# Patient Record
Sex: FEMALE | Race: WHITE | NOT HISPANIC OR LATINO | Employment: FULL TIME | URBAN - METROPOLITAN AREA
[De-identification: names, ages, dates, MRNs, and addresses within clinical notes are randomized per-mention and may not be internally consistent; named-entity substitution may affect disease eponyms.]

---

## 2017-01-25 ENCOUNTER — OFFICE VISIT (OUTPATIENT)
Dept: URGENT CARE | Facility: RETAIL CLINIC | Age: 52
End: 2017-01-25
Payer: COMMERCIAL

## 2017-01-25 VITALS — SYSTOLIC BLOOD PRESSURE: 146 MMHG | DIASTOLIC BLOOD PRESSURE: 94 MMHG | HEART RATE: 91 BPM | TEMPERATURE: 97.6 F

## 2017-01-25 DIAGNOSIS — J01.90 ACUTE SINUSITIS WITH COEXISTING CONDITION, NEED PROPHYLACTIC TREATMENT: Primary | ICD-10-CM

## 2017-01-25 DIAGNOSIS — Z23 NEED FOR PROPHYLACTIC VACCINATION AND INOCULATION AGAINST INFLUENZA: ICD-10-CM

## 2017-01-25 PROCEDURE — 90471 IMMUNIZATION ADMIN: CPT | Performed by: PHYSICIAN ASSISTANT

## 2017-01-25 PROCEDURE — 90686 IIV4 VACC NO PRSV 0.5 ML IM: CPT | Performed by: PHYSICIAN ASSISTANT

## 2017-01-25 PROCEDURE — 99213 OFFICE O/P EST LOW 20 MIN: CPT | Mod: 25 | Performed by: PHYSICIAN ASSISTANT

## 2017-01-25 RX ORDER — AZITHROMYCIN 500 MG/1
500 TABLET, FILM COATED ORAL DAILY
Qty: 3 TABLET | Refills: 0 | Status: SHIPPED | OUTPATIENT
Start: 2017-01-25 | End: 2017-01-28

## 2017-01-25 NOTE — PROGRESS NOTES
Chief Complaint   Patient presents with     Sinus Problem     x 7+ days, no fevers, sinus pressure and pain     Flu Shot        SUBJECTIVE:  Ophelia Wolf is a 51 year old female here with concerns about sinus infection.  She states onset of symptoms were 1 week(s) ago.  She has had maxillary, frontal pressure. Course of illness is worsening. Severity moderate  Current and Associated symptoms: nasal congestion, rhinorrhea, ear pressure bilateral, facial pain/pressure, tooth pain, headache and post-nasal drainage  Predisposing factors include HX of recurrent sinusitis and recent illness. Recent treatment has included: nasal spray, OTC cold med  Is leaving for [place] in 2 days    Past Medical History   Diagnosis Date     Lesion of plantar nerve      Buckley's neuroma/metatarsalgia     NONSPECIFIC MEDICAL HISTORY      pseudocholinesterase deficiency     Tobacco use disorder      Tension headache      UTERINE LEIOMYOMA NOS 4/26/2007     Dysmenorrhea 3/13/2007     CLASS MIGRAIN W/O MENTN INTRACTABLE 12/15/2004     ALLERGIC RHINITIS NOS 8/8/2002     History of rabies vaccination      Headache      Premenstrual tension syndrome      Current Outpatient Prescriptions   Medication Sig Dispense Refill     Chlorpheniramine-Phenylephrine (SINUS & ALLERGY PO)        azithromycin (ZITHROMAX) 500 MG tablet Take 1 tablet (500 mg) by mouth daily for 3 days 3 tablet 0     HYDROcodone-acetaminophen (NORCO) 5-325 MG per tablet Take 1 tablet by mouth every 6 hours as needed for pain 4 tablet 0     SUMAtriptan (IMITREX) 100 MG tablet Take 1 tablet (100 mg) by mouth at onset of headache for migraine 18 tablet prn     UNABLE TO FIND daily MEDICATION NAME: osteosheath       BIOTIN 5000 PO Take by mouth daily       Progesterone Micronized (PROGESTERONE PO) Take 20 mg by mouth daily       UNABLE TO FIND 5 mg daily MEDICATION NAME: biosWEEZEVENT       Vitamin D-Vitamin K (D3 + K2 DOTS) 1000-90 UNIT-MCG TABS Take 1 tablet by mouth daily       Calcium  Carbonate-Vitamin D (CALCIUM + D PO) Take  by mouth 2 times daily.          Allergies   Allergen Reactions     Amoxicillin Difficulty breathing     Anesthetic Ether      pseudocholinesterase deficiency        History   Smoking status     Former Smoker -- 0.50 packs/day for 10 years     Types: Cigarettes     Quit date: 11/30/2007   Smokeless tobacco     Never Used     Comment: 3-4 cig/day       ROS:  Review of systems negative except as stated above.    OBJECTIVE:  /94 mmHg  Pulse 91  Temp(Src) 97.6  F (36.4  C) (Oral)  LMP 06/16/2007  Exam:GENERAL APPEARANCE: moderately ill appearing  EYES: conjunctiva clear  HENT: ear canals clear. TMs gray with serous effusion. Nose erythematous, swollen, purulent discharge. mouth without ulcers, erythema or lesions  NECK: supple, nontender, no lymphadenopathy  RESP: lungs clear to auscultation - no rales, rhonchi or wheezes  CV: regular rates and rhythm, normal S1 S2, no murmur noted  SKIN: no suspicious lesions or rashes    ASSESSMENT:  Acute sinusitis with coexisting condition, need prophylactic treatment [J01.90]  Need for prophylactic vaccination and inoculation against influenza [Z23]      PLAN:  Flu shot received today  azithromycin (ZITHROMAX) 500 MG tablet - 1 tablet daily for 3 days  Take antibiotic as directed  Apply warm facial compresses/packs and next to ear for 15 minutes  daily.  Drink plenty of fluids- 6 to 10 glasses of liquids each day. Rest.  Saline drops or nasal sprays as needed.   May use netti pot as needed. Do not use tap water. May use filtered or distilled water.  Steam treatments or humidifier.  Take Sudafed (nasal decongestant) for congestion/pressure in middle ear.  Take antihistamine (Claritin/zyrtec) to help dry up fluid in middle ear  Mucinex (guaifenesin) to thin out secretions.  Tylenol or ibuprofen as needed for pain or fever  Follow up at your primary care clinic for increasing pain, high fever, vision changes, worsening symptoms, or  no relief from symptoms after 7-10 days.  Please follow up with primary care provider if not improving, worsening or new symptoms or for any adverse reactions to medications.     Winter Chirinos PA-C  [facility] [place]

## 2017-01-25 NOTE — MR AVS SNAPSHOT
After Visit Summary   1/25/2017    Ophelia Wolf    MRN: [id number]           Patient Information     Date Of Birth          1965        Visit Information        Provider Department      1/25/2017 11:40 AM Winter Chirinos PA-C [facility]        Today's Diagnoses     Acute sinusitis with coexisting condition, need prophylactic treatment    -  1       Care Instructions    Take antibiotic as directed  Apply warm facial compresses/packs and next to ear for 15 minutes  daily.  Drink plenty of fluids- 6 to 10 glasses of liquids each day. Rest.  Saline drops or nasal sprays as needed.   May use netti pot as needed. Do not use tap water. May use filtered or distilled water.  Steam treatments or humidifier.  Take Sudafed (nasal decongestant) for congestion/pressure in middle ear.  Take antihistamine (Claritin/zyrtec) to help dry up fluid in middle ear  Mucinex (guaifenesin) to thin out secretions.  Tylenol or ibuprofen as needed for pain or fever  Follow up at your primary care clinic for increasing pain, high fever, vision changes, worsening symptoms, or no relief from symptoms after 7-10 days.  Please follow up with primary care provider if not improving, worsening or new symptoms or for any adverse reactions to medications.     Flu shot given today          Follow-ups after your visit        Who to contact     You can reach your care team any time of the day by calling [phone].  Notification of test results:  If you have an abnormal lab result, we will notify you by phone as soon as possible.         Additional Information About Your Visit        [employer] Information     [employer] gives you secure access to your electronic health record. If you see a primary care provider, you can also send messages to your care team and make appointments. If you have questions, please call your primary care clinic.  If you do not have a primary care provider, please call [phone] and  they will assist you.        Care EveryWhere ID     This is your Care EveryWhere ID. This could be used by other organizations to access your [facility] medical records  [id number]        Your Vitals Were     Pulse Temperature Last Period             91 97.6  F (36.4  C) (Oral) 06/16/2007          Blood Pressure from Last 3 Encounters:   01/25/17 146/94   11/01/16 139/94   08/17/16 144/84    Weight from Last 3 Encounters:   08/17/16 157 lb (71.215 kg)   08/11/16 157 lb (71.215 kg)   05/10/16 160 lb (72.576 kg)              Today, you had the following     No orders found for display         Today's Medication Changes          These changes are accurate as of: 1/25/17 11:56 AM.  If you have any questions, ask your nurse or doctor.               Start taking these medicines.        Dose/Directions    azithromycin 500 MG tablet   Commonly known as:  ZITHROMAX   Used for:  Acute sinusitis with coexisting condition, need prophylactic treatment        Dose:  500 mg   Take 1 tablet (500 mg) by mouth daily for 3 days   Quantity:  3 tablet   Refills:  0            Where to get your medicines      These medications were sent to [id number] - [place], [place] - [address]  [address] [address] [address]     Phone:  [phone]    - azithromycin 500 MG tablet             Primary Care Provider Office Phone # Fax #    Susannah Ray PA-C [phone]       [facility] [address] [place] [address]        Thank you!     Thank you for choosing [facility]  for your care. Our goal is always to provide you with excellent care. Hearing back from our patients is one way we can continue to improve our services. Please take a few minutes to complete the written survey that you may receive in the mail after your visit with us. Thank you!             Your Updated Medication List - Protect others around you: Learn how to safely use, store and throw away your  medicines at www.disposemymeds.org.          This list is accurate as of: 1/25/17 11:56 AM.  Always use your most recent med list.                   Brand Name Dispense Instructions for use    azithromycin 500 MG tablet    ZITHROMAX    3 tablet    Take 1 tablet (500 mg) by mouth daily for 3 days       BIOTIN 5000 PO      Take by mouth daily       CALCIUM + D PO      Take  by mouth 2 times daily.       D3 + K2 DOTS 1000-90 UNIT-MCG Tabs      Take 1 tablet by mouth daily       HYDROcodone-acetaminophen 5-325 MG per tablet    NORCO    4 tablet    Take 1 tablet by mouth every 6 hours as needed for pain       NYQUIL PO          PROGESTERONE PO      Take 20 mg by mouth daily       SINUS & ALLERGY PO          SUMAtriptan 100 MG tablet    IMITREX    18 tablet    Take 1 tablet (100 mg) by mouth at onset of headache for migraine       triamcinolone 0.1 % cream    KENALOG    80 g    Apply sparingly to affected area three times daily as needed       * UNABLE TO FIND      daily MEDICATION NAME: osteosheath       * UNABLE TO FIND      5 mg daily MEDICATION NAME: biosom       * Notice:  This list has 2 medication(s) that are the same as other medications prescribed for you. Read the directions carefully, and ask your doctor or other care provider to review them with you.

## 2017-01-25 NOTE — PATIENT INSTRUCTIONS
Take antibiotic as directed  Apply warm facial compresses/packs and next to ear for 15 minutes  daily.  Drink plenty of fluids- 6 to 10 glasses of liquids each day. Rest.  Saline drops or nasal sprays as needed.   May use netti pot as needed. Do not use tap water. May use filtered or distilled water.  Steam treatments or humidifier.  Take Sudafed (nasal decongestant) for congestion/pressure in middle ear.  Take antihistamine (Claritin/zyrtec) to help dry up fluid in middle ear  Mucinex (guaifenesin) to thin out secretions.  Tylenol or ibuprofen as needed for pain or fever  Follow up at your primary care clinic for increasing pain, high fever, vision changes, worsening symptoms, or no relief from symptoms after 7-10 days.  Please follow up with primary care provider if not improving, worsening or new symptoms or for any adverse reactions to medications.     Flu shot given today

## 2017-01-25 NOTE — PROGRESS NOTES
Injectable Influenza Immunization Documentation    1.  Is the person to be vaccinated sick today?  No    2. Does the person to be vaccinated have an allergy to eggs or to a component of the vaccine?  No    3. Has the person to be vaccinated today ever had a serious reaction to influenza vaccine in the past?  No    4. Has the person to be vaccinated ever had Guillain-Macomb syndrome?  No     Form completed by Judith Anne CMA ([facility])  Prior to injection verified patient identity using patient's name and date of birth.  Per orders of Winter Chirinos PA-C, injection of flu shot given by Judith Anne. Patient instructed to remain in clinic for 20 minutes afterwards, and to report any adverse reaction to me immediately.Judith Anne CMA ([facility])

## 2017-02-06 NOTE — PROGRESS NOTES
"  SUBJECTIVE:                                                    Ophelia Wolf is a 51 year old female who presents to clinic today for the following health issues:      Mass  This is a new problem. Episode onset: 2 months. The problem occurs constantly. The problem has been gradually worsening. Associated symptoms comments: Mass seems to increase in size with activity.. Exacerbated by: When pressure is applied  She has tried nothing for the symptoms. The treatment provided no relief.       -------------------------------------    Problem list and histories reviewed & adjusted, as indicated.  Additional history: Patient has been noting a soreness to her R shoulder area and then afterwards felt there was a lump that was growing there.  Would like this evaluated.  Noted the discomfort after doing a lot of heavy lifting at work.  No trauma noted.        Problem list, Medication list, Allergies, and Medical/Social/Surgical histories reviewed in EPIC and updated as appropriate.    ROS:  C: NEGATIVE for fever, chills, change in weight  E/M: NEGATIVE for ear, mouth and throat problems  R: NEGATIVE for significant cough or SOB  CV: NEGATIVE for chest pain, palpitations or peripheral edema  MUSCULOSKELETAL: As above    OBJECTIVE:                                                    /80 mmHg  Pulse 76  Temp(Src) 98  F (36.7  C) (Temporal)  Resp 18  Ht 5' 3.5\" (1.613 m)  Wt 169 lb (76.658 kg)  BMI 29.46 kg/m2  LMP 06/16/2007  Body mass index is 29.46 kg/(m^2).   GENERAL: healthy, alert, well nourished, well hydrated, no distress  RESP: lungs clear to auscultation - no rales, no rhonchi, no wheezes  CV: regular rates and rhythm, normal S1 S2, no S3 or S4 and no murmur, no click or rub -  SHOULDER Exam-Right   Inspection: no swelling, no bruising, no discoloration, no obvious deformity, no asymmetry, no glenohumeral joint anterior bulge, no distal clavicle elevation, no muscle atrophy, no scapular winging  THERE IS " NO LUMP -- POINTS TO NORMAL CLAVICULAR PROMINENCE WHICH IS THE SAME AS ALTERNATE SIDE   Tenderness of: SC joint- no, clavicle(prox-mid)- no, clavicle-(mid-distal)- no, AC joint- YES, acromion- no, anterior capsule- no, prox bicep tendon- no, greater tuberosity- no, prox humerus- no, supraspinatous- no, infraspinatous- no, superior trapezious- no, rhomboids- no   Range of Motion: Active- forward flexion- normal, abduction- normal, external rotation- normal, internal rotation- normal  Range of Motion: Passive- forward flexion- normal, abduction- normal, external rotation- normal, internal rotation- normal   Strength: forward flexion- 5/5, abduction- 5/5, internal rotation- 5/5, external rotation- 5/5 and bicep- full   Special tests:    SHOULDER Exam-Left   Inspection: no swelling, no bruising, no discoloration, no obvious deformity, no asymmetry, no glenohumeral joint anterior bulge, no distal clavicle elevation, no muscle atrophy, no scapular winging   Tenderness of: SC joint- no, clavicle(prox-mid)- no, clavicle-(mid-distal)- no, AC joint- no, acromion- no, anterior capsule- no, prox bicep tendon- no, greater tuberosity- no, prox humerus- no, supraspinatous- no, infraspinatous- no, superior trapezious- no, rhomboids- no   Range of Motion: Active- forward flexion- normal, abduction- normal, external rotation- normal, internal rotation- normal  Range of Motion: Passive- forward flexion- normal, abduction- normal, external rotation- normal, internal rotation- normal   Strength: forward flexion- 5/5, abduction- 5/5, internal rotation- 5/5, external rotation- 5/5 and bicep- full   Special tests:                ASSESSMENT/PLAN:                                                        ICD-10-CM    1. Arthralgia of right acromioclavicular joint M25.511    2. Need for prophylactic vaccination and inoculation against viral hepatitis Z23 HEPATITIS A VACCINE (ADULT)   3. Need for hepatitis C screening test Z11.59 Hepatitis C  antibody       Reassured her that this is normal shoulder anatomy without any lump, though as she is having some AC area discomfort, she could have had injury or partial tear if over heavy lifting.  All is stable and only minimally uncomfortable at this time, so we reassured her on this and can offer PT or assistance in getting back to being able to lift heavier objects if needed, but likely needs a little relative rest and will be better soon.  Also planning travel to [place] soon, so discussed CDC website options for vaccinations and she felt only Hep A applied -- aware 2 shot series, but will only be able to complete 1 vaccine prior to travel.    Melonie Moore MD, MD  [facility]

## 2017-02-07 ASSESSMENT — PATIENT HEALTH QUESTIONNAIRE - PHQ9
SUM OF ALL RESPONSES TO PHQ QUESTIONS 1-9: 0
10. IF YOU CHECKED OFF ANY PROBLEMS, HOW DIFFICULT HAVE THESE PROBLEMS MADE IT FOR YOU TO DO YOUR WORK, TAKE CARE OF THINGS AT HOME, OR GET ALONG WITH OTHER PEOPLE: NOT DIFFICULT AT ALL

## 2017-02-07 ASSESSMENT — ANXIETY QUESTIONNAIRES
7. FEELING AFRAID AS IF SOMETHING AWFUL MIGHT HAPPEN: 0 = NOT AT ALL
GAD7 TOTAL SCORE: 0
GAD7 TOTAL SCORE: 0

## 2017-02-08 ASSESSMENT — PATIENT HEALTH QUESTIONNAIRE - PHQ9: SUM OF ALL RESPONSES TO PHQ QUESTIONS 1-9: 0

## 2017-02-08 ASSESSMENT — ANXIETY QUESTIONNAIRES: GAD7 TOTAL SCORE: 0

## 2017-02-09 ENCOUNTER — OFFICE VISIT (OUTPATIENT)
Dept: FAMILY MEDICINE | Facility: OTHER | Age: 52
End: 2017-02-09
Payer: COMMERCIAL

## 2017-02-09 VITALS
TEMPERATURE: 98 F | RESPIRATION RATE: 18 BRPM | SYSTOLIC BLOOD PRESSURE: 112 MMHG | HEART RATE: 76 BPM | WEIGHT: 169 LBS | DIASTOLIC BLOOD PRESSURE: 80 MMHG | BODY MASS INDEX: 28.85 KG/M2 | HEIGHT: 64 IN

## 2017-02-09 DIAGNOSIS — Z23 NEED FOR PROPHYLACTIC VACCINATION AND INOCULATION AGAINST VIRAL HEPATITIS: ICD-10-CM

## 2017-02-09 DIAGNOSIS — M25.511 ARTHRALGIA OF RIGHT ACROMIOCLAVICULAR JOINT: Primary | ICD-10-CM

## 2017-02-09 DIAGNOSIS — Z11.59 NEED FOR HEPATITIS C SCREENING TEST: ICD-10-CM

## 2017-02-09 PROCEDURE — 36415 COLL VENOUS BLD VENIPUNCTURE: CPT | Performed by: FAMILY MEDICINE

## 2017-02-09 PROCEDURE — 90632 HEPA VACCINE ADULT IM: CPT | Performed by: FAMILY MEDICINE

## 2017-02-09 PROCEDURE — 90471 IMMUNIZATION ADMIN: CPT | Performed by: FAMILY MEDICINE

## 2017-02-09 PROCEDURE — 86803 HEPATITIS C AB TEST: CPT | Performed by: FAMILY MEDICINE

## 2017-02-09 PROCEDURE — 99214 OFFICE O/P EST MOD 30 MIN: CPT | Mod: 25 | Performed by: FAMILY MEDICINE

## 2017-02-09 ASSESSMENT — PAIN SCALES - GENERAL: PAINLEVEL: MODERATE PAIN (4)

## 2017-02-09 NOTE — NURSING NOTE
"Chief Complaint   Patient presents with     Mass     shoulder x 2 months     Panel Management     height, hep c. phq/faith, drug screen       Initial /80 mmHg  Pulse 76  Temp(Src) 98  F (36.7  C) (Temporal)  Resp 18  Ht 5' 3.5\" (1.613 m)  Wt 169 lb (76.658 kg)  BMI 29.46 kg/m2  LMP 06/16/2007 Estimated body mass index is 29.46 kg/(m^2) as calculated from the following:    Height as of this encounter: 5' 3.5\" (1.613 m).    Weight as of this encounter: 169 lb (76.658 kg).  Medication Reconciliation: complete  "

## 2017-02-09 NOTE — MR AVS SNAPSHOT
"              After Visit Summary   2/9/2017    Ophelia Wolf    MRN: [id number]           Patient Information     Date Of Birth          1965        Visit Information        Provider Department      2/9/2017 1:00 PM Melonie Moore MD [facility]        Today's Diagnoses     Need for prophylactic vaccination and inoculation against viral hepatitis    -  1     Need for hepatitis C screening test            Follow-ups after your visit        Who to contact     If you have questions or need follow up information about today's clinic visit or your schedule please contact [facility] directly at [phone].  Normal or non-critical lab and imaging results will be communicated to you by [email or web address], letter or phone within 4 business days after the clinic has received the results. If you do not hear from us within 7 days, please contact the clinic through [employer] or phone. If you have a critical or abnormal lab result, we will notify you by phone as soon as possible.  Submit refill requests through [employer] or call your pharmacy and they will forward the refill request to us. Please allow 3 business days for your refill to be completed.          Additional Information About Your Visit        MyChart Information     [employer] gives you secure access to your electronic health record. If you see a primary care provider, you can also send messages to your care team and make appointments. If you have questions, please call your primary care clinic.  If you do not have a primary care provider, please call [phone] and they will assist you.        Care EveryWhere ID     This is your Care EveryWhere ID. This could be used by other organizations to access your [facility] medical records  [id number]        Your Vitals Were     Pulse Temperature Respirations Height BMI (Body Mass Index) Last Period    76 98  F (36.7  C) (Temporal) 18 5' 3.5\" (1.613 m) 29.46 kg/m2 06/16/2007       Blood Pressure " from Last 3 Encounters:   02/09/17 112/80   01/25/17 146/94   11/01/16 139/94    Weight from Last 3 Encounters:   02/09/17 169 lb (76.658 kg)   08/17/16 157 lb (71.215 kg)   08/11/16 157 lb (71.215 kg)              We Performed the Following     HEPATITIS A VACCINE (ADULT)     Hepatitis C antibody        Primary Care Provider Office Phone # Fax #    Susannah Ray PA-C [phone]       [facility] [address] [place] [address]        Thank you!     Thank you for choosing [facility]  for your care. Our goal is always to provide you with excellent care. Hearing back from our patients is one way we can continue to improve our services. Please take a few minutes to complete the written survey that you may receive in the mail after your visit with us. Thank you!             Your Updated Medication List - Protect others around you: Learn how to safely use, store and throw away your medicines at www.disposemymeds.org.          This list is accurate as of: 2/9/17  1:37 PM.  Always use your most recent med list.                   Brand Name Dispense Instructions for use    BIOTIN 5000 PO      Take by mouth daily       CALCIUM + D PO      Take  by mouth 2 times daily.       D3 + K2 DOTS 1000-90 UNIT-MCG Tabs      Take 1 tablet by mouth daily       HYDROcodone-acetaminophen 5-325 MG per tablet    NORCO    4 tablet    Take 1 tablet by mouth every 6 hours as needed for pain       PROGESTERONE PO      Take 20 mg by mouth daily       SINUS & ALLERGY PO          SUMAtriptan 100 MG tablet    IMITREX    18 tablet    Take 1 tablet (100 mg) by mouth at onset of headache for migraine       * UNABLE TO FIND      daily MEDICATION NAME: osteosheath       * UNABLE TO FIND      5 mg daily MEDICATION NAME: biosom       * Notice:  This list has 2 medication(s) that are the same as other medications prescribed for you. Read the directions carefully, and ask your doctor or other care provider  to review them with you.

## 2017-02-09 NOTE — NURSING NOTE
Screening Questionnaire for Adult Immunization    Are you sick today?   No   Do you have allergies to medications, food, a vaccine component or latex?   No   Have you ever had a serious reaction after receiving a vaccination?   No   Do you have a long-term health problem with heart disease, lung disease, asthma, kidney disease, metabolic disease (e.g. diabetes), anemia, or other blood disorder?   No   Do you have cancer, leukemia, HIV/AIDS, or any other immune system problem?   No   In the past 3 months, have you taken medications that affect  your immune system, such as prednisone, other steroids, or anticancer drugs; drugs for the treatment of rheumatoid arthritis, Crohn s disease, or psoriasis; or have you had radiation treatments?   No   Have you had a seizure, or a brain or other nervous system problem?   No   During the past year, have you received a transfusion of blood or blood     products, or been given immune (gamma) globulin or antiviral drug?   No   For women: Are you pregnant or is there a chance you could become        pregnant during the next month?   No   Have you received any vaccinations in the past 4 weeks?   No     Immunization questionnaire answers were all negative.      MNVFC doesn't apply on this patient    Per orders of Dr. Moore, injection of Hpe A given by Marzena Wilcox. Patient instructed to remain in clinic for 20 minutes afterwards, and to report any adverse reaction to me immediately.       Screening performed by Marzena Wilcox on 2/9/2017 at 1:39 PM.

## 2017-02-10 LAB — HCV AB SERPL QL IA: NORMAL

## 2017-02-12 NOTE — PROGRESS NOTES
Ophelia, your Hep C was normal.  Please let me know if you have any questions.    Melonie Moore MD

## 2017-02-15 ENCOUNTER — MYC MEDICAL ADVICE (OUTPATIENT)
Dept: FAMILY MEDICINE | Facility: OTHER | Age: 52
End: 2017-02-15

## 2017-02-15 DIAGNOSIS — J01.01 ACUTE RECURRENT MAXILLARY SINUSITIS: Primary | ICD-10-CM

## 2017-02-15 RX ORDER — AZITHROMYCIN 250 MG/1
TABLET, FILM COATED ORAL
Qty: 6 TABLET | Refills: 0 | Status: SHIPPED | OUTPATIENT
Start: 2017-02-15 | End: 2017-02-27

## 2017-02-21 NOTE — PROGRESS NOTES
SUBJECTIVE:                                                    Ophelia Wolf is a 51 year old female who presents to clinic today for the following health issues:      Migraine Follow-Up    Headaches symptoms:  Stable , doing well    Frequency: varies-stress induced but maybe once a month    Duration of headaches: about half the day to whole day    Able to do normal daily activities/work with migraines: No     Rescue/Relief medication:sumatriptan (Imitrex)              Effectiveness: total relief, if this fails she will use a norco, uses 4 in 90 days, narcotic agreement up to date     Preventative medication: None    Neurologic complications: No new stroke-like symptoms, loss of vision or speech, numbness or weakness    In the past 4 weeks, how often have you gone to Urgent Care or the emergency room because of your headaches?  0     Check spot on right big toe  No pain, just has a white spot at corner of nail.      Discuss weight loss   Has not been exercising, feels that she eats healthy.  She is starting to monitor her eating even more       Amount of exercise or physical activity: most the day with work    Problems taking medications regularly: No    Medication side effects: none  Diet: regular (no restrictions)        Problem list and histories reviewed & adjusted, as indicated.  Additional history: as documented    BP Readings from Last 3 Encounters:   02/27/17 128/66   02/09/17 112/80   01/25/17 (!) 146/94    Wt Readings from Last 3 Encounters:   02/27/17 166 lb (75.3 kg)   02/09/17 169 lb (76.7 kg)   08/17/16 157 lb (71.2 kg)                  Labs reviewed in EPIC    ROS:  C: NEGATIVE for fever, chills, change in weight  E/M: NEGATIVE for ear, mouth and throat problems  R: NEGATIVE for significant cough or SOB  CV: NEGATIVE for chest pain, palpitations or peripheral edema  NEURO: NEGATIVE for weakness, dizziness or paresthesias and HA as above  PSYCHIATRIC: working to monitor her stress levels which  "trigger her HA, her daughter with drug issues is now out of [facility] and doing well, she does not have her granddaughter as much now due to this , more free time    OBJECTIVE:                                                    /66  Pulse 78  Temp 98.4  F (36.9  C) (Oral)  Resp 12  Ht 5' 5\" (1.651 m)  Wt 166 lb (75.3 kg)  LMP 06/16/2007  BMI 27.62 kg/m2  Body mass index is 27.62 kg/(m^2).  GENERAL: healthy, alert and no distress  NECK: no adenopathy, no asymmetry, masses, or scars and thyroid normal to palpation  RESP: lungs clear to auscultation - no rales, rhonchi or wheezes  CV: regular rate and rhythm, normal S1 S2, no S3 or S4, no murmur, click or rub, no peripheral edema and peripheral pulses strong  ABDOMEN: soft, nontender, no hepatosplenomegaly, no masses and bowel sounds normal  MS: no gross musculoskeletal defects noted, no edema  SKIN: right great toe, mildly hypopigmented area lateral distal aspect, no yellowed nail no debris   NEURO: Normal strength and tone, mentation intact and speech normal    Diagnostic Test Results:  none      ASSESSMENT/PLAN:                                                        Will do drug screen at next visit, may not have hydrocodone in urine as she gets 4 pills for 90 days obviously rarely uses med , [place]  is checked last filled 11-3-16 all rx by me    1. Migraine with aura and without status migrainosus, not intractable  Stable   - SUMAtriptan (IMITREX) 100 MG tablet; Take 1 tablet (100 mg) by mouth at onset of headache for migraine  Dispense: 18 tablet; Refill: prn  - HYDROcodone-acetaminophen (NORCO) 5-325 MG per tablet; Take 1 tablet by mouth every 6 hours as needed for pain  Dispense: 4 tablet; Refill: 0    2. Onychomycosis  She will observe and use otc antifungal options,  To podiatry if worsening     3. Weight loss - track foods, increase vegetables and protein, eat less start exercise routine    Susannah Ray PA-C       recheck in 6 months     Susannah Ray, " FAN  [facility]  Electronically signed by Susannah Ray PA-C

## 2017-02-27 ENCOUNTER — OFFICE VISIT (OUTPATIENT)
Dept: FAMILY MEDICINE | Facility: OTHER | Age: 52
End: 2017-02-27
Payer: COMMERCIAL

## 2017-02-27 VITALS
TEMPERATURE: 98.4 F | HEIGHT: 65 IN | WEIGHT: 166 LBS | HEART RATE: 78 BPM | DIASTOLIC BLOOD PRESSURE: 66 MMHG | RESPIRATION RATE: 12 BRPM | BODY MASS INDEX: 27.66 KG/M2 | SYSTOLIC BLOOD PRESSURE: 128 MMHG

## 2017-02-27 DIAGNOSIS — B35.1 ONYCHOMYCOSIS: Primary | ICD-10-CM

## 2017-02-27 DIAGNOSIS — G43.109 MIGRAINE WITH AURA AND WITHOUT STATUS MIGRAINOSUS, NOT INTRACTABLE: ICD-10-CM

## 2017-02-27 PROCEDURE — 99213 OFFICE O/P EST LOW 20 MIN: CPT | Performed by: PHYSICIAN ASSISTANT

## 2017-02-27 RX ORDER — SUMATRIPTAN 100 MG/1
100 TABLET, FILM COATED ORAL
Qty: 18 TABLET | Status: SHIPPED | OUTPATIENT
Start: 2017-02-27 | End: 2018-01-08

## 2017-02-27 RX ORDER — HYDROCODONE BITARTRATE AND ACETAMINOPHEN 5; 325 MG/1; MG/1
1 TABLET ORAL EVERY 6 HOURS PRN
Qty: 4 TABLET | Refills: 0 | Status: SHIPPED | OUTPATIENT
Start: 2017-02-27 | End: 2017-02-27

## 2017-02-27 RX ORDER — HYDROCODONE BITARTRATE AND ACETAMINOPHEN 5; 325 MG/1; MG/1
1 TABLET ORAL EVERY 6 HOURS PRN
Qty: 4 TABLET | Refills: 0 | Status: SHIPPED | OUTPATIENT
Start: 2017-05-27 | End: 2017-10-12

## 2017-02-27 ASSESSMENT — PAIN SCALES - GENERAL: PAINLEVEL: NO PAIN (0)

## 2017-02-27 NOTE — MR AVS SNAPSHOT
"              After Visit Summary   2/27/2017    Ophelia Wolf    MRN: [id number]           Patient Information     Date Of Birth          1965        Visit Information        Provider Department      2/27/2017 9:30 AM Susannah Ray PA-C [facility]        Today's Diagnoses     Migraine with aura and without status migrainosus, not intractable           Follow-ups after your visit        Who to contact     If you have questions or need follow up information about today's clinic visit or your schedule please contact [facility] directly at [phone].  Normal or non-critical lab and imaging results will be communicated to you by [employer], letter or phone within 4 business days after the clinic has received the results. If you do not hear from us within 7 days, please contact the clinic through [employer] or phone. If you have a critical or abnormal lab result, we will notify you by phone as soon as possible.  Submit refill requests through [employer] or call your pharmacy and they will forward the refill request to us. Please allow 3 business days for your refill to be completed.          Additional Information About Your Visit        MyChart Information     [employer] gives you secure access to your electronic health record. If you see a primary care provider, you can also send messages to your care team and make appointments. If you have questions, please call your primary care clinic.  If you do not have a primary care provider, please call [phone] and they will assist you.        Care EveryWhere ID     This is your Care EveryWhere ID. This could be used by other organizations to access your [place] medical records  [id number]        Your Vitals Were     Pulse Temperature Respirations Height Last Period BMI (Body Mass Index)    78 98.4  F (36.9  C) (Oral) 12 5' 5\" (1.651 m) 06/16/2007 27.62 kg/m2       Blood Pressure from Last 3 Encounters:   02/27/17 128/66   02/09/17 112/80 "   01/25/17 (!) 146/94    Weight from Last 3 Encounters:   02/27/17 166 lb (75.3 kg)   02/09/17 169 lb (76.7 kg)   08/17/16 157 lb (71.2 kg)              Today, you had the following     No orders found for display         Today's Medication Changes          These changes are accurate as of: 2/27/17 10:16 AM.  If you have any questions, ask your nurse or doctor.               Start taking these medicines.        Dose/Directions    HYDROcodone-acetaminophen 5-325 MG per tablet   Commonly known as:  NORCO   Used for:  Migraine with aura and without status migrainosus, not intractable   Started by:  Susannah Ray PA-C        Dose:  1 tablet   Start taking on:  5/27/2017   Take 1 tablet by mouth every 6 hours as needed for pain   Quantity:  4 tablet   Refills:  0            Where to get your medicines      These medications were sent to [id number] - [place], [place] - [address]  [address], [facility] [address]     Phone:  [phone]     SUMAtriptan 100 MG tablet         Some of these will need a paper prescription and others can be bought over the counter.  Ask your nurse if you have questions.     Bring a paper prescription for each of these medications     HYDROcodone-acetaminophen 5-325 MG per tablet                Primary Care Provider Office Phone # Fax #    Susannah Ray PA-C [phone]       [facility] [address] [place] [address]        Thank you!     Thank you for choosing [facility]  for your care. Our goal is always to provide you with excellent care. Hearing back from our patients is one way we can continue to improve our services. Please take a few minutes to complete the written survey that you may receive in the mail after your visit with us. Thank you!             Your Updated Medication List - Protect others around you: Learn how to safely use, store and throw away your medicines at www.disposemymeds.org.           This list is accurate as of: 2/27/17 10:16 AM.  Always use your most recent med list.                   Brand Name Dispense Instructions for use    BIOTIN 5000 PO      Take by mouth daily       CALCIUM + D PO      Take  by mouth 2 times daily.       D3 + K2 DOTS 1000-90 UNIT-MCG Tabs      Take 1 tablet by mouth daily       HYDROcodone-acetaminophen 5-325 MG per tablet   Start taking on:  5/27/2017    NORCO    4 tablet    Take 1 tablet by mouth every 6 hours as needed for pain       PROGESTERONE PO      Take 20 mg by mouth daily       SINUS & ALLERGY PO          SUMAtriptan 100 MG tablet    IMITREX    18 tablet    Take 1 tablet (100 mg) by mouth at onset of headache for migraine       * UNABLE TO FIND      daily MEDICATION NAME: osteosheath       * UNABLE TO FIND      5 mg daily MEDICATION NAME: biosom       * Notice:  This list has 2 medication(s) that are the same as other medications prescribed for you. Read the directions carefully, and ask your doctor or other care provider to review them with you.

## 2017-03-15 DIAGNOSIS — R12 HEARTBURN SYMPTOM: ICD-10-CM

## 2017-03-16 NOTE — TELEPHONE ENCOUNTER
Routing refill request to provider for review/approval because:  Drug not active on patient's medication list    Kenzie Copeland RN, BSN

## 2017-03-16 NOTE — TELEPHONE ENCOUNTER
Ranitidine 150 mg    Did not see on med list   Last Written Prescription Date:   Last Fill Quantity: ,  # refills:    Last Office Visit with [facility], [facility] or [facility] prescribing provider: 02/27/2017

## 2017-05-24 ENCOUNTER — MYC MEDICAL ADVICE (OUTPATIENT)
Dept: FAMILY MEDICINE | Facility: OTHER | Age: 52
End: 2017-05-24

## 2017-05-24 NOTE — PROGRESS NOTES
SUBJECTIVE:                                                    Ophelia Wolf is a 51 year old female who presents to clinic today for the following health issues:      Acute Illness   Acute illness concerns: Cough  Onset: 3 weeks    Fever: no    Chills/Sweats: no    Headache (location?): no    Sinus Pressure:YES- a little bit    Conjunctivitis:  YES-     Ear Pain: YES- pressure    Rhinorrhea: YES    Congestion: YES    Sore Throat: YES-Cough, post nasal drainage      Cough: YES-productive of yellow sputum    Wheeze: no    Decreased Appetite: no    Nausea: no    Vomiting: no    Diarrhea:  no    Dysuria/Freq.: no    Fatigue/Achiness: YES    Sick/Strep Exposure: YES- Patient has been ill since being in [place]     Therapies Tried and outcome: loratadine,       PROBLEMS TO ADD ON...    Problem list and histories reviewed & adjusted, as indicated.  Additional history: as documented    Patient Active Problem List   Diagnosis     Headache     Premenstrual tension syndrome     Allergic rhinitis     Migraine with aura     Constipation     Dysmenorrhea     Leiomyoma of uterus     pseudocholinesterase deficiency     Disturbance of skin sensation     CARDIOVASCULAR SCREENING; LDL GOAL LESS THAN 160     Abdominal bloating     Chronic abdominal pain     Heartburn symptom     Past Surgical History:   Procedure Laterality Date     C LIGATE FALLOPIAN TUBE,POSTPARTUM  1990    Tubal Ligation     C NONSPECIFIC PROCEDURE  1978    jaw surgery     C SUPRACERV ABD HYSTERECTOMY  06/28/2007     COLONOSCOPY  04/14/10       Social History   Substance Use Topics     Smoking status: Former Smoker     Packs/day: 0.50     Years: 10.00     Types: Cigarettes     Quit date: 11/30/2007     Smokeless tobacco: Never Used      Comment: 3-4 cig/day     Alcohol use 2.0 oz/week      Comment: weekends     Family History   Problem Relation Age of Onset     Neurologic Disorder Mother      Meniere's disease     Anesthesia Reaction Mother      And myself      OSTEOPOROSIS Mother      Thyroid Disease Mother      hypo     HEART DISEASE Maternal Grandmother      stroke     Arthritis Maternal Grandmother      osteoporosis     DIABETES Maternal Grandmother      Type II diabetes     Coronary Artery Disease Maternal Grandmother 40     CEREBROVASCULAR DISEASE Maternal Grandmother 40     OSTEOPOROSIS Maternal Grandmother      HEART DISEASE Maternal Grandfather      heart problems, s/p CAB     CANCER Maternal Grandfather      prostate      DIABETES Maternal Grandfather      Type II diabetes     Prostate Cancer Maternal Grandfather      Hypertension Father      Hyperlipidemia Father      HEART DISEASE Paternal Grandmother      Anesthesia Reaction Brother          Current Outpatient Prescriptions   Medication Sig Dispense Refill     fluticasone (FLONASE) 50 MCG/ACT spray Spray 1-2 sprays into both nostrils daily 16 g 0     ranitidine (ZANTAC) 150 MG tablet TAKE ONE TABLET BY MOUTH TWICE A DAY 60 tablet 11     SUMAtriptan (IMITREX) 100 MG tablet Take 1 tablet (100 mg) by mouth at onset of headache for migraine 18 tablet prn     HYDROcodone-acetaminophen (NORCO) 5-325 MG per tablet Take 1 tablet by mouth every 6 hours as needed for pain 4 tablet 0     Chlorpheniramine-Phenylephrine (SINUS & ALLERGY PO)        BIOTIN 5000 PO Take by mouth daily       Progesterone Micronized (PROGESTERONE PO) Take 20 mg by mouth daily       Calcium Carbonate-Vitamin D (CALCIUM + D PO) Take  by mouth 2 times daily.       Allergies   Allergen Reactions     Amoxicillin Difficulty breathing     Anesthetic Ether      pseudocholinesterase deficiency       Reviewed and updated as needed this visit by clinical staff       Reviewed and updated as needed this visit by Provider         ROS:  Constitutional, HEENT, cardiovascular, pulmonary, gi and gu systems are negative, except as otherwise noted.    OBJECTIVE:                                                    /80 (Cuff Size: Adult Regular)  Pulse 88   "Temp 97.7  F (36.5  C) (Temporal)  Resp 16  Ht 5' 5\" (1.651 m)  Wt 164 lb (74.4 kg)  LMP 06/16/2007  SpO2 98%  BMI 27.29 kg/m2  Body mass index is 27.29 kg/(m^2).  GENERAL: healthy, alert and no distress  EYES: Eyes grossly normal to inspection, PERRL and conjunctivae and sclerae normal  HENT: ear canals and TM's normal, nose and mouth without ulcers or lesions  NECK: no adenopathy, no asymmetry, masses, or scars and thyroid normal to palpation  RESP: lungs clear to auscultation - no rales, rhonchi or wheezes  CV: regular rate and rhythm, normal S1 S2, no S3 or S4, no murmur, click or rub, no peripheral edema and peripheral pulses strong  ABDOMEN: soft, nontender, no hepatosplenomegaly, no masses and bowel sounds normal  MS: no gross musculoskeletal defects noted, no edema    Diagnostic Test Results:  none      ASSESSMENT/PLAN:                                                        ICD-10-CM    1. Acute recurrent maxillary sinusitis J01.01 fluticasone (FLONASE) 50 MCG/ACT spray       The patient understood the rational for the diagnosis and treatment plan. All questions were answered to best of my ability and the patient's satisfaction.  I have reviewed all pertinent investigations including labs and imaging including outside records if relevent.  Hydrate well, tylenol as needed.  Risks, benefits and alternatives of treatments discussed. Plan agreed on.  Followup: if not better.  Will call, return to clinic, or go to ED if worsening or symptoms not improving as discussed.  See patient instructions.     Patient Instructions       Sinusitis (No Antibiotics)    The sinuses are air-filled spaces within the bones of the face. They connect to the inside of the nose. Sinusitis is an inflammation of the tissue lining the sinus cavity. Sinus inflammation can occur during a cold. It can also be due to allergies to pollens and other particles in the air. It can cause symptoms such as sinus congestion, headache, sore " throat, facial swelling and fullness. It may also cause a low-grade fever. No infection is present, and no antibiotic treatment is needed.  Home care    Drink plenty of water, hot tea, and other liquids. This may help thin mucus. It also may promote sinus drainage.    Heat may help soothe painful areas of the face. Use a towel soaked in hot water. Or,  the shower and direct the hot spray onto your face. Using a vaporizer along with a menthol rub at night may also help.     An expectorant containing guaifenesin may help thin the mucus and promote drainage from the sinuses.    Over-the-counter decongestants may be used unless a similar medicine was prescribed. Nasal sprays work the fastest. Use one that contains phenylephrine or oxymetazoline. First blow the nose gently. Then use the spray. Do not use these medicines more often than directed on the label or symptoms may get worse. You may also use tablets containing pseudoephedrine. Avoid products that combine ingredients, because side effects may be increased. Read labels. You can also ask the pharmacist for help. (NOTE: Persons with high blood pressure should not use decongestants. They can raise blood pressure.)    Over-the-counter antihistamines may help if allergies contributed to your sinusitis.      Use acetaminophen or ibuprofen to control pain, unless another pain medicine was prescribed. (If you have chronic liver or kidney disease or ever had a stomach ulcer, talk with your doctor before using these medicines. Aspirin should never be used in anyone under 18 years of age who is ill with a fever. It may cause severe liver damage.)    Use nasal rinses or irrigation as instructed by your health care provider.    Don't smoke. This can worsen symptoms.  Follow-up care  Follow up with your healthcare provider or our staff if you are not improving within the next week.  When to seek medical advice  Call your healthcare provider if any of these  occur:    Green or yellow discharge from the nose or into the throat    Facial pain or headache becoming more severe    Stiff neck    Unusual drowsiness or confusion    Swelling of the forehead or eyelids    Vision problems, including blurred or double vision    Fever of 100.4 F (38 C) or higher, or as directed by your healthcare provider    Seizure    Breathing problems    Symptoms not resolving within 10 days    8766-4780 [employer]. [address] [place] [address]. All rights reserved. This information is not intended as a substitute for professional medical care. Always follow your healthcare professional's instructions.        Self-Care for Sinusitis  Sinusitis can often be managed with self-care. Self-care can keep sinuses moist and make you feel more comfortable. Remember to follow your doctor's instructions closely, which can make a big difference in getting your sinus problem under control.    Drink fluids  Drinking extra fluids -- a glass every hour or two -- helps thin your mucus, allowing it to drain from your sinuses more easily. A humidifier helps in much the same way. Fluids can also offset the drying effects of certain drugs.  Use saltwater rinses  Rinses help keep your sinuses and nose moist. Mix a teaspoon of salt in 8 ounces of fresh, warm water. Use a bulb syringe to gently squirt the water into your nose a few times a day. You can also buy ready-made saline nasal sprays.  Apply hot or cold packs  Applying heat to the area surrounding your sinuses may make you feel more comfortable. Use a hot water bottle or a hand towel dipped in hot water. Some people also find ice packs effective for relieving pain.  Medications  Your doctor may prescribe medications to help treat your sinusitis. If you have an infection, antibiotics can help clear it up. If you are prescribed antibiotics, take all pills on schedule until they are gone, even if you feel better. Decongestants help  relieve swelling. Use decongestant sprays for short periods only under the direction of your doctor. If you have allergies, your doctor may prescribe medications to help relieve them.     9104-2641 [employer] [address] [place] [address], [place] [address]. All rights reserved. This information is not intended as a substitute for professional medical care. Always follow your healthcare professional's instructions.        Preventing Sinusitis  Colds, flu, and allergies can lead more easily to sinusitis. Do your best to prevent sinusitis by preventing these underlying problems. Do what you can to avoid getting colds and other infections. Avoid any allergens (substances that cause allergies), and keep your sinuses as moist as possible.  Tips for air travel  When traveling on an airplane, use saline nasal spray to keep your sinuses moist. Drink plenty of fluids. You may also want to take a decongestant before boarding.   Prevent colds    Do what you can to avoid exposure to colds and flu. Whenever possible, take more time to rest when you feel something  coming on.     Wash your hands often, especially during cold and flu season.    As much as possible, stay away from infected people.    Follow these standbys for beating the  bugs : eat balanced meals, exercise regularly, and get plenty of sleep.  Avoid allergens  First find out what substances you re allergic to. Then take steps to minimize exposure to allergens or irritants in the air such as dust, pollution, and pollen.    Wear a mask when you clean, or consider hiring a  to help minimize your exposure to dust.    Sit in the nonsmoking sections of restaurants.    Avoid the outdoors during peak pollution hours such as rush hour.    Keep an air conditioner on during allergy season and clean its filter regularly.  Maximize moisture  Keeping your sinuses moist makes your mucus thinner, allowing your sinuses to drain better. This, in turn, helps  prevent infection. Ask your doctor about these suggestions:    Use a humidifier, regularly cleaning out any mold or mildew in the reservoir.    Drink several glasses of water a day.    Avoid drying substances such as alcohol and coffee.    Avoid smoke, which dries out sinus linings.    Use saltwater rinses.    5974-6620 [employer]. [address] [place] [address]. All rights reserved. This information is not intended as a substitute for professional medical care. Always follow your healthcare professional's instructions.        Causes of Sinusitis       Mucus helps keep your sinuses clean. But mucus may build up in the sinuses due to colds, allergies, or obstructions. These things interfere with the natural drainage of mucus. This may lead to sinusitis (sinus inflammation and infection).    Acute sinusitis comes on suddenly. It often happens right after an upper respiratory infection, such as a cold.    Chronic sinusitis is ongoing swelling of the sinus lining. This is often the result of allergies or chronic infections.  Colds and other infections  A cold or flu may cause your sinus and nasal linings to swell. Sinus openings can become blocked. This causes mucus to back up. This backed-up mucus becomes an ideal place for bacteria to grow. Thick, yellow, or discolored mucus is one sign of infection.  Allergic reactions  You may be sensitive to certain substances. This causes the release of histamine in the body. Histamine makes your sinus and nasal linings swell. Long-term swelling clogs your sinuses. It prevents the cilia (tiny hairs in the nasal lining) from sweeping away mucus. Allergy symptoms can be persistent. But they re less severe than with colds.    Obstructions    A polyp is a sac of swollen tissue. It can be the result of an allergy or infection. It may block the middle meatus (the opening where most of your sinuses drain). It may even grow large enough to block your nose.    A  deviated septum is when the thin wall inside your nose is pushed to one side. It is often the result of injury. This can block your middle meatus.    2798-5736 [employer]. [address] [place] [address], [place] [address]. All rights reserved. This information is not intended as a substitute for professional medical care. Always follow your healthcare professional's instructions.            Eda De La Cruz MD  [facility]

## 2017-05-24 NOTE — TELEPHONE ENCOUNTER
Patient returned call and received message below. Patient is now scheduled in [place] with another provider     Yue Khan  Reception/ Scheduling

## 2017-05-24 NOTE — TELEPHONE ENCOUNTER
Responded to mychart. Due to length of symptoms and type of symptoms, patient needs OV.    Kenzie Copeland RN, BSN

## 2017-05-25 ENCOUNTER — OFFICE VISIT (OUTPATIENT)
Dept: FAMILY MEDICINE | Facility: OTHER | Age: 52
End: 2017-05-25
Payer: COMMERCIAL

## 2017-05-25 VITALS
SYSTOLIC BLOOD PRESSURE: 122 MMHG | HEIGHT: 65 IN | RESPIRATION RATE: 16 BRPM | DIASTOLIC BLOOD PRESSURE: 80 MMHG | TEMPERATURE: 97.7 F | BODY MASS INDEX: 27.32 KG/M2 | OXYGEN SATURATION: 98 % | HEART RATE: 88 BPM | WEIGHT: 164 LBS

## 2017-05-25 DIAGNOSIS — J01.01 ACUTE RECURRENT MAXILLARY SINUSITIS: Primary | ICD-10-CM

## 2017-05-25 PROCEDURE — 99213 OFFICE O/P EST LOW 20 MIN: CPT | Performed by: FAMILY MEDICINE

## 2017-05-25 RX ORDER — FLUTICASONE PROPIONATE 50 MCG
1-2 SPRAY, SUSPENSION (ML) NASAL DAILY
Qty: 16 G | Refills: 0 | Status: SHIPPED | OUTPATIENT
Start: 2017-05-25 | End: 2017-10-17

## 2017-05-25 ASSESSMENT — PAIN SCALES - GENERAL: PAINLEVEL: NO PAIN (0)

## 2017-05-25 NOTE — PATIENT INSTRUCTIONS
Sinusitis (No Antibiotics)    The sinuses are air-filled spaces within the bones of the face. They connect to the inside of the nose. Sinusitis is an inflammation of the tissue lining the sinus cavity. Sinus inflammation can occur during a cold. It can also be due to allergies to pollens and other particles in the air. It can cause symptoms such as sinus congestion, headache, sore throat, facial swelling and fullness. It may also cause a low-grade fever. No infection is present, and no antibiotic treatment is needed.  Home care    Drink plenty of water, hot tea, and other liquids. This may help thin mucus. It also may promote sinus drainage.    Heat may help soothe painful areas of the face. Use a towel soaked in hot water. Or,  the shower and direct the hot spray onto your face. Using a vaporizer along with a menthol rub at night may also help.     An expectorant containing guaifenesin may help thin the mucus and promote drainage from the sinuses.    Over-the-counter decongestants may be used unless a similar medicine was prescribed. Nasal sprays work the fastest. Use one that contains phenylephrine or oxymetazoline. First blow the nose gently. Then use the spray. Do not use these medicines more often than directed on the label or symptoms may get worse. You may also use tablets containing pseudoephedrine. Avoid products that combine ingredients, because side effects may be increased. Read labels. You can also ask the pharmacist for help. (NOTE: Persons with high blood pressure should not use decongestants. They can raise blood pressure.)    Over-the-counter antihistamines may help if allergies contributed to your sinusitis.      Use acetaminophen or ibuprofen to control pain, unless another pain medicine was prescribed. (If you have chronic liver or kidney disease or ever had a stomach ulcer, talk with your doctor before using these medicines. Aspirin should never be used in anyone under 18 years of age  who is ill with a fever. It may cause severe liver damage.)    Use nasal rinses or irrigation as instructed by your health care provider.    Don't smoke. This can worsen symptoms.  Follow-up care  Follow up with your healthcare provider or our staff if you are not improving within the next week.  When to seek medical advice  Call your healthcare provider if any of these occur:    Green or yellow discharge from the nose or into the throat    Facial pain or headache becoming more severe    Stiff neck    Unusual drowsiness or confusion    Swelling of the forehead or eyelids    Vision problems, including blurred or double vision    Fever of 100.4 F (38 C) or higher, or as directed by your healthcare provider    Seizure    Breathing problems    Symptoms not resolving within 10 days    1447-6317 [employer]. [address] [place] [address], [place] [address]. All rights reserved. This information is not intended as a substitute for professional medical care. Always follow your healthcare professional's instructions.        Self-Care for Sinusitis  Sinusitis can often be managed with self-care. Self-care can keep sinuses moist and make you feel more comfortable. Remember to follow your doctor's instructions closely, which can make a big difference in getting your sinus problem under control.    Drink fluids  Drinking extra fluids -- a glass every hour or two -- helps thin your mucus, allowing it to drain from your sinuses more easily. A humidifier helps in much the same way. Fluids can also offset the drying effects of certain drugs.  Use saltwater rinses  Rinses help keep your sinuses and nose moist. Mix a teaspoon of salt in 8 ounces of fresh, warm water. Use a bulb syringe to gently squirt the water into your nose a few times a day. You can also buy ready-made saline nasal sprays.  Apply hot or cold packs  Applying heat to the area surrounding your sinuses may make you feel more comfortable. Use a hot water bottle  or a hand towel dipped in hot water. Some people also find ice packs effective for relieving pain.  Medications  Your doctor may prescribe medications to help treat your sinusitis. If you have an infection, antibiotics can help clear it up. If you are prescribed antibiotics, take all pills on schedule until they are gone, even if you feel better. Decongestants help relieve swelling. Use decongestant sprays for short periods only under the direction of your doctor. If you have allergies, your doctor may prescribe medications to help relieve them.     8691-7262 [employer]. [address] [place] [address]. All rights reserved. This information is not intended as a substitute for professional medical care. Always follow your healthcare professional's instructions.        Preventing Sinusitis  Colds, flu, and allergies can lead more easily to sinusitis. Do your best to prevent sinusitis by preventing these underlying problems. Do what you can to avoid getting colds and other infections. Avoid any allergens (substances that cause allergies), and keep your sinuses as moist as possible.  Tips for air travel  When traveling on an airplane, use saline nasal spray to keep your sinuses moist. Drink plenty of fluids. You may also want to take a decongestant before boarding.   Prevent colds    Do what you can to avoid exposure to colds and flu. Whenever possible, take more time to rest when you feel something  coming on.     Wash your hands often, especially during cold and flu season.    As much as possible, stay away from infected people.    Follow these standbys for beating the  bugs : eat balanced meals, exercise regularly, and get plenty of sleep.  Avoid allergens  First find out what substances you re allergic to. Then take steps to minimize exposure to allergens or irritants in the air such as dust, pollution, and pollen.    Wear a mask when you clean, or consider hiring a  to help  minimize your exposure to dust.    Sit in the nonsmoking sections of restaurants.    Avoid the outdoors during peak pollution hours such as rush hour.    Keep an air conditioner on during allergy season and clean its filter regularly.  Maximize moisture  Keeping your sinuses moist makes your mucus thinner, allowing your sinuses to drain better. This, in turn, helps prevent infection. Ask your doctor about these suggestions:    Use a humidifier, regularly cleaning out any mold or mildew in the reservoir.    Drink several glasses of water a day.    Avoid drying substances such as alcohol and coffee.    Avoid smoke, which dries out sinus linings.    Use saltwater rinses.    6234-9791 [employer]. [address] [place], [place] [id number]. All rights reserved. This information is not intended as a substitute for professional medical care. Always follow your healthcare professional's instructions.        Causes of Sinusitis       Mucus helps keep your sinuses clean. But mucus may build up in the sinuses due to colds, allergies, or obstructions. These things interfere with the natural drainage of mucus. This may lead to sinusitis (sinus inflammation and infection).    Acute sinusitis comes on suddenly. It often happens right after an upper respiratory infection, such as a cold.    Chronic sinusitis is ongoing swelling of the sinus lining. This is often the result of allergies or chronic infections.  Colds and other infections  A cold or flu may cause your sinus and nasal linings to swell. Sinus openings can become blocked. This causes mucus to back up. This backed-up mucus becomes an ideal place for bacteria to grow. Thick, yellow, or discolored mucus is one sign of infection.  Allergic reactions  You may be sensitive to certain substances. This causes the release of histamine in the body. Histamine makes your sinus and nasal linings swell. Long-term swelling clogs your sinuses. It prevents the cilia (tiny  hairs in the nasal lining) from sweeping away mucus. Allergy symptoms can be persistent. But they re less severe than with colds.    Obstructions    A polyp is a sac of swollen tissue. It can be the result of an allergy or infection. It may block the middle meatus (the opening where most of your sinuses drain). It may even grow large enough to block your nose.    A deviated septum is when the thin wall inside your nose is pushed to one side. It is often the result of injury. This can block your middle meatus.    0452-2269 [employer]. [address] [place] [address]. All rights reserved. This information is not intended as a substitute for professional medical care. Always follow your healthcare professional's instructions.

## 2017-05-25 NOTE — NURSING NOTE
"Chief Complaint   Patient presents with     Cough     Panel Management     phq, faith, Urine drug screen       Initial /80 (Cuff Size: Adult Regular)  Pulse 88  Temp 97.7  F (36.5  C) (Temporal)  Resp 16  Ht 5' 5\" (1.651 m)  Wt 164 lb (74.4 kg)  LMP 06/16/2007  SpO2 98%  BMI 27.29 kg/m2 Estimated body mass index is 27.29 kg/(m^2) as calculated from the following:    Height as of this encounter: 5' 5\" (1.651 m).    Weight as of this encounter: 164 lb (74.4 kg).  Medication Reconciliation: complete   Ciera Lazar CMA (AAMA)    "

## 2017-09-19 ENCOUNTER — TELEPHONE (OUTPATIENT)
Dept: FAMILY MEDICINE | Facility: OTHER | Age: 52
End: 2017-09-19

## 2017-09-19 DIAGNOSIS — G43.109 MIGRAINE WITH AURA AND WITHOUT STATUS MIGRAINOSUS, NOT INTRACTABLE: ICD-10-CM

## 2017-09-19 RX ORDER — HYDROCODONE BITARTRATE AND ACETAMINOPHEN 5; 325 MG/1; MG/1
1 TABLET ORAL EVERY 6 HOURS PRN
Qty: 4 TABLET | Refills: 0 | Status: CANCELLED | OUTPATIENT
Start: 2017-09-19

## 2017-09-19 NOTE — TELEPHONE ENCOUNTER
Pending Prescriptions:                       Disp   Refills    HYDROcodone-acetaminophen (NORCO) 5-325 M*4 tabl*0            Sig: Take 1 tablet by mouth every 6 hours as needed           for pain          Last Written Prescription Date:  5/27/2017  Patient will  script please call when ready [phone], patient will be out of med before her appt on 10/12/2017 this was 1st available for Susannah CANNON  Last Fill Quantity: 4,   # refills: 0  Last Office Visit with [facility] prescribing provider: 5/25/2017  Future Office visit:    Next 5 appointments (look out 90 days)     Oct 12, 2017 10:00 AM CDT   Office Visit with Susannah Ray PA-C   [facility]    [address] [facility] [address] [phone]                   Routing refill request to provider for review/approval because:  Drug not on the [facility] refill protocol or controlled substance

## 2017-09-19 NOTE — TELEPHONE ENCOUNTER
Left message for patient to call clinic back. When patient calls back please give messasge below.  Romy Ortega MA

## 2017-09-19 NOTE — TELEPHONE ENCOUNTER
Reason for call:  Medication  Reason for Call:  Medication or medication refill:    Do you use a [facility]?  Name of the pharmacy and phone number for the current request:  pt will come     Name of the medication requested: hydrocodone    Other request: pt has an appointment for 10/12 (that was the first available with Susannah CANNON).  But she will be out of her medication before that date.  She asked that we give her a refill enough to get her to her appointment.    Can we leave a detailed message on this number? YES    Phone number patient can be reached at: Home number on file [phone] (home)    Best Time: any    Call taken on 9/19/2017 at 8:50 AM by Alia Stereter

## 2017-10-10 NOTE — PROGRESS NOTES
SUBJECTIVE:   Ophelia Wolf is a 52 year old female who presents to clinic today for the following health issues:        The 10-year ASCVD risk score (Felipa DIANA Jr, et al., 2013) is: 1.3%    Values used to calculate the score:      Age: 52 years      Sex: Female      Is Non- : No      Diabetic: No      Tobacco smoker: No      Systolic Blood Pressure: 122 mmHg      Is BP treated: No      HDL Cholesterol: 45 mg/dL      Total Cholesterol: 163 mg/dL  Patient is eligible for use of low-dose aspirin for primary prevention of heart attack and stroke.  Provider has discussed aspirin with patient and our decision was:     Prescribe:  Daily low-dose aspirin recommended for primary prevention, patient agrees with plan.        Chronic Pain Follow-Up       Type / Location of Pain: start in front lobe , she has migraine HA that are generally very well controlled with her imitrex as long as she takes it at the onset.  She rarely needs norco for her HA she gets 4 for 90 days per our contract  Very appropriate use   Analgesia/pain control:       Recent changes:  same      Overall control: Fully effective control  Activity level/function:      Daily activities:  Able to do all daily activities if she catches headache early on    Work:  Pain does not limit any  work  Adverse effects:  No  Adherance    Taking medication as directed?  Yes    Participating in other treatments: NA  Risk Factors:    Sleep:  Fair    Mood/anxiety:  controlled    Recent family or social stressors:  none noted    Other aggravating factors: Family stress  She now has her 7 year old granddaughter living with them. This is a good thing though she worries about her daughter and her drug use.  Her daughter  is in and our of [facility] and the legal system. Things are settling down a bit though she has very little free time   PHQ-9 SCORE 2/7/2017   Total Score MyChart 0     EDISON-7 SCORE 2/7/2017   Total Score 0 (minimal anxiety)     Encounter-Level  "CSA - 08/11/2016:          Controlled Substance Agreement - Scan on 8/26/2016 10:51 AM : CONTROLLED MEDICATION AGREEMENT (below)                  Amount of exercise or physical activity: 4-5 days/week for an average of greater than 60 minutes    Problems taking medications regularly: No    Medication side effects: none  Diet: regular (no restrictions)          Problem list and histories reviewed & adjusted, as indicated.  Additional history: as documented    BP Readings from Last 3 Encounters:   10/12/17 140/90   05/25/17 122/80   02/27/17 128/66    Wt Readings from Last 3 Encounters:   10/12/17 163 lb 12.8 oz (74.3 kg)   05/25/17 164 lb (74.4 kg)   02/27/17 166 lb (75.3 kg)               Labs reviewed in EPIC      Reviewed and updated as needed this visit by clinical staff     Reviewed and updated as needed this visit by Provider         ROS:  C: NEGATIVE for fever, chills, change in weight  E/M: NEGATIVE for ear, mouth and throat problems  ENT/MOUTH: she uses an otc allergy med and flonase and has not had sinus issues since, struggled with sinusitis significantly last year  R: NEGATIVE for significant cough or SOB  CV: NEGATIVE for chest pain, palpitations or peripheral edema  GI: NEGATIVE for nausea, abdominal pain, heartburn, or change in bowel habits  NEURO: NEGATIVE for weakness, dizziness or paresthesias and HA as above     OBJECTIVE:     /90  Pulse 80  Temp 97.4  F (36.3  C) (Temporal)  Resp 16  Ht 5' 3.58\" (1.615 m)  Wt 163 lb 12.8 oz (74.3 kg)  LMP 06/16/2007  BMI 28.49 kg/m2  Body mass index is 28.49 kg/(m^2).  GENERAL: healthy, alert and no distress  EYES: Eyes grossly normal to inspection, PERRL and conjunctivae and sclerae normal  NECK: no adenopathy, no asymmetry, masses, or scars and thyroid normal to palpation  RESP: lungs clear to auscultation - no rales, rhonchi or wheezes  CV: regular rate and rhythm, normal S1 S2, no S3 or S4, no murmur, click or rub, no peripheral edema and " peripheral pulses strong  ABDOMEN: soft, nontender, no hepatosplenomegaly, no masses and bowel sounds normal  MS: no gross musculoskeletal defects noted, no edema  NEURO: Normal strength and tone, mentation intact and speech normal  PSYCH: mentation appears normal, affect normal/bright    Diagnostic Test Results:  No results found for this or any previous visit (from the past 24 hour(s)).    ASSESSMENT/PLAN:         I reviewed the [place] prescription monitoring program website, She is compliant with our contract.  No concerning findings noted. Last hydrocodone was filled June 16 for 4 pills      1. Migraine with aura and without status migrainosus, not intractable  Well-controlled, she was uses ibuprofen or Imitrex which works great majority of the time. Rare use of hydrocodone, she gets 4 pills for 90 days  She only uses them if her headache is resistant to Imitrex  - Pain Drug Scr UR W Rptd Meds  - HYDROcodone-acetaminophen (NORCO) 5-325 MG per tablet; Take 1 tablet by mouth every 6 hours as needed for pain  Dispense: 4 tablet; Refill: 0    2. Need for vaccination  Series complete  - HEPATITIS A VACCINE, ADULT  [27537]  - 1st  Administration  [65403]    3. Need for prophylactic vaccination and inoculation against influenza  Updated  - FLU VAC, SPLIT VIRUS IM > 3 YO (QUADRIVALENT) [56802]  - Vaccine Administration, Each Additional [16699]        Susannah Ray PA-C  [facility]  Electronically signed by Susannah Ray PA-C

## 2017-10-12 ENCOUNTER — OFFICE VISIT (OUTPATIENT)
Dept: FAMILY MEDICINE | Facility: OTHER | Age: 52
End: 2017-10-12
Payer: COMMERCIAL

## 2017-10-12 VITALS
WEIGHT: 163.8 LBS | RESPIRATION RATE: 16 BRPM | HEART RATE: 80 BPM | HEIGHT: 64 IN | BODY MASS INDEX: 27.96 KG/M2 | TEMPERATURE: 97.4 F | DIASTOLIC BLOOD PRESSURE: 84 MMHG | SYSTOLIC BLOOD PRESSURE: 140 MMHG

## 2017-10-12 DIAGNOSIS — Z23 NEED FOR PROPHYLACTIC VACCINATION AND INOCULATION AGAINST INFLUENZA: ICD-10-CM

## 2017-10-12 DIAGNOSIS — Z23 NEED FOR VACCINATION: Primary | ICD-10-CM

## 2017-10-12 DIAGNOSIS — G43.109 MIGRAINE WITH AURA AND WITHOUT STATUS MIGRAINOSUS, NOT INTRACTABLE: ICD-10-CM

## 2017-10-12 PROCEDURE — 99213 OFFICE O/P EST LOW 20 MIN: CPT | Mod: 25 | Performed by: PHYSICIAN ASSISTANT

## 2017-10-12 PROCEDURE — 90472 IMMUNIZATION ADMIN EACH ADD: CPT | Performed by: PHYSICIAN ASSISTANT

## 2017-10-12 PROCEDURE — 90686 IIV4 VACC NO PRSV 0.5 ML IM: CPT | Performed by: PHYSICIAN ASSISTANT

## 2017-10-12 PROCEDURE — 90632 HEPA VACCINE ADULT IM: CPT | Performed by: PHYSICIAN ASSISTANT

## 2017-10-12 PROCEDURE — 80307 DRUG TEST PRSMV CHEM ANLYZR: CPT | Mod: 90 | Performed by: PHYSICIAN ASSISTANT

## 2017-10-12 PROCEDURE — 99000 SPECIMEN HANDLING OFFICE-LAB: CPT | Performed by: PHYSICIAN ASSISTANT

## 2017-10-12 PROCEDURE — 90471 IMMUNIZATION ADMIN: CPT | Performed by: PHYSICIAN ASSISTANT

## 2017-10-12 RX ORDER — HYDROCODONE BITARTRATE AND ACETAMINOPHEN 5; 325 MG/1; MG/1
1 TABLET ORAL EVERY 6 HOURS PRN
Qty: 4 TABLET | Refills: 0 | Status: SHIPPED | OUTPATIENT
Start: 2017-10-12 | End: 2017-10-12

## 2017-10-12 RX ORDER — HYDROCODONE BITARTRATE AND ACETAMINOPHEN 5; 325 MG/1; MG/1
1 TABLET ORAL EVERY 6 HOURS PRN
Qty: 4 TABLET | Refills: 0 | Status: SHIPPED | OUTPATIENT
Start: 2018-01-12 | End: 2018-01-08

## 2017-10-12 ASSESSMENT — ANXIETY QUESTIONNAIRES
5. BEING SO RESTLESS THAT IT IS HARD TO SIT STILL: NOT AT ALL
4. TROUBLE RELAXING: NOT AT ALL
GAD7 TOTAL SCORE: 0
7. FEELING AFRAID AS IF SOMETHING AWFUL MIGHT HAPPEN: NOT AT ALL
GAD7 TOTAL SCORE: 0
7. FEELING AFRAID AS IF SOMETHING AWFUL MIGHT HAPPEN: NOT AT ALL
3. WORRYING TOO MUCH ABOUT DIFFERENT THINGS: NOT AT ALL
2. NOT BEING ABLE TO STOP OR CONTROL WORRYING: NOT AT ALL
GAD7 TOTAL SCORE: 0
1. FEELING NERVOUS, ANXIOUS, OR ON EDGE: NOT AT ALL
6. BECOMING EASILY ANNOYED OR IRRITABLE: NOT AT ALL

## 2017-10-12 ASSESSMENT — PATIENT HEALTH QUESTIONNAIRE - PHQ9
SUM OF ALL RESPONSES TO PHQ QUESTIONS 1-9: 1
SUM OF ALL RESPONSES TO PHQ QUESTIONS 1-9: 1

## 2017-10-12 ASSESSMENT — PAIN SCALES - GENERAL: PAINLEVEL: NO PAIN (0)

## 2017-10-12 NOTE — NURSING NOTE
"Chief Complaint   Patient presents with     Pain Management     Panel Management     flu shot, asa, urine drug screen       Initial /90  Pulse 80  Temp 97.4  F (36.3  C) (Temporal)  Resp 16  Ht 5' 3.58\" (1.615 m)  Wt 163 lb 12.8 oz (74.3 kg)  LMP 06/16/2007  BMI 28.49 kg/m2 Estimated body mass index is 28.49 kg/(m^2) as calculated from the following:    Height as of this encounter: 5' 3.58\" (1.615 m).    Weight as of this encounter: 163 lb 12.8 oz (74.3 kg).  Medication Reconciliation: complete   Julisa aBi      "

## 2017-10-12 NOTE — MR AVS SNAPSHOT
After Visit Summary   10/12/2017    Ophelia Wolf    MRN: [id number]           Patient Information     Date Of Birth          1965        Visit Information        Provider Department      10/12/2017 10:00 AM Susannah Ray PA-C [facility]        Today's Diagnoses     Need for vaccination    -  1    Migraine with aura and without status migrainosus, not intractable        Need for prophylactic vaccination and inoculation against influenza           Follow-ups after your visit        Who to contact     If you have questions or need follow up information about today's clinic visit or your schedule please contact [facility] directly at [phone].  Normal or non-critical lab and imaging results will be communicated to you by MyChart, letter or phone within 4 business days after the clinic has received the results. If you do not hear from us within 7 days, please contact the clinic through [employer] or phone. If you have a critical or abnormal lab result, we will notify you by phone as soon as possible.  Submit refill requests through [employer] or call your pharmacy and they will forward the refill request to us. Please allow 3 business days for your refill to be completed.          Additional Information About Your Visit        MyChart Information     [employer] gives you secure access to your electronic health record. If you see a primary care provider, you can also send messages to your care team and make appointments. If you have questions, please call your primary care clinic.  If you do not have a primary care provider, please call [phone] and they will assist you.        Care EveryWhere ID     This is your Care EveryWhere ID. This could be used by other organizations to access your [facility] medical records  [id number]        Your Vitals Were     Pulse Temperature Respirations Height Last Period BMI (Body Mass Index)    80 97.4  F (36.3  C) (Temporal) 16 5'  "3.58\" (1.615 m) 06/16/2007 28.49 kg/m2       Blood Pressure from Last 3 Encounters:   10/12/17 140/84   05/25/17 122/80   02/27/17 128/66    Weight from Last 3 Encounters:   10/12/17 163 lb 12.8 oz (74.3 kg)   05/25/17 164 lb (74.4 kg)   02/27/17 166 lb (75.3 kg)              We Performed the Following     1st  Administration  [02021]     FLU VAC, SPLIT VIRUS IM > 3 YO (QUADRIVALENT) [96502]     HEPATITIS A VACCINE, ADULT  [13519]     Pain Drug Scr UR W Rptd Meds     Vaccine Administration, Each Additional [30982]          Today's Medication Changes          These changes are accurate as of: 10/12/17 12:50 PM.  If you have any questions, ask your nurse or doctor.               Start taking these medicines.        Dose/Directions    HYDROcodone-acetaminophen 5-325 MG per tablet   Commonly known as:  NORCO   Used for:  Migraine with aura and without status migrainosus, not intractable   Started by:  Susannah Ray PA-C        Dose:  1 tablet   Start taking on:  1/12/2018   Take 1 tablet by mouth every 6 hours as needed for pain   Quantity:  4 tablet   Refills:  0            Where to get your medicines      Some of these will need a paper prescription and others can be bought over the counter.  Ask your nurse if you have questions.     Bring a paper prescription for each of these medications     HYDROcodone-acetaminophen 5-325 MG per tablet                Primary Care Provider Office Phone # Fax #    Susannah Ray PA-C [phone] [address] [place] [address]        Equal Access to Services     [facility] AH: Hadii aad ku hadasho Soomaali, waaxda luqadaha, qaybta kaalmada adeegyada, jovani cai . So [facility] [phone].    ATENCIÓN: Si habla español, tiene a gee disposición servicios gratuitos de asistencia lingüística. Llame al [phone].    We comply with applicable federal civil rights laws and [place] laws. We do not discriminate on the basis of race, " color, national origin, age, disability, sex, sexual orientation, or gender identity.            Thank you!     Thank you for choosing [facility]  for your care. Our goal is always to provide you with excellent care. Hearing back from our patients is one way we can continue to improve our services. Please take a few minutes to complete the written survey that you may receive in the mail after your visit with us. Thank you!             Your Updated Medication List - Protect others around you: Learn how to safely use, store and throw away your medicines at www.disposemymeds.org.          This list is accurate as of: 10/12/17 12:50 PM.  Always use your most recent med list.                   Brand Name Dispense Instructions for use Diagnosis    BIOTIN 5000 PO      Take by mouth daily        CALCIUM + D PO      Take  by mouth 2 times daily.        fluticasone 50 MCG/ACT spray    FLONASE    16 g    Spray 1-2 sprays into both nostrils daily    Acute recurrent maxillary sinusitis       HYDROcodone-acetaminophen 5-325 MG per tablet   Start taking on:  1/12/2018    NORCO    4 tablet    Take 1 tablet by mouth every 6 hours as needed for pain    Migraine with aura and without status migrainosus, not intractable       PROGESTERONE PO      Take 20 mg by mouth daily        ranitidine 150 MG tablet    ZANTAC    60 tablet    TAKE ONE TABLET BY MOUTH TWICE A DAY    Heartburn symptom       SINUS & ALLERGY PO           SUMAtriptan 100 MG tablet    IMITREX    18 tablet    Take 1 tablet (100 mg) by mouth at onset of headache for migraine    Migraine with aura and without status migrainosus, not intractable

## 2017-10-12 NOTE — PROGRESS NOTES
"  SUBJECTIVE:                                                    Ophelia Wolf is a 52 year old female who presents to clinic today for the following health issues:  {Provider please address medication reconciliation discrepancies--rooming staff please delete if no med/rec issues}    HPI    Medication Followup of ***    Taking Medication as prescribed: {.:036488::\"yes\"}    Side Effects:  {NONEORCHOOSE:956825::\"None\"}    Medication Helping Symptoms:  {.:049638::\"yes\"}         Problem list and histories reviewed & adjusted, as indicated.  Additional history: {NONE - AS DOCUMENTED:660263::\"as documented\"}    {ACUTE Problem SUPERLIST - brief histories:968672}    {HIST REVIEW/ LINKS 2:302202}    {PROVIDER CHARTING PREFERENCE:064014}  "

## 2017-10-12 NOTE — PROGRESS NOTES
Prior to injection verified patient identity using patient's name and date of birth.  Injectable Influenza Immunization Documentation    1.  Is the person to be vaccinated sick today?   No    2. Does the person to be vaccinated have an allergy to a component   of the vaccine?   No    3. Has the person to be vaccinated ever had a serious reaction   to influenza vaccine in the past?   No    4. Has the person to be vaccinated ever had Guillain-Barré syndrome?   No    Form completed by Albertina Barnes CMA ([facility])  Screening Questionnaire for Adult Immunization    Are you sick today?   No   Do you have allergies to medications, food, a vaccine component or latex?   Yes   Have you ever had a serious reaction after receiving a vaccination?   No   Do you have a long-term health problem with heart disease, lung disease, asthma, kidney disease, metabolic disease (e.g. diabetes), anemia, or other blood disorder?   No   Do you have cancer, leukemia, HIV/AIDS, or any other immune system problem?   No   In the past 3 months, have you taken medications that affect  your immune system, such as prednisone, other steroids, or anticancer drugs; drugs for the treatment of rheumatoid arthritis, Crohn s disease, or psoriasis; or have you had radiation treatments?   No   Have you had a seizure, or a brain or other nervous system problem?   No   During the past year, have you received a transfusion of blood or blood     products, or been given immune (gamma) globulin or antiviral drug?   No   For women: Are you pregnant or is there a chance you could become        pregnant during the next month?   No   Have you received any vaccinations in the past 4 weeks?   No     Immunization questionnaire was positive for at least one answer.  Notified provider ok to give.        Per orders of Susannah Ray, injection of flu shot and hep a given by Albertina Barnes. Patient instructed to remain in clinic for 15 minutes afterwards, and to report any  adverse reaction to me immediately.       Screening performed by Albertina Barnes on 10/12/2017 at 12:14 PM.

## 2017-10-13 ASSESSMENT — ANXIETY QUESTIONNAIRES: GAD7 TOTAL SCORE: 0

## 2017-10-13 ASSESSMENT — PATIENT HEALTH QUESTIONNAIRE - PHQ9: SUM OF ALL RESPONSES TO PHQ QUESTIONS 1-9: 1

## 2017-10-17 ENCOUNTER — MYC MEDICAL ADVICE (OUTPATIENT)
Dept: FAMILY MEDICINE | Facility: OTHER | Age: 52
End: 2017-10-17

## 2017-10-17 DIAGNOSIS — J01.01 ACUTE RECURRENT MAXILLARY SINUSITIS: ICD-10-CM

## 2017-10-17 RX ORDER — FLUTICASONE PROPIONATE 50 MCG
1-2 SPRAY, SUSPENSION (ML) NASAL DAILY
Qty: 16 G | Refills: 11 | Status: SHIPPED | OUTPATIENT
Start: 2017-10-17 | End: 2021-02-11

## 2017-10-19 LAB — PAIN DRUG SCR UR W RPTD MEDS: NORMAL

## 2017-12-06 ENCOUNTER — OFFICE VISIT (OUTPATIENT)
Dept: URGENT CARE | Facility: RETAIL CLINIC | Age: 52
End: 2017-12-06
Payer: COMMERCIAL

## 2017-12-06 VITALS — TEMPERATURE: 97.6 F | DIASTOLIC BLOOD PRESSURE: 96 MMHG | HEART RATE: 85 BPM | SYSTOLIC BLOOD PRESSURE: 156 MMHG

## 2017-12-06 DIAGNOSIS — R03.0 ELEVATED BLOOD PRESSURE READING WITHOUT DIAGNOSIS OF HYPERTENSION: ICD-10-CM

## 2017-12-06 DIAGNOSIS — R51.9 ACUTE NONINTRACTABLE HEADACHE, UNSPECIFIED HEADACHE TYPE: ICD-10-CM

## 2017-12-06 DIAGNOSIS — J06.9 VIRAL URI: Primary | ICD-10-CM

## 2017-12-06 PROCEDURE — 99213 OFFICE O/P EST LOW 20 MIN: CPT | Performed by: PHYSICIAN ASSISTANT

## 2017-12-06 NOTE — NURSING NOTE
"Chief Complaint   Patient presents with     Sinus Problem     x 1 week, no fevers, sinus pain and pressure       Initial BP (!) 156/96 (BP Location: Left arm)  Pulse 85  Temp 97.6  F (36.4  C) (Temporal)  LMP 06/16/2007 Estimated body mass index is 28.49 kg/(m^2) as calculated from the following:    Height as of 10/12/17: 5' 3.58\" (1.615 m).    Weight as of 10/12/17: 163 lb 12.8 oz (74.3 kg).  Medication Reconciliation: complete    "

## 2017-12-06 NOTE — MR AVS SNAPSHOT
After Visit Summary   12/6/2017    Ophelia Wolf    MRN: [id number]           Patient Information     Date Of Birth          1965        Visit Information        Provider Department      12/6/2017 5:40 PM Winter Chirinos PA-C [facility]        Today's Diagnoses     Viral URI    -  1    Acute nonintractable headache, unspecified headache type        Elevated blood pressure reading without diagnosis of hypertension          Care Instructions    Symptomatic measures for viral illness  Apply warm facial compresses/packs for 5-10 minutes three times daily.  Drink plenty of fluids- 6 to 10 glasses of liquids each day. Rest.  Steam treatments/humidity  Saline drops or nasal sprays as needed.   Steam treatments or humidifier.  Mucinex (guaifenesin) to thin out secretions.  Tylenol or ibuprofen as needed for pain or fever  Follow up at your primary care clinic for increasing pain, high fever, vision changes, worsening symptoms, or no relief from symptoms after 7-10 days.  Please follow up with primary care provider if not improving, worsening or new symptoms or for any adverse reactions to medications.     For headache  Take medications for treating migraine  If persistent headache despite treatment, please be seen in primary care clinic, urgent care or ER for evaluation.              Follow-ups after your visit        Who to contact     You can reach your care team any time of the day by calling [phone].  Notification of test results:  If you have an abnormal lab result, we will notify you by phone as soon as possible.         Additional Information About Your Visit        MyChart Information     [employer] gives you secure access to your electronic health record. If you see a primary care provider, you can also send messages to your care team and make appointments. If you have questions, please call your primary care clinic.  If you do not have a primary care provider,  please call [phone] and they will assist you.        Care EveryWhere ID     This is your Care EveryWhere ID. This could be used by other organizations to access your [place] medical records  [id number]        Your Vitals Were     Pulse Temperature Last Period             85 97.6  F (36.4  C) (Temporal) 06/16/2007          Blood Pressure from Last 3 Encounters:   12/06/17 (!) 156/96   10/12/17 140/84   05/25/17 122/80    Weight from Last 3 Encounters:   10/12/17 163 lb 12.8 oz (74.3 kg)   05/25/17 164 lb (74.4 kg)   02/27/17 166 lb (75.3 kg)              Today, you had the following     No orders found for display       Primary Care Provider Office Phone # Fax #    Susannah Ray PA-C [phone] [address] [place] [address]        Equal Access to Services     [facility]: Hadii aad ku hadasho Sokenishaali, waaxda luqadaha, qaybta kaalmada adeegyada, jovani cai . So [facility] [phone].    ATENCIÓN: Si habla español, tiene a gee disposición servicios gratuitos de asistencia lingüística. Llame al [phone].    We comply with applicable federal civil rights laws and [place] laws. We do not discriminate on the basis of race, color, national origin, age, disability, sex, sexual orientation, or gender identity.            Thank you!     Thank you for choosing [facility]  for your care. Our goal is always to provide you with excellent care. Hearing back from our patients is one way we can continue to improve our services. Please take a few minutes to complete the written survey that you may receive in the mail after your visit with us. Thank you!             Your Updated Medication List - Protect others around you: Learn how to safely use, store and throw away your medicines at www.disposemymeds.org.          This list is accurate as of: 12/6/17  6:13 PM.  Always use your most recent med list.                   Brand Name Dispense  Instructions for use Diagnosis    BIOTIN 5000 PO      Take by mouth daily        CALCIUM + D PO      Take  by mouth 2 times daily.        fluticasone 50 MCG/ACT spray    FLONASE    16 g    Spray 1-2 sprays into both nostrils daily    Acute recurrent maxillary sinusitis       HYDROcodone-acetaminophen 5-325 MG per tablet   Start taking on:  1/12/2018    NORCO    4 tablet    Take 1 tablet by mouth every 6 hours as needed for pain    Migraine with aura and without status migrainosus, not intractable       PROGESTERONE PO      Take 20 mg by mouth daily        ranitidine 150 MG tablet    ZANTAC    60 tablet    TAKE ONE TABLET BY MOUTH TWICE A DAY    Heartburn symptom       SINUS & ALLERGY PO           SUDAFED PO           SUMAtriptan 100 MG tablet    IMITREX    18 tablet    Take 1 tablet (100 mg) by mouth at onset of headache for migraine    Migraine with aura and without status migrainosus, not intractable

## 2017-12-06 NOTE — PROGRESS NOTES
Chief Complaint   Patient presents with     Sinus Problem     x 1 week, no fevers, sinus pain and pressure        SUBJECTIVE:  Ophelia Wolf is a 52 year old female here with concerns about sinus infection.  She states onset of symptoms were 1 week(s) ago. Has had cold sxs earlier in week, now having pounding headache across forehead, experienced sound sensitivity a few days ago. Headache is worsening. Considering taking her imitrex for migraines but has not yet. Headache into temples. No congestion. Not blowing anything now.   Current and Associated symptoms: significant headache  Predisposing factors include HX of sinusitis and recent illness. Recent treatment has included: flonase    Past Medical History:   Diagnosis Date     ALLERGIC RHINITIS NOS 8/8/2002     CLASS MIGRAIN W/O MENTN INTRACTABLE 12/15/2004     Dysmenorrhea 3/13/2007     Headache      Headache      History of rabies vaccination      Lesion of plantar nerve     Buckley's neuroma/metatarsalgia     NONSPECIFIC MEDICAL HISTORY     pseudocholinesterase deficiency     Premenstrual tension syndrome      Tension headache      Tobacco use disorder      UTERINE LEIOMYOMA NOS 4/26/2007     Current Outpatient Prescriptions   Medication Sig Dispense Refill     Pseudoephedrine HCl (SUDAFED PO)        fluticasone (FLONASE) 50 MCG/ACT spray Spray 1-2 sprays into both nostrils daily 16 g 11     [START ON 1/12/2018] HYDROcodone-acetaminophen (NORCO) 5-325 MG per tablet Take 1 tablet by mouth every 6 hours as needed for pain 4 tablet 0     ranitidine (ZANTAC) 150 MG tablet TAKE ONE TABLET BY MOUTH TWICE A DAY 60 tablet 11     Chlorpheniramine-Phenylephrine (SINUS & ALLERGY PO)        BIOTIN 5000 PO Take by mouth daily       Calcium Carbonate-Vitamin D (CALCIUM + D PO) Take  by mouth 2 times daily.       SUMAtriptan (IMITREX) 100 MG tablet Take 1 tablet (100 mg) by mouth at onset of headache for migraine (Patient not taking: Reported on 12/6/2017) 18 tablet prn      Progesterone Micronized (PROGESTERONE PO) Take 20 mg by mouth daily          Allergies   Allergen Reactions     Amoxicillin Difficulty breathing     Anesthetic Ether      pseudocholinesterase deficiency        History   Smoking Status     Former Smoker     Packs/day: 0.50     Years: 10.00     Types: Cigarettes     Quit date: 11/30/2007   Smokeless Tobacco     Never Used     Comment: 3-4 cig/day       ROS:  CONSTITUTIONAL:POSITIVE significant headaches NEGATIVE for fever, chills  ENT/MOUTH: POSITIVE for congestion earlier on and NEGATIVE for postnasal drainage and rhinorrhea-clear  RESP:NEGATIVE for significant cough or wheezing    OBJECTIVE:  BP (!) 156/96 (BP Location: Left arm)  Pulse 85  Temp 97.6  F (36.4  C) (Temporal)  LMP 06/16/2007  Exam:GENERAL APPEARANCE: alert, mild discomfort  EYES:  conjunctiva clear, no discharge  HENT: ear canals and TM's normal.  Nose boggy, pink. Mouth without ulcers, erythema or lesions Pressure with palpation across forehead and temples  NECK: supple, nontender, no lymphadenopathy  RESP: lungs clear to auscultation - no rales, rhonchi or wheezes  CV: regular rates and rhythm, normal S1 S2, no murmur noted  SKIN: no suspicious lesions or rashes    ASSESSMENT:  (J06.9,  B97.89) Viral URI  (primary encounter diagnosis)  (R51) Acute nonintractable headache, unspecified headache type  (R03.0) Elevated blood pressure reading without diagnosis of hypertension    PLAN:  Discussed exam and history presents as viral cold and likely migraine headache    Symptomatic measures for viral illness  Apply warm facial compresses/packs for 5-10 minutes three times daily.  Drink plenty of fluids- 6 to 10 glasses of liquids each day. Rest.  Steam treatments/humidity  Saline drops or nasal sprays as needed.   Steam treatments or humidifier.  Mucinex (guaifenesin) to thin out secretions.  Tylenol or ibuprofen as needed for pain or fever  Follow up at your primary care clinic for increasing pain, high  fever, vision changes, worsening symptoms, or no relief from symptoms after 7-10 days.  Please follow up with primary care provider if not improving, worsening or new symptoms or for any adverse reactions to medications.     For headache  Take medications for treating migraine  If persistent headache despite treatment, please be seen in primary care clinic, urgent care or ER for evaluation.  Advised symptoms measures.   If persistent or worsening symptoms, follow up with primary care provider for evaluation.    Winter Chirinos PA-C  [facility]

## 2017-12-07 NOTE — PATIENT INSTRUCTIONS
Symptomatic measures for viral illness  Apply warm facial compresses/packs for 5-10 minutes three times daily.  Drink plenty of fluids- 6 to 10 glasses of liquids each day. Rest.  Steam treatments/humidity  Saline drops or nasal sprays as needed.   Steam treatments or humidifier.  Mucinex (guaifenesin) to thin out secretions.  Tylenol or ibuprofen as needed for pain or fever  Follow up at your primary care clinic for increasing pain, high fever, vision changes, worsening symptoms, or no relief from symptoms after 7-10 days.  Please follow up with primary care provider if not improving, worsening or new symptoms or for any adverse reactions to medications.     For headache  Take medications for treating migraine  If persistent headache despite treatment, please be seen in primary care clinic, urgent care or ER for evaluation.

## 2018-01-03 NOTE — PROGRESS NOTES
SUBJECTIVE:                                                    Ophelia Wolf is a 52 year old female who presents to clinic today for the following health issues:      HPI    Lump on shoulder --    Onset: a few months    Description:   Location: right shoulder  Character: Dull ache    Intensity: mild    Progression of Symptoms: worse, same    Accompanying Signs & Symptoms:  Other symptoms: pain in both shoulders that comes and goes, feels like lump on right shoulder is bigger than before, was told it was her bone a few months ago.  No injury.  Aches at night when she lays on her shoulder.      History:   Previous similar pain: YES- but no imaging done      Precipitating factors:   Trauma or overuse: no     Alleviating factors:  Improved by: rest/inactivity    Therapies Tried and outcome: once and awhile will take aleve for the achy pain at night in shoulders    Gets an area of tenderness just to the right of the center of chest  Noticed this a few months ago, tenderness comes and goes, usually lasts about 1-2 days.  Nothing triggers it, makes it worse, or better.  Resolves on own.    Did feel swollen at one time but no lump or bump  No discoloration/redness.  No increased pain with deep breathing.  Not exertional.  has a history of reflux but does not feel like that.        Problem list and histories reviewed & adjusted, as indicated.  Additional history: as documented        BP Readings from Last 3 Encounters:   01/08/18 136/74   12/06/17 (!) 156/96   10/12/17 140/84    Wt Readings from Last 3 Encounters:   01/08/18 169 lb (76.7 kg)   10/12/17 163 lb 12.8 oz (74.3 kg)   05/25/17 164 lb (74.4 kg)            Labs reviewed in EPIC      ROS:  C: NEGATIVE for fever, chills, change in weight  E/M: NEGATIVE for ear, mouth and throat problems  R: NEGATIVE for significant cough or SOB  CV: NEGATIVE for chest pain, palpitations or peripheral edema  GI: NEGATIVE for nausea, abdominal pain, heartburn is stable on zantac, or  "change in bowel habits  NEURO: migraines have been stable as well, gets 4 norco for 90 days, only had to use it once since I saw her, uses if Imitrex fails   PSYCHIATRIC: a lot of stress with dealing with her daughter    OBJECTIVE:     /74  Pulse 84  Temp 98.2  F (36.8  C) (Oral)  Resp 12  Ht 5' 4.75\" (1.645 m)  Wt 169 lb (76.7 kg)  LMP 06/16/2007  BMI 28.34 kg/m2  Body mass index is 28.34 kg/(m^2).  GENERAL: healthy, alert and no distress  NECK: no adenopathy, no asymmetry, masses, or scars and thyroid normal to palpation  RESP: lungs clear to auscultation - no rales, rhonchi or wheezes  CV: regular rate and rhythm, normal S1 S2, no S3 or S4, no murmur, click or rub, no peripheral edema and peripheral pulses strong  Minimal tenderness over right inferior costocartilage   ABDOMEN: soft, nontender, no hepatosplenomegaly, no masses and bowel sounds normal  MS: no gross musculoskeletal defects noted, no edema  MS: right AC joint is slightly enlarged   SKIN: no suspicious lesions or rashes  PSYCH: mentation appears normal, affect normal/bright    Diagnostic Test Results:  Xray - ac joints pending     ASSESSMENT/PLAN:         1. Migraine with aura and without status migrainosus, not intractable  Stable, given 1 more rx until next recheck  - HYDROcodone-acetaminophen (NORCO) 5-325 MG per tablet; Take 1 tablet by mouth every 6 hours as needed for pain  Dispense: 4 tablet; Refill: 0  - SUMAtriptan (IMITREX) 100 MG tablet; Take 1 tablet (100 mg) by mouth at onset of headache for migraine  Dispense: 18 tablet; Refill: prn    2. Heartburn symptom  Stable continue current meds   - ranitidine (ZANTAC) 150 MG tablet; Take 1 tablet (150 mg) by mouth 2 times daily  Dispense: 60 tablet; Refill: 11    3. Arthralgia of both acromioclavicular joints  Observations, Aleve prn  - XR Acromioclavicular Joint Bilateral; Future      4. costochondritis - use of NSAIDS as needed, if changing or worsening needs follow up  The area " of involvement is so localized no further evaluation needed today        Susannah Ray PA-C  [facility]  Electronically signed by Susannah Ray PA-C

## 2018-01-07 ASSESSMENT — ANXIETY QUESTIONNAIRES
1. FEELING NERVOUS, ANXIOUS, OR ON EDGE: SEVERAL DAYS
6. BECOMING EASILY ANNOYED OR IRRITABLE: NOT AT ALL
GAD7 TOTAL SCORE: 2
5. BEING SO RESTLESS THAT IT IS HARD TO SIT STILL: NOT AT ALL
3. WORRYING TOO MUCH ABOUT DIFFERENT THINGS: SEVERAL DAYS
7. FEELING AFRAID AS IF SOMETHING AWFUL MIGHT HAPPEN: NOT AT ALL
7. FEELING AFRAID AS IF SOMETHING AWFUL MIGHT HAPPEN: NOT AT ALL
2. NOT BEING ABLE TO STOP OR CONTROL WORRYING: NOT AT ALL
GAD7 TOTAL SCORE: 2
4. TROUBLE RELAXING: NOT AT ALL
GAD7 TOTAL SCORE: 2

## 2018-01-07 ASSESSMENT — PATIENT HEALTH QUESTIONNAIRE - PHQ9
SUM OF ALL RESPONSES TO PHQ QUESTIONS 1-9: 0
SUM OF ALL RESPONSES TO PHQ QUESTIONS 1-9: 0

## 2018-01-08 ENCOUNTER — OFFICE VISIT (OUTPATIENT)
Dept: FAMILY MEDICINE | Facility: OTHER | Age: 53
End: 2018-01-08
Payer: COMMERCIAL

## 2018-01-08 ENCOUNTER — RADIANT APPOINTMENT (OUTPATIENT)
Dept: GENERAL RADIOLOGY | Facility: OTHER | Age: 53
End: 2018-01-08
Attending: PHYSICIAN ASSISTANT
Payer: COMMERCIAL

## 2018-01-08 VITALS
DIASTOLIC BLOOD PRESSURE: 74 MMHG | SYSTOLIC BLOOD PRESSURE: 136 MMHG | TEMPERATURE: 98.2 F | WEIGHT: 169 LBS | RESPIRATION RATE: 12 BRPM | HEIGHT: 65 IN | HEART RATE: 84 BPM | BODY MASS INDEX: 28.16 KG/M2

## 2018-01-08 DIAGNOSIS — M25.512 ARTHRALGIA OF BOTH ACROMIOCLAVICULAR JOINTS: Primary | ICD-10-CM

## 2018-01-08 DIAGNOSIS — M94.0 COSTOCHONDRITIS: ICD-10-CM

## 2018-01-08 DIAGNOSIS — R12 HEARTBURN SYMPTOM: ICD-10-CM

## 2018-01-08 DIAGNOSIS — M25.511 ARTHRALGIA OF BOTH ACROMIOCLAVICULAR JOINTS: Primary | ICD-10-CM

## 2018-01-08 DIAGNOSIS — G43.109 MIGRAINE WITH AURA AND WITHOUT STATUS MIGRAINOSUS, NOT INTRACTABLE: ICD-10-CM

## 2018-01-08 DIAGNOSIS — M25.511 ARTHRALGIA OF BOTH ACROMIOCLAVICULAR JOINTS: ICD-10-CM

## 2018-01-08 DIAGNOSIS — M25.512 ARTHRALGIA OF BOTH ACROMIOCLAVICULAR JOINTS: ICD-10-CM

## 2018-01-08 PROCEDURE — 99214 OFFICE O/P EST MOD 30 MIN: CPT | Performed by: PHYSICIAN ASSISTANT

## 2018-01-08 PROCEDURE — 73050 X-RAY EXAM OF SHOULDERS: CPT | Mod: FY

## 2018-01-08 RX ORDER — SUMATRIPTAN 100 MG/1
100 TABLET, FILM COATED ORAL
Qty: 18 TABLET | Status: SHIPPED | OUTPATIENT
Start: 2018-01-08 | End: 2018-09-12

## 2018-01-08 RX ORDER — HYDROCODONE BITARTRATE AND ACETAMINOPHEN 5; 325 MG/1; MG/1
1 TABLET ORAL EVERY 6 HOURS PRN
Qty: 4 TABLET | Refills: 0 | Status: SHIPPED | OUTPATIENT
Start: 2018-01-12 | End: 2018-05-09

## 2018-01-08 ASSESSMENT — ANXIETY QUESTIONNAIRES: GAD7 TOTAL SCORE: 2

## 2018-01-08 ASSESSMENT — PATIENT HEALTH QUESTIONNAIRE - PHQ9: SUM OF ALL RESPONSES TO PHQ QUESTIONS 1-9: 0

## 2018-01-08 ASSESSMENT — PAIN SCALES - GENERAL: PAINLEVEL: NO PAIN (0)

## 2018-01-08 NOTE — MR AVS SNAPSHOT
"              After Visit Summary   1/8/2018    Ophelia Wolf    MRN: [id number]           Patient Information     Date Of Birth          1965        Visit Information        Provider Department      1/8/2018 8:45 AM Susannah Ray PA-C [facility]        Today's Diagnoses     Arthralgia of both acromioclavicular joints    -  1    Migraine with aura and without status migrainosus, not intractable        Heartburn symptom           Follow-ups after your visit        Who to contact     If you have questions or need follow up information about today's clinic visit or your schedule please contact [facility] directly at [phone].  Normal or non-critical lab and imaging results will be communicated to you by MyChart, letter or phone within 4 business days after the clinic has received the results. If you do not hear from us within 7 days, please contact the clinic through [email or web address] or phone. If you have a critical or abnormal lab result, we will notify you by phone as soon as possible.  Submit refill requests through [employer] or call your pharmacy and they will forward the refill request to us. Please allow 3 business days for your refill to be completed.          Additional Information About Your Visit        MyChart Information     [employer] gives you secure access to your electronic health record. If you see a primary care provider, you can also send messages to your care team and make appointments. If you have questions, please call your primary care clinic.  If you do not have a primary care provider, please call [phone] and they will assist you.        Care EveryWhere ID     This is your Care EveryWhere ID. This could be used by other organizations to access your [place] medical records  [id number]        Your Vitals Were     Pulse Temperature Respirations Height Last Period BMI (Body Mass Index)    84 98.2  F (36.8  C) (Oral) 12 5' 4.75\" (1.645 m) 06/16/2007 28.34 kg/m2 "       Blood Pressure from Last 3 Encounters:   01/08/18 136/74   12/06/17 (!) 156/96   10/12/17 140/84    Weight from Last 3 Encounters:   01/08/18 169 lb (76.7 kg)   10/12/17 163 lb 12.8 oz (74.3 kg)   05/25/17 164 lb (74.4 kg)                 Today's Medication Changes          These changes are accurate as of: 1/8/18  9:30 AM.  If you have any questions, ask your nurse or doctor.               These medicines have changed or have updated prescriptions.        Dose/Directions    ranitidine 150 MG tablet   Commonly known as:  ZANTAC   This may have changed:  See the new instructions.   Used for:  Heartburn symptom   Changed by:  Susannah Ray PA-C        Dose:  150 mg   Take 1 tablet (150 mg) by mouth 2 times daily   Quantity:  60 tablet   Refills:  11            Where to get your medicines      These medications were sent to [id number] - [facility] [address] [place] [address] [address] [place] [id number]     Phone:  [phone]     ranitidine 150 MG tablet    SUMAtriptan 100 MG tablet         Some of these will need a paper prescription and others can be bought over the counter.  Ask your nurse if you have questions.     Bring a paper prescription for each of these medications     HYDROcodone-acetaminophen 5-325 MG per tablet                Primary Care Provider Office Phone # Fax #    Susannah Ray PA-C [phone] [address] [place] [address]        Equal Access to Services     [facility]: Hadii aad ku hadasho Soomaali, waaxda luqadaha, qaybta kaalmada adeegyada, waxay idiin haygunner de la cruz. So [facility] [phone].    ATENCIÓN: Si habla español, tiene a gee disposición servicios gratuitos de asistencia lingüística. Llame al [phone].    We comply with applicable federal civil rights laws and [place] laws. We do not discriminate on the basis of race, color, national origin, age, disability, sex, sexual orientation, or gender  identity.            Thank you!     Thank you for choosing [facility]  for your care. Our goal is always to provide you with excellent care. Hearing back from our patients is one way we can continue to improve our services. Please take a few minutes to complete the written survey that you may receive in the mail after your visit with us. Thank you!             Your Updated Medication List - Protect others around you: Learn how to safely use, store and throw away your medicines at www.disposemymeds.org.          This list is accurate as of: 1/8/18  9:30 AM.  Always use your most recent med list.                   Brand Name Dispense Instructions for use Diagnosis    BIOTIN 5000 PO      Take by mouth daily        CALCIUM + D PO      Take  by mouth 2 times daily.        fluticasone 50 MCG/ACT spray    FLONASE    16 g    Spray 1-2 sprays into both nostrils daily    Acute recurrent maxillary sinusitis       HYDROcodone-acetaminophen 5-325 MG per tablet   Start taking on:  1/12/2018    NORCO    4 tablet    Take 1 tablet by mouth every 6 hours as needed for pain    Migraine with aura and without status migrainosus, not intractable       MELATONIN PO           PROGESTERONE PO      Take 20 mg by mouth daily        ranitidine 150 MG tablet    ZANTAC    60 tablet    Take 1 tablet (150 mg) by mouth 2 times daily    Heartburn symptom       SINUS & ALLERGY PO           SUDAFED PO           SUMAtriptan 100 MG tablet    IMITREX    18 tablet    Take 1 tablet (100 mg) by mouth at onset of headache for migraine    Migraine with aura and without status migrainosus, not intractable

## 2018-04-19 ENCOUNTER — VIRTUAL VISIT (OUTPATIENT)
Dept: FAMILY MEDICINE | Facility: OTHER | Age: 53
End: 2018-04-19
Payer: COMMERCIAL

## 2018-04-19 DIAGNOSIS — R82.90 NONSPECIFIC FINDING ON EXAMINATION OF URINE: ICD-10-CM

## 2018-04-19 DIAGNOSIS — N30.00 ACUTE CYSTITIS WITHOUT HEMATURIA: ICD-10-CM

## 2018-04-19 DIAGNOSIS — R30.0 DYSURIA: Primary | ICD-10-CM

## 2018-04-19 LAB
ALBUMIN UR-MCNC: NEGATIVE MG/DL
APPEARANCE UR: CLEAR
BACTERIA #/AREA URNS HPF: ABNORMAL /HPF
BILIRUB UR QL STRIP: NEGATIVE
COLOR UR AUTO: YELLOW
GLUCOSE UR STRIP-MCNC: NEGATIVE MG/DL
HGB UR QL STRIP: NEGATIVE
KETONES UR STRIP-MCNC: NEGATIVE MG/DL
LEUKOCYTE ESTERASE UR QL STRIP: ABNORMAL
NITRATE UR QL: NEGATIVE
NON-SQ EPI CELLS #/AREA URNS LPF: ABNORMAL /LPF
PH UR STRIP: 6 PH (ref 5–7)
RBC #/AREA URNS AUTO: ABNORMAL /HPF
SOURCE: ABNORMAL
SP GR UR STRIP: <=1.005 (ref 1–1.03)
SPECIMEN SOURCE: NORMAL
UROBILINOGEN UR STRIP-ACNC: 0.2 EU/DL (ref 0.2–1)
WBC #/AREA URNS AUTO: ABNORMAL /HPF
WET PREP SPEC: NORMAL

## 2018-04-19 PROCEDURE — 87210 SMEAR WET MOUNT SALINE/INK: CPT | Performed by: PHYSICIAN ASSISTANT

## 2018-04-19 PROCEDURE — 81001 URINALYSIS AUTO W/SCOPE: CPT | Performed by: PHYSICIAN ASSISTANT

## 2018-04-19 PROCEDURE — 87086 URINE CULTURE/COLONY COUNT: CPT | Performed by: PHYSICIAN ASSISTANT

## 2018-04-19 PROCEDURE — 99207 ZZC NO CHARGE LOS: CPT | Performed by: PHYSICIAN ASSISTANT

## 2018-04-19 RX ORDER — SULFAMETHOXAZOLE/TRIMETHOPRIM 800-160 MG
1 TABLET ORAL 2 TIMES DAILY
Qty: 14 TABLET | Refills: 0 | Status: SHIPPED | OUTPATIENT
Start: 2018-04-19 | End: 2018-09-12

## 2018-04-19 ASSESSMENT — PAIN SCALES - GENERAL: PAINLEVEL: SEVERE PAIN (7)

## 2018-04-19 NOTE — PROGRESS NOTES
"Ophelia Wolf is a 52 year old female who is being evaluated via a billable telephone visit.      The patient has been notified of following:     \"This telephone visit will be conducted via a call between you and your physician/provider. We have found that certain health care needs can be provided without the need for a physical exam.  This service lets us provide the care you need with a short phone conversation.  If a prescription is necessary we can send it directly to your pharmacy.  If lab work is needed we can place an order for that and you can then stop by our lab to have the test done at a later time.    We will bill your insurance company for this service.  Please check with your medical insurance if this type of visit is covered. You may be responsible for the cost of this type of visit if insurance coverage is denied.  The typical cost is $30 (10min), $59 (11-20min) and $85 (21-30min).  Most often these visits are shorter than 10 minutes.    If during the course of the call the physician/provider feels a telephone visit is not appropriate, you will not be charged for this service.\"       Consent has been obtained for this service by care team member: yes. See the scanned image in the medical record.  SUBJECTIVE:     Ophelia Wolf complains of    Chief Complaint   Patient presents with     UTI       I have reviewed and updated the patient's Past Medical History, Social History, Family History and Medication List.    ALLERGIES  Amoxicillin and Anesthetic ether    Robin Celis CMA   (MA signature)    Additional provider notes: UTI - Female  Duration of complaint: Just started last night   She has had stomach upset, back discomfort and urinary pressure, no dysuria fevers or vomiting.  She did have some diarrhea after eating cheesy potatoes last night but that is better today    OBJECTIVE:     1. Acute cystitis without hematuria  Increase fluids, urinate frequently follow-up if sulfa is not " improving symptoms  - sulfamethoxazole-trimethoprim (BACTRIM DS/SEPTRA DS) 800-160 MG per tablet; Take 1 tablet by mouth 2 times daily  Dispense: 14 tablet; Refill: 0    2. Dysuria    - UA reflex to Microscopic and Culture  - Wet prep  - Urine Microscopic        I have reviewed the note as documented above.  This accurately captures the substance of my conversation with the patient,  Electronically signed by Susannah Ray PA-C     Total time of call between patient and provider was 3.5 minutes     Robin Celis, CMA

## 2018-04-20 LAB
BACTERIA SPEC CULT: NORMAL
BACTERIA SPEC CULT: NORMAL
Lab: NORMAL
SPECIMEN SOURCE: NORMAL

## 2018-05-08 ENCOUNTER — MYC MEDICAL ADVICE (OUTPATIENT)
Dept: FAMILY MEDICINE | Facility: OTHER | Age: 53
End: 2018-05-08

## 2018-05-08 DIAGNOSIS — G43.109 MIGRAINE WITH AURA AND WITHOUT STATUS MIGRAINOSUS, NOT INTRACTABLE: ICD-10-CM

## 2018-05-10 RX ORDER — HYDROCODONE BITARTRATE AND ACETAMINOPHEN 5; 325 MG/1; MG/1
1 TABLET ORAL EVERY 6 HOURS PRN
Qty: 4 TABLET | Refills: 0 | Status: SHIPPED | OUTPATIENT
Start: 2018-05-10 | End: 2018-07-12

## 2018-05-10 NOTE — TELEPHONE ENCOUNTER
In this circumstance, this is fine.  Please mail to patient, I have already notified her  Susannah Ray PA-C

## 2018-07-12 ENCOUNTER — MYC MEDICAL ADVICE (OUTPATIENT)
Dept: FAMILY MEDICINE | Facility: OTHER | Age: 53
End: 2018-07-12

## 2018-07-12 DIAGNOSIS — G43.109 MIGRAINE WITH AURA AND WITHOUT STATUS MIGRAINOSUS, NOT INTRACTABLE: ICD-10-CM

## 2018-07-12 RX ORDER — HYDROCODONE BITARTRATE AND ACETAMINOPHEN 5; 325 MG/1; MG/1
1 TABLET ORAL EVERY 6 HOURS PRN
Qty: 4 TABLET | Refills: 0 | Status: SHIPPED | OUTPATIENT
Start: 2018-07-12 | End: 2018-09-12

## 2018-07-18 ENCOUNTER — TELEPHONE (OUTPATIENT)
Dept: FAMILY MEDICINE | Facility: OTHER | Age: 53
End: 2018-07-18

## 2018-07-18 NOTE — LETTER
[facility]  [address] [place] [address]  Phone: [phone]  October 9, 2018      Ophelia Wolf  [address] [facility] [address]      Dear Ophelia,    We care about your health and have reviewed your health plan including your medical conditions, medications, and lab results.  Based on this review, it is recommended that you follow up regarding the following health topic(s):  -Breast Cancer Screening    We recommend you take the following action(s):  -schedule a MAMMOGRAM which is due. Please disregard this reminder if you have had this exam elsewhere within the last 1-2 years please let us know so we can update your records.     Please call us at the [facility] - [phone] (or use Hango) to address the above recommendations.     Thank you for trusting [facility] and we appreciate the opportunity to serve you.  We look forward to supporting your healthcare needs in the future.    Healthy Regards,    Your Health Care Team  [facility]

## 2018-07-18 NOTE — TELEPHONE ENCOUNTER
Summary:    Patient is due/failing the following:   MAMMOGRAM    Action needed:   Schedule a mammogram     Type of outreach:    Phone, left message for patient to call back.     Questions for provider review:    None                                                                                                                                    Monica Pierre       Chart routed to Care Team .        Panel Management Review      Patient has the following on her problem list: None      Composite cancer screening  Chart review shows that this patient is due/due soon for the following Mammogram

## 2018-09-06 NOTE — PROGRESS NOTES
SUBJECTIVE:   Ophelia Wolf is a 53 year old female who presents to clinic today for the following health issues:    The ASCVD Risk score (Felipa DC Jr, et al., 2013) failed to calculate for the following reasons:    Cannot find a previous HDL lab    Cannot find a previous total cholesterol lab  Patient is eligible for use of low-dose aspirin for primary prevention of heart attack and stroke.  Provider has discussed aspirin with patient and our decision was:     Prescribe:  Daily low-dose aspirin recommended for primary prevention, patient agrees with plan.        History of Present Illness     Migraines:     Headache Symptoms are: depends on the stress levels     Migraine frequency:: has been a few months since last one.    Migraine Duration::  1 day    Ability to perform ADL's::  No    Migraine Rescue/Relief Medications::  Sumatriptan (Imitrex) and Hydrocodone    Effectiveness of rescue/relief medications::  Total relief    Migraine Preventative Medications::  None    Neurological symptoms::  None    ER or UC Visits::  None    Diet:  Gluten-free/reduced  Frequency of exercise:  None  Taking medications regularly:  Yes  Medication side effects:  None  Additional concerns today:  No      Problem list and histories reviewed & adjusted, as indicated.  Additional history: court is now done, she has temporary full custody of granddaughter as her daughter is going thorough drug treatment         BP Readings from Last 3 Encounters:   09/12/18 126/70   01/08/18 136/74   12/06/17 (!) 156/96    Wt Readings from Last 3 Encounters:   09/12/18 164 lb (74.4 kg)   01/08/18 169 lb (76.7 kg)   10/12/17 163 lb 12.8 oz (74.3 kg)                  Labs reviewed in EPIC    ROS:  CONSTITUTIONAL: NEGATIVE for fever, chills, change in weight  ENT/MOUTH: NEGATIVE for ear, mouth and throat problems  RESP: NEGATIVE for significant cough or SOB  CV: NEGATIVE for chest pain, palpitations or peripheral edema  NEURO: Migraines are stable and  improved not there is less stress, her symptoms are the same as typical  PSYCHIATRIC: Anxiety and stress is much less now that she has temporary full custody of her granddaughter    OBJECTIVE:     /70  Pulse 78  Temp 97.3  F (36.3  C) (Temporal)  Resp 16  Wt 164 lb (74.4 kg)  LMP 06/16/2007  BMI 27.5 kg/m2  Body mass index is 27.5 kg/(m^2).  GENERAL: healthy, alert and no distress  NECK: no adenopathy, no asymmetry, masses, or scars and thyroid normal to palpation  RESP: lungs clear to auscultation - no rales, rhonchi or wheezes  CV: regular rate and rhythm, normal S1 S2, no S3 or S4, no murmur, click or rub, no peripheral edema and peripheral pulses strong  MS: no gross musculoskeletal defects noted, no edema  NEURO: Normal strength and tone, mentation intact and speech normal  PSYCH: mentation appears normal, affect normal/bright    Diagnostic Test Results:  none     ASSESSMENT/PLAN:         I reviewed the [place] prescription monitoring program website, she is compliant with our contract.  No concerning findings noted.     1. Migraine with aura and without status migrainosus, not intractable  Stable, uses hydrocodone only if Imitrex feels on its own, I increased the amount she is given as she will be without insurance she uses a very reasonable amount for every 3 months recheck in clinic when she is requiring a refill  - HYDROcodone-acetaminophen (NORCO) 5-325 MG per tablet; Take 1 tablet by mouth every 6 hours as needed for pain  Dispense: 12 tablet; Refill: 0  - aspirin 81 MG tablet; Take 1 tablet (81 mg) by mouth daily  Dispense: 30 tablet  - SUMAtriptan (IMITREX) 100 MG tablet; Take 1 tablet (100 mg) by mouth at onset of headache for migraine  Dispense: 18 tablet; Refill: prn    This chart documentation was completed in part with Dragon voice recognition software.  Documentation is reviewed after completion, however, some words and grammatical errors may remain.  FAN Palumbo  FAN Ray  [facility]  Answers for HPI/ROS submitted by the patient on 9/12/2018   PHQ-2 Score: 0  Electronically signed by Susannah Ray PA-C

## 2018-09-12 ENCOUNTER — OFFICE VISIT (OUTPATIENT)
Dept: FAMILY MEDICINE | Facility: OTHER | Age: 53
End: 2018-09-12
Payer: COMMERCIAL

## 2018-09-12 VITALS
WEIGHT: 164 LBS | RESPIRATION RATE: 16 BRPM | SYSTOLIC BLOOD PRESSURE: 126 MMHG | TEMPERATURE: 97.3 F | HEART RATE: 78 BPM | BODY MASS INDEX: 27.5 KG/M2 | DIASTOLIC BLOOD PRESSURE: 70 MMHG

## 2018-09-12 DIAGNOSIS — G43.109 MIGRAINE WITH AURA AND WITHOUT STATUS MIGRAINOSUS, NOT INTRACTABLE: ICD-10-CM

## 2018-09-12 PROCEDURE — 99213 OFFICE O/P EST LOW 20 MIN: CPT | Performed by: PHYSICIAN ASSISTANT

## 2018-09-12 RX ORDER — SUMATRIPTAN 100 MG/1
100 TABLET, FILM COATED ORAL
Qty: 18 TABLET | Status: SHIPPED | OUTPATIENT
Start: 2018-09-12 | End: 2019-05-22

## 2018-09-12 RX ORDER — HYDROCODONE BITARTRATE AND ACETAMINOPHEN 5; 325 MG/1; MG/1
1 TABLET ORAL EVERY 6 HOURS PRN
Qty: 12 TABLET | Refills: 0 | Status: SHIPPED | OUTPATIENT
Start: 2018-10-12 | End: 2019-04-10

## 2018-09-12 ASSESSMENT — PAIN SCALES - GENERAL: PAINLEVEL: NO PAIN (0)

## 2018-09-12 NOTE — MR AVS SNAPSHOT
After Visit Summary   9/12/2018    Ophelia Wolf    MRN: [id number]           Patient Information     Date Of Birth          1965        Visit Information        Provider Department      9/12/2018 8:15 AM Susannah Ray PA-C [facility]        Today's Diagnoses     Migraine with aura and without status migrainosus, not intractable           Follow-ups after your visit        Who to contact     If you have questions or need follow up information about today's clinic visit or your schedule please contact [facility] directly at [phone].  Normal or non-critical lab and imaging results will be communicated to you by Vinspihart, letter or phone within 4 business days after the clinic has received the results. If you do not hear from us within 7 days, please contact the clinic through MerchantCirclet or phone. If you have a critical or abnormal lab result, we will notify you by phone as soon as possible.  Submit refill requests through Pockee or call your pharmacy and they will forward the refill request to us. Please allow 3 business days for your refill to be completed.          Additional Information About Your Visit        MyChart Information     Pockee gives you secure access to your electronic health record. If you see a primary care provider, you can also send messages to your care team and make appointments. If you have questions, please call your primary care clinic.  If you do not have a primary care provider, please call [phone] and they will assist you.        Care EveryWhere ID     This is your Care EveryWhere ID. This could be used by other organizations to access your [place] medical records  [id number]        Your Vitals Were     Pulse Temperature Respirations Last Period BMI (Body Mass Index)       78 97.3  F (36.3  C) (Temporal) 16 06/16/2007 27.5 kg/m2        Blood Pressure from Last 3 Encounters:   09/12/18 126/70   01/08/18 136/74   12/06/17 (!)  156/96    Weight from Last 3 Encounters:   09/12/18 164 lb (74.4 kg)   01/08/18 169 lb (76.7 kg)   10/12/17 163 lb 12.8 oz (74.3 kg)              Today, you had the following     No orders found for display         Today's Medication Changes          These changes are accurate as of 9/12/18  8:45 AM.  If you have any questions, ask your nurse or doctor.               Start taking these medicines.        Dose/Directions    aspirin 81 MG tablet   Used for:  Migraine with aura and without status migrainosus, not intractable   Started by:  Susannah Ray PA-C        Dose:  81 mg   Take 1 tablet (81 mg) by mouth daily   Quantity:  30 tablet   Refills:  0         These medicines have changed or have updated prescriptions.        Dose/Directions    HYDROcodone-acetaminophen 5-325 MG per tablet   Commonly known as:  NORCO   This may have changed:  These instructions start on 10/12/2018. If you are unsure what to do until then, ask your doctor or other care provider.   Used for:  Migraine with aura and without status migrainosus, not intractable   Changed by:  Susannah Ray PA-C        Dose:  1 tablet   Start taking on:  10/12/2018   Take 1 tablet by mouth every 6 hours as needed for pain   Quantity:  12 tablet   Refills:  0            Where to get your medicines      These medications were sent to [id number] - [address] [address]     Phone:  [phone]     SUMAtriptan 100 MG tablet         Some of these will need a paper prescription and others can be bought over the counter.  Ask your nurse if you have questions.     Bring a paper prescription for each of these medications     HYDROcodone-acetaminophen 5-325 MG per tablet       You don't need a prescription for these medications     aspirin 81 MG tablet               Information about OPIOIDS     PRESCRIPTION OPIOIDS: WHAT YOU NEED TO KNOW   We gave you an opioid (narcotic) pain  medicine. It is important to manage your pain, but opioids are not always the best choice. You should first try all the other options your care team gave you. Take this medicine for as short a time (and as few doses) as possible.    Some activities can increase your pain, such as bandage changes or therapy sessions. It may help to take your pain medicine 30 to 60 minutes before these activities. Reduce your stress by getting enough sleep, working on hobbies you enjoy and practicing relaxation or meditation. Talk to your care team about ways to manage your pain beyond prescription opioids.    These medicines have risks:    DO NOT drive when on new or higher doses of pain medicine. These medicines can affect your alertness and reaction times, and you could be arrested for driving under the influence (DUI). If you need to use opioids long-term, talk to your care team about driving.    DO NOT operate heavy machinery    DO NOT do any other dangerous activities while taking these medicines.    DO NOT drink any alcohol while taking these medicines.     If the opioid prescribed includes acetaminophen, DO NOT take with any other medicines that contain acetaminophen. Read all labels carefully. Look for the word  acetaminophen  or  Tylenol.  Ask your pharmacist if you have questions or are unsure.    You can get addicted to pain medicines, especially if you have a history of addiction (chemical, alcohol or substance dependence). Talk to your care team about ways to reduce this risk.    All opioids tend to cause constipation. Drink plenty of water and eat foods that have a lot of fiber, such as fruits, vegetables, prune juice, apple juice and high-fiber cereal. Take a laxative (Miralax, milk of magnesia, Colace, Senna) if you don t move your bowels at least every other day. Other side effects include upset stomach, sleepiness, dizziness, throwing up, tolerance (needing more of the medicine to have the same effect), physical  dependence and slowed breathing.    Store your pills in a secure place, locked if possible. We will not replace any lost or stolen medicine. If you don t finish your medicine, please throw away (dispose) as directed by your pharmacist. The [place] Pollution Control Agency has more information about safe disposal: https://[email or web address].[facility].[place]/living-green/managing-unwanted-medications         Primary Care Provider Office Phone # Fax #    Susannah Ray PA-C [phone] [address] [place] [address]        Equal Access to Services     [facility]: Hadii aad ku hadasho Soomaali, waaxda luqadaha, qaybta kaalmada adeegyada, jovani cai . So [facility] [phone].    ATENCIÓN: Si habla español, tiene a gee disposición servicios gratuitos de asistencia lingüística. Ll[facility] [phone].    We comply with applicable federal civil rights laws and [place] laws. We do not discriminate on the basis of race, color, national origin, age, disability, sex, sexual orientation, or gender identity.            Thank you!     Thank you for choosing [facility]  for your care. Our goal is always to provide you with excellent care. Hearing back from our patients is one way we can continue to improve our services. Please take a few minutes to complete the written survey that you may receive in the mail after your visit with us. Thank you!             Your Updated Medication List - Protect others around you: Learn how to safely use, store and throw away your medicines at www.disposemymeds.org.          This list is accurate as of 9/12/18  8:45 AM.  Always use your most recent med list.                   Brand Name Dispense Instructions for use Diagnosis    aspirin 81 MG tablet     30 tablet    Take 1 tablet (81 mg) by mouth daily    Migraine with aura and without status migrainosus, not intractable       BIOTIN 5000 PO      Take by mouth daily        CALCIUM + D PO       Take  by mouth 2 times daily.        fluticasone 50 MCG/ACT spray    FLONASE    16 g    Spray 1-2 sprays into both nostrils daily    Acute recurrent maxillary sinusitis       HYDROcodone-acetaminophen 5-325 MG per tablet   Start taking on:  10/12/2018    NORCO    12 tablet    Take 1 tablet by mouth every 6 hours as needed for pain    Migraine with aura and without status migrainosus, not intractable       MELATONIN PO           PROGESTERONE PO      Take 20 mg by mouth daily        ranitidine 150 MG tablet    ZANTAC    60 tablet    Take 1 tablet (150 mg) by mouth 2 times daily    Heartburn symptom       SUMAtriptan 100 MG tablet    IMITREX    18 tablet    Take 1 tablet (100 mg) by mouth at onset of headache for migraine    Migraine with aura and without status migrainosus, not intractable

## 2018-11-05 ENCOUNTER — TELEPHONE (OUTPATIENT)
Dept: FAMILY MEDICINE | Facility: OTHER | Age: 53
End: 2018-11-05

## 2018-11-05 NOTE — LETTER
[facility]  [address] [place] [address]  Phone: [phone]  December 3, 2018      Ophelia Wolf  [address] [facility] [address]      Dear Ophelia,    We care about your health and have reviewed your health plan including your medical conditions, medications, and lab results.  Based on this review, it is recommended that you follow up regarding the following health topic(s):  -Breast Cancer Screening    We recommend you take the following action(s):  -schedule a MAMMOGRAM which is due. Please disregard this reminder if you have had this exam elsewhere within the last 1-2 years please let us know so we can update your records.     Please call us at the [facility] - [phone] (or use Conergy) to address the above recommendations.     Thank you for trusting [facility] and we appreciate the opportunity to serve you.  We look forward to supporting your healthcare needs in the future.    Healthy Regards,    Your Health Care Team  [facility]

## 2018-12-05 NOTE — MR AVS SNAPSHOT
After Visit Summary   4/19/2018    Ophelia Wolf    MRN: [id number]           Patient Information     Date Of Birth          1965        Visit Information        Provider Department      4/19/2018 1:45 PM Susannah Ray PA-C [facility]        Today's Diagnoses     Dysuria    -  1    Acute cystitis without hematuria           Follow-ups after your visit        Who to contact     If you have questions or need follow up information about today's clinic visit or your schedule please contact [facility] directly at [phone].  Normal or non-critical lab and imaging results will be communicated to you by Etcetera Edutainmenthart, letter or phone within 4 business days after the clinic has received the results. If you do not hear from us within 7 days, please contact the clinic through Poupt or phone. If you have a critical or abnormal lab result, we will notify you by phone as soon as possible.  Submit refill requests through Doodle Mobile or call your pharmacy and they will forward the refill request to us. Please allow 3 business days for your refill to be completed.          Additional Information About Your Visit        MyChart Information     Doodle Mobile gives you secure access to your electronic health record. If you see a primary care provider, you can also send messages to your care team and make appointments. If you have questions, please call your primary care clinic.  If you do not have a primary care provider, please call [phone] and they will assist you.        Care EveryWhere ID     This is your Care EveryWhere ID. This could be used by other organizations to access your [place] medical records  [id number]        Your Vitals Were     Last Period                   06/16/2007            Blood Pressure from Last 3 Encounters:   01/08/18 136/74   12/06/17 (!) 156/96   10/12/17 140/84    Weight from Last 3 Encounters:   01/08/18 169 lb (76.7 kg)   10/12/17 163 lb 12.8 oz  (74.3 kg)   05/25/17 164 lb (74.4 kg)              We Performed the Following     UA reflex to Microscopic and Culture     Urine Microscopic     Wet prep          Today's Medication Changes          These changes are accurate as of 4/19/18  2:54 PM.  If you have any questions, ask your nurse or doctor.               Start taking these medicines.        Dose/Directions    sulfamethoxazole-trimethoprim 800-160 MG per tablet   Commonly known as:  BACTRIM DS/SEPTRA DS   Used for:  Acute cystitis without hematuria   Started by:  Susannah Ray PA-C        Dose:  1 tablet   Take 1 tablet by mouth 2 times daily   Quantity:  14 tablet   Refills:  0            Where to get your medicines      These medications were sent to [id number] - [place], [place] - [address]  [address]  [address] [place] [address]     Phone:  [phone]     sulfamethoxazole-trimethoprim 800-160 MG per tablet                Primary Care Provider Office Phone # Fax #    Susannah Ray PA-C [phone] [address] [place] [address]        Equal Access to Services     [facility]: Hadii aad ku hadasho Soomaali, waaxda luqadaha, qaybta kaalmada adeegyada, jovani cai . So [facility] [phone].    ATENCIÓN: Si habla español, tiene a gee disposición servicios gratuitos de asistencia lingüística. Llame al [phone].    We comply with applicable federal civil rights laws and [place] laws. We do not discriminate on the basis of race, color, national origin, age, disability, sex, sexual orientation, or gender identity.            Thank you!     Thank you for choosing [facility]  for your care. Our goal is always to provide you with excellent care. Hearing back from our patients is one way we can continue to improve our services. Please take a few minutes to complete the written survey that you may receive in the mail after your visit with us. Thank  [Non-Contributory] : Non-contributory you!             Your Updated Medication List - Protect others around you: Learn how to safely use, store and throw away your medicines at www.disposemymeds.org.          This list is accurate as of 4/19/18  2:54 PM.  Always use your most recent med list.                   Brand Name Dispense Instructions for use Diagnosis    BIOTIN 5000 PO      Take by mouth daily        CALCIUM + D PO      Take  by mouth 2 times daily.        fluticasone 50 MCG/ACT spray    FLONASE    16 g    Spray 1-2 sprays into both nostrils daily    Acute recurrent maxillary sinusitis       HYDROcodone-acetaminophen 5-325 MG per tablet    NORCO    4 tablet    Take 1 tablet by mouth every 6 hours as needed for pain    Migraine with aura and without status migrainosus, not intractable       MELATONIN PO           PROGESTERONE PO      Take 20 mg by mouth daily        ranitidine 150 MG tablet    ZANTAC    60 tablet    Take 1 tablet (150 mg) by mouth 2 times daily    Heartburn symptom       sulfamethoxazole-trimethoprim 800-160 MG per tablet    BACTRIM DS/SEPTRA DS    14 tablet    Take 1 tablet by mouth 2 times daily    Acute cystitis without hematuria       SUMAtriptan 100 MG tablet    IMITREX    18 tablet    Take 1 tablet (100 mg) by mouth at onset of headache for migraine    Migraine with aura and without status migrainosus, not intractable          [Normal?] : normal pregnancy [Vaginal] : Vaginal [Was child in NICU?] : Child was not in NICU

## 2019-01-15 DIAGNOSIS — Z12.31 ENCOUNTER FOR SCREENING MAMMOGRAM FOR BREAST CANCER: Primary | ICD-10-CM

## 2019-01-15 DIAGNOSIS — G43.109 MIGRAINE WITH AURA AND WITHOUT STATUS MIGRAINOSUS, NOT INTRACTABLE: ICD-10-CM

## 2019-01-15 RX ORDER — SUMATRIPTAN 100 MG/1
TABLET, FILM COATED ORAL
Qty: 18 TABLET | Refills: 3 | Status: SHIPPED | OUTPATIENT
Start: 2019-01-15 | End: 2019-11-19

## 2019-01-15 NOTE — TELEPHONE ENCOUNTER
"Requested Prescriptions   Pending Prescriptions Disp Refills     SUMAtriptan (IMITREX) 100 MG tablet [Pharmacy Med Name: SUMATRIPTAN SUCCINATE 100MG TABS] 18 tablet prn     Sig: TAKE 1 TABLET BY MOUTH AT ONSET OF HEADACHE FOR MIGRAINE    Serotonin Agonists Failed - 1/15/2019 12:44 PM       Failed - Serotonin Agonist request needs review.    Please review patient's record. If patient has had 8 or more treatments in the past month, please forward to provider.         Passed - Blood pressure under 140/90 in past 12 months    BP Readings from Last 3 Encounters:   09/12/18 126/70   01/08/18 136/74   12/06/17 (!) 156/96          Passed - Recent (12 mo) or future (30 days) visit within the authorizing provider's specialty    Patient had office visit in the last 12 months or has a visit in the next 30 days with authorizing provider or within the authorizing provider's specialty.  See \"Patient Info\" tab in inbasket, or \"Choose Columns\" in Meds & Orders section of the refill encounter.           Passed - Medication is active on med list       Passed - Patient is age 18 or older       Passed - No active pregnancy on record       Passed - No positive pregnancy test in past 12 months      SUMAtriptan (IMITREX) 100 MG tablet  Prescription approved per [facility] Refill Protocol.    Please call patient, per plan 09/2018, will use Norco as needed. If needing this refilled as well will need an OV.   Cyn Conley RN, BSN       "

## 2019-01-16 NOTE — TELEPHONE ENCOUNTER
Called patient and advised her of the message. She was also asking if she could get a Mammo, huddled with provider and she stated I can order it. Patient will call and schedule.     Lashay Blancas MA

## 2019-01-23 DIAGNOSIS — R12 HEARTBURN SYMPTOM: ICD-10-CM

## 2019-01-23 NOTE — TELEPHONE ENCOUNTER
"Requested Prescriptions   Pending Prescriptions Disp Refills     ranitidine (ZANTAC) 150 MG tablet [Pharmacy Med Name: RANITIDINE HCL 150MG TABS] 60 tablet 11     Sig: TAKE ONE TABLET BY MOUTH TWICE A DAY    H2 Blockers Protocol Passed - 1/23/2019  4:44 PM       Passed - Patient is age 12 or older       Passed - Recent (12 mo) or future (30 days) visit within the authorizing provider's specialty    Patient had office visit in the last 12 months or has a visit in the next 30 days with authorizing provider or within the authorizing provider's specialty.  See \"Patient Info\" tab in inbasket, or \"Choose Columns\" in Meds & Orders section of the refill encounter.             Passed - Medication is active on med list        Last ov 09/12/2018    Prescription approved per [facility] Refill Protocol.  Lowell Ferguson, RN, BSN        "

## 2019-02-18 ENCOUNTER — HOSPITAL ENCOUNTER (OUTPATIENT)
Dept: ULTRASOUND IMAGING | Facility: CLINIC | Age: 54
End: 2019-02-18
Attending: PHYSICIAN ASSISTANT
Payer: COMMERCIAL

## 2019-02-18 ENCOUNTER — HOSPITAL ENCOUNTER (OUTPATIENT)
Dept: MAMMOGRAPHY | Facility: CLINIC | Age: 54
End: 2019-02-18
Attending: PHYSICIAN ASSISTANT
Payer: COMMERCIAL

## 2019-02-18 ENCOUNTER — TELEPHONE (OUTPATIENT)
Dept: FAMILY MEDICINE | Facility: OTHER | Age: 54
End: 2019-02-18

## 2019-02-18 DIAGNOSIS — N64.4 BREAST PAIN, RIGHT: ICD-10-CM

## 2019-02-18 DIAGNOSIS — N64.4 BREAST PAIN, RIGHT: Primary | ICD-10-CM

## 2019-02-18 PROCEDURE — 76642 ULTRASOUND BREAST LIMITED: CPT | Mod: RT

## 2019-02-18 PROCEDURE — G0279 TOMOSYNTHESIS, MAMMO: HCPCS

## 2019-02-18 PROCEDURE — 77066 DX MAMMO INCL CAD BI: CPT

## 2019-02-18 NOTE — TELEPHONE ENCOUNTER
Radiology called, patient is there for screening mammogram though admits to 3-4 months of right medial breast pain.  Orders were changed from screening to diagnostic mammogram plus ultrasound.  Susannah Ray PA-C

## 2019-04-10 ENCOUNTER — TELEPHONE (OUTPATIENT)
Dept: FAMILY MEDICINE | Facility: OTHER | Age: 54
End: 2019-04-10

## 2019-04-10 DIAGNOSIS — G43.109 MIGRAINE WITH AURA AND WITHOUT STATUS MIGRAINOSUS, NOT INTRACTABLE: ICD-10-CM

## 2019-04-10 RX ORDER — HYDROCODONE BITARTRATE AND ACETAMINOPHEN 5; 325 MG/1; MG/1
1 TABLET ORAL EVERY 6 HOURS PRN
Qty: 2 TABLET | Refills: 0 | Status: SHIPPED | OUTPATIENT
Start: 2019-04-10 | End: 2019-05-22

## 2019-04-10 NOTE — TELEPHONE ENCOUNTER
Reason for call:  Pt does not see her until 5/6th and she is wondering if she can get a scrip for a few hydrocodone pills which she takes for her migraines just to hold her off until she is seen.

## 2019-04-10 NOTE — TELEPHONE ENCOUNTER
Rx placed in the mail to [facility].    Albertina Barnes CMA ([facility])

## 2019-04-10 NOTE — TELEPHONE ENCOUNTER
Left message for pt to return call, when call is returned give information below. Rx is in provider's MA to do bin.      Albertina Barnes CMA ([facility])

## 2019-05-01 NOTE — PROGRESS NOTES
"c  SUBJECTIVE:   Ophelia Wolf is a 53 year old female who presents to clinic today for the following health issues:      History of Present Illness     Migraines:   Since the patient's last clinic visit, headaches are: no change (He has identified triggers to be when she does not get enough sleep and has a poor appetite and if she becomes easily overwhelmed.)  The patient is getting headaches:  Zero migraines right now  She is able to do normal daily activities when she has a migraine.  The patient is taking the following rescue/relief medications:  Ibuprofen (Advil, Motrin)   Patient states \"The relief is inconsistent (Imitrex works as she uses it right away, if it fails and ibuprofen does not work that is when she uses the hydrocodone)\" from the rescue/relief medications.   The patient is taking the following medications to prevent migraines:  Other  In the past 4 weeks, the patient has gone to an Urgent Care or Emergency Room 0 times times due to headaches.    She eats 2-3 servings of fruits and vegetables daily.She consumes 0 sweetened beverage(s) daily.  She is taking medications regularly.      Additional history: She has spots on her feet that are getting tender as they get larger she is unsure what they are.    Also, she has blemishes that she gets on her chin when she gets them they last for several months even up to 6 months she is not sure what causes them.        Reviewed and updated as needed this visit by clinical staff  Tobacco  Allergies  Meds  Problems  Med Hx  Surg Hx  Fam Hx         Reviewed and updated as needed this visit by Provider  Tobacco  Allergies  Meds  Problems  Med Hx  Surg Hx  Fam Hx             BP Readings from Last 3 Encounters:   05/22/19 (!) 156/92   09/12/18 126/70   01/08/18 136/74    Wt Readings from Last 3 Encounters:   05/22/19 68.5 kg (151 lb)   09/12/18 74.4 kg (164 lb)   01/08/18 76.7 kg (169 lb)            Objective-vitals were reviewed to blood pressures " "continue to be elevated  She has erythema bilateral cheeks with telangiectasias central forehead, and chin that she does have an erythematous papule on it today  Neck supple no cervical lymphadenopathy  Lungs clear to auscultation bilaterally  Heart regular rate rhythm without murmur  Neuro alert and oriented x3 gait is normal speech is normal she is able to walk without any ataxia  Skin-left foot she has 3 plantar warts noted       Assessment & Plan     1. Migraine with aura and without status migrainosus, not intractable  Well-controlled, rare use of hydrocodone she gets for for 3 months we will recheck in 6 months  - HYDROcodone-acetaminophen (NORCO) 5-325 MG tablet; Take 1 tablet by mouth every 6 hours as needed for pain  Dispense: 4 tablet; Refill: 0  - HYDROcodone-acetaminophen (NORCO) 5-325 MG tablet; Take 1 tablet by mouth every 6 hours as needed for pain  Dispense: 4 tablet; Refill: 0  - Drug  Screen Comprehensive, Urine w/o Reported Meds (Pain Care Package)    2. Plantar warts  Warts were all treated in 3 freeze thaw cycles   - DESTRUCT BENIGN LESION, UP TO 14    3. Rosacea    - metroNIDAZOLE (METROGEL) 0.75 % external gel; Apply topically 2 times daily  Dispense: 45 g; Refill: 3       Blood pressure is elevated, she is under a lot of stress, she will have this rechecked with [facility] nurse or at [facility] pharmacy if it continues to be elevated she needs office visit    BMI:   Estimated body mass index is 25.32 kg/m  as calculated from the following:    Height as of this encounter: 1.645 m (5' 4.75\").    Weight as of this encounter: 68.5 kg (151 lb).   Weight management plan: Discussed healthy diet and exercise guidelines        This chart documentation was completed in part with Dragon voice recognition software.  Documentation is reviewed after completion, however, some words and grammatical errors may remain.  Susannah Ray PA-C       Return in about 6 months (around 11/22/2019) for migraine, chronic " pain.    Susannah Ray PA-C  [facility]  Answers for HPI/ROS submitted by the patient on 5/22/2019   Chronic problems general questions HPI Form  If you checked off any problems, how difficult have these problems made it for you to do your work, take care of things at home, or get along with other people?: Not difficult at all  PHQ9 TOTAL SCORE: 0  EDISON 7 TOTAL SCORE: 0

## 2019-05-22 ENCOUNTER — OFFICE VISIT (OUTPATIENT)
Dept: FAMILY MEDICINE | Facility: OTHER | Age: 54
End: 2019-05-22
Payer: COMMERCIAL

## 2019-05-22 VITALS
HEART RATE: 74 BPM | SYSTOLIC BLOOD PRESSURE: 156 MMHG | DIASTOLIC BLOOD PRESSURE: 92 MMHG | WEIGHT: 151 LBS | RESPIRATION RATE: 16 BRPM | BODY MASS INDEX: 25.16 KG/M2 | HEIGHT: 65 IN | TEMPERATURE: 97.6 F

## 2019-05-22 DIAGNOSIS — G43.109 MIGRAINE WITH AURA AND WITHOUT STATUS MIGRAINOSUS, NOT INTRACTABLE: ICD-10-CM

## 2019-05-22 DIAGNOSIS — L71.9 ROSACEA: ICD-10-CM

## 2019-05-22 DIAGNOSIS — B07.0 PLANTAR WARTS: Primary | ICD-10-CM

## 2019-05-22 PROCEDURE — 80307 DRUG TEST PRSMV CHEM ANLYZR: CPT | Mod: 90 | Performed by: PHYSICIAN ASSISTANT

## 2019-05-22 PROCEDURE — 17110 DESTRUCTION B9 LES UP TO 14: CPT | Performed by: PHYSICIAN ASSISTANT

## 2019-05-22 PROCEDURE — 99214 OFFICE O/P EST MOD 30 MIN: CPT | Mod: 25 | Performed by: PHYSICIAN ASSISTANT

## 2019-05-22 PROCEDURE — 99000 SPECIMEN HANDLING OFFICE-LAB: CPT | Performed by: PHYSICIAN ASSISTANT

## 2019-05-22 RX ORDER — HYDROCODONE BITARTRATE AND ACETAMINOPHEN 5; 325 MG/1; MG/1
1 TABLET ORAL EVERY 6 HOURS PRN
Qty: 4 TABLET | Refills: 0 | Status: SHIPPED | OUTPATIENT
Start: 2019-08-22 | End: 2019-11-19

## 2019-05-22 RX ORDER — HYDROCODONE BITARTRATE AND ACETAMINOPHEN 5; 325 MG/1; MG/1
1 TABLET ORAL EVERY 6 HOURS PRN
Qty: 4 TABLET | Refills: 0 | Status: SHIPPED | OUTPATIENT
Start: 2019-05-22 | End: 2019-08-05

## 2019-05-22 RX ORDER — METRONIDAZOLE 7.5 MG/G
GEL TOPICAL 2 TIMES DAILY
Qty: 45 G | Refills: 3 | Status: SHIPPED | OUTPATIENT
Start: 2019-05-22 | End: 2021-05-18

## 2019-05-22 ASSESSMENT — ANXIETY QUESTIONNAIRES
3. WORRYING TOO MUCH ABOUT DIFFERENT THINGS: NOT AT ALL
GAD7 TOTAL SCORE: 0
1. FEELING NERVOUS, ANXIOUS, OR ON EDGE: NOT AT ALL
7. FEELING AFRAID AS IF SOMETHING AWFUL MIGHT HAPPEN: NOT AT ALL
GAD7 TOTAL SCORE: 0
7. FEELING AFRAID AS IF SOMETHING AWFUL MIGHT HAPPEN: NOT AT ALL
4. TROUBLE RELAXING: NOT AT ALL
2. NOT BEING ABLE TO STOP OR CONTROL WORRYING: NOT AT ALL
5. BEING SO RESTLESS THAT IT IS HARD TO SIT STILL: NOT AT ALL
GAD7 TOTAL SCORE: 0
6. BECOMING EASILY ANNOYED OR IRRITABLE: NOT AT ALL

## 2019-05-22 ASSESSMENT — PATIENT HEALTH QUESTIONNAIRE - PHQ9
SUM OF ALL RESPONSES TO PHQ QUESTIONS 1-9: 0
SUM OF ALL RESPONSES TO PHQ QUESTIONS 1-9: 0
10. IF YOU CHECKED OFF ANY PROBLEMS, HOW DIFFICULT HAVE THESE PROBLEMS MADE IT FOR YOU TO DO YOUR WORK, TAKE CARE OF THINGS AT HOME, OR GET ALONG WITH OTHER PEOPLE: NOT DIFFICULT AT ALL

## 2019-05-22 ASSESSMENT — MIFFLIN-ST. JEOR: SCORE: 1286.84

## 2019-05-22 ASSESSMENT — PAIN SCALES - GENERAL: PAINLEVEL: NO PAIN (0)

## 2019-05-23 ASSESSMENT — ANXIETY QUESTIONNAIRES: GAD7 TOTAL SCORE: 0

## 2019-06-01 LAB — COMPREHEN DRUG ANALYSIS UR: NORMAL

## 2019-07-26 ENCOUNTER — TELEPHONE (OUTPATIENT)
Dept: FAMILY MEDICINE | Facility: OTHER | Age: 54
End: 2019-07-26

## 2019-07-26 DIAGNOSIS — G43.109 MIGRAINE WITH AURA AND WITHOUT STATUS MIGRAINOSUS, NOT INTRACTABLE: ICD-10-CM

## 2019-07-26 NOTE — TELEPHONE ENCOUNTER
Reason for Call:   medication refill    Do you use a [facility]?  Name of the pharmacy and phone number for the current request:    [id number] - [address] [phone] (Phone)  [phone] (Fax)     Name of the medication requested: HYDROcodone-acetaminophen (NORCO) 5-325 MG tablet     Other request: current script is over 30 days old so pharmacy will not fill    Can we leave a detailed message on this number? YES    Phone number patient can be reached at: Home number on file [phone] (home)    Best Time: anytime    Call taken on 7/26/2019 at 10:01 AM by Lilian Pérez

## 2019-08-02 NOTE — TELEPHONE ENCOUNTER
Pt is okay waiting until NP is back in office. Wondering if this can be mailed? As she lives about 40 minutes from [place]. Please advise.

## 2019-08-05 RX ORDER — HYDROCODONE BITARTRATE AND ACETAMINOPHEN 5; 325 MG/1; MG/1
1 TABLET ORAL EVERY 6 HOURS PRN
Qty: 4 TABLET | Refills: 0 | Status: SHIPPED | OUTPATIENT
Start: 2019-08-05 | End: 2020-10-20

## 2019-08-05 NOTE — TELEPHONE ENCOUNTER
Please call pt- I have approved her prescription, we can mail it to her or pharmacy  Susannah Ray PA-C

## 2019-08-05 NOTE — TELEPHONE ENCOUNTER
Patient returning message below. Advised patient of the message and she wants it sent to her home verified address which is [id number] [place] [address].    Not applicable

## 2019-08-05 NOTE — TELEPHONE ENCOUNTER
Left message for patient to return call to clinic. When call is returned please see message below. Please let patient know that our mail does take longer then usual due to we do not have a mail service that picks up our mail we have to send it out. It can take up to 10-12 days to get to its destination.     Bill Jensen,

## 2019-09-27 ENCOUNTER — HEALTH MAINTENANCE LETTER (OUTPATIENT)
Age: 54
End: 2019-09-27

## 2019-10-28 DIAGNOSIS — R12 HEARTBURN SYMPTOM: ICD-10-CM

## 2019-10-28 NOTE — TELEPHONE ENCOUNTER
Pending Prescriptions:                       Disp   Refills    ranitidine (ZANTAC) 150 MG tablet [Pharma*60 tab*8            Sig: TAKE ONE TABLET BY MOUTH TWICE A DAY    Prescription approved per [facility] Refill Protocol.    Angelina Nelson, RN, BSN

## 2019-11-04 NOTE — PROGRESS NOTES
"Subjective     Ophelia Wolf is a 54 year old female who presents to clinic today for the following health issues:  The ASCVD Risk score (Welaka DIANA Jr., et al., 2013) failed to calculate for the following reasons:    Cannot find a previous HDL lab    Cannot find a previous total cholesterol lab  Patient is eligible for use of low-dose aspirin for primary prevention of heart attack and stroke.  Provider has discussed aspirin with patient and our decision was:     Not Indicated:  Daily low-dose aspirin recommended for primary prevention, patient not eligible.        History of Present Illness        Migraines:   Since the patient's last clinic visit, headaches are: no change  The patient is getting headaches:  Maybe once a month  She is not able to do normal daily activities when she has a migraine.  The patient is taking the following rescue/relief medications:  Sumatriptan (Imitrex)   Patient states \"I get total relief\" from the rescue/relief medications.   The patient is taking the following medications to prevent migraines:  Other  In the past 4 weeks, the patient has gone to an Urgent Care or Emergency Room 0 times times due to headaches.  Migraines comment:  When she gets migraines, which she states she self triggers with anxiety and stress, they quickly resolve with use of her medication.  On rare occasion she must use Norco which she only gets for every 3 months.    She eats 2-3 servings of fruits and vegetables daily.She consumes 0 sweetened beverage(s) daily.  She is taking medications regularly.           BP Readings from Last 3 Encounters:   11/19/19 128/64   05/22/19 (!) 156/92   09/12/18 126/70    Wt Readings from Last 3 Encounters:   11/19/19 71.7 kg (158 lb)   05/22/19 68.5 kg (151 lb)   09/12/18 74.4 kg (164 lb)                    Reviewed and updated as needed this visit by Provider         Review of Systems   ROS COMP: CONSTITUTIONAL: NEGATIVE for fever, chills, change in weight  ENT/MOUTH: NEGATIVE " for ear, mouth and throat problems  RESP: NEGATIVE for significant cough or SOB  CV: NEGATIVE for chest pain, palpitations or peripheral edema  GI: She has been using ranitidine, she is not having any issues with it we did discuss potential carcinogens she is agreeable to switching over to famotidine  NEURO: Migraines as above  PSYCHIATRIC: NEGATIVE for changes in mood or affect      Objective    /64   Pulse 72   Temp 98.4  F (36.9  C) (Temporal)   Resp 16   Wt 71.7 kg (158 lb)   LMP 06/16/2007 (Approximate)   BMI 26.50 kg/m     Body mass index is 26.5 kg/m .  Physical Exam   GENERAL: healthy, alert and no distress  NECK: no adenopathy, no asymmetry, masses, or scars and thyroid normal to palpation  RESP: lungs clear to auscultation - no rales, rhonchi or wheezes  CV: regular rate and rhythm, normal S1 S2, no S3 or S4, no murmur, click or rub, no peripheral edema and peripheral pulses strong  MS: no gross musculoskeletal defects noted, no edema  NEURO: Normal strength and tone, mentation intact and speech normal  PSYCH: mentation appears normal, affect normal/bright    Diagnostic Test Results:  Labs reviewed in Epic  none         Assessment & Plan   I reviewed the [place] prescription monitoring program website, he is compliant with our contract.  No concerning findings noted.    1. Migraine with aura and without status migrainosus, not intractable  Doing well, her pharmacist recommended switching over to Aimovig however patient does not wish to do so at this time as she is doing well with her current treatment.  We have switched her  - SUMAtriptan (IMITREX) 100 MG tablet; TAKE 1 TABLET BY MOUTH AT ONSET OF HEADACHE FOR MIGRAINE  Dispense: 18 tablet; Refill: 3  - HYDROcodone-acetaminophen (NORCO) 5-325 MG tablet; Take 1 tablet by mouth every 6 hours as needed for pain  Dispense: 4 tablet; Refill: 0    2. Heartburn symptom  discharge ranitidine changed  to famotidine,  - famotidine (PEPCID) 20 MG tablet; Take  "1 tablet (20 mg) by mouth 2 times daily  Dispense: 180 tablet; Refill: 1     BMI:   Estimated body mass index is 26.5 kg/m  as calculated from the following:    Height as of 5/22/19: 1.645 m (5' 4.75\").    Weight as of this encounter: 71.7 kg (158 lb).   Weight management plan: Discussed healthy diet and exercise guidelines       recheck in 6 months with a physical exam and med check      Return in about 6 months (around 5/19/2020) for Physical Exam.    Susannah Ray PA-C  [facility]    Answers for HPI/ROS submitted by the patient on 11/18/2019   Chronic problems general questions HPI Form  If you checked off any problems, how difficult have these problems made it for you to do your work, take care of things at home, or get along with other people?: Not difficult at all  PHQ9 TOTAL SCORE: 0  EDISON 7 TOTAL SCORE: 0    "

## 2019-11-18 ASSESSMENT — PATIENT HEALTH QUESTIONNAIRE - PHQ9
SUM OF ALL RESPONSES TO PHQ QUESTIONS 1-9: 0
10. IF YOU CHECKED OFF ANY PROBLEMS, HOW DIFFICULT HAVE THESE PROBLEMS MADE IT FOR YOU TO DO YOUR WORK, TAKE CARE OF THINGS AT HOME, OR GET ALONG WITH OTHER PEOPLE: NOT DIFFICULT AT ALL
SUM OF ALL RESPONSES TO PHQ QUESTIONS 1-9: 0

## 2019-11-18 ASSESSMENT — ANXIETY QUESTIONNAIRES
2. NOT BEING ABLE TO STOP OR CONTROL WORRYING: NOT AT ALL
GAD7 TOTAL SCORE: 0
6. BECOMING EASILY ANNOYED OR IRRITABLE: NOT AT ALL
GAD7 TOTAL SCORE: 0
7. FEELING AFRAID AS IF SOMETHING AWFUL MIGHT HAPPEN: NOT AT ALL
5. BEING SO RESTLESS THAT IT IS HARD TO SIT STILL: NOT AT ALL
4. TROUBLE RELAXING: NOT AT ALL
GAD7 TOTAL SCORE: 0
1. FEELING NERVOUS, ANXIOUS, OR ON EDGE: NOT AT ALL
3. WORRYING TOO MUCH ABOUT DIFFERENT THINGS: NOT AT ALL
7. FEELING AFRAID AS IF SOMETHING AWFUL MIGHT HAPPEN: NOT AT ALL

## 2019-11-19 ENCOUNTER — OFFICE VISIT (OUTPATIENT)
Dept: FAMILY MEDICINE | Facility: OTHER | Age: 54
End: 2019-11-19
Payer: COMMERCIAL

## 2019-11-19 VITALS
RESPIRATION RATE: 16 BRPM | BODY MASS INDEX: 26.5 KG/M2 | WEIGHT: 158 LBS | SYSTOLIC BLOOD PRESSURE: 128 MMHG | TEMPERATURE: 98.4 F | DIASTOLIC BLOOD PRESSURE: 64 MMHG | HEART RATE: 72 BPM

## 2019-11-19 DIAGNOSIS — Z23 NEED FOR PROPHYLACTIC VACCINATION AND INOCULATION AGAINST INFLUENZA: ICD-10-CM

## 2019-11-19 DIAGNOSIS — G43.109 MIGRAINE WITH AURA AND WITHOUT STATUS MIGRAINOSUS, NOT INTRACTABLE: ICD-10-CM

## 2019-11-19 DIAGNOSIS — R12 HEARTBURN SYMPTOM: Primary | ICD-10-CM

## 2019-11-19 PROCEDURE — 90471 IMMUNIZATION ADMIN: CPT | Performed by: PHYSICIAN ASSISTANT

## 2019-11-19 PROCEDURE — 90682 RIV4 VACC RECOMBINANT DNA IM: CPT | Performed by: PHYSICIAN ASSISTANT

## 2019-11-19 PROCEDURE — 99214 OFFICE O/P EST MOD 30 MIN: CPT | Mod: 25 | Performed by: PHYSICIAN ASSISTANT

## 2019-11-19 RX ORDER — FAMOTIDINE 20 MG/1
20 TABLET, FILM COATED ORAL 2 TIMES DAILY
Qty: 180 TABLET | Refills: 1 | Status: SHIPPED | OUTPATIENT
Start: 2019-11-19 | End: 2020-07-29

## 2019-11-19 RX ORDER — HYDROCODONE BITARTRATE AND ACETAMINOPHEN 5; 325 MG/1; MG/1
1 TABLET ORAL EVERY 6 HOURS PRN
Qty: 4 TABLET | Refills: 0 | Status: SHIPPED | OUTPATIENT
Start: 2019-11-19 | End: 2020-03-31

## 2019-11-19 RX ORDER — SUMATRIPTAN 100 MG/1
TABLET, FILM COATED ORAL
Qty: 18 TABLET | Refills: 3 | Status: SHIPPED | OUTPATIENT
Start: 2019-11-19 | End: 2020-03-04

## 2019-11-19 ASSESSMENT — PAIN SCALES - GENERAL: PAINLEVEL: NO PAIN (0)

## 2019-11-19 ASSESSMENT — ANXIETY QUESTIONNAIRES: GAD7 TOTAL SCORE: 0

## 2019-11-19 ASSESSMENT — PATIENT HEALTH QUESTIONNAIRE - PHQ9: SUM OF ALL RESPONSES TO PHQ QUESTIONS 1-9: 0

## 2020-03-01 DIAGNOSIS — G43.109 MIGRAINE WITH AURA AND WITHOUT STATUS MIGRAINOSUS, NOT INTRACTABLE: ICD-10-CM

## 2020-03-03 NOTE — TELEPHONE ENCOUNTER
Pending Prescriptions:                       Disp   Refills    SUMAtriptan (IMITREX) 100 MG tablet [Phar*18 tab*3            Sig: TAKE 1 TABLET BY MOUTH AT ONSET OF MIGRAINE    Routing refill request to provider for review/approval because:  Appears that patient has used more than 8 treatments in the last month  Sent 11/19/19 with 18 tablet, 3 refills.    Angelina Nelson, BSN, RN, PHN

## 2020-03-04 RX ORDER — SUMATRIPTAN 100 MG/1
TABLET, FILM COATED ORAL
Qty: 18 TABLET | Refills: 3 | Status: SHIPPED | OUTPATIENT
Start: 2020-03-04 | End: 2020-06-08

## 2020-03-04 NOTE — TELEPHONE ENCOUNTER
Per pharmacist is patient fills this prescription  alot- twice a month- last fill was 2/12/2020.   Stephanie Estevez MA on 3/4/2020 at 10:48 AM

## 2020-03-04 NOTE — TELEPHONE ENCOUNTER
Patient called clinic.k She is looking for an update for the status of this rx. She needs this sent [facility].

## 2020-03-31 ENCOUNTER — MYC REFILL (OUTPATIENT)
Dept: FAMILY MEDICINE | Facility: OTHER | Age: 55
End: 2020-03-31

## 2020-03-31 DIAGNOSIS — G43.109 MIGRAINE WITH AURA AND WITHOUT STATUS MIGRAINOSUS, NOT INTRACTABLE: ICD-10-CM

## 2020-03-31 RX ORDER — HYDROCODONE BITARTRATE AND ACETAMINOPHEN 5; 325 MG/1; MG/1
1 TABLET ORAL EVERY 6 HOURS PRN
Qty: 4 TABLET | Refills: 0 | Status: SHIPPED | OUTPATIENT
Start: 2020-03-31 | End: 2020-06-29

## 2020-03-31 NOTE — TELEPHONE ENCOUNTER
Pending Prescriptions:                       Disp   Refills    HYDROcodone-acetaminophen (NORCO) 5-325 MG*4 tabl*0        Sig: Take 1 tablet by mouth every 6 hours as needed for           pain    Routing refill request to provider for review/approval because:  Drug not on the [facility] refill protocol

## 2020-03-31 NOTE — TELEPHONE ENCOUNTER
This refill was completed virtually due to our current COVID 19 pandemic.  The patient will be seen as soon as possible in the office.  If there is any significant change in or worsening of symptoms patient will call in to be triaged, present to the emergency department, or call 911 if acute worsening is noted.   Susannah Ray PA-C

## 2020-05-05 ENCOUNTER — MYC MEDICAL ADVICE (OUTPATIENT)
Dept: FAMILY MEDICINE | Facility: OTHER | Age: 55
End: 2020-05-05

## 2020-05-08 NOTE — TELEPHONE ENCOUNTER
LMTC< please see message below and assist in scheduling if needed  Thanks  Cheri Zamorano RT (R)         
MyChart has not been read  Thanks  Cheri Zamorano RT (R)         
Patient aware to schedule.  Looks like she scheduled for 5/19/20 however cancelled.  Will await patient to reschedule.    
Patient was at work so she will check her mychart and respond back   
Responded via MYChart   Thanks  Cheri Zamorano RT (R)         
normal...

## 2020-06-05 DIAGNOSIS — G43.109 MIGRAINE WITH AURA AND WITHOUT STATUS MIGRAINOSUS, NOT INTRACTABLE: ICD-10-CM

## 2020-06-05 NOTE — TELEPHONE ENCOUNTER
Reason for Call:  Medication or medication refill:    Do you use a [facility]?  Name of the pharmacy and phone number for the current request: [id number] - [place], [place] - [address] [place] [address] [address]      Name of the medication requested: SUMAtriptan (IMITREX) 100 MG tablet     Other request: Pt called and we did get a request from pharmacy for a refill on this medication. She has no more left today she was wondering if another provider can refill it. Please advise thank you    Can we leave a detailed message on this number? YES    Phone number patient can be reached at: Home number on file [phone] (home)    Best Time: anytime    Call taken on 6/5/2020 at 10:32 AM by Shirley Vaughan

## 2020-06-08 RX ORDER — SUMATRIPTAN 100 MG/1
TABLET, FILM COATED ORAL
Qty: 18 TABLET | Refills: 3 | OUTPATIENT
Start: 2020-06-08

## 2020-06-08 RX ORDER — SUMATRIPTAN 100 MG/1
TABLET, FILM COATED ORAL
Qty: 18 TABLET | Refills: 1 | Status: SHIPPED | OUTPATIENT
Start: 2020-06-08 | End: 2020-07-30

## 2020-06-08 NOTE — TELEPHONE ENCOUNTER
Prescription approved per [facility] [place].  Lowell Ferguson, RN, BSN

## 2020-06-29 ENCOUNTER — MYC REFILL (OUTPATIENT)
Dept: FAMILY MEDICINE | Facility: OTHER | Age: 55
End: 2020-06-29

## 2020-06-29 DIAGNOSIS — G43.109 MIGRAINE WITH AURA AND WITHOUT STATUS MIGRAINOSUS, NOT INTRACTABLE: ICD-10-CM

## 2020-06-29 RX ORDER — HYDROCODONE BITARTRATE AND ACETAMINOPHEN 5; 325 MG/1; MG/1
1 TABLET ORAL EVERY 6 HOURS PRN
Qty: 4 TABLET | Refills: 0 | Status: SHIPPED | OUTPATIENT
Start: 2020-06-29 | End: 2020-12-21

## 2020-06-29 NOTE — TELEPHONE ENCOUNTER
Pending Prescriptions:                       Disp   Refills    HYDROcodone-acetaminophen (NORCO) 5-325 MG*4 tabl*0        Sig: Take 1 tablet by mouth every 6 hours as needed for           pain    Last Written Prescription Date:  3/31/20  Last Fill Quantity: 4,  # refills: 0   Last office visit: 11/19/2019 with prescribing provider:     Future Office Visit:            Routing refill request to provider for review/approval because:  Drug not on the [facility] refill protocol     Jenn Gan, MSN, RN

## 2020-07-27 DIAGNOSIS — R12 HEARTBURN SYMPTOM: ICD-10-CM

## 2020-07-27 NOTE — TELEPHONE ENCOUNTER
Pending Prescriptions:                       Disp   Refills    famotidine (PEPCID) 20 MG tablet [Pharmacy*180 ta*1        Sig: TAKE ONE TABLET BY MOUTH TWICE A DAY    Patient is due for physical. Please call and schedule.    Jenn Gan, MSN, RN

## 2020-07-29 RX ORDER — FAMOTIDINE 20 MG/1
TABLET, FILM COATED ORAL
Qty: 180 TABLET | Refills: 0 | Status: SHIPPED | OUTPATIENT
Start: 2020-07-29 | End: 2020-10-20

## 2020-07-29 NOTE — TELEPHONE ENCOUNTER
Prescription approved per [facility] Refill Protocol.    Angelina Nelson, BSN, RN, PHN

## 2020-09-11 DIAGNOSIS — G43.109 MIGRAINE WITH AURA AND WITHOUT STATUS MIGRAINOSUS, NOT INTRACTABLE: ICD-10-CM

## 2020-09-13 NOTE — TELEPHONE ENCOUNTER
Pending Prescriptions:                       Disp   Refills    SUMAtriptan (IMITREX) 100 MG tablet [Pharm*18 tab*1        Sig: TAKE 1 TABLET BY MOUTH AT ONSET OF MIGRAINE      Support staff:  Please call patient to schedule a visit. (F2F, video, phone, or E Visit) physical/med check  Then ask if the patient will need a refill of the above medication before the visit.  Please reflag for RN and route to the Refill pool to give a 30 or 90 day mayuri to get them to their next visit or to remove medication and to close the encounter.    After 3 attempts to reach patient, please route to provider to review and advise.    Thank you,  Radha Mejia RN

## 2020-09-14 ENCOUNTER — MYC REFILL (OUTPATIENT)
Dept: FAMILY MEDICINE | Facility: OTHER | Age: 55
End: 2020-09-14

## 2020-09-14 DIAGNOSIS — G43.109 MIGRAINE WITH AURA AND WITHOUT STATUS MIGRAINOSUS, NOT INTRACTABLE: ICD-10-CM

## 2020-09-15 RX ORDER — SUMATRIPTAN 100 MG/1
TABLET, FILM COATED ORAL
Qty: 18 TABLET | Refills: 1 | Status: SHIPPED | OUTPATIENT
Start: 2020-09-15 | End: 2020-10-20

## 2020-09-15 NOTE — TELEPHONE ENCOUNTER
Patient called in to check on status of this refill. She was given the information below and stated that she starts a new job tomorrow and will not be able to come in for a few weeks. She scheduled for 10/20/20 for a physical/med check. Please send mayuri refill.

## 2020-09-16 RX ORDER — SUMATRIPTAN 100 MG/1
TABLET, FILM COATED ORAL
Qty: 18 TABLET | Refills: 1 | OUTPATIENT
Start: 2020-09-16

## 2020-10-09 ENCOUNTER — MYC REFILL (OUTPATIENT)
Dept: FAMILY MEDICINE | Facility: OTHER | Age: 55
End: 2020-10-09

## 2020-10-09 ENCOUNTER — MYC MEDICAL ADVICE (OUTPATIENT)
Dept: FAMILY MEDICINE | Facility: OTHER | Age: 55
End: 2020-10-09

## 2020-10-09 DIAGNOSIS — G43.109 MIGRAINE WITH AURA AND WITHOUT STATUS MIGRAINOSUS, NOT INTRACTABLE: ICD-10-CM

## 2020-10-09 RX ORDER — HYDROCODONE BITARTRATE AND ACETAMINOPHEN 5; 325 MG/1; MG/1
1 TABLET ORAL EVERY 6 HOURS PRN
Qty: 4 TABLET | Refills: 0 | Status: CANCELLED | OUTPATIENT
Start: 2020-10-09

## 2020-10-10 NOTE — TELEPHONE ENCOUNTER
Pt has read message with no reply. Will close.    Albertina Barnes CMA ([facility])

## 2020-10-12 NOTE — TELEPHONE ENCOUNTER
Needs PE and med check in person, please set up appt, I can refill if she will run out before her appt  Susannah Ray PA-C

## 2020-10-12 NOTE — TELEPHONE ENCOUNTER
Appt scheduled  Next 5 appointments (look out 90 days)    Oct 20, 2020  7:00 AM  PHYSICAL with Susannah Ray PA-C  [facility] [address]  [facility] [address]  [phone]        Closing encounter  Cheri Zamorano RT (R)

## 2020-10-15 NOTE — PROGRESS NOTES
SUBJECTIVE:   CC: Ophelia Wolf is an 55 year old woman who presents for preventive health visit.     Patient has been advised of split billing requirements and indicates understanding: Yes  Healthy Habits:     Getting at least 3 servings of Calcium per day:  Yes    Bi-annual eye exam:  NO    Dental care twice a year:  Yes    Sleep apnea or symptoms of sleep apnea:  None    Diet:  Low salt, Low fat/cholesterol and Gluten-free/reduced    Frequency of exercise:  None    Taking medications regularly:  Yes    Medication side effects:  None    PHQ-2 Total Score: 0    Additional concerns today:  Yes    She has 2 tiny skin tags on her left neck she would like removed.  They irritate her, they get caught on her necklace.    Her headaches have been occurring more frequently.  She been under more stress.  Her mother  unexpectedly and she is helping to care for her father.  Her father-in-law also  unexpectedly and lives out of state.  She typically will use Excedrin if this fails she will use Imitrex.  If these 2 things fail then she will use a half of a Norco.  She gets 4 Norco for 3 months.  She is out of this prescription currently.  She is due for her drug screen.    Patient has realized she is sensitive to gluten.  She will get a lower stomachache if she eats anything containing gluten.  She uses famotidine as needed for acid reflux.  She tries to avoid its use however.  Sometimes she will get epigastric pain.  She feels very bloated and increased gas.  She does not feel nauseous.  She denies any right upper quadrant pain or radiation to her back.  If she presses on her abdomen or if she lays on her abdomen the pressure seems to improve.  She wonders what this could be from.  She has never had upper GI endoscopy to her recollection.          Today's PHQ-2 Score:   PHQ-2 (  [employer]) 10/20/2020   Q1: Little interest or pleasure in doing things 0   Q2: Feeling down, depressed or hopeless 0   PHQ-2 Score 0    Q1: Little interest or pleasure in doing things Not at all   Q2: Feeling down, depressed or hopeless Not at all   PHQ-2 Score 0       Abuse: Current or Past (Physical, Sexual or Emotional) - No  Do you feel safe in your environment? Yes    Have you ever done Advance Care Planning? (For example, a Health Directive, POLST, or a discussion with a medical provider or your loved ones about your wishes): Yes, patient states has an Advance Care Planning document and will bring a copy to the clinic.    Social History     Tobacco Use     Smoking status: Former Smoker     Packs/day: 0.50     Years: 10.00     Pack years: 5.00     Types: Cigarettes     Quit date: 2007     Years since quittin.8     Smokeless tobacco: Never Used     Tobacco comment: 3-4 cig/day   Substance Use Topics     Alcohol use: Yes     Alcohol/week: 3.3 standard drinks     Comment: weekends         Alcohol Use 10/20/2020   Prescreen: >3 drinks/day or >7 drinks/week? Yes   Prescreen: >3 drinks/day or >7 drinks/week? -   AUDIT SCORE  8     AUDIT - Alcohol Use Disorders Identification Test - Reproduced from the World Health Organization Audit 2001 (Second Edition) 10/20/2020   1.  How often do you have a drink containing alcohol? 4 or more times a week   2.  How many drinks containing alcohol do you have on a typical day when you are drinking? 3 or 4   3.  How often do you have five or more drinks on one occasion? Weekly   4.  How often during the last year have you found that you were not able to stop drinking once you had started? Never   5.  How often during the last year have you failed to do what was normally expected of you because of drinking? Never   6.  How often during the last year have you needed a first drink in the morning to get yourself going after a heavy drinking session? Never   7.  How often during the last year have you had a feeling of guilt or remorse after drinking? Never   8.  How often during the last year have you been  unable to remember what happened the night before because of your drinking? Never   9.  Have you or someone else been injured because of your drinking? No   10. Has a relative, friend, doctor or other health care worker been concerned about your drinking or suggested you cut down? No   TOTAL SCORE 8       Reviewed orders with patient.  Reviewed health maintenance and updated orders accordingly - Yes  Labs reviewed in EPIC  BP Readings from Last 3 Encounters:   10/20/20 120/78   19 128/64   19 (!) 156/92    Wt Readings from Last 3 Encounters:   10/20/20 68.3 kg (150 lb 9.6 oz)   19 71.7 kg (158 lb)   19 68.5 kg (151 lb)                  Patient Active Problem List   Diagnosis     Headache     Premenstrual tension syndrome     Allergic rhinitis     Migraine with aura     Constipation     Dysmenorrhea     Leiomyoma of uterus     pseudocholinesterase deficiency     Disturbance of skin sensation     CARDIOVASCULAR SCREENING; LDL GOAL LESS THAN 160     Abdominal bloating     Chronic abdominal pain     Heartburn symptom     Past Surgical History:   Procedure Laterality Date     C LIGATE FALLOPIAN TUBE,POSTPARTUM      Tubal Ligation     C SUPRACERV ABD HYSTERECTOMY  2007     COLONOSCOPY  04/14/10     ZZC NONSPECIFIC PROCEDURE      jaw surgery       Social History     Tobacco Use     Smoking status: Former Smoker     Packs/day: 0.50     Years: 10.00     Pack years: 5.00     Types: Cigarettes     Quit date: 2007     Years since quittin.8     Smokeless tobacco: Never Used     Tobacco comment: 3-4 cig/day   Substance Use Topics     Alcohol use: Yes     Alcohol/week: 3.3 standard drinks     Comment: weekends     Family History   Problem Relation Age of Onset     Neurologic Disorder Mother         Meniere's disease     Anesthesia Reaction Mother         And myself     Osteoporosis Mother      Thyroid Disease Mother         hypo     Heart Disease Maternal Grandmother         stroke      Arthritis Maternal Grandmother         osteoporosis     Diabetes Maternal Grandmother         Type II diabetes     Coronary Artery Disease Maternal Grandmother 40     Cerebrovascular Disease Maternal Grandmother 40     Osteoporosis Maternal Grandmother      Heart Disease Maternal Grandfather         heart problems, s/p CAB     Cancer Maternal Grandfather         prostate      Diabetes Maternal Grandfather         Type II diabetes     Prostate Cancer Maternal Grandfather      Hypertension Father      Hyperlipidemia Father      Heart Disease Paternal Grandmother      Anesthesia Reaction Brother      Anesthesia Reaction Brother          Current Outpatient Medications   Medication Sig Dispense Refill     aspirin 81 MG tablet Take 1 tablet (81 mg) by mouth daily 30 tablet      Calcium Carbonate-Vitamin D (CALCIUM + D PO) Take  by mouth 2 times daily.       famotidine (PEPCID) 20 MG tablet Take 1 tablet (20 mg) by mouth 2 times daily 180 tablet 1     HYDROcodone-acetaminophen (NORCO) 5-325 MG tablet Take 1 tablet by mouth every 6 hours as needed for pain 4 tablet 0     MELATONIN PO        metroNIDAZOLE (METROGEL) 0.75 % external gel Apply topically 2 times daily 45 g 3     SUMAtriptan (IMITREX) 100 MG tablet TAKE 1 TABLET BY MOUTH AT ONSET OF MIGRAINE 18 tablet 3     BIOTIN 5000 PO Take by mouth daily       fluticasone (FLONASE) 50 MCG/ACT spray Spray 1-2 sprays into both nostrils daily (Patient not taking: Reported on 10/20/2020) 16 g 11     HYDROcodone-acetaminophen (NORCO) 5-325 MG tablet Take 1 tablet by mouth every 6 hours as needed for pain (Patient not taking: Reported on 10/20/2020) 4 tablet 0     Progesterone Micronized (PROGESTERONE PO) Take 20 mg by mouth daily       Allergies   Allergen Reactions     Amoxicillin Difficulty breathing     Anesthetic Ether      pseudocholinesterase deficiency       Mammogram Screening: Patient over age 50, mutual decision to screen reflected in health  "maintenance.    Pertinent mammograms are reviewed under the imaging tab.  History of abnormal Pap smear:   Last 3 Pap and HPV Results:   PAP / HPV Latest Ref Rng & Units 9/22/2015 3/13/2007   PAP - NIL NIL   HPV 16 DNA NEG Negative -   HPV 18 DNA NEG Negative -   OTHER HR HPV NEG Negative -     Status post hysterectomy. Pap still indicated. Supracervical hysterectomy, she still has her cervix  PAP / HPV Latest Ref Rng & Units 9/22/2015 3/13/2007   PAP - NIL NIL   HPV 16 DNA NEG Negative -   HPV 18 DNA NEG Negative -   OTHER HR HPV NEG Negative -     Reviewed and updated as needed this visit by clinical staff  Tobacco  Allergies  Meds  Problems  Med Hx  Surg Hx  Fam Hx  Soc Hx          Reviewed and updated as needed this visit by Provider  Tobacco  Allergies  Meds  Problems  Med Hx  Surg Hx  Fam Hx             Review of Systems   Constitutional: Negative for chills and fever.   HENT: Negative for congestion, ear pain, hearing loss and sore throat.    Eyes: Negative for pain and visual disturbance.   Respiratory: Negative for cough and shortness of breath.    Cardiovascular: Negative for chest pain, palpitations and peripheral edema.   Gastrointestinal: Positive for abdominal pain and constipation. Negative for diarrhea, heartburn, hematochezia and nausea.   Genitourinary: Negative for dysuria, frequency, genital sores, hematuria and urgency.   Musculoskeletal: Negative for arthralgias, joint swelling and myalgias.   Skin: Negative for rash.   Neurological: Positive for headaches. Negative for dizziness, weakness and paresthesias.   Psychiatric/Behavioral: Negative for mood changes. The patient is not nervous/anxious.      As above     OBJECTIVE:   /78   Pulse 69   Temp 97.5  F (36.4  C) (Temporal)   Resp 16   Ht 1.6 m (5' 2.99\")   Wt 68.3 kg (150 lb 9.6 oz)   LMP 06/16/2007 (Approximate)   SpO2 100%   BMI 26.68 kg/m    Physical Exam  GENERAL APPEARANCE: healthy, alert and no " distress  EYES: Eyes grossly normal to inspection, PERRL and conjunctivae and sclerae normal  HENT: ear canals and TM's normal, nose and mouth without ulcers or lesions, oropharynx clear and oral mucous membranes moist  NECK: no adenopathy, no asymmetry, masses, or scars and thyroid normal to palpation  RESP: lungs clear to auscultation - no rales, rhonchi or wheezes  BREAST: normal without masses, tenderness or nipple discharge and no palpable axillary masses or adenopathy  CV: regular rate and rhythm, normal S1 S2, no S3 or S4, no murmur, click or rub, no peripheral edema and peripheral pulses strong  ABDOMEN: soft, nontender, no hepatosplenomegaly, no masses and bowel sounds normal   (female): normal female external genitalia, normal urethral meatus, vaginal mucosal atrophy noted and normal-appearing cervix, status post hysterectomy  MS: no musculoskeletal defects are noted and gait is age appropriate without ataxia  SKIN: 2 small pedunculated hyperpigmented lesions on her left neck  NEURO: Normal strength and tone, sensory exam grossly normal, mentation intact and speech normal  PSYCH: mentation appears normal and affect normal/bright    Diagnostic Test Results:  Labs reviewed in Epic  No results found for any visits on 10/20/20.    ASSESSMENT/PLAN:   1. Routine general medical examination at a health care facility  Patient will come in annually for exams  - **CBC with platelets FUTURE anytime; Future    2. Migraine with aura and without status migrainosus, not intractable  Stable, continue current meds she reserves hydrocodone for only if Excedrin and Imitrex are not successful.  - SUMAtriptan (IMITREX) 100 MG tablet; TAKE 1 TABLET BY MOUTH AT ONSET OF MIGRAINE  Dispense: 18 tablet; Refill: 3  - HYDROcodone-acetaminophen (NORCO) 5-325 MG tablet; Take 1 tablet by mouth every 6 hours as needed for pain  Dispense: 4 tablet; Refill: 0  - **Comprehensive metabolic panel FUTURE anytime; Future  - Drug  Screen  Comprehensive, Urine w/o Reported Meds (Pain Care Package)    3. Heartburn symptom  Advised that she use famotidine daily, over acid production could account for the bloating and epigastric pain she has noted.  I also advised small frequent meals as opposed to skipping meals and eating 1 large meal at the end of the day  - famotidine (PEPCID) 20 MG tablet; Take 1 tablet (20 mg) by mouth 2 times daily  Dispense: 180 tablet; Refill: 1  - **CBC with platelets FUTURE anytime; Future  - **Comprehensive metabolic panel FUTURE anytime; Future    4. Special screening for malignant neoplasms, colon  Patient will complete in December  - GASTROENTEROLOGY ADULT REF PROCEDURE ONLY; Future    5. Epigastric pain  She would like to wait until December, in the meantime she will use famotidine more routinely and eat small frequent meals  - US Abdomen Limited; Future    6. CARDIOVASCULAR SCREENING; LDL GOAL LESS THAN 160  She will return to clinic for lipids  - Lipid panel reflex to direct LDL Fasting; Future    7. Screening for malignant neoplasm of cervix  Pending  - Pap imaged thin layer screen with HPV - recommended age 30 - 65 years (select HPV order below)  - HPV High Risk Types DNA Cervical    8. Encounter for screening mammogram for breast cancer  Patient will do in December  - *MA Screening Digital Bilateral; Future    9. Skin tag  Skin was cleansed with alcohol using sterile technique 2 tiny skin tags were snipped and covered with a Band-Aid patient tolerated procedure well    10. Personal history of tobacco use  Patient is no longer smoker, she has a 13.5-pack-year history of smoking, she does not qualify for lung cancer screening    11. Need for prophylactic vaccination and inoculation against influenza  Given  - INFLUENZA QUAD, RECOMBINANT, P-FREE (RIV4) (FLUBLOCK) [78959]  - Vaccine Administration, Initial [87017]    Patient has been advised of split billing requirements and indicates understanding:  "Yes  COUNSELING:  Reviewed preventive health counseling, as reflected in patient instructions    Estimated body mass index is 26.68 kg/m  as calculated from the following:    Height as of this encounter: 1.6 m (5' 2.99\").    Weight as of this encounter: 68.3 kg (150 lb 9.6 oz).    Weight management plan: Discussed healthy diet and exercise guidelines  She is planning to start working out at the gym in near future and already eats a healthy diet    She reports that she quit smoking about 12 years ago. Her smoking use included cigarettes. She has a 5.00 pack-year smoking history. She has never used smokeless tobacco.      Counseling Resources:  ATP IV Guidelines  Pooled Cohorts Equation Calculator  Breast Cancer Risk Calculator  BRCA-Related Cancer Risk Assessment: FHS-7 Tool  FRAX Risk Assessment  ICSI Preventive Guidelines  Dietary Guidelines for Americans, 2010  USDA's MyPlate  ASA Prophylaxis  Lung CA Screening    Susannah Ray PA-C  [facility]  Lung Cancer Screening Shared Decision Making Visit     Ophelia Wolf is not eligible for lung cancer screening on the basis of the information provided in my signed lung cancer screening order. Ophelia's smoking history is below the threshold and so it is not recommended.    Patient is not currently a smoker and so we did not discuss that the only way to prevent lung cancer is to not smoke. Smoking cessation counseling was not given.    I did not offer risk estimation using a calculator such as this one:    ShouldIScreen    "

## 2020-10-16 ENCOUNTER — MYC MEDICAL ADVICE (OUTPATIENT)
Dept: FAMILY MEDICINE | Facility: OTHER | Age: 55
End: 2020-10-16

## 2020-10-20 ENCOUNTER — OFFICE VISIT (OUTPATIENT)
Dept: FAMILY MEDICINE | Facility: OTHER | Age: 55
End: 2020-10-20
Payer: COMMERCIAL

## 2020-10-20 VITALS
BODY MASS INDEX: 26.68 KG/M2 | TEMPERATURE: 97.5 F | RESPIRATION RATE: 16 BRPM | HEIGHT: 63 IN | HEART RATE: 69 BPM | DIASTOLIC BLOOD PRESSURE: 78 MMHG | OXYGEN SATURATION: 100 % | SYSTOLIC BLOOD PRESSURE: 120 MMHG | WEIGHT: 150.6 LBS

## 2020-10-20 DIAGNOSIS — Z00.00 ROUTINE GENERAL MEDICAL EXAMINATION AT A HEALTH CARE FACILITY: Primary | ICD-10-CM

## 2020-10-20 DIAGNOSIS — L91.8 SKIN TAG: ICD-10-CM

## 2020-10-20 DIAGNOSIS — R12 HEARTBURN SYMPTOM: ICD-10-CM

## 2020-10-20 DIAGNOSIS — R10.13 EPIGASTRIC PAIN: ICD-10-CM

## 2020-10-20 DIAGNOSIS — Z12.31 ENCOUNTER FOR SCREENING MAMMOGRAM FOR BREAST CANCER: ICD-10-CM

## 2020-10-20 DIAGNOSIS — Z87.891 PERSONAL HISTORY OF TOBACCO USE: ICD-10-CM

## 2020-10-20 DIAGNOSIS — Z23 NEED FOR PROPHYLACTIC VACCINATION AND INOCULATION AGAINST INFLUENZA: ICD-10-CM

## 2020-10-20 DIAGNOSIS — Z13.6 CARDIOVASCULAR SCREENING; LDL GOAL LESS THAN 160: ICD-10-CM

## 2020-10-20 DIAGNOSIS — G43.109 MIGRAINE WITH AURA AND WITHOUT STATUS MIGRAINOSUS, NOT INTRACTABLE: ICD-10-CM

## 2020-10-20 DIAGNOSIS — Z12.11 SPECIAL SCREENING FOR MALIGNANT NEOPLASMS, COLON: ICD-10-CM

## 2020-10-20 DIAGNOSIS — Z12.4 SCREENING FOR MALIGNANT NEOPLASM OF CERVIX: ICD-10-CM

## 2020-10-20 PROCEDURE — 80307 DRUG TEST PRSMV CHEM ANLYZR: CPT | Mod: 90 | Performed by: PHYSICIAN ASSISTANT

## 2020-10-20 PROCEDURE — 87624 HPV HI-RISK TYP POOLED RSLT: CPT | Performed by: PHYSICIAN ASSISTANT

## 2020-10-20 PROCEDURE — 90682 RIV4 VACC RECOMBINANT DNA IM: CPT | Performed by: PHYSICIAN ASSISTANT

## 2020-10-20 PROCEDURE — G0145 SCR C/V CYTO,THINLAYER,RESCR: HCPCS | Performed by: PHYSICIAN ASSISTANT

## 2020-10-20 PROCEDURE — 99396 PREV VISIT EST AGE 40-64: CPT | Mod: 25 | Performed by: PHYSICIAN ASSISTANT

## 2020-10-20 PROCEDURE — 99000 SPECIMEN HANDLING OFFICE-LAB: CPT | Performed by: PHYSICIAN ASSISTANT

## 2020-10-20 PROCEDURE — 90471 IMMUNIZATION ADMIN: CPT | Performed by: PHYSICIAN ASSISTANT

## 2020-10-20 PROCEDURE — 99214 OFFICE O/P EST MOD 30 MIN: CPT | Mod: 25 | Performed by: PHYSICIAN ASSISTANT

## 2020-10-20 RX ORDER — FAMOTIDINE 20 MG/1
20 TABLET, FILM COATED ORAL 2 TIMES DAILY
Qty: 180 TABLET | Refills: 1 | Status: SHIPPED | OUTPATIENT
Start: 2020-10-20 | End: 2021-12-15

## 2020-10-20 RX ORDER — SUMATRIPTAN 100 MG/1
TABLET, FILM COATED ORAL
Qty: 18 TABLET | Refills: 3 | Status: SHIPPED | OUTPATIENT
Start: 2020-10-20 | End: 2021-02-01

## 2020-10-20 RX ORDER — HYDROCODONE BITARTRATE AND ACETAMINOPHEN 5; 325 MG/1; MG/1
1 TABLET ORAL EVERY 6 HOURS PRN
Qty: 4 TABLET | Refills: 0 | Status: SHIPPED | OUTPATIENT
Start: 2020-10-20 | End: 2020-12-21

## 2020-10-20 ASSESSMENT — ENCOUNTER SYMPTOMS
SHORTNESS OF BREATH: 0
EYE PAIN: 0
HEMATOCHEZIA: 0
MYALGIAS: 0
CHILLS: 0
HEADACHES: 1
NAUSEA: 0
JOINT SWELLING: 0
DIARRHEA: 0
DYSURIA: 0
ABDOMINAL PAIN: 1
NERVOUS/ANXIOUS: 0
PALPITATIONS: 0
SORE THROAT: 0
FREQUENCY: 0
ARTHRALGIAS: 0
COUGH: 0
DIZZINESS: 0
CONSTIPATION: 1
WEAKNESS: 0
HEARTBURN: 0
FEVER: 0
HEMATURIA: 0
PARESTHESIAS: 0

## 2020-10-20 ASSESSMENT — MIFFLIN-ST. JEOR: SCORE: 1247.12

## 2020-10-21 ENCOUNTER — TELEPHONE (OUTPATIENT)
Dept: FAMILY MEDICINE | Facility: OTHER | Age: 55
End: 2020-10-21

## 2020-10-21 NOTE — LETTER
[facility] [address] [address]  [phone]      October 23, 2020      Ophelia Wolf  [address] [facility] [address]      Dear Ophelia:     To better serve you, we are sending this letter to notify you that we have attempted to contact you by telephone to schedule the following procedure(s) ordered by your physician.     ___x____   Colonoscopy   _______   Upper GI Endoscopy (EGD)   _______   Colonoscopy and Upper GI Endoscopy    To provide the highest quality of care, we strongly encourage you to call and schedule the prescribed test/procedure at your earliest convenience.   The number to the Specialty Scheduling department is [phone] and the hours are 8:00am - 4:30pm Monday through Friday.   We look forward to hearing from you.    Sincerely,    [facility]

## 2020-10-21 NOTE — TELEPHONE ENCOUNTER
Left message for patient to return call to schedule EGD/colonoscopy. If Sowmya or Blanca are not available, please transfer to same day surgery

## 2020-10-22 LAB
COMPREHEN DRUG ANALYSIS UR: NORMAL
COPATH REPORT: NORMAL
PAP: NORMAL

## 2020-10-23 LAB
FINAL DIAGNOSIS: ABNORMAL
HPV HR 12 DNA CVX QL NAA+PROBE: NEGATIVE
HPV16 DNA SPEC QL NAA+PROBE: NEGATIVE
HPV18 DNA SPEC QL NAA+PROBE: POSITIVE
SPECIMEN DESCRIPTION: ABNORMAL
SPECIMEN SOURCE CVX/VAG CYTO: ABNORMAL

## 2020-10-23 NOTE — TELEPHONE ENCOUNTER
Left message for patient to return call to schedule EGD/colonoscopy. If Sowmya or Blanca are not available, please transfer to same day surgery        x2 letter sent

## 2020-10-26 ENCOUNTER — PATIENT OUTREACH (OUTPATIENT)
Dept: FAMILY MEDICINE | Facility: OTHER | Age: 55
End: 2020-10-26

## 2020-10-26 DIAGNOSIS — R87.810 CERVICAL HIGH RISK HPV (HUMAN PAPILLOMAVIRUS) TEST POSITIVE: ICD-10-CM

## 2020-10-26 NOTE — TELEPHONE ENCOUNTER
2004, 2007 NIL paps.  2007 Supracervical hysterectomy  2015 NIL pap, Neg HPV  10/20/20 NIL pap, + HR HPV 18. Plan colp due bef 1/20/21.

## 2020-11-16 ENCOUNTER — MYC MEDICAL ADVICE (OUTPATIENT)
Dept: FAMILY MEDICINE | Facility: OTHER | Age: 55
End: 2020-11-16

## 2020-12-18 DIAGNOSIS — Z13.6 CARDIOVASCULAR SCREENING; LDL GOAL LESS THAN 160: ICD-10-CM

## 2020-12-18 DIAGNOSIS — G43.109 MIGRAINE WITH AURA AND WITHOUT STATUS MIGRAINOSUS, NOT INTRACTABLE: ICD-10-CM

## 2020-12-18 DIAGNOSIS — R12 HEARTBURN SYMPTOM: ICD-10-CM

## 2020-12-18 DIAGNOSIS — Z00.00 ROUTINE GENERAL MEDICAL EXAMINATION AT A HEALTH CARE FACILITY: ICD-10-CM

## 2020-12-18 LAB
ALBUMIN SERPL-MCNC: 4 G/DL (ref 3.4–5)
ALP SERPL-CCNC: 92 U/L (ref 40–150)
ALT SERPL W P-5'-P-CCNC: 31 U/L (ref 0–50)
ANION GAP SERPL CALCULATED.3IONS-SCNC: 3 MMOL/L (ref 3–14)
AST SERPL W P-5'-P-CCNC: 21 U/L (ref 0–45)
BILIRUB SERPL-MCNC: 0.6 MG/DL (ref 0.2–1.3)
BUN SERPL-MCNC: 9 MG/DL (ref 7–30)
CALCIUM SERPL-MCNC: 9.6 MG/DL (ref 8.5–10.1)
CHLORIDE SERPL-SCNC: 108 MMOL/L (ref 94–109)
CHOLEST SERPL-MCNC: 247 MG/DL
CO2 SERPL-SCNC: 31 MMOL/L (ref 20–32)
CREAT SERPL-MCNC: 0.62 MG/DL (ref 0.52–1.04)
ERYTHROCYTE [DISTWIDTH] IN BLOOD BY AUTOMATED COUNT: 13.1 % (ref 10–15)
GFR SERPL CREATININE-BSD FRML MDRD: >90 ML/MIN/{1.73_M2}
GLUCOSE SERPL-MCNC: 90 MG/DL (ref 70–99)
HCT VFR BLD AUTO: 45.5 % (ref 35–47)
HDLC SERPL-MCNC: 64 MG/DL
HGB BLD-MCNC: 15.2 G/DL (ref 11.7–15.7)
LDLC SERPL CALC-MCNC: 164 MG/DL
MCH RBC QN AUTO: 32.1 PG (ref 26.5–33)
MCHC RBC AUTO-ENTMCNC: 33.4 G/DL (ref 31.5–36.5)
MCV RBC AUTO: 96 FL (ref 78–100)
NONHDLC SERPL-MCNC: 183 MG/DL
PLATELET # BLD AUTO: 206 10E9/L (ref 150–450)
POTASSIUM SERPL-SCNC: 4.5 MMOL/L (ref 3.4–5.3)
PROT SERPL-MCNC: 7.5 G/DL (ref 6.8–8.8)
RBC # BLD AUTO: 4.74 10E12/L (ref 3.8–5.2)
SODIUM SERPL-SCNC: 142 MMOL/L (ref 133–144)
TRIGL SERPL-MCNC: 97 MG/DL
WBC # BLD AUTO: 4.5 10E9/L (ref 4–11)

## 2020-12-18 PROCEDURE — 85027 COMPLETE CBC AUTOMATED: CPT | Performed by: PHYSICIAN ASSISTANT

## 2020-12-18 PROCEDURE — 80061 LIPID PANEL: CPT | Performed by: PHYSICIAN ASSISTANT

## 2020-12-18 PROCEDURE — 36415 COLL VENOUS BLD VENIPUNCTURE: CPT | Performed by: PHYSICIAN ASSISTANT

## 2020-12-18 PROCEDURE — 80053 COMPREHEN METABOLIC PANEL: CPT | Performed by: PHYSICIAN ASSISTANT

## 2020-12-21 ENCOUNTER — MYC REFILL (OUTPATIENT)
Dept: FAMILY MEDICINE | Facility: OTHER | Age: 55
End: 2020-12-21

## 2020-12-21 ENCOUNTER — MYC MEDICAL ADVICE (OUTPATIENT)
Dept: FAMILY MEDICINE | Facility: OTHER | Age: 55
End: 2020-12-21

## 2020-12-21 DIAGNOSIS — G43.109 MIGRAINE WITH AURA AND WITHOUT STATUS MIGRAINOSUS, NOT INTRACTABLE: ICD-10-CM

## 2020-12-22 RX ORDER — HYDROCODONE BITARTRATE AND ACETAMINOPHEN 5; 325 MG/1; MG/1
1 TABLET ORAL EVERY 6 HOURS PRN
Qty: 6 TABLET | Refills: 0 | Status: SHIPPED | OUTPATIENT
Start: 2020-12-22 | End: 2021-05-04

## 2021-01-13 ENCOUNTER — ANCILLARY PROCEDURE (OUTPATIENT)
Dept: MAMMOGRAPHY | Facility: OTHER | Age: 56
End: 2021-01-13
Attending: PHYSICIAN ASSISTANT
Payer: COMMERCIAL

## 2021-01-13 DIAGNOSIS — Z12.31 ENCOUNTER FOR SCREENING MAMMOGRAM FOR BREAST CANCER: ICD-10-CM

## 2021-01-13 PROCEDURE — 77067 SCR MAMMO BI INCL CAD: CPT | Mod: TC | Performed by: RADIOLOGY

## 2021-01-20 PROBLEM — R87.810 CERVICAL HIGH RISK HPV (HUMAN PAPILLOMAVIRUS) TEST POSITIVE: Status: ACTIVE | Noted: 2020-10-20

## 2021-01-28 DIAGNOSIS — G43.109 MIGRAINE WITH AURA AND WITHOUT STATUS MIGRAINOSUS, NOT INTRACTABLE: ICD-10-CM

## 2021-01-29 ENCOUNTER — MYC MEDICAL ADVICE (OUTPATIENT)
Dept: FAMILY MEDICINE | Facility: OTHER | Age: 56
End: 2021-01-29

## 2021-01-29 DIAGNOSIS — G43.109 MIGRAINE WITH AURA AND WITHOUT STATUS MIGRAINOSUS, NOT INTRACTABLE: ICD-10-CM

## 2021-01-29 NOTE — TELEPHONE ENCOUNTER
"Requested Prescriptions   Pending Prescriptions Disp Refills     SUMAtriptan (IMITREX) 100 MG tablet [Pharmacy Med Name: SUMATRIPTAN SUCCINATE 100MG TABS] 18 tablet 3     Sig: TAKE 1 TABLET BY MOUTH AT ONSET OF MIGRAINE       Serotonin Agonists Failed - 1/28/2021  1:56 PM        Failed - Serotonin Agonist request needs review.     Please review patient's record. If patient has had 8 or more treatments in the past month, please forward to provider.          Passed - Blood pressure under 140/90 in past 12 months     BP Readings from Last 3 Encounters:   10/20/20 120/78   11/19/19 128/64   05/22/19 (!) 156/92                 Passed - Recent (12 mo) or future (30 days) visit within the authorizing provider's specialty     Patient has had an office visit with the authorizing provider or a provider within the authorizing providers department within the previous 12 mos or has a future within next 30 days. See \"Patient Info\" tab in inbasket, or \"Choose Columns\" in Meds & Orders section of the refill encounter.              Passed - Medication is active on med list        Passed - Patient is age 18 or older        Passed - No active pregnancy on record        Passed - No positive pregnancy test in past 12 months           Routing refill request to provider for review/approval because:  Last script was for 18 tablets with 3 refills. Patient has had 8 or more treatments in the past month.    Jean Pierre Mishra, RN, BSN            "

## 2021-02-01 ENCOUNTER — MYC MEDICAL ADVICE (OUTPATIENT)
Dept: FAMILY MEDICINE | Facility: OTHER | Age: 56
End: 2021-02-01

## 2021-02-01 RX ORDER — SUMATRIPTAN 100 MG/1
TABLET, FILM COATED ORAL
Qty: 18 TABLET | Status: SHIPPED | OUTPATIENT
Start: 2021-02-01 | End: 2022-02-15

## 2021-02-01 RX ORDER — SUMATRIPTAN 100 MG/1
TABLET, FILM COATED ORAL
Qty: 18 TABLET | Refills: 3 | OUTPATIENT
Start: 2021-02-01
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
## 2021-02-02 ENCOUNTER — ANCILLARY PROCEDURE (OUTPATIENT)
Dept: GENERAL RADIOLOGY | Facility: CLINIC | Age: 56
End: 2021-02-02
Attending: PHYSICAL MEDICINE & REHABILITATION
Payer: COMMERCIAL

## 2021-02-02 ENCOUNTER — OFFICE VISIT (OUTPATIENT)
Dept: ORTHOPEDICS | Facility: CLINIC | Age: 56
End: 2021-02-02
Payer: COMMERCIAL

## 2021-02-02 ENCOUNTER — TELEPHONE (OUTPATIENT)
Dept: ORTHOPEDICS | Facility: OTHER | Age: 56
End: 2021-02-02

## 2021-02-02 VITALS — DIASTOLIC BLOOD PRESSURE: 88 MMHG | WEIGHT: 157 LBS | BODY MASS INDEX: 27.82 KG/M2 | SYSTOLIC BLOOD PRESSURE: 150 MMHG

## 2021-02-02 DIAGNOSIS — M25.511 ACUTE PAIN OF RIGHT SHOULDER: Primary | ICD-10-CM

## 2021-02-02 DIAGNOSIS — M25.511 RIGHT SHOULDER PAIN: ICD-10-CM

## 2021-02-02 PROCEDURE — 99244 OFF/OP CNSLTJ NEW/EST MOD 40: CPT | Performed by: PHYSICAL MEDICINE & REHABILITATION

## 2021-02-02 PROCEDURE — 73030 X-RAY EXAM OF SHOULDER: CPT | Mod: TC | Performed by: RADIOLOGY

## 2021-02-02 RX ORDER — TRAMADOL HYDROCHLORIDE 50 MG/1
50 TABLET ORAL EVERY 6 HOURS PRN
Qty: 10 TABLET | Refills: 0 | Status: SHIPPED | OUTPATIENT
Start: 2021-02-02 | End: 2021-02-11

## 2021-02-02 ASSESSMENT — PAIN SCALES - GENERAL: PAINLEVEL: EXTREME PAIN (8)

## 2021-02-02 NOTE — TELEPHONE ENCOUNTER
Reason for Call:  Other call back and prescription    Detailed comments: Patient was seen today and wanted to ask if she could have a stronger prescription then ibuprofen sent to her pharmacy, [facility] in [place].      Phone Number Patient can be reached at: Home number on file [phone] (home) or Cell number on file:    Telephone Information:   Mobile [phone]       Best Time: any    Can we leave a detailed message on this number? YES    Call taken on 2/2/2021 at 11:12 AM by Sera Stewart

## 2021-02-02 NOTE — PATIENT INSTRUCTIONS
-MRI right shoulder ordered.  [facility] [phone]. Cost estimates can be provided by [employer] at the same number.  -Ice or heat 15-20 minutes as needed (Avoid sleeping on a heating pad or ice).  -Patient's preferred over the counter medication as directed on packaging as needed for pain or soreness.  -Work on gentle range of motion of the shoulder.    -Ophelia to follow up with primary care provider regarding elevated blood pressure.    -Follow up after completion of MRI.  Can be in person or virtual appointment.  Please schedule at least 1-2 business days after MRI is completed to ensure we have the results of the MRI.

## 2021-02-02 NOTE — TELEPHONE ENCOUNTER
Spoke with pt. Let her know Rx has been sent. Discussed sedation and not taking prior to driving, and to reserve for severe pain. Pt expressed understanding and no further questions.  Vince Oden, ATC

## 2021-02-02 NOTE — TELEPHONE ENCOUNTER
Tramadol prescription sent to pharmacy.  Can cause sedation.  Do not take prior to driving.  Reserve for severe pain.
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
## 2021-02-04 ENCOUNTER — HOSPITAL ENCOUNTER (OUTPATIENT)
Dept: MRI IMAGING | Facility: CLINIC | Age: 56
Discharge: HOME | End: 2021-02-04
Attending: PHYSICAL MEDICINE & REHABILITATION | Admitting: PHYSICAL MEDICINE & REHABILITATION
Payer: COMMERCIAL

## 2021-02-04 ENCOUNTER — TELEPHONE (OUTPATIENT)
Dept: ORTHOPEDICS | Facility: CLINIC | Age: 56
End: 2021-02-04

## 2021-02-04 ENCOUNTER — MYC MEDICAL ADVICE (OUTPATIENT)
Dept: FAMILY MEDICINE | Facility: OTHER | Age: 56
End: 2021-02-04

## 2021-02-04 DIAGNOSIS — M25.511 ACUTE PAIN OF RIGHT SHOULDER: ICD-10-CM

## 2021-02-04 DIAGNOSIS — S46.011A TRAUMATIC COMPLETE TEAR OF RIGHT ROTATOR CUFF, INITIAL ENCOUNTER: Primary | ICD-10-CM

## 2021-02-04 PROCEDURE — 73221 MRI JOINT UPR EXTREM W/O DYE: CPT | Mod: 26 | Performed by: RADIOLOGY

## 2021-02-04 PROCEDURE — 73221 MRI JOINT UPR EXTREM W/O DYE: CPT | Mod: RT

## 2021-02-04 NOTE — TELEPHONE ENCOUNTER
Called Ophelia to review MRI results with her.  Shows a complete tear of the supraspinatus tendon. Recommend orthopedic surgery referral for repair.  Referral placed.  She expressed understanding and was appreciative of the call.

## 2021-02-09 NOTE — MR AVS SNAPSHOT
After Visit Summary   5/25/2017    Ophelia Wolf    MRN: [id number]           Patient Information     Date Of Birth          1965        Visit Information        Provider Department      5/25/2017 1:40 PM Eda De La Cruz MD [facility]        Today's Diagnoses     Acute recurrent maxillary sinusitis    -  1      Care Instructions      Sinusitis (No Antibiotics)    The sinuses are air-filled spaces within the bones of the face. They connect to the inside of the nose. Sinusitis is an inflammation of the tissue lining the sinus cavity. Sinus inflammation can occur during a cold. It can also be due to allergies to pollens and other particles in the air. It can cause symptoms such as sinus congestion, headache, sore throat, facial swelling and fullness. It may also cause a low-grade fever. No infection is present, and no antibiotic treatment is needed.  Home care    Drink plenty of water, hot tea, and other liquids. This may help thin mucus. It also may promote sinus drainage.    Heat may help soothe painful areas of the face. Use a towel soaked in hot water. Or,  the shower and direct the hot spray onto your face. Using a vaporizer along with a menthol rub at night may also help.     An expectorant containing guaifenesin may help thin the mucus and promote drainage from the sinuses.    Over-the-counter decongestants may be used unless a similar medicine was prescribed. Nasal sprays work the fastest. Use one that contains phenylephrine or oxymetazoline. First blow the nose gently. Then use the spray. Do not use these medicines more often than directed on the label or symptoms may get worse. You may also use tablets containing pseudoephedrine. Avoid products that combine ingredients, because side effects may be increased. Read labels. You can also ask the pharmacist for help. (NOTE: Persons with high blood pressure should not use decongestants. They can raise blood  Patient notified  She does still see the allergist    She completely understand and agrees with PCP, she does feel the paperwork is asking a little more than it should  She is OK with PCP filling out however much you are bale/ willing to fill out, and leave the rest blank and possibly just write a letter      OK to discuss at upcoming appt no need to call back pressure.)    Over-the-counter antihistamines may help if allergies contributed to your sinusitis.      Use acetaminophen or ibuprofen to control pain, unless another pain medicine was prescribed. (If you have chronic liver or kidney disease or ever had a stomach ulcer, talk with your doctor before using these medicines. Aspirin should never be used in anyone under 18 years of age who is ill with a fever. It may cause severe liver damage.)    Use nasal rinses or irrigation as instructed by your health care provider.    Don't smoke. This can worsen symptoms.  Follow-up care  Follow up with your healthcare provider or our staff if you are not improving within the next week.  When to seek medical advice  Call your healthcare provider if any of these occur:    Green or yellow discharge from the nose or into the throat    Facial pain or headache becoming more severe    Stiff neck    Unusual drowsiness or confusion    Swelling of the forehead or eyelids    Vision problems, including blurred or double vision    Fever of 100.4 F (38 C) or higher, or as directed by your healthcare provider    Seizure    Breathing problems    Symptoms not resolving within 10 days    8231-1335 [employer]. [address] [place], [place] [id number]. All rights reserved. This information is not intended as a substitute for professional medical care. Always follow your healthcare professional's instructions.        Self-Care for Sinusitis  Sinusitis can often be managed with self-care. Self-care can keep sinuses moist and make you feel more comfortable. Remember to follow your doctor's instructions closely, which can make a big difference in getting your sinus problem under control.    Drink fluids  Drinking extra fluids -- a glass every hour or two -- helps thin your mucus, allowing it to drain from your sinuses more easily. A humidifier helps in much the same way. Fluids can also offset the drying effects of certain drugs.  Use  saltwater rinses  Rinses help keep your sinuses and nose moist. Mix a teaspoon of salt in 8 ounces of fresh, warm water. Use a bulb syringe to gently squirt the water into your nose a few times a day. You can also buy ready-made saline nasal sprays.  Apply hot or cold packs  Applying heat to the area surrounding your sinuses may make you feel more comfortable. Use a hot water bottle or a hand towel dipped in hot water. Some people also find ice packs effective for relieving pain.  Medications  Your doctor may prescribe medications to help treat your sinusitis. If you have an infection, antibiotics can help clear it up. If you are prescribed antibiotics, take all pills on schedule until they are gone, even if you feel better. Decongestants help relieve swelling. Use decongestant sprays for short periods only under the direction of your doctor. If you have allergies, your doctor may prescribe medications to help relieve them.     5879-6095 [employer]. [address] [place] [address], [place] [id number]. All rights reserved. This information is not intended as a substitute for professional medical care. Always follow your healthcare professional's instructions.        Preventing Sinusitis  Colds, flu, and allergies can lead more easily to sinusitis. Do your best to prevent sinusitis by preventing these underlying problems. Do what you can to avoid getting colds and other infections. Avoid any allergens (substances that cause allergies), and keep your sinuses as moist as possible.  Tips for air travel  When traveling on an airplane, use saline nasal spray to keep your sinuses moist. Drink plenty of fluids. You may also want to take a decongestant before boarding.   Prevent colds    Do what you can to avoid exposure to colds and flu. Whenever possible, take more time to rest when you feel something  coming on.     Wash your hands often, especially during cold and flu season.    As much as possible, stay away from  infected people.    Follow these standbys for beating the  bugs : eat balanced meals, exercise regularly, and get plenty of sleep.  Avoid allergens  First find out what substances you re allergic to. Then take steps to minimize exposure to allergens or irritants in the air such as dust, pollution, and pollen.    Wear a mask when you clean, or consider hiring a  to help minimize your exposure to dust.    Sit in the nonsmoking sections of restaurants.    Avoid the outdoors during peak pollution hours such as rush hour.    Keep an air conditioner on during allergy season and clean its filter regularly.  Maximize moisture  Keeping your sinuses moist makes your mucus thinner, allowing your sinuses to drain better. This, in turn, helps prevent infection. Ask your doctor about these suggestions:    Use a humidifier, regularly cleaning out any mold or mildew in the reservoir.    Drink several glasses of water a day.    Avoid drying substances such as alcohol and coffee.    Avoid smoke, which dries out sinus linings.    Use saltwater rinses.    0813-5366 [employer]. [address] [place] [address]. All rights reserved. This information is not intended as a substitute for professional medical care. Always follow your healthcare professional's instructions.        Causes of Sinusitis       Mucus helps keep your sinuses clean. But mucus may build up in the sinuses due to colds, allergies, or obstructions. These things interfere with the natural drainage of mucus. This may lead to sinusitis (sinus inflammation and infection).    Acute sinusitis comes on suddenly. It often happens right after an upper respiratory infection, such as a cold.    Chronic sinusitis is ongoing swelling of the sinus lining. This is often the result of allergies or chronic infections.  Colds and other infections  A cold or flu may cause your sinus and nasal linings to swell. Sinus openings can become blocked. This  causes mucus to back up. This backed-up mucus becomes an ideal place for bacteria to grow. Thick, yellow, or discolored mucus is one sign of infection.  Allergic reactions  You may be sensitive to certain substances. This causes the release of histamine in the body. Histamine makes your sinus and nasal linings swell. Long-term swelling clogs your sinuses. It prevents the cilia (tiny hairs in the nasal lining) from sweeping away mucus. Allergy symptoms can be persistent. But they re less severe than with colds.    Obstructions    A polyp is a sac of swollen tissue. It can be the result of an allergy or infection. It may block the middle meatus (the opening where most of your sinuses drain). It may even grow large enough to block your nose.    A deviated septum is when the thin wall inside your nose is pushed to one side. It is often the result of injury. This can block your middle meatus.    4168-6530 [employer]. [address] [place] [address]. All rights reserved. This information is not intended as a substitute for professional medical care. Always follow your healthcare professional's instructions.                Follow-ups after your visit        Who to contact     If you have questions or need follow up information about today's clinic visit or your schedule please contact [facility] directly at [phone].  Normal or non-critical lab and imaging results will be communicated to you by MyChart, letter or phone within 4 business days after the clinic has received the results. If you do not hear from us within 7 days, please contact the clinic through MyChart or phone. If you have a critical or abnormal lab result, we will notify you by phone as soon as possible.  Submit refill requests through [employer] or call your pharmacy and they will forward the refill request to us. Please allow 3 business days for your refill to be completed.          Additional Information About  "Your Visit        MyChart Information     [employer] gives you secure access to your electronic health record. If you see a primary care provider, you can also send messages to your care team and make appointments. If you have questions, please call your primary care clinic.  If you do not have a primary care provider, please call [phone] and they will assist you.        Care EveryWhere ID     This is your Care EveryWhere ID. This could be used by other organizations to access your [facility] medical records  [id number]        Your Vitals Were     Pulse Temperature Respirations Height Last Period Pulse Oximetry    88 97.7  F (36.5  C) (Temporal) 16 5' 5\" (1.651 m) 06/16/2007 98%    BMI (Body Mass Index)                   27.29 kg/m2            Blood Pressure from Last 3 Encounters:   05/25/17 122/80   02/27/17 128/66   02/09/17 112/80    Weight from Last 3 Encounters:   05/25/17 164 lb (74.4 kg)   02/27/17 166 lb (75.3 kg)   02/09/17 169 lb (76.7 kg)              Today, you had the following     No orders found for display         Today's Medication Changes          These changes are accurate as of: 5/25/17  2:20 PM.  If you have any questions, ask your nurse or doctor.               Start taking these medicines.        Dose/Directions    fluticasone 50 MCG/ACT spray   Commonly known as:  FLONASE   Used for:  Acute recurrent maxillary sinusitis   Started by:  Eda De La Cruz MD        Dose:  1-2 spray   Spray 1-2 sprays into both nostrils daily   Quantity:  16 g   Refills:  0            Where to get your medicines      Some of these will need a paper prescription and others can be bought over the counter.  Ask your nurse if you have questions.     Bring a paper prescription for each of these medications     fluticasone 50 MCG/ACT spray                Primary Care Provider Office Phone # Fax #    Susannah Ray PA-C [phone]       [facility] [address] [place] " [id number]        Thank you!     Thank you for choosing [facility]  for your care. Our goal is always to provide you with excellent care. Hearing back from our patients is one way we can continue to improve our services. Please take a few minutes to complete the written survey that you may receive in the mail after your visit with us. Thank you!             Your Updated Medication List - Protect others around you: Learn how to safely use, store and throw away your medicines at www.disposemymeds.org.          This list is accurate as of: 5/25/17  2:20 PM.  Always use your most recent med list.                   Brand Name Dispense Instructions for use    BIOTIN 5000 PO      Take by mouth daily       CALCIUM + D PO      Take  by mouth 2 times daily.       fluticasone 50 MCG/ACT spray    FLONASE    16 g    Spray 1-2 sprays into both nostrils daily       HYDROcodone-acetaminophen 5-325 MG per tablet   Start taking on:  5/27/2017    NORCO    4 tablet    Take 1 tablet by mouth every 6 hours as needed for pain       PROGESTERONE PO      Take 20 mg by mouth daily       ranitidine 150 MG tablet    ZANTAC    60 tablet    TAKE ONE TABLET BY MOUTH TWICE A DAY       SINUS & ALLERGY PO          SUMAtriptan 100 MG tablet    IMITREX    18 tablet    Take 1 tablet (100 mg) by mouth at onset of headache for migraine

## 2021-02-10 ENCOUNTER — VIRTUAL VISIT (OUTPATIENT)
Dept: FAMILY MEDICINE | Facility: OTHER | Age: 56
End: 2021-02-10
Payer: COMMERCIAL

## 2021-02-10 DIAGNOSIS — J01.10 ACUTE NON-RECURRENT FRONTAL SINUSITIS: Primary | ICD-10-CM

## 2021-02-10 PROCEDURE — 99213 OFFICE O/P EST LOW 20 MIN: CPT | Mod: TEL | Performed by: PHYSICIAN ASSISTANT

## 2021-02-10 RX ORDER — AZITHROMYCIN 250 MG/1
TABLET, FILM COATED ORAL
Qty: 6 TABLET | Refills: 0 | Status: SHIPPED | OUTPATIENT
Start: 2021-02-10 | End: 2021-02-15

## 2021-02-10 NOTE — PROGRESS NOTES
Ophelia is a 55 year old who is being evaluated via a billable telephone visit.      What phone number would you like to be contacted at? [phone]  How would you like to obtain your AVS? MyChart    Assessment & Plan     Acute non-recurrent frontal sinusitis  Continue to use Lois pot, over-the-counter meds as needed and will use Zithromax.  It has always worked well for her in the past  - azithromycin (ZITHROMAX) 250 MG tablet; Take 2 tablets (500 mg) by mouth daily for 1 day, THEN 1 tablet (250 mg) daily for 4 days.                 No follow-ups on file.    Susannah Ray PA-C  [facility]    Gilberto Jacinto is a 55 year old who presents for the following health issues     HPI       Acute Illness  Acute illness concerns: sinus  Onset/Duration: 3-4 weeks, she been trying to zaragoza it off with the Lois pot but is just not working.  Today she woke up and had much worse frontal headaches and pain with blowing her nose.  She has now noticed that it feels much more like a sinus infection.  She historically has done well with Zithromax and prefers this antibiotic  Symptoms:  Fever: no  Chills/Sweats: no  Headache (location?): YES,   Sinus Pressure: YES  Conjunctivitis:  no  Ear Pain: no  Rhinorrhea: no  Congestion: no  Sore Throat: no  Cough: no  Wheeze: no  Decreased Appetite: no  Nausea: no  Vomiting: no  Diarrhea: no  Dysuria/Freq.: no  Dysuria or Hematuria: no  Fatigue/Achiness: no  Sick/Strep Exposure: no  Therapies tried and outcome: neti pot , has dramatically reduced infection          Review of Systems         Objective           Vitals:  No vitals were obtained today due to virtual visit.    Physical Exam   healthy, alert and no distress  PSYCH: Alert and oriented times 3; coherent speech, normal   rate and volume, able to articulate logical thoughts, able   to abstract reason, no tangential thoughts, no hallucinations   or delusions  Her affect is normal and pleasant  RESP: No cough,  no audible wheezing, able to talk in full sentences  Remainder of exam unable to be completed due to telephone visits                Phone call duration: 6  minutes

## 2021-02-11 ENCOUNTER — MYC MEDICAL ADVICE (OUTPATIENT)
Dept: FAMILY MEDICINE | Facility: OTHER | Age: 56
End: 2021-02-11

## 2021-02-11 ENCOUNTER — TELEPHONE (OUTPATIENT)
Dept: ORTHOPEDICS | Facility: CLINIC | Age: 56
End: 2021-02-11

## 2021-02-11 ENCOUNTER — OFFICE VISIT (OUTPATIENT)
Dept: ORTHOPEDICS | Facility: OTHER | Age: 56
End: 2021-02-11
Payer: COMMERCIAL

## 2021-02-11 VITALS
BODY MASS INDEX: 27.82 KG/M2 | HEART RATE: 97 BPM | HEIGHT: 63 IN | DIASTOLIC BLOOD PRESSURE: 87 MMHG | SYSTOLIC BLOOD PRESSURE: 153 MMHG | RESPIRATION RATE: 14 BRPM | WEIGHT: 157 LBS

## 2021-02-11 DIAGNOSIS — S46.011A TRAUMATIC COMPLETE TEAR OF RIGHT ROTATOR CUFF, INITIAL ENCOUNTER: ICD-10-CM

## 2021-02-11 DIAGNOSIS — M25.60 LIMITED JOINT RANGE OF MOTION (ROM): Primary | ICD-10-CM

## 2021-02-11 DIAGNOSIS — M19.011 ARTHRITIS OF RIGHT ACROMIOCLAVICULAR JOINT: ICD-10-CM

## 2021-02-11 DIAGNOSIS — M25.511 ACUTE PAIN OF RIGHT SHOULDER: ICD-10-CM

## 2021-02-11 PROCEDURE — 99204 OFFICE O/P NEW MOD 45 MIN: CPT | Performed by: ORTHOPAEDIC SURGERY

## 2021-02-11 RX ORDER — TRAMADOL HYDROCHLORIDE 50 MG/1
50 TABLET ORAL EVERY 6 HOURS PRN
Qty: 30 TABLET | Refills: 0 | Status: SHIPPED | OUTPATIENT
Start: 2021-02-11 | End: 2021-02-23

## 2021-02-11 ASSESSMENT — MIFFLIN-ST. JEOR: SCORE: 1276.28

## 2021-02-11 NOTE — LETTER
2/11/2021         RE: Ophelia Wolf  [address] [facility] [address]        Dear Colleague,    Thank you for referring your patient, Ophelia Wolf, to the [facility] [place]. Please see a copy of my visit note below.    ORTHOPEDIC CLINIC CONSULT    Referred by: Dr. Cheng    Chief Complaint: Ophelia Wolf is a 55 year old female who is being seen for   Chief Complaint   Patient presents with     Consult For     Right shoulder/MRI results.       History of Present Illness:   Mechanism of Injury: Pain resulted ground level fall. 1/4/21 fell on right shoulder, was recovering well then fell on ice again on 1/31/21.  After that the right arm did not want to work.  She cannot raise it overhead. Felt like she hit hard  Location: right shoulder diffuse  Duration of Pain:   2-6 week(s)  Rating of Pain:  Moderate-severe.    Pain Quality: dull, aching, sharp, cramping, stabbing and constant  Pain is better with: Tramadol  Pain is worse with:  overhead reaching, reaching to the side of the body, reaching forward, reaching across body  Treatment so far consists of:  Rest, OTC pain medications, range of motion attempts, pendulums   Associated Features: None, difficulty raising arm  Prior history of related problems:  No  Patient reports she recovered shortly after the first fall on ice that she was able to obtain her range of motion back.  After the second fall, patient reports her shoulder was difficult to move.  It is painful most of the time.  Patient has been released from her work at this time until seen in clinic.  She has primarily office duties.    Has seen Dr. Cheng.    MRI obtained 2/4/21 - reveals complete rotator cuff tear of supraspinatus.  Also high grade partial tear of subscapularis lower fibers is suspected.  No biceps dislocation.  Mild acromio-clavicular osteoarthritis and type 2 acromion produces significant encroachment on the rotator cuff.    Patient's past  medical, surgical, social and family histories reviewed.       Past Medical History:   Diagnosis Date     ALLERGIC RHINITIS NOS 08/08/2002     Arthritis of right acromioclavicular joint 2/11/2021     Cervical high risk HPV (human papillomavirus) test positive 10/20/2020     CLASS MIGRAIN W/O MENTN INTRACTABLE 12/15/2004     Dysmenorrhea 03/13/2007     Headache      Headache      Headache      History of rabies vaccination      Lesion of plantar nerve     Buckley's neuroma/metatarsalgia     NONSPECIFIC MEDICAL HISTORY     pseudocholinesterase deficiency     Premenstrual tension syndrome      Tension headache      Tobacco use disorder      UTERINE LEIOMYOMA NOS 04/26/2007       Past Surgical History:   Procedure Laterality Date     C LIGATE FALLOPIAN TUBE,POSTPARTUM  1990    Tubal Ligation     C SUPRACERV ABD HYSTERECTOMY  06/28/2007     COLONOSCOPY  04/14/10     [facility] NONSPECIFIC PROCEDURE  1978    jaw surgery       Home Medications:  Prior to Admission medications    Medication Sig Start Date End Date Taking? Authorizing Provider   aspirin 81 MG tablet Take 1 tablet (81 mg) by mouth daily 9/12/18  Yes Susannah Ray PA-C   azithromycin (ZITHROMAX) 250 MG tablet Take 2 tablets (500 mg) by mouth daily for 1 day, THEN 1 tablet (250 mg) daily for 4 days. 2/10/21 2/15/21 Yes Susannah Ray PA-C   BIOTIN 5000 PO Take by mouth daily   Yes Reported, Patient   Calcium Carbonate-Vitamin D (CALCIUM + D PO) Take  by mouth 2 times daily.   Yes Reported, Patient   famotidine (PEPCID) 20 MG tablet Take 1 tablet (20 mg) by mouth 2 times daily 10/20/20  Yes Susannah Ray PA-C   HYDROcodone-acetaminophen (NORCO) 5-325 MG tablet Take 1 tablet by mouth every 6 hours as needed for pain 12/22/20  Yes Susannah Ray PA-C   MELATONIN PO    Yes Reported, Patient   metroNIDAZOLE (METROGEL) 0.75 % external gel Apply topically 2 times daily 5/22/19  Yes Susannah Ray PA-C   Progesterone Micronized (PROGESTERONE PO) Take 20 mg by mouth  daily   Yes Reported, Patient   SUMAtriptan (IMITREX) 100 MG tablet TAKE 1 TABLET BY MOUTH AT ONSET OF MIGRAINE 21  Yes Susannah Ray PA-C   traMADol (ULTRAM) 50 MG tablet Take 1 tablet (50 mg) by mouth every 6 hours as needed for severe pain 21  Yes Laquita Rivera PA-C       Allergies   Allergen Reactions     Amoxicillin Difficulty breathing     Anesthetic Ether      pseudocholinesterase deficiency       Social History     Occupational History     Occupation: staff coordinator   Tobacco Use     Smoking status: Former Smoker     Packs/day: 0.50     Years: 27.00     Pack years: 13.50     Types: Cigarettes     Quit date: 2007     Years since quittin.2     Smokeless tobacco: Never Used     Tobacco comment: 3-4 cig/day   Substance and Sexual Activity     Alcohol use: Yes     Alcohol/week: 3.3 standard drinks     Comment: weekends     Drug use: No     Sexual activity: Yes     Partners: Male     Birth control/protection: Female Surgical     Comment: hysterectomy-partial    for insurance company    Family History   Problem Relation Age of Onset     Neurologic Disorder Mother         Meniere's disease     Anesthesia Reaction Mother         And myself     Osteoporosis Mother      Thyroid Disease Mother         hypo     Heart Disease Maternal Grandmother         stroke     Arthritis Maternal Grandmother         osteoporosis     Diabetes Maternal Grandmother         Type II diabetes     Coronary Artery Disease Maternal Grandmother 40     Cerebrovascular Disease Maternal Grandmother 40     Osteoporosis Maternal Grandmother      Heart Disease Maternal Grandfather         heart problems, s/p CAB     Cancer Maternal Grandfather         prostate      Diabetes Maternal Grandfather         Type II diabetes     Prostate Cancer Maternal Grandfather      Hypertension Father      Hyperlipidemia Father      Heart Disease Paternal Grandmother      Anesthesia Reaction Brother      Anesthesia Reaction  "Brother        REVIEW OF SYSTEMS    General: Negative for fever or fatigue    Psych:  negative anxiety or depression     Ophthalmic:  Corrective lenses?  YES    ENT:  Hearing difficulty? No    CV: negative for chest pain, venous insufficiency, no history of MI or stroke    Endocrine:  negative diabetes     Urology:  negative kidney disease    Resp:  Normal respiratory effort, no history of pulmonary disease or asthma    Skin: negative for cuts/sores or redness    Musculoskeletal: as above    Neurologic:negative for numbness/tingling    Hematologic: negative for bleeding disorder, does not use of prescription anticoagulants         Physical Exam:    Vitals: BP (!) 153/87   Pulse 97   Resp 14   Ht 1.6 m (5' 3\")   Wt 71.2 kg (157 lb)   LMP 06/16/2007 (Approximate)   BMI 27.81 kg/m    BMI= Body mass index is 27.81 kg/m .    GENERAL APPEARANCE: Healthy, alert, no distress    SKIN:  negative for suspicious lesions or rashes    NEURO: Normal strength and tone, mentation intact and speech normal    PSYCH:   Mentation appears Normal and affect normal/bright    RESPIRATORY: negative for respiratory distress.    EYES: negative for Conjunctivitis    Cardiovascular: No vascular discoloration;  Fingers warm and well perfused    JOINT/EXTREMITIES:  right   SHOULDER Exam-Right   Inspection: no swelling, no bruising, no discoloration, no obvious deformity, no asymmetry, no glenohumeral joint anterior bulge, no distal clavicle elevation, no muscle atrophy, no scapular winging   Tenderness of: No significant tenderness of the AC joint or distal clavicle   Range of Motion: Active-patient has difficulty in abduction to only approximately 20 degrees.  Patient has only 20 degrees in forward flexion.  Left shoulder obtains easily 180 degrees.  Patient with symmetrical external rotation.  Internal rotation is limited by pain  Range of Motion: Passive-passively patient's right shoulder can be elevated to a full 180 degrees with some " discomfort.  Internal and external rotation also causes some discomfort but does have passive motion.  Patient is guarding of motion with the right.   Strength: Patient has difficulty even holding up her right arm in an abducted or forward flexed position.  Positive blocking test and empty can test on right.     Radiographs: MRI images do reveal supraspinatus tendon is pulled away from the insertion site.  There is also some irritation recognized at subscapularis with possible tearing of lower fibers..  There is good spacing of the AC joint however there is some subacromial spurring.  Type 2 acromion.  Independent visualization of the films was made.     Impression:      ICD-10-CM    1. Traumatic complete tear of right rotator cuff, initial encounter  S46.011A Orthopedic & Spine  Referral     traMADol (ULTRAM) 50 MG tablet     Case Request: ARTHROTOMY, SHOULDER ([facility]), WITH ROTATOR CUFF REPAIR open     Case Request: ARTHROTOMY, SHOULDER ([facility]), WITH ROTATOR CUFF REPAIR open   2. Acute pain of right shoulder  M25.511 traMADol (ULTRAM) 50 MG tablet   3. Arthritis of right acromioclavicular joint  M19.011      Patient with traumatic rotator cuff tear resulting from a fall on ice.  Patient also has imaging evidence of subacromial spurring.  Patient with significant reduction of range of motion.    Plan:  All of the above pertinent physical exam and imaging modalities findings was reviewed with Ophelia.                                           ELECTIVE SURGERY:  The patient has attempted conservative treatments that include NSAIDs, Tramadol, focused self directed physical therapy, activity modifications, rest, no pushing, pulling, and/or lifting yet still continues to have significant issues. These issues include pain at rest, increased pain with activity, loss of motion of the joint. Secondary to these reasons I have offered surgery in the form of rotator cuff repair , subacromial decompression  and distal clavicle excision ([facility]).    Restrictions: Upper extremity:  no lifting, pushing or pulling    Patient should continue pendulum exercises and active range of motion as able.  She should not do any pulling pushing or lifting with the right arm.  Patient may return to work up until surgery date.  She should maintain restrictions of no lifting pushing or pulling with the right arm.  A note was written    Surgical orders have been placed.  Patient should expect a call for scheduling of surgery.    Follow-up 2 weeks postop    BP Readings from Last 1 Encounters:   02/11/21 (!) 153/87       BP noted to be elevated today in office.  Patient to follow up with Primary Care provider regarding elevated blood pressure.    Scribed by  Laquita Rivera PA-C   2/11/2021  9:37 AM    The information in this document, created by a scribe for me, accurately reflects the services I personally performed and the decisions made by me. I have reviewed and approved this document for accuracy.  César Talavera MD        Again, thank you for allowing me to participate in the care of your patient.        Sincerely,        César Talavera MD

## 2021-02-11 NOTE — LETTER
[facility] [address]  [facility] [address]  [phone]          February 11, 2021    RE:  Ophelia Wolf                                                                                                                                                       [address] [facility] [address]            To whom it may concern:    Ophelia Wolf is under my professional care for right shoulder injury.  Please allow patient opportunity to return to work at this time.  She should avoid lifting with right arm.  She will need 3-6 weeks off work post surgical when scheduled.      Sincerely,        César Talavera MD

## 2021-02-11 NOTE — PATIENT INSTRUCTIONS
Rotator cuff repair is done in same day surgery.    Preop physical with primary physician is needed within 30 days of the surgery.  Nothing to eat or drink for 8 hours before surgery.  For same day surgery you need a ride.  Someone should stay with you for 12 hours afterward.  Stop blood thinners 5 days before surgery (aspirin, warfarin, anti-inflammatories).      General anesthesia is used.  They often perform a pain block at the neck to help with pain.  Sutures are in the wound.  Keep wound dry until clinic appointment at 1 1/2 weeks.     Physical Therapy will start after first clinic visit.  0-3 weeks.  Arm in sling or immobilizer.  No reaching with operative arm.   Okay to have arm out to dangle or bend elbow.    3-6 weeks.  Discontinue immobilizer.  Start reaching.  No lifting over 1 lb.  6-12 weeks  Work on strengthening.  1 year to full recovery.  You will need a Covid test before surgery.  They will call you to schedule that.    Surgery schedulers will call you to schedule surgery.  If you don't get a call in the next few days, then call [phone] to schedule your surgery for [place] or [phone] to schedule for [place]..

## 2021-02-11 NOTE — TELEPHONE ENCOUNTER
Type of surgery: ARTHROTOMY, SHOULDER ([facility]), WITH ROTATOR CUFF REPAIR open  Location of surgery: [facility]   Date of surgery: 3/12/21  Surgeon: Dr. Talavera   Pre-Op Appt Date: 3/8/21  Post-Op Appt Date: 3/25/21   Packet sent out: Surgery packet mailed to patient's home address.   Pre-cert/Authorization completed: NA  Date: 2/11/2021    Blanca Iglesias  Surgery Scheduler

## 2021-02-11 NOTE — PROGRESS NOTES
ORTHOPEDIC CLINIC CONSULT    Referred by: Dr. Cheng    Chief Complaint: Ophelia Wolf is a 55 year old female who is being seen for   Chief Complaint   Patient presents with     Consult For     Right shoulder/MRI results.       History of Present Illness:   Mechanism of Injury: Pain resulted ground level fall. 1/4/21 fell on right shoulder, was recovering well then fell on ice again on 1/31/21.  After that the right arm did not want to work.  She cannot raise it overhead. Felt like she hit hard  Location: right shoulder diffuse  Duration of Pain:   2-6 week(s)  Rating of Pain:  Moderate-severe.    Pain Quality: dull, aching, sharp, cramping, stabbing and constant  Pain is better with: Tramadol  Pain is worse with:  overhead reaching, reaching to the side of the body, reaching forward, reaching across body  Treatment so far consists of:  Rest, OTC pain medications, range of motion attempts, pendulums   Associated Features: None, difficulty raising arm  Prior history of related problems:  No  Patient reports she recovered shortly after the first fall on ice that she was able to obtain her range of motion back.  After the second fall, patient reports her shoulder was difficult to move.  It is painful most of the time.  Patient has been released from her work at this time until seen in clinic.  She has primarily office duties.    Has seen Dr. Cheng.    MRI obtained 2/4/21 - reveals complete rotator cuff tear of supraspinatus.  Also high grade partial tear of subscapularis lower fibers is suspected.  No biceps dislocation.  Mild acromio-clavicular osteoarthritis and type 2 acromion produces significant encroachment on the rotator cuff.    Patient's past medical, surgical, social and family histories reviewed.       Past Medical History:   Diagnosis Date     ALLERGIC RHINITIS NOS 08/08/2002     Arthritis of right acromioclavicular joint 2/11/2021     Cervical high risk HPV (human papillomavirus) test positive  10/20/2020     CLASS MIGRAIN W/O MENTN INTRACTABLE 12/15/2004     Dysmenorrhea 03/13/2007     Headache      Headache      Headache      History of rabies vaccination      Lesion of plantar nerve     Buckley's neuroma/metatarsalgia     NONSPECIFIC MEDICAL HISTORY     pseudocholinesterase deficiency     Premenstrual tension syndrome      Tension headache      Tobacco use disorder      UTERINE LEIOMYOMA NOS 04/26/2007       Past Surgical History:   Procedure Laterality Date     C LIGATE FALLOPIAN TUBE,POSTPARTUM  1990    Tubal Ligation     C SUPRACERV ABD HYSTERECTOMY  06/28/2007     COLONOSCOPY  04/14/10     [facility] NONSPECIFIC PROCEDURE  1978    jaw surgery       Home Medications:  Prior to Admission medications    Medication Sig Start Date End Date Taking? Authorizing Provider   aspirin 81 MG tablet Take 1 tablet (81 mg) by mouth daily 9/12/18  Yes Susannah Ray PA-C   azithromycin (ZITHROMAX) 250 MG tablet Take 2 tablets (500 mg) by mouth daily for 1 day, THEN 1 tablet (250 mg) daily for 4 days. 2/10/21 2/15/21 Yes Susannah Ray PA-C   BIOTIN 5000 PO Take by mouth daily   Yes Reported, Patient   Calcium Carbonate-Vitamin D (CALCIUM + D PO) Take  by mouth 2 times daily.   Yes Reported, Patient   famotidine (PEPCID) 20 MG tablet Take 1 tablet (20 mg) by mouth 2 times daily 10/20/20  Yes Susannah Ray PA-C   HYDROcodone-acetaminophen (NORCO) 5-325 MG tablet Take 1 tablet by mouth every 6 hours as needed for pain 12/22/20  Yes Susannah Ray PA-C   MELATONIN PO    Yes Reported, Patient   metroNIDAZOLE (METROGEL) 0.75 % external gel Apply topically 2 times daily 5/22/19  Yes Susannah Ray PA-C   Progesterone Micronized (PROGESTERONE PO) Take 20 mg by mouth daily   Yes Reported, Patient   SUMAtriptan (IMITREX) 100 MG tablet TAKE 1 TABLET BY MOUTH AT ONSET OF MIGRAINE 2/1/21  Yes Susannah Ray PA-C   traMADol (ULTRAM) 50 MG tablet Take 1 tablet (50 mg) by mouth every 6 hours as needed for severe pain 2/11/21   Yes Laquita Rivera PA-C       Allergies   Allergen Reactions     Amoxicillin Difficulty breathing     Anesthetic Ether      pseudocholinesterase deficiency       Social History     Occupational History     Occupation: staff coordinator   Tobacco Use     Smoking status: Former Smoker     Packs/day: 0.50     Years: 27.00     Pack years: 13.50     Types: Cigarettes     Quit date: 2007     Years since quittin.2     Smokeless tobacco: Never Used     Tobacco comment: 3-4 cig/day   Substance and Sexual Activity     Alcohol use: Yes     Alcohol/week: 3.3 standard drinks     Comment: weekends     Drug use: No     Sexual activity: Yes     Partners: Male     Birth control/protection: Female Surgical     Comment: hysterectomy-partial    for insurance company    Family History   Problem Relation Age of Onset     Neurologic Disorder Mother         Meniere's disease     Anesthesia Reaction Mother         And myself     Osteoporosis Mother      Thyroid Disease Mother         hypo     Heart Disease Maternal Grandmother         stroke     Arthritis Maternal Grandmother         osteoporosis     Diabetes Maternal Grandmother         Type II diabetes     Coronary Artery Disease Maternal Grandmother 40     Cerebrovascular Disease Maternal Grandmother 40     Osteoporosis Maternal Grandmother      Heart Disease Maternal Grandfather         heart problems, s/p CAB     Cancer Maternal Grandfather         prostate      Diabetes Maternal Grandfather         Type II diabetes     Prostate Cancer Maternal Grandfather      Hypertension Father      Hyperlipidemia Father      Heart Disease Paternal Grandmother      Anesthesia Reaction Brother      Anesthesia Reaction Brother        REVIEW OF SYSTEMS    General: Negative for fever or fatigue    Psych:  negative anxiety or depression     Ophthalmic:  Corrective lenses?  YES    ENT:  Hearing difficulty? No    CV: negative for chest pain, venous insufficiency, no history of MI  "or stroke    Endocrine:  negative diabetes     Urology:  negative kidney disease    Resp:  Normal respiratory effort, no history of pulmonary disease or asthma    Skin: negative for cuts/sores or redness    Musculoskeletal: as above    Neurologic:negative for numbness/tingling    Hematologic: negative for bleeding disorder, does not use of prescription anticoagulants         Physical Exam:    Vitals: BP (!) 153/87   Pulse 97   Resp 14   Ht 1.6 m (5' 3\")   Wt 71.2 kg (157 lb)   LMP 06/16/2007 (Approximate)   BMI 27.81 kg/m    BMI= Body mass index is 27.81 kg/m .    GENERAL APPEARANCE: Healthy, alert, no distress    SKIN:  negative for suspicious lesions or rashes    NEURO: Normal strength and tone, mentation intact and speech normal    PSYCH:   Mentation appears Normal and affect normal/bright    RESPIRATORY: negative for respiratory distress.    EYES: negative for Conjunctivitis    Cardiovascular: No vascular discoloration;  Fingers warm and well perfused    JOINT/EXTREMITIES:  right   SHOULDER Exam-Right   Inspection: no swelling, no bruising, no discoloration, no obvious deformity, no asymmetry, no glenohumeral joint anterior bulge, no distal clavicle elevation, no muscle atrophy, no scapular winging   Tenderness of: No significant tenderness of the AC joint or distal clavicle   Range of Motion: Active-patient has difficulty in abduction to only approximately 20 degrees.  Patient has only 20 degrees in forward flexion.  Left shoulder obtains easily 180 degrees.  Patient with symmetrical external rotation.  Internal rotation is limited by pain  Range of Motion: Passive-passively patient's right shoulder can be elevated to a full 180 degrees with some discomfort.  Internal and external rotation also causes some discomfort but does have passive motion.  Patient is guarding of motion with the right.   Strength: Patient has difficulty even holding up her right arm in an abducted or forward flexed position.  " Positive blocking test and empty can test on right.     Radiographs: MRI images do reveal supraspinatus tendon is pulled away from the insertion site.  There is also some irritation recognized at subscapularis with possible tearing of lower fibers..  There is good spacing of the AC joint however there is some subacromial spurring.  Type 2 acromion.  Independent visualization of the films was made.     Impression:      ICD-10-CM    1. Traumatic complete tear of right rotator cuff, initial encounter  S46.011A Orthopedic & Spine  Referral     traMADol (ULTRAM) 50 MG tablet     Case Request: ARTHROTOMY, SHOULDER ([facility]), WITH ROTATOR CUFF REPAIR open     Case Request: ARTHROTOMY, SHOULDER ([facility]), WITH ROTATOR CUFF REPAIR open   2. Acute pain of right shoulder  M25.511 traMADol (ULTRAM) 50 MG tablet   3. Arthritis of right acromioclavicular joint  M19.011      Patient with traumatic rotator cuff tear resulting from a fall on ice.  Patient also has imaging evidence of subacromial spurring.  Patient with significant reduction of range of motion.    Plan:  All of the above pertinent physical exam and imaging modalities findings was reviewed with Ophelia.                                           ELECTIVE SURGERY:  The patient has attempted conservative treatments that include NSAIDs, Tramadol, focused self directed physical therapy, activity modifications, rest, no pushing, pulling, and/or lifting yet still continues to have significant issues. These issues include pain at rest, increased pain with activity, loss of motion of the joint. Secondary to these reasons I have offered surgery in the form of rotator cuff repair , subacromial decompression and distal clavicle excision ([facility]).    Restrictions: Upper extremity:  no lifting, pushing or pulling    Patient should continue pendulum exercises and active range of motion as able.  She should not do any pulling pushing or lifting with the right  arm.  Patient may return to work up until surgery date.  She should maintain restrictions of no lifting pushing or pulling with the right arm.  A note was written    Surgical orders have been placed.  Patient should expect a call for scheduling of surgery.    Follow-up 2 weeks postop    BP Readings from Last 1 Encounters:   02/11/21 (!) 153/87       BP noted to be elevated today in office.  Patient to follow up with Primary Care provider regarding elevated blood pressure.    Scribed by  Laquita Rivera PA-C   2/11/2021  9:37 AM    The information in this document, created by a scribe for me, accurately reflects the services I personally performed and the decisions made by me. I have reviewed and approved this document for accuracy.  César Talavera MD

## 2021-02-22 ENCOUNTER — MYC MEDICAL ADVICE (OUTPATIENT)
Dept: ORTHOPEDICS | Facility: OTHER | Age: 56
End: 2021-02-22

## 2021-02-22 DIAGNOSIS — M25.511 ACUTE PAIN OF RIGHT SHOULDER: ICD-10-CM

## 2021-02-22 DIAGNOSIS — S46.011A TRAUMATIC COMPLETE TEAR OF RIGHT ROTATOR CUFF, INITIAL ENCOUNTER: Primary | ICD-10-CM

## 2021-02-22 NOTE — TELEPHONE ENCOUNTER
Would adding hydroxyzine be appropriate?    Clemencia PALACIO RN Specialty Triage 2/22/2021 4:19 PM

## 2021-02-23 ENCOUNTER — MYC MEDICAL ADVICE (OUTPATIENT)
Dept: FAMILY MEDICINE | Facility: OTHER | Age: 56
End: 2021-02-23

## 2021-02-23 DIAGNOSIS — Z11.59 ENCOUNTER FOR SCREENING FOR OTHER VIRAL DISEASES: ICD-10-CM

## 2021-02-23 RX ORDER — TRAMADOL HYDROCHLORIDE 50 MG/1
50 TABLET ORAL EVERY 6 HOURS PRN
Qty: 30 TABLET | Refills: 0 | Status: SHIPPED | OUTPATIENT
Start: 2021-02-23 | End: 2021-05-18

## 2021-02-23 RX ORDER — HYDROXYZINE HYDROCHLORIDE 25 MG/1
25 TABLET, FILM COATED ORAL 3 TIMES DAILY PRN
Qty: 30 TABLET | Refills: 1 | Status: SHIPPED | OUTPATIENT
Start: 2021-02-23 | End: 2021-05-18

## 2021-02-23 NOTE — TELEPHONE ENCOUNTER
Requested for tramadol forwarded to ortho team to review in absence of Dr. Talavera. Opal Ramires RN, BSN [facility]

## 2021-02-23 NOTE — TELEPHONE ENCOUNTER
Dr. Talavera is off this week.  She is to be getting her rotator cuff repaired soon.  We will not do a narcotic since this would only make her more sensitive post operatively to pain and likely requiring more than needed post operatively.  We don't prescribe beyond a surgical need anyway.  Hydroxyzine is a good option especially to allow sleep at night. I will place an order for this.  Sent to [facility] in [place].  Also a lidocaine patch found OTC may work well for her

## 2021-03-03 NOTE — PATIENT INSTRUCTIONS

## 2021-03-08 ENCOUNTER — OFFICE VISIT (OUTPATIENT)
Dept: FAMILY MEDICINE | Facility: OTHER | Age: 56
End: 2021-03-08
Payer: COMMERCIAL

## 2021-03-08 VITALS
OXYGEN SATURATION: 98 % | HEART RATE: 95 BPM | DIASTOLIC BLOOD PRESSURE: 88 MMHG | SYSTOLIC BLOOD PRESSURE: 124 MMHG | TEMPERATURE: 98.8 F | RESPIRATION RATE: 14 BRPM | BODY MASS INDEX: 27.81 KG/M2 | WEIGHT: 157 LBS

## 2021-03-08 DIAGNOSIS — Z01.818 PREOP GENERAL PHYSICAL EXAM: Primary | ICD-10-CM

## 2021-03-08 DIAGNOSIS — E88.09 PSEUDOCHOLINESTERASE DEFICIENCY: ICD-10-CM

## 2021-03-08 DIAGNOSIS — D22.9 ATYPICAL MOLE: ICD-10-CM

## 2021-03-08 DIAGNOSIS — G43.109 MIGRAINE WITH AURA AND WITHOUT STATUS MIGRAINOSUS, NOT INTRACTABLE: ICD-10-CM

## 2021-03-08 DIAGNOSIS — R12 HEARTBURN SYMPTOM: ICD-10-CM

## 2021-03-08 DIAGNOSIS — Z11.59 ENCOUNTER FOR SCREENING FOR OTHER VIRAL DISEASES: ICD-10-CM

## 2021-03-08 DIAGNOSIS — S46.011A TRAUMATIC COMPLETE TEAR OF RIGHT ROTATOR CUFF, INITIAL ENCOUNTER: ICD-10-CM

## 2021-03-08 LAB
LABORATORY COMMENT REPORT: NORMAL
SARS-COV-2 RNA RESP QL NAA+PROBE: NEGATIVE
SARS-COV-2 RNA RESP QL NAA+PROBE: NORMAL
SPECIMEN SOURCE: NORMAL
SPECIMEN SOURCE: NORMAL

## 2021-03-08 PROCEDURE — U0003 INFECTIOUS AGENT DETECTION BY NUCLEIC ACID (DNA OR RNA); SEVERE ACUTE RESPIRATORY SYNDROME CORONAVIRUS 2 (SARS-COV-2) (CORONAVIRUS DISEASE [COVID-19]), AMPLIFIED PROBE TECHNIQUE, MAKING USE OF HIGH THROUGHPUT TECHNOLOGIES AS DESCRIBED BY CMS-2020-01-R: HCPCS | Performed by: ORTHOPAEDIC SURGERY

## 2021-03-08 PROCEDURE — U0005 INFEC AGEN DETEC AMPLI PROBE: HCPCS | Performed by: ORTHOPAEDIC SURGERY

## 2021-03-08 PROCEDURE — 99214 OFFICE O/P EST MOD 30 MIN: CPT | Performed by: PHYSICIAN ASSISTANT

## 2021-03-08 NOTE — PROGRESS NOTES
[address]  [facility] [address]  Phone: [phone]  Primary Provider: Susannah Ray        PREOPERATIVE EVALUATION:  Today's date: 3/8/2021    Ophelia Wolf is a 55 year old female who presents for a preoperative evaluation.    Surgical Information:  Surgery/Procedure: Arthrotomy right shoulder  Surgery Location: [facility]  Surgeon: Dr. Talavera  Surgery Date: 3/12/21  Time of Surgery: TBD  Where patient plans to recover: At home with family  Fax number for surgical facility: Note does not need to be faxed, will be available electronically in Epic.    Type of Anesthesia Anticipated: General    Assessment & Plan     The proposed surgical procedure is considered INTERMEDIATE risk.    Preop general physical exam-   scheduled for procedure above    Traumatic complete tear of right rotator cuff, initial encounter  Scheduled for procedure as above    Pseudocholinesterase deficiency  Patient will alert anesthesiologist on the day of the procedure as well    Migraine with aura and without status migrainosus, not intractable  Stable, well-controlled no new symptoms    Heartburn symptom  Well-controlled with current meds       atypical mole, after she has healed from surgery she may come in for punch biopsy    Risks and Recommendations:  The patient has the following additional risks and recommendations for perioperative complications:   - No identified additional risk factors other than previously addressed    Medication Instructions:   - aspirin: Discontinue aspirin 7-10 days prior to procedure to reduce bleeding risk. It should be resumed postoperatively.    - ibuprofen (Advil, Motrin): HOLD 1 day before surgery.    - naproxen (Aleve, Naprosyn): HOLD 4 days before surgery.     RECOMMENDATION: Please note she has a pseudocholinesterase deficiency  APPROVAL GIVEN to proceed with proposed procedure, without further diagnostic  evaluation.            Subjective     HPI related to upcoming procedure: Patient fell on January 4 on the ice landing on her posterior right forearm and unfortunately she fell again on January 30 in the same position.  After her second fall, an MRI was ordered which showed supraspinatus tendon tear.  She continues to have a lot of pain and discomfort and inability to use her arm due to this pain.    Preop Questions 3/5/2021   1. Have you ever had a heart attack or stroke? No   2. Have you ever had surgery on your heart or blood vessels, such as a stent placement, a coronary artery bypass, or surgery on an artery in your head, neck, heart, or legs? No   3. Do you have chest pain with activity? No   4. Do you have a history of  heart failure? No   5. Do you currently have a cold, bronchitis or symptoms of other infection? No   6. Do you have a cough, shortness of breath, or wheezing? No   7. Do you or anyone in your family have previous history of blood clots? No   8. Do you or does anyone in your family have a serious bleeding problem such as prolonged bleeding following surgeries or cuts? No   9. Have you ever had problems with anemia or been told to take iron pills? No   10. Have you had any abnormal blood loss such as black, tarry or bloody stools, or abnormal vaginal bleeding? No   11. Have you ever had a blood transfusion? No   12. Are you willing to have a blood transfusion if it is medically needed before, during, or after your surgery? Yes   13. Have you or any of your relatives ever had problems with anesthesia? **YES - difficulty waking up with anesthesia, has pseudocholinesterase deficiency .  Her brother has this also he stopped breathing with anesthesia as a child.  All her family have been tested and many family members have it as well. **   14. Do you have sleep apnea, excessive snoring or daytime drowsiness? No   15. Do you have any artifical heart valves or other implanted medical devices like a  pacemaker, defibrillator, or continuous glucose monitor? No   16. Do you have artificial joints? No   17. Are you allergic to latex? No   18. Is there any chance that you may be pregnant? No       Health Care Directive:  Patient does not have a Health Care Directive or Living Will: Advance Directive received and scanned. Click on Code in the patient header to view.    Preoperative Review of :   reviewed - controlled substances reflected in medication list.      Status of Chronic Conditions:  See problem list for active medical problems.  Problems all longstanding and stable, except as noted/documented.  See ROS for pertinent symptoms related to these conditions.      Review of Systems  CONSTITUTIONAL: NEGATIVE for fever, chills, change in weight  INTEGUMENTARY/SKIN: NEGATIVE for worrisome rashes or lesions.  She did notice a new mole that started as a pimple per pt on her left upper arm, it is now just dark in color  EYES: NEGATIVE for vision changes or irritation  ENT/MOUTH: NEGATIVE for ear, mouth and throat problems  RESP: NEGATIVE for significant cough or SOB  CV: NEGATIVE for chest pain, palpitations or peripheral edema  GI: NEGATIVE for nausea, abdominal pain, heartburn, or change in bowel habits  : NEGATIVE for frequency, dysuria, or hematuria  MUSCULOSKELETAL: Right shoulder pain related to injury  NEURO: NEGATIVE for weakness, dizziness or paresthesias  ENDOCRINE: NEGATIVE for temperature intolerance, skin/hair changes  HEME: NEGATIVE for bleeding problems  PSYCHIATRIC: NEGATIVE for changes in mood or affect    Patient Active Problem List    Diagnosis Date Noted     Pseudocholinesterase deficiency 03/08/2021     Priority: Medium     Traumatic complete tear of right rotator cuff 02/11/2021     Priority: Medium     Arthritis of right acromioclavicular joint 02/11/2021     Priority: Medium     Traumatic complete tear of right rotator cuff, initial encounter 02/11/2021     Priority: Medium     Added  automatically from request for surgery [id number]       Cervical high risk HPV (human papillomavirus) test positive 10/20/2020     Priority: Medium     2004, 2007 NIL paps.  2007 Supracervical hysterectomy  2015 NIL pap, Neg HPV  10/20/20 NIL pap, + HR HPV 18. Plan colp due bef 1/20/21.  10/27/20 LM for pt to call us back.  11/2/20 LM for pt to call us back.   11/9/20 Result letter sent to [facility].   11/10/20 Pt notified by phone - [place].   12/18/20 Reminder (2mo). [place] - patient read  1/20/21 [place] not done. Tracking updated for 6 mo colp/pap due 4/20/21.                  Heartburn symptom 09/13/2012     Priority: Medium     Abdominal bloating 12/06/2011     Priority: Medium     Chronic abdominal pain 12/06/2011     Priority: Medium     CARDIOVASCULAR SCREENING; LDL GOAL LESS THAN 160 10/31/2010     Priority: Medium     Disturbance of skin sensation 05/06/2008     Priority: Medium     pseudocholinesterase deficiency      Priority: Medium     pseudocholinesterase deficiency       Leiomyoma of uterus 04/26/2007     Priority: Medium     Problem list name updated by automated process. Provider to review       Constipation 03/13/2007     Priority: Medium     Problem list name updated by automated process. Provider to review       Dysmenorrhea 03/13/2007     Priority: Medium     Migraine with aura 12/15/2004     Priority: Medium         Patient is followed by [facility] for ongoing prescription of narcotic pain medicine.  Med: hydrocodone acetaminophen 5/325 .   Maximum use per month: 30 per 3 months, changed plans-- 4 for 60 -90 days August 11, 2016   Expected duration: chronic  Narcotic agreement on file: YES  Clinic visit recommended: Q 6  months      Problem list name updated by automated process. Provider to review         Allergic rhinitis 08/08/2002     Priority: Medium     Problem list name updated by automated process. Provider to review       Headache      Priority: Medium     likely migraine  etiology  Problem list name updated by automated process. Provider to review       Premenstrual tension syndrome      Priority: Medium     bloating, irritability, breast tenderness  Problem list name updated by automated process. Provider to review        Past Medical History:   Diagnosis Date     ALLERGIC RHINITIS NOS 08/08/2002     Arthritis of right acromioclavicular joint 2/11/2021     Cervical high risk HPV (human papillomavirus) test positive 10/20/2020     CLASS MIGRAIN W/O MENTN INTRACTABLE 12/15/2004     Dysmenorrhea 03/13/2007     Headache      Headache      Headache      History of rabies vaccination      Lesion of plantar nerve     Buckley's neuroma/metatarsalgia     NONSPECIFIC MEDICAL HISTORY     pseudocholinesterase deficiency     Premenstrual tension syndrome      Tension headache      Tobacco use disorder      UTERINE LEIOMYOMA NOS 04/26/2007     Past Surgical History:   Procedure Laterality Date     C LIGATE FALLOPIAN TUBE,POSTPARTUM  1990    Tubal Ligation     C SUPRACERV ABD HYSTERECTOMY  06/28/2007     COLONOSCOPY  04/14/10     [facility] NONSPECIFIC PROCEDURE  1978    jaw surgery     Current Outpatient Medications   Medication Sig Dispense Refill     aspirin 81 MG tablet Take 1 tablet (81 mg) by mouth daily 30 tablet      BIOTIN 5000 PO Take by mouth daily       Calcium Carbonate-Vitamin D (CALCIUM + D PO) Take  by mouth 2 times daily.       famotidine (PEPCID) 20 MG tablet Take 1 tablet (20 mg) by mouth 2 times daily 180 tablet 1     HYDROcodone-acetaminophen (NORCO) 5-325 MG tablet Take 1 tablet by mouth every 6 hours as needed for pain 6 tablet 0     hydrOXYzine (ATARAX) 25 MG tablet Take 1 tablet (25 mg) by mouth 3 times daily as needed for itching or other (augment pain control) Can make you sleepy 30 tablet 1     MELATONIN PO        metroNIDAZOLE (METROGEL) 0.75 % external gel Apply topically 2 times daily 45 g 3     Progesterone Micronized (PROGESTERONE PO) Take 20 mg by mouth daily        SUMAtriptan (IMITREX) 100 MG tablet TAKE 1 TABLET BY MOUTH AT ONSET OF MIGRAINE 18 tablet prn     traMADol (ULTRAM) 50 MG tablet Take 1 tablet (50 mg) by mouth every 6 hours as needed for severe pain 30 tablet 0       Allergies   Allergen Reactions     Amoxicillin Difficulty breathing     Anesthetic Ether      pseudocholinesterase deficiency        Social History     Tobacco Use     Smoking status: Former Smoker     Packs/day: 0.50     Years: 27.00     Pack years: 13.50     Types: Cigarettes     Quit date: 2007     Years since quittin.2     Smokeless tobacco: Never Used     Tobacco comment: 3-4 cig/day   Substance Use Topics     Alcohol use: Yes     Alcohol/week: 3.3 standard drinks     Comment: weekends       History   Drug Use No         Objective     /88   Pulse 95   Temp 98.8  F (37.1  C) (Temporal)   Resp 14   Wt 71.2 kg (157 lb)   LMP 2007 (Approximate)   SpO2 98%   BMI 27.81 kg/m      Physical Exam    GENERAL APPEARANCE: healthy, alert and no distress     EYES: EOMI, PERRL     HENT: ear canals and TM's normal and nose and mouth without ulcers or lesions     NECK: no adenopathy, no asymmetry, masses, or scars and thyroid normal to palpation     RESP: lungs clear to auscultation - no rales, rhonchi or wheezes     CV: regular rates and rhythm, normal S1 S2, no S3 or S4 and no murmur, click or rub     ABDOMEN:  soft, nontender, no HSM or masses and bowel sounds normal     MS: extremities normal- no gross deformities noted, no evidence of inflammation in joints, FROM in all extremities.     MS: Limited range of motion of right shoulder due to pain     SKIN: no suspicious lesions or rashes, left upper arm , almost blue. Hyperpigmented nevus that is 3 mm in diameter     NEURO: Normal strength and tone, sensory exam grossly normal, mentation intact and speech normal     PSYCH: mentation appears normal. and affect normal/bright     LYMPHATICS: No cervical adenopathy    Recent Labs   Lab  Test 12/18/20  0839   HGB 15.2         POTASSIUM 4.5   CR 0.62        Diagnostics:  No labs were ordered during this visit.   No EKG required, no history of coronary heart disease, significant arrhythmia, peripheral arterial disease or other structural heart disease.    Revised Cardiac Risk Index (RCRI):  The patient has the following serious cardiovascular risks for perioperative complications:   - No serious cardiac risks = 0 points     RCRI Interpretation: 0 points: Class I (very low risk - 0.4% complication rate)             Signed Electronically by: Susannah Ray PA-C  Copy of this evaluation report is provided to requesting physician.    [facility] Guidelines    Revised Cardiac Risk Index

## 2021-03-11 ENCOUNTER — ANESTHESIA EVENT (OUTPATIENT)
Dept: SURGERY | Facility: CLINIC | Age: 56
End: 2021-03-11
Payer: COMMERCIAL

## 2021-03-11 ASSESSMENT — LIFESTYLE VARIABLES: TOBACCO_USE: 1

## 2021-03-11 NOTE — ANESTHESIA PREPROCEDURE EVALUATION
Anesthesia Pre-Procedure Evaluation    Patient: Ophelia Wolf   MRN: [id number] : 1965        Preoperative Diagnosis: Traumatic complete tear of right rotator cuff, initial encounter [S46.011A]   Procedure : Procedure(s):  ARTHROTOMY, SHOULDER (anatoly marmolejo), WITH ROTATOR CUFF REPAIR open     Past Medical History:   Diagnosis Date     ALLERGIC RHINITIS NOS 2002     Arthritis of right acromioclavicular joint 2021     Cervical high risk HPV (human papillomavirus) test positive 10/20/2020     CLASS MIGRAIN W/O MENTN INTRACTABLE 12/15/2004     Dysmenorrhea 2007     Headache      Headache      Headache      History of rabies vaccination      Lesion of plantar nerve     Buckley's neuroma/metatarsalgia     NONSPECIFIC MEDICAL HISTORY     pseudocholinesterase deficiency     Premenstrual tension syndrome      Tension headache      Tobacco use disorder      UTERINE LEIOMYOMA NOS 2007      Past Surgical History:   Procedure Laterality Date     C LIGATE FALLOPIAN TUBE,POSTPARTUM      Tubal Ligation     C SUPRACERV ABD HYSTERECTOMY  2007     COLONOSCOPY  04/14/10     [facility] NONSPECIFIC PROCEDURE      jaw surgery      Allergies   Allergen Reactions     Amoxicillin Difficulty breathing     Anesthetic Ether      pseudocholinesterase deficiency      Social History     Tobacco Use     Smoking status: Former Smoker     Packs/day: 0.50     Years: 27.00     Pack years: 13.50     Types: Cigarettes     Quit date: 2007     Years since quittin.2     Smokeless tobacco: Never Used     Tobacco comment: 3-4 cig/day   Substance Use Topics     Alcohol use: Yes     Alcohol/week: 3.3 standard drinks     Comment: weekends      Wt Readings from Last 1 Encounters:   21 71.2 kg (157 lb)        Anesthesia Evaluation   Pt has had prior anesthetic. Type: General and MAC.    No history of anesthetic complications       ROS/MED HX  ENT/Pulmonary: Comment: Quit smoking in     (+) allergic  rhinitis, tobacco use, Past use, 14  Pack-Year Hx,      Neurologic:     (+) migraines,     Cardiovascular:     (+) Dyslipidemia -----    METS/Exercise Tolerance: >4 METS    Hematologic: Comments: Pseudocholinesterase deficiency       Musculoskeletal: Comment: Traumatic complete tear of right rotator cuff  Arthritis of right acromioclavicular joint  (+) arthritis,     GI/Hepatic:     (+) GERD, Asymptomatic on medication,     Renal/Genitourinary:  - neg Renal ROS     Endo:  - neg endo ROS     Psychiatric/Substance Use:  - neg psychiatric ROS     Infectious Disease:  - neg infectious disease ROS     Malignancy:  - neg malignancy ROS     Other:            Physical Exam    Airway        Mallampati: II   TM distance: > 3 FB   Neck ROM: full   Mouth opening: > 3 cm    Respiratory Devices and Support         Dental           Cardiovascular   cardiovascular exam normal       Rhythm and rate: regular and normal     Pulmonary   pulmonary exam normal        breath sounds clear to auscultation           OUTSIDE LABS:  CBC:   Lab Results   Component Value Date    WBC 4.5 12/18/2020    WBC 6.2 08/19/2014    HGB 15.2 12/18/2020    HGB 14.7 08/19/2014    HCT 45.5 12/18/2020    HCT 42.7 08/19/2014     12/18/2020     08/19/2014     BMP:   Lab Results   Component Value Date     12/18/2020     08/19/2014    POTASSIUM 4.5 12/18/2020    POTASSIUM 3.6 02/04/2015    CHLORIDE 108 12/18/2020    CHLORIDE 105 08/19/2014    CO2 31 12/18/2020    CO2 29 08/19/2014    BUN 9 12/18/2020    BUN 12 08/19/2014    CR 0.62 12/18/2020    CR 0.4 02/04/2015    GLC 90 12/18/2020    GLC 84 02/04/2015     COAGS: No results found for: PTT, INR, FIBR  POC:   Lab Results   Component Value Date    HCG Negative 06/28/2007     HEPATIC:   Lab Results   Component Value Date    ALBUMIN 4.0 12/18/2020    PROTTOTAL 7.5 12/18/2020    ALT 31 12/18/2020    AST 21 12/18/2020    ALKPHOS 92 12/18/2020    BILITOTAL 0.6 12/18/2020     OTHER:   Lab  Results   Component Value Date    A1C 4.8 01/27/2003    LYNNE 9.6 12/18/2020    TSH 1.03 02/04/2015    T4 0.9 01/27/2003    CRP <3.0 05/06/2008    SED 11 12/06/2011       Anesthesia Plan    ASA Status:  2   NPO Status:  NPO Appropriate    Anesthesia Type: General.     - Airway: ETT   Induction: Intravenous, Propofol.   Maintenance: Balanced.        Consents    Anesthesia Plan(s) and associated risks, benefits, and realistic alternatives discussed. Questions answered and patient/representative(s) expressed understanding.     - Discussed with:  Patient      - Extended Intubation/Ventilatory Support Discussed: No.      - Patient is DNR/DNI Status: No    Use of blood products discussed: No .     Postoperative Care    Pain management: IV analgesics, Oral pain medications, Multi-modal analgesia, Peripheral nerve block (Single Shot).   PONV prophylaxis: Ondansetron (or other 5HT-3), Background Propofol Infusion, Dexamethasone or Solumedrol     Comments:    The History and Physical has been reviewed, the patient has been examined and no changes have occurred in the patient's condition since the H & P was completed.               Venus Casey

## 2021-03-12 ENCOUNTER — HOSPITAL ENCOUNTER (OUTPATIENT)
Facility: CLINIC | Age: 56
Discharge: HOME | End: 2021-03-12
Attending: ORTHOPAEDIC SURGERY | Admitting: ORTHOPAEDIC SURGERY
Payer: COMMERCIAL

## 2021-03-12 ENCOUNTER — ANESTHESIA (OUTPATIENT)
Dept: SURGERY | Facility: CLINIC | Age: 56
End: 2021-03-12
Payer: COMMERCIAL

## 2021-03-12 VITALS
OXYGEN SATURATION: 93 % | RESPIRATION RATE: 16 BRPM | HEART RATE: 102 BPM | HEIGHT: 63 IN | WEIGHT: 156.97 LBS | DIASTOLIC BLOOD PRESSURE: 89 MMHG | SYSTOLIC BLOOD PRESSURE: 136 MMHG | BODY MASS INDEX: 27.81 KG/M2 | TEMPERATURE: 98.8 F

## 2021-03-12 DIAGNOSIS — S46.011A TRAUMATIC COMPLETE TEAR OF RIGHT ROTATOR CUFF, INITIAL ENCOUNTER: ICD-10-CM

## 2021-03-12 DIAGNOSIS — M19.011 ARTHRITIS OF RIGHT ACROMIOCLAVICULAR JOINT: Primary | ICD-10-CM

## 2021-03-12 PROCEDURE — 250N000009 HC RX 250: Performed by: NURSE ANESTHETIST, CERTIFIED REGISTERED

## 2021-03-12 PROCEDURE — 23120 CLAVICULECTOMY PARTIAL: CPT | Mod: 59 | Performed by: ORTHOPAEDIC SURGERY

## 2021-03-12 PROCEDURE — 999N000141 HC STATISTIC PRE-PROCEDURE NURSING ASSESSMENT: Performed by: ORTHOPAEDIC SURGERY

## 2021-03-12 PROCEDURE — 258N000003 HC RX IP 258 OP 636: Performed by: NURSE ANESTHETIST, CERTIFIED REGISTERED

## 2021-03-12 PROCEDURE — 250N000011 HC RX IP 250 OP 636: Performed by: NURSE ANESTHETIST, CERTIFIED REGISTERED

## 2021-03-12 PROCEDURE — 370N000017 HC ANESTHESIA TECHNICAL FEE, PER MIN: Performed by: ORTHOPAEDIC SURGERY

## 2021-03-12 PROCEDURE — 360N000077 HC SURGERY LEVEL 4, PER MIN: Performed by: ORTHOPAEDIC SURGERY

## 2021-03-12 PROCEDURE — 710N000010 HC RECOVERY PHASE 1, LEVEL 2, PER MIN: Performed by: ORTHOPAEDIC SURGERY

## 2021-03-12 PROCEDURE — 23412 REPAIR ROTATOR CUFF CHRONIC: CPT | Mod: RT | Performed by: ORTHOPAEDIC SURGERY

## 2021-03-12 PROCEDURE — 710N000012 HC RECOVERY PHASE 2, PER MINUTE: Performed by: ORTHOPAEDIC SURGERY

## 2021-03-12 PROCEDURE — 272N000001 HC OR GENERAL SUPPLY STERILE: Performed by: ORTHOPAEDIC SURGERY

## 2021-03-12 PROCEDURE — 23130 ACROMP/ACROMIONECTOMY PRTL: CPT | Mod: 59 | Performed by: ORTHOPAEDIC SURGERY

## 2021-03-12 PROCEDURE — 258N000003 HC RX IP 258 OP 636: Performed by: ORTHOPAEDIC SURGERY

## 2021-03-12 PROCEDURE — 271N000001 HC OR GENERAL SUPPLY NON-STERILE: Performed by: ORTHOPAEDIC SURGERY

## 2021-03-12 PROCEDURE — 250N000009 HC RX 250: Performed by: ORTHOPAEDIC SURGERY

## 2021-03-12 PROCEDURE — 250N000011 HC RX IP 250 OP 636

## 2021-03-12 PROCEDURE — 250N000011 HC RX IP 250 OP 636: Performed by: ORTHOPAEDIC SURGERY

## 2021-03-12 PROCEDURE — 250N000026 HC DESFLURANE, PER MIN: Performed by: ORTHOPAEDIC SURGERY

## 2021-03-12 PROCEDURE — C9290 INJ, BUPIVACAINE LIPOSOME: HCPCS | Performed by: NURSE ANESTHETIST, CERTIFIED REGISTERED

## 2021-03-12 RX ORDER — ONDANSETRON 2 MG/ML
4 INJECTION INTRAMUSCULAR; INTRAVENOUS EVERY 30 MIN PRN
Status: DISCONTINUED | OUTPATIENT
Start: 2021-03-12 | End: 2021-03-12 | Stop reason: HOSPADM

## 2021-03-12 RX ORDER — OXYCODONE HYDROCHLORIDE 5 MG/1
5-10 TABLET ORAL EVERY 4 HOURS PRN
Qty: 30 TABLET | Refills: 0 | Status: SHIPPED | OUTPATIENT
Start: 2021-03-12 | End: 2021-05-18

## 2021-03-12 RX ORDER — MEPERIDINE HYDROCHLORIDE 25 MG/ML
12.5 INJECTION INTRAMUSCULAR; INTRAVENOUS; SUBCUTANEOUS
Status: DISCONTINUED | OUTPATIENT
Start: 2021-03-12 | End: 2021-03-12 | Stop reason: HOSPADM

## 2021-03-12 RX ORDER — DEXAMETHASONE SODIUM PHOSPHATE 10 MG/ML
INJECTION, SOLUTION INTRAMUSCULAR; INTRAVENOUS PRN
Status: DISCONTINUED | OUTPATIENT
Start: 2021-03-12 | End: 2021-03-12

## 2021-03-12 RX ORDER — FENTANYL CITRATE 50 UG/ML
25-50 INJECTION, SOLUTION INTRAMUSCULAR; INTRAVENOUS
Status: DISCONTINUED | OUTPATIENT
Start: 2021-03-12 | End: 2021-03-12 | Stop reason: HOSPADM

## 2021-03-12 RX ORDER — PROPOFOL 10 MG/ML
INJECTION, EMULSION INTRAVENOUS PRN
Status: DISCONTINUED | OUTPATIENT
Start: 2021-03-12 | End: 2021-03-12

## 2021-03-12 RX ORDER — FENTANYL CITRATE 50 UG/ML
INJECTION, SOLUTION INTRAMUSCULAR; INTRAVENOUS PRN
Status: DISCONTINUED | OUTPATIENT
Start: 2021-03-12 | End: 2021-03-12

## 2021-03-12 RX ORDER — LIDOCAINE HYDROCHLORIDE 20 MG/ML
INJECTION, SOLUTION INFILTRATION; PERINEURAL PRN
Status: DISCONTINUED | OUTPATIENT
Start: 2021-03-12 | End: 2021-03-12

## 2021-03-12 RX ORDER — ONDANSETRON 2 MG/ML
INJECTION INTRAMUSCULAR; INTRAVENOUS PRN
Status: DISCONTINUED | OUTPATIENT
Start: 2021-03-12 | End: 2021-03-12

## 2021-03-12 RX ORDER — HYDROMORPHONE HYDROCHLORIDE 1 MG/ML
.3-.5 INJECTION, SOLUTION INTRAMUSCULAR; INTRAVENOUS; SUBCUTANEOUS EVERY 10 MIN PRN
Status: DISCONTINUED | OUTPATIENT
Start: 2021-03-12 | End: 2021-03-12 | Stop reason: HOSPADM

## 2021-03-12 RX ORDER — CLINDAMYCIN PHOSPHATE 900 MG/50ML
900 INJECTION, SOLUTION INTRAVENOUS SEE ADMIN INSTRUCTIONS
Status: DISCONTINUED | OUTPATIENT
Start: 2021-03-12 | End: 2021-03-12 | Stop reason: HOSPADM

## 2021-03-12 RX ORDER — NALOXONE HYDROCHLORIDE 0.4 MG/ML
0.2 INJECTION, SOLUTION INTRAMUSCULAR; INTRAVENOUS; SUBCUTANEOUS
Status: DISCONTINUED | OUTPATIENT
Start: 2021-03-12 | End: 2021-03-12 | Stop reason: HOSPADM

## 2021-03-12 RX ORDER — NALOXONE HYDROCHLORIDE 0.4 MG/ML
0.4 INJECTION, SOLUTION INTRAMUSCULAR; INTRAVENOUS; SUBCUTANEOUS
Status: DISCONTINUED | OUTPATIENT
Start: 2021-03-12 | End: 2021-03-12 | Stop reason: HOSPADM

## 2021-03-12 RX ORDER — ALBUTEROL SULFATE 0.83 MG/ML
2.5 SOLUTION RESPIRATORY (INHALATION) EVERY 4 HOURS PRN
Status: DISCONTINUED | OUTPATIENT
Start: 2021-03-12 | End: 2021-03-12 | Stop reason: HOSPADM

## 2021-03-12 RX ORDER — BUPIVACAINE HYDROCHLORIDE AND EPINEPHRINE 5; 5 MG/ML; UG/ML
INJECTION, SOLUTION PERINEURAL PRN
Status: DISCONTINUED | OUTPATIENT
Start: 2021-03-12 | End: 2021-03-12

## 2021-03-12 RX ORDER — DIMENHYDRINATE 50 MG/ML
12.5 INJECTION, SOLUTION INTRAMUSCULAR; INTRAVENOUS EVERY 10 MIN PRN
Status: DISCONTINUED | OUTPATIENT
Start: 2021-03-12 | End: 2021-03-12 | Stop reason: HOSPADM

## 2021-03-12 RX ORDER — SODIUM CHLORIDE, SODIUM LACTATE, POTASSIUM CHLORIDE, CALCIUM CHLORIDE 600; 310; 30; 20 MG/100ML; MG/100ML; MG/100ML; MG/100ML
INJECTION, SOLUTION INTRAVENOUS CONTINUOUS
Status: DISCONTINUED | OUTPATIENT
Start: 2021-03-12 | End: 2021-03-12 | Stop reason: HOSPADM

## 2021-03-12 RX ORDER — ONDANSETRON 4 MG/1
4 TABLET, ORALLY DISINTEGRATING ORAL EVERY 30 MIN PRN
Status: DISCONTINUED | OUTPATIENT
Start: 2021-03-12 | End: 2021-03-12 | Stop reason: HOSPADM

## 2021-03-12 RX ORDER — OXYCODONE HYDROCHLORIDE 5 MG/1
5 TABLET ORAL EVERY 4 HOURS PRN
Status: DISCONTINUED | OUTPATIENT
Start: 2021-03-12 | End: 2021-03-12 | Stop reason: HOSPADM

## 2021-03-12 RX ORDER — PROPOFOL 10 MG/ML
INJECTION, EMULSION INTRAVENOUS CONTINUOUS PRN
Status: DISCONTINUED | OUTPATIENT
Start: 2021-03-12 | End: 2021-03-12

## 2021-03-12 RX ORDER — CLINDAMYCIN PHOSPHATE 900 MG/50ML
900 INJECTION, SOLUTION INTRAVENOUS
Status: COMPLETED | OUTPATIENT
Start: 2021-03-12 | End: 2021-03-12

## 2021-03-12 RX ORDER — HYDROXYZINE HYDROCHLORIDE 25 MG/1
25 TABLET, FILM COATED ORAL 3 TIMES DAILY PRN
Qty: 20 TABLET | Refills: 1 | Status: SHIPPED | OUTPATIENT
Start: 2021-03-12 | End: 2021-05-18

## 2021-03-12 RX ADMIN — PHENYLEPHRINE HYDROCHLORIDE 100 MCG: 10 INJECTION INTRAVENOUS at 08:03

## 2021-03-12 RX ADMIN — DEXAMETHASONE SODIUM PHOSPHATE 10 MG: 10 INJECTION, SOLUTION INTRAMUSCULAR; INTRAVENOUS at 08:30

## 2021-03-12 RX ADMIN — HYDROMORPHONE HYDROCHLORIDE 0.5 MG: 1 INJECTION, SOLUTION INTRAMUSCULAR; INTRAVENOUS; SUBCUTANEOUS at 10:05

## 2021-03-12 RX ADMIN — BUPIVACAINE 5 ML: 13.3 INJECTION, SUSPENSION, LIPOSOMAL INFILTRATION at 07:33

## 2021-03-12 RX ADMIN — SODIUM CHLORIDE, POTASSIUM CHLORIDE, SODIUM LACTATE AND CALCIUM CHLORIDE: 600; 310; 30; 20 INJECTION, SOLUTION INTRAVENOUS at 07:58

## 2021-03-12 RX ADMIN — Medication 5 ML: at 10:29

## 2021-03-12 RX ADMIN — Medication 5 ML: at 10:28

## 2021-03-12 RX ADMIN — Medication 5 ML: at 07:32

## 2021-03-12 RX ADMIN — ONDANSETRON 4 MG: 2 INJECTION INTRAMUSCULAR; INTRAVENOUS at 08:30

## 2021-03-12 RX ADMIN — ROCURONIUM BROMIDE 50 MG: 10 INJECTION INTRAVENOUS at 07:43

## 2021-03-12 RX ADMIN — FENTANYL CITRATE 50 MCG: 50 INJECTION INTRAMUSCULAR; INTRAVENOUS at 10:00

## 2021-03-12 RX ADMIN — CLINDAMYCIN PHOSPHATE 900 MG: 900 INJECTION, SOLUTION INTRAVENOUS at 07:57

## 2021-03-12 RX ADMIN — PROPOFOL 100 MCG/KG/MIN: 10 INJECTION, EMULSION INTRAVENOUS at 07:43

## 2021-03-12 RX ADMIN — LIDOCAINE HYDROCHLORIDE 0.1 ML: 10 INJECTION, SOLUTION EPIDURAL; INFILTRATION; INTRACAUDAL; PERINEURAL at 06:42

## 2021-03-12 RX ADMIN — SODIUM CHLORIDE, POTASSIUM CHLORIDE, SODIUM LACTATE AND CALCIUM CHLORIDE: 600; 310; 30; 20 INJECTION, SOLUTION INTRAVENOUS at 06:43

## 2021-03-12 RX ADMIN — Medication 5 ML: at 07:33

## 2021-03-12 RX ADMIN — BUPIVACAINE 5 ML: 13.3 INJECTION, SUSPENSION, LIPOSOMAL INFILTRATION at 07:32

## 2021-03-12 RX ADMIN — MIDAZOLAM 1 MG: 1 INJECTION INTRAMUSCULAR; INTRAVENOUS at 07:28

## 2021-03-12 RX ADMIN — FENTANYL CITRATE 50 MCG: 50 INJECTION, SOLUTION INTRAMUSCULAR; INTRAVENOUS at 09:43

## 2021-03-12 RX ADMIN — MIDAZOLAM 1 MG: 1 INJECTION INTRAMUSCULAR; INTRAVENOUS at 07:25

## 2021-03-12 RX ADMIN — LIDOCAINE HYDROCHLORIDE 100 MG: 20 INJECTION, SOLUTION INFILTRATION; PERINEURAL at 07:43

## 2021-03-12 RX ADMIN — PROPOFOL 200 MG: 10 INJECTION, EMULSION INTRAVENOUS at 07:43

## 2021-03-12 RX ADMIN — FENTANYL CITRATE 50 MCG: 50 INJECTION, SOLUTION INTRAMUSCULAR; INTRAVENOUS at 07:25

## 2021-03-12 RX ADMIN — PHENYLEPHRINE HYDROCHLORIDE 100 MCG: 10 INJECTION INTRAVENOUS at 08:08

## 2021-03-12 RX ADMIN — FENTANYL CITRATE 50 MCG: 50 INJECTION INTRAMUSCULAR; INTRAVENOUS at 10:16

## 2021-03-12 RX ADMIN — SUGAMMADEX 200 MG: 100 INJECTION, SOLUTION INTRAVENOUS at 09:35

## 2021-03-12 ASSESSMENT — MIFFLIN-ST. JEOR: SCORE: 1276

## 2021-03-12 NOTE — PROGRESS NOTES
Aj Wolf is a 55 year old female who presents with right rotator cuff tear, acromio-clavicular osteoarthritis, impingement.  MRI shows complete supraspinatus tear and possible partial subscapularis tear. Failed conservative treatment.  Presents for right rotator cuff repair, Maria Eugenia Patel.    This H&P has been reviewed and there are no clinically significant changes in the patient s condition.  The patient was evaluated by myself as well as (H&P provider) prior to surgery. The patient is approved for the surgery and the stated surgical procedure is still clinically indicated.  Risks, benefits, potential complications and alternatives were discussed.    César Talavera M.D.  Department of Orthopaedic Surgery  [facility]

## 2021-03-12 NOTE — DISCHARGE INSTRUCTIONS
General Shoulder Arthroscopy Discharge Instructions  Dr. Talavera                                        [phone]  Bone and Joint Service Line for issues or concerns      General Care:  After surgery you may feel tired/sleepy. This is normal. Please have someone stay with you for 24 hours after surgery. You should avoid driving for 1-2 days after surgery, as your reaction time may be slow. You should not drive at all if you have had surgery on your arms, right leg and/or are taking narcotic pain medications until released by your doctor. If you have any question along the way please contact the office. If you feel it is an issue cannot wait for normal office hours, contact the on-call physician.    Bandages:    Change your bandage after the first 48-72 hours. You may use Band-Aids or sterile gauze with a small amount of tape. It s normal to have some blood-tinged fluid on your bandages, this will usually continue for the first day or two. Keep the area clean and dry. Do not apply any ointments.     Bathing:  Do not submerge your incision in water such as a bath or pool. It is ok to shower after removing your initial bandage after the first 2 days from surgery. Avoid any excessively hot showers, baths, or hot tubes after surgery.     Follow up:  Your follow up appointment should already be scheduled. If its not, please call the office to verify an appointment 10 days after surgery.     Diet:  Start with non-alcoholic liquids at first, particularly water or sports drinks after surgery. Progress to bland foods such as crackers and bread and finally to your normal diet if you have no problems.     Pain control:  Take your pain medications as prescribed. These medications may make you sleepy. Do not drive, operate equipment, or drink alcohol when taking these.  You may take Tylenol (Generic name is acetaminophen) as directed on the bottle for additional relief or in place of the prescribed pain medications as your  pain gets better. If the medications cause a reaction such as nausea or skin rash, stop taking them and contact your doctor. Please plan accordingly, pain medications will not be re-filled on the weekends or at night. Call the office during the day if you need more medications. Narcotic pain medication can cause constipation, take the stool softer as prescribed.             Sleeping Position:  Sometimes this can be a challenge after shoulder surgery. Some find it comfortable sleeping propped up on several pillows with pillows also behind their elbow. Others, if available, sleep in an easy chair. You may also lay on your back, it may be more comfortable to have a pillow behind your elbow to keep it from falling back.     Sling/Brace:  Keep the sling or brace on after surgery until your follow up. You may remove to bathe, but keep your elbow to your side and do not reach with your arm. You should also slip your arm out of your sling and allow your elbow to straighten all the way out and then bend all the way up. You may need to use your other arm to assist in this. Also make sure to move your wrist back and forth and practice making a fist. Do this 10-15 times about 3-4 times a day.       Physical Therapy:  Depending on your surgery, physical therapy may start within a few days or be delayed 4-6 weeks. At your first post-operative visit, your doctor will direct you on your personal therapy program. You may start the Codman exercises as listed below if pain is controlled.     Codman Shoulder Exercises:  Bend over at the waist and let your arm completely relax. Support your weight by leaning on a table or counter.  Swing your arm slowly from side to side.  Repeat this 20 times, four times each day:    Normal findings after surgery:  It is sometimes normal to have pain in the back of the shoulder even before the block wears off in the hand or elbow.  Warmth and swelling about the hand and shoulder is normal for up to 3-4  weeks after surgery.  Numbness around the incisions is normal.  You may have bruising around the incisions and into the bicep area. You may notice this bruising settle into the elbow and forearm.   Low grade fevers less than 100.5 degrees Fahrenheit are normal.     When to call the Office:  Temperature greater than 101.5 degrees Fahreheit.  Fever, chills, and increasing pain in the shoulder.  Excessive drainage from the incisions that include bright red blood.  Drainage from the incisions sites that appear yellow, pus-like, or foul smelling.  Increasing pain the shoulder not relieved by the prescribed pain medications or ice.  Persistent nausea or vomiting not helped by the Zofran.  Any other effects you feel are significant.    [facility]   Adult Discharge Orders & Instructions     For 24 hours after surgery    1. Get plenty of rest.  A responsible adult must stay with you for at least 24 hours after you leave the hospital.   2. Do not drive or use heavy equipment.  If you have weakness or tingling, don't drive or use heavy equipment until this feeling goes away.  3. Do not drink alcohol.  4. Avoid strenuous or risky activities.  Ask for help when climbing stairs.   5. You may feel lightheaded.  If so, sit for a few minutes before standing.  Have someone help you get up.   6. You may have a slight fever. Call the doctor if your fever is over 100 F (37.7 C) (taken under the tongue) or lasts longer than 24 hours.  7. You may have a dry mouth, a sore throat, muscle aches or trouble sleeping.  These should go away after 24 hours.  8. Do not make important or legal decisions.  We don t expect you to have any problems from the surgery or treatment you had today. Just in case, here s what to do if you have pain, upset stomach (nausea), bleeding or infection:  Pain:  Take medicines your doctor has prescribed or over-the-counter medicine they have suggested. Resting and using ice packs can help,  too. For surgery on an arm or leg, raise it on a pillow to ease swelling. Call your doctor if these methods don t work.  Copyright Kobi P[employer], Licensed under [employer]  Upset stomach (nausea):  Take anti-nausea medicine approved by your doctor. Drink clear liquids like apple juice, ginger ale, broth or 7-Up. Be sure to drink enough fluids. Rest can help, too. Move to normal foods when you re ready. Bleeding:  In the first 24 hours, you may see a little blood on your dressing, about the size of a quarter. You don t need to worry about this much blood, but if the blood spot keeps getting bigger:    Put pressure on the wound if you can, AND    Call your doctor.  Copyright [employer], Licensed under [employer]  Fever/Infection: Please call your doctor if you have any of these signs:    Redness    Swelling    Wound feels warm    Pain gets worse    Bad-smelling fluid leaks from wound    Fever or chills  Call your doctor for any of the followin.  It has been over 8 to 10 hours since surgery and you are still not able to urinate (pass water).    2.  Headache for over 24 hours.    3.  Numbness, tingling or weakness in your legs the day after surgery (if you had spinal anesthesia).    24 Hour Nurse advice line: [phone]

## 2021-03-12 NOTE — ADDENDUM NOTE
Addendum  created 03/12/21 1438 by César Rainey APRN CRNA    Child order released for a procedure order, Clinical Note Signed, Intraprocedure Blocks edited, Intraprocedure Event edited, Intraprocedure Meds edited, Intraprocedure Staff edited

## 2021-03-12 NOTE — ANESTHESIA PROCEDURE NOTES
Pre-Procedure   Staff -   CRNA: César Rainey APRN CRNA  Performed By: CRNA  Location: post-op  Procedure Start/Stop Times: 3/12/2021 10:22 AM and 3/12/2021 10:31 AM  Pre-Anesthestic Checklist: patient identified, IV checked, site marked, risks and benefits discussed, informed consent, monitors and equipment checked, pre-op evaluation, at physician/surgeon's request and post-op pain management  Timeout:  Correct Patient: Yes   Correct Procedure: Yes   Correct Site: Yes   Correct Position: Yes   Correct Laterality: Yes   Site Marked: Yes    Procedure Documentation  Procedure: interscalene  Patient Position:supine  Patient Prep/Sterile Barriers: sterile gloves, mask, Chloraprep  Local skin infiltrated with 1 mL of 1% lidocaine.   Needle type: insulated  Needle Gauge: 22.   Needle Length (millimeters) 100   Ultrasound guided, Ultrasound used to identify targeted nerve, plexus, vascular marker, or fascial plane and place a needle adjacent to it in real-time, Ultrasound was used to visualize the spread of anesthetic in close proximity to the above referenced structure  The nerve(s) appeared anatomically normal, There were no apparent abnormal pathologic findings  Nerve Stim: Initial Level 0 mA. Lowest motor response mA.    Assessment/Narrative      The placement was negative for: blood aspirated, painful injection and site bleeding  Paresthesias: No.  Test dose of mL at.   Test dose negative, 3 minutes after injection, for signs of intravascular, subdural, or intrathecal injection.  Bolus given via needle..   Secured via.   Insertion/Infusion Method: Single Shot  Complications: none  Injection made incrementally with aspirations every 5 mL.  Comments:  Pt tolerated the procedure well.  Pain decreased from 6 /10 to 0 /10.  No complications noted.  Will follow if needed.

## 2021-03-12 NOTE — ANESTHESIA PROCEDURE NOTES
Pre-Procedure   Staff -   CRNA: Dorothy Marte APRN CRNA  Performed By: CRNA  Location: post-op  Procedure Start/Stop Times: 3/12/2021 7:23 AM and 3/12/2021 7:33 AM  Pre-Anesthestic Checklist: patient identified, IV checked, site marked, risks and benefits discussed, informed consent, monitors and equipment checked, pre-op evaluation, at physician/surgeon's request and post-op pain management  Timeout:  Correct Patient: Yes   Correct Procedure: Yes   Correct Site: Yes   Correct Position: Yes   Correct Laterality: Yes   Site Marked: Yes    Procedure Documentation  Procedure: interscalene  Laterality: right  Patient Position:supine  Patient Prep/Sterile Barriers: sterile gloves, mask, Chloraprep  Local skin infiltrated with 1 mL of 1% lidocaine.   Needle type: insulated  Needle Gauge: 21.   Needle Length (millimeters) 100   Ultrasound guided, Ultrasound used to identify targeted nerve, plexus, vascular marker, or fascial plane and place a needle adjacent to it in real-time, Ultrasound was used to visualize the spread of anesthetic in close proximity to the above referenced structure  A permanent image is entered into the patient's record.  The nerve(s) appeared anatomically normal, There were no apparent abnormal pathologic findings  Nerve Stim: Initial Level mA. Lowest motor response 0.28 mA.    Assessment/Narrative      The placement was negative for: blood aspirated, painful injection and site bleeding  Paresthesias: No.  Test dose of mL at.   Test dose negative, 3 minutes after injection, for signs of intravascular, subdural, or intrathecal injection.  Bolus given via needle..   Secured via.   Insertion/Infusion Method: Single Shot  Complications: none  Injection made incrementally with aspirations every 5 mL.  Comments:  Pt tolerated the procedure well.  No complications noted.  Will follow if needed. Block placed by SRNA under the direct supervision of CRNA.

## 2021-03-12 NOTE — BRIEF OP NOTE
[facility] Brief Operative Note    Pre-operative diagnosis: Traumatic complete tear of right rotator cuff, initial encounter [S46.011A]   Post-operative diagnosis Right rotator cuff tear.  Right acromio-clavicular osteoarthritis.  Right shoulder impingement.   Procedure: Procedure(s):  ARTHROTOMY, SHOULDER (anatoly jaleel), WITH ROTATOR CUFF REPAIR open   Surgeon(s): Surgeon(s) and Role:     * César Talavera MD - Primary     * Laquita Rivera PA-C - Assisting   Estimated blood loss: 30 mL    Specimens: * No specimens in log *   Findings: 2 chondromalacia rotator cuff tear of supraspinatus.

## 2021-03-12 NOTE — ANESTHESIA PROCEDURE NOTES
Airway   Date/Time: 3/12/2021 7:45 AM   Patient location during procedure: OR  Staff -   CRNA: Juanpablo Llamas APRN CRNA  Performed By: CRNA    Consent for Airway   Urgency: elective    Indications and Patient Condition  Indications for airway management: theresa-procedural  Induction type:intravenousMask difficulty assessment: 1 - vent by mask    Final Airway Details  Final airway type: endotracheal airway  Successful airway:ETT - single  Endotracheal Airway Details   Cuffed: yes  Successful intubation technique: direct laryngoscopy  Grade View of Cords: 1  Adjucts: stylet  Measured from: lips  Secured at (cm): 20  Secured with: silk tape  Bite block used: Oral Airway    Post intubation assessment   Placement verified by: capnometry, equal breath sounds and chest rise   Number of attempts at approach: 1  Number of other approaches attempted: 0  Secured with:silk tape  Ease of procedure: easy  Dentition: Intact and Unchanged

## 2021-03-12 NOTE — ANESTHESIA POSTPROCEDURE EVALUATION
Patient: Ophelia Wolf    Procedure(s):  ARTHROTOMY, SHOULDER (anatoly jaleel), WITH ROTATOR CUFF REPAIR open    Diagnosis:Traumatic complete tear of right rotator cuff, initial encounter [S46.011A]  Diagnosis Additional Information: No value filed.    Anesthesia Type:  General, Peripheral Nerve Block    Note:  Disposition: Outpatient   Postop Pain Control: Uneventful            Sign Out: Well controlled pain   PONV: No   Neuro/Psych: Uneventful            Sign Out: Acceptable/Baseline neuro status   Airway/Respiratory: Uneventful            Sign Out: Acceptable/Baseline resp. status   CV/Hemodynamics: Uneventful            Sign Out: Acceptable CV status   Other NRE: NONE   DID A NON-ROUTINE EVENT OCCUR? No    Event details/Postop Comments:  Pt was happy with anesthesia care.  No complications.  I will follow up with the pt if needed.         Last vitals:  Vitals:    03/12/21 1048 03/12/21 1100 03/12/21 1115   BP: 122/76 119/77 136/89   Pulse: 96 95 102   Resp: 16 16 16   Temp:      SpO2: 92% 94% 92%       Last vitals prior to Anesthesia Care Transfer:  CRNA VITALS  3/12/2021 0908 - 3/12/2021 1008      3/12/2021             Pulse:  104    SpO2:  96 %          Electronically Signed By: ABHI Null CRNA  March 12, 2021  11:31 AM

## 2021-03-12 NOTE — ANESTHESIA PROCEDURE NOTES
Pre-Procedure   Staff -   CRNA: Dorothy Marte APRN CRNA  Other Anesthesia Staff: Venus Casey  Performed By: CRNA and SRNA  Location: pre-op  Procedure Start/Stop Times: 3/12/2021 8:25 AM and 3/12/2021 8:33 AM  Pre-Anesthestic Checklist: patient identified, IV checked, site marked, risks and benefits discussed, informed consent, monitors and equipment checked, pre-op evaluation, at physician/surgeon's request and post-op pain management  Timeout:  Correct Patient: Yes   Correct Procedure: Yes   Correct Site: Yes   Correct Position: Yes   Correct Laterality: Yes   Site Marked: Yes    Procedure Documentation  Procedure: Interscalene  Laterality: right  Patient Position:sitting  Patient Prep/Sterile Barriers: Chloraprep  Local skin infiltrated with mL of 2% lidocaine.   Needle type: insulated  Ultrasound guided, Ultrasound used to identify targeted nerve, plexus, vascular marker, or fascial plane and place a needle adjacent to it in real-time, Ultrasound was used to visualize the spread of anesthetic in close proximity to the above referenced structure  A permanent image is entered into the patient's record.  The nerve(s) appeared anatomically normal, There were no apparent abnormal pathologic findings    Assessment/Narrative      The placement was negative for: blood aspirated, painful injection and site bleeding  Paresthesias: No.  Bolus given via needle. No blood aspirated via catheter.   Secured via.   Insertion/Infusion Method: Single Shot  Complications: none  Comments:  Performed by Venus EUBANKS under the direct supervision of Dorothy Marte CRNA. Patient tolerated procedure well. No complications

## 2021-03-12 NOTE — ANESTHESIA CARE TRANSFER NOTE
Patient: Ophelia Wolf    Procedure(s):  ARTHROTOMY, SHOULDER (anatoly jaleel), WITH ROTATOR CUFF REPAIR open    Diagnosis: Traumatic complete tear of right rotator cuff, initial encounter [S46.011A]  Diagnosis Additional Information: No value filed.    Anesthesia Type:   General, Peripheral Nerve Block     Note:    Oropharynx: spontaneously breathing  Level of Consciousness: awake  Oxygen Supplementation: nasal cannula  Level of Supplemental Oxygen (L/min / FiO2): 24%  Independent Airway: airway patency satisfactory and stable  Dentition: dentition unchanged  Vital Signs Stable: post-procedure vital signs reviewed and stable  Report to RN Given: handoff report given  Patient transferred to: PACU    Handoff Report: Identifed the Patient, Identified the Reponsible Provider, Reviewed the pertinent medical history, Discussed the surgical course, Reviewed Intra-OP anesthesia mangement and issues during anesthesia, Set expectations for post-procedure period and Allowed opportunity for questions and acknowledgement of understanding      Vitals: (Last set prior to Anesthesia Care Transfer)  CRNA VITALS  3/12/2021 0908 - 3/12/2021 0948      3/12/2021             Pulse:  104    SpO2:  96 %        Electronically Signed By: ABHI Null CRNA  March 12, 2021  9:48 AM

## 2021-03-12 NOTE — OP NOTE
SURGEON:  JAZMINE MEDINA MD    ASSISTANT:  Laquita Rivera     ANESTHESIA: General                             FINDINGS:  2 cm rotator cuff tear, Impingement and acromioclavicular spurring    ESTIMATED BLOOD LOSS: 30 cc       DRAINS: None    COMPLICATIONS:  None    SPECIMENS: Not sent clavicle and acromion.    DATE OF SURGERY:  3/12/2021     PREOPERATIVE AND DISCHARGE DIAGNOSIS  1. Right rotator cuff tear.  2. Right acromioclavicular arthritis.  3. Impingement.     PROCEDURE   1. Right rotator cuff repair.  2. Right distal clavicle excision.  3. Acromioplasty with coracoacromial ligament resection.    HISTORY   This is a 55 year old female who presents with right rotator cuff tear, acromioclavicular  arthritis and impingement. MRI shows 2 cm rotator cuff tear of supraspinatus with possible inferior subscapularis partial tear.  She has failed  conservative treatment and presents for right rotator cuff repair, clavicle excision and  acromioplasty.    SURGERY   After smooth general endotracheal anesthesia, the patient was placed in a  beach-chair position. The right shoulder was prepped and draped in the usual  fashion.  Pause was performed for patient verification.  A saber incision was  made from the mid acromion to the lateral coracoid, and carried down through  subcutaneous tissue.  The deltoid fascia was divided 5 cm, starting at the AC  joint.  It was also released from the anterior acromion and the distal  clavicle.  The coracoacromial ligament was identified and resected.  The  distal clavicle was subperiosteally exposed, and the distal 1 cm removed with  the oscillating saw and smoothed with a rasp.  There was a sharp overhang on  the anterior acromion that was aggressively debrided with osteotome and  rongeur, then smoothed with a rasp.  We excised the thickened bursa at this  point.  The rotator cuff was examined showing a 2 cms tear.  The biceps was intact beneath this.  I probed the subscapularis and the  entire tendon was intact.  The edges of the tear were excised to obtain vascular edge.  The tuberosity was decorticated.  The rotator cuff tear was then repaired pulling it directly lateral into the trough.  There was no tension with this.   2 interrupted vertical mattress sutures of #2 Polydek was placed.    This was tied and then the suture passed through the cuff and the tuberosity.  It was tied laterally.  I then sealed the edge with a running #1 Vicryl whip stitch.  We now had a water-tight closure of the rotator cuff.   The wound was irrigated, and bleeding controlled with electrocautery. The deltoid  fascia was repaired to the trapezial fascia and the anterior acromion with  interrupted #1 Vicryl suture.  The side-to-side fiber division of the deltoid was closed with  running 0 Vicryl.  Subcutaneous tissue was closed with interrupted 2-0 Vicryl  suture, and the skin edges were closed with running 3-0 Prolene subcuticular  closure.  Steri-strips were applied.  Sterile dressings were applied. The  patient was taken to the recovery room in stable condition.          César Talavera M.D.  Department of Orthopaedic Surgery  [facility]        PATIENT: Ophelia Wolf    MR#: [id number]   : 1965

## 2021-03-15 ENCOUNTER — TELEPHONE (OUTPATIENT)
Dept: ORTHOPEDICS | Facility: CLINIC | Age: 56
End: 2021-03-15

## 2021-03-15 DIAGNOSIS — S46.011A TRAUMATIC COMPLETE TEAR OF RIGHT ROTATOR CUFF, INITIAL ENCOUNTER: Primary | ICD-10-CM

## 2021-03-15 NOTE — TELEPHONE ENCOUNTER
Patient is calling back about her message below.    She is wondering if there is something else to try. As she is getting migraines from the pain medications.     Please someone call her back.    Thank you, Stephanie Wagner,

## 2021-03-16 RX ORDER — HYDROCODONE BITARTRATE AND ACETAMINOPHEN 5; 325 MG/1; MG/1
1 TABLET ORAL EVERY 4 HOURS PRN
Qty: 30 TABLET | Refills: 0 | Status: SHIPPED | OUTPATIENT
Start: 2021-03-16 | End: 2021-03-27

## 2021-03-16 NOTE — TELEPHONE ENCOUNTER
Returned call to pt she spoke with post surgery nurse who informed her that her headache/migraine was likely r/t the roxicodone. States that tramadol was not helpful in the past. Roxicodone is very helpful for her post surgical pain. She has added ibuprofen, writer instructed her to add tylenol as she is not currently taking any medication with tylenol. Sumatriptan helps for headache/migraine only for 2 hours then it returns. She denies any neurological changes such as change in vision or balance. Denies SOB and talks with ease in conversation. Wd to MD and team to address.    Pharm- rubi PALACIO RN Specialty Triage 3/16/2021 10:14 AM

## 2021-03-16 NOTE — TELEPHONE ENCOUNTER
I called Ophelia Wolf on 3/16/2021 at 11:42 AM.  She had better luck with hydrocodone before.  Prescription sent for 30 hydrocodone.

## 2021-03-25 ENCOUNTER — OFFICE VISIT (OUTPATIENT)
Dept: ORTHOPEDICS | Facility: OTHER | Age: 56
End: 2021-03-25
Payer: COMMERCIAL

## 2021-03-25 VITALS
BODY MASS INDEX: 27.82 KG/M2 | SYSTOLIC BLOOD PRESSURE: 145 MMHG | HEART RATE: 102 BPM | HEIGHT: 63 IN | DIASTOLIC BLOOD PRESSURE: 82 MMHG | RESPIRATION RATE: 14 BRPM | WEIGHT: 157 LBS

## 2021-03-25 DIAGNOSIS — Z98.890 S/P COMPLETE REPAIR OF ROTATOR CUFF: Primary | ICD-10-CM

## 2021-03-25 PROCEDURE — 99024 POSTOP FOLLOW-UP VISIT: CPT | Performed by: ORTHOPAEDIC SURGERY

## 2021-03-25 ASSESSMENT — MIFFLIN-ST. JEOR: SCORE: 1276.28

## 2021-03-25 NOTE — LETTER
3/25/2021         RE: Ophelia Wolf  [address] [facility] [id number]        Dear Colleague,    Thank you for referring your patient, Ophelia Wolf, to the [facility] [place]. Please see a copy of my visit note below.    Follow up right rotator cuff repair, Maria Eugenia Patel  on 3/12/2021.  She had a 2 cms tear  Pain is mild.   Wound is healing well.  Suture removed.    Assessment:   right rotator cuff repair, NM   Surgical findings discussed.   Plan:  Will start physical therapy for gentle passive range of motion, advance to active range of motion in 10 days.  Start scar massage with vitamin-E cream.   May stop using the sling or immobilizer in 10 days.  Medication renewal: None # .  Return to clinic 4 weeks to start strengthening.        Again, thank you for allowing me to participate in the care of your patient.        Sincerely,        César Talavera MD

## 2021-03-25 NOTE — PROGRESS NOTES
Follow up right rotator cuff repair, Maria Eugenia Patel  on 3/12/2021.  She had a 2 cms tear  Pain is mild.   Wound is healing well.  Suture removed.    Assessment:   right rotator cuff repair, NM   Surgical findings discussed.   Plan:  Will start physical therapy for gentle passive range of motion, advance to active range of motion in 10 days.  Start scar massage with vitamin-E cream.   May stop using the sling or immobilizer in 10 days.  Medication renewal: None # .  Return to clinic 4 weeks to start strengthening.

## 2021-03-25 NOTE — PATIENT INSTRUCTIONS
Ophelia to follow up with Primary Care provider regarding elevated blood pressure.    Will start physical therapy for gentle passive range of motion, advance to active range of motion in 10 days.  Start scar massage with vitamin-E cream.   May stop using the sling or immobilizer in 10 days.  Medication renewal: None # .  Return to clinic 4 weeks to start strengthening.

## 2021-03-27 DIAGNOSIS — S46.011A TRAUMATIC COMPLETE TEAR OF RIGHT ROTATOR CUFF, INITIAL ENCOUNTER: ICD-10-CM

## 2021-03-27 RX ORDER — HYDROCODONE BITARTRATE AND ACETAMINOPHEN 5; 325 MG/1; MG/1
1 TABLET ORAL EVERY 4 HOURS PRN
Qty: 20 TABLET | Refills: 0 | Status: SHIPPED | OUTPATIENT
Start: 2021-03-27 | End: 2021-04-05

## 2021-04-05 ENCOUNTER — MYC MEDICAL ADVICE (OUTPATIENT)
Dept: ORTHOPEDICS | Facility: OTHER | Age: 56
End: 2021-04-05

## 2021-04-05 DIAGNOSIS — S46.011A TRAUMATIC COMPLETE TEAR OF RIGHT ROTATOR CUFF, INITIAL ENCOUNTER: ICD-10-CM

## 2021-04-05 RX ORDER — HYDROCODONE BITARTRATE AND ACETAMINOPHEN 5; 325 MG/1; MG/1
1 TABLET ORAL EVERY 6 HOURS PRN
Qty: 20 TABLET | Refills: 0 | Status: SHIPPED | OUTPATIENT
Start: 2021-04-05 | End: 2021-04-15

## 2021-04-06 ENCOUNTER — THERAPY VISIT (OUTPATIENT)
Dept: PHYSICAL THERAPY | Facility: CLINIC | Age: 56
End: 2021-04-06
Payer: COMMERCIAL

## 2021-04-06 DIAGNOSIS — Z98.890 S/P RIGHT ROTATOR CUFF REPAIR: ICD-10-CM

## 2021-04-06 DIAGNOSIS — Z98.890 S/P COMPLETE REPAIR OF ROTATOR CUFF: ICD-10-CM

## 2021-04-06 DIAGNOSIS — M25.511 ACUTE PAIN OF RIGHT SHOULDER: ICD-10-CM

## 2021-04-06 PROCEDURE — 97161 PT EVAL LOW COMPLEX 20 MIN: CPT | Mod: GP | Performed by: PHYSICAL THERAPIST

## 2021-04-06 PROCEDURE — 97110 THERAPEUTIC EXERCISES: CPT | Mod: GP | Performed by: PHYSICAL THERAPIST

## 2021-04-06 PROCEDURE — 97140 MANUAL THERAPY 1/> REGIONS: CPT | Mod: GP | Performed by: PHYSICAL THERAPIST

## 2021-04-06 NOTE — PROGRESS NOTES
Physical Therapy Initial Evaluation  Subjective:  The history is provided by the patient. No  was used.   Patient Health History  Ophelia Wolf being seen for R shoulder rotator cuff repair.     Problem began: 3/12/2021.   Problem occurred: fall   Pain is reported as 3/10 on pain scale.  General health as reported by patient is good.  Pertinent medical history includes: menopausal and migraines/headaches.   Red flags:  None as reported by patient.  Medical allergies: none.   Surgeries include:  Other. Other surgery history details: jaw surgery, partial hysterectomy.    Current medications:  Bone density and pain medication.    Current occupation is .   Primary job tasks include:  Computer work, driving, lifting/carrying, prolonged sitting, pushing/pulling and repetitive tasks.                  Therapist Generated HPI Evaluation  Problem details: Fell on ice in Jan 2021.  Had surgery on 3/12/21.  She has had improvement since the surgery.  She is no longer sore in her elbow.  She will has pain and bruising in her mid upper arm, in shoulder and chest.  She is sleeping in bed with R arm resting on pillows.  She is able to dress more easily.  She is really care to not move R arm with dressing.  She is taking 1-2 pain pills/day.  She gets a lot of relief with ice..         Type of problem:  Right shoulder.    This is a new condition.  Condition occurred with:  A fall.  Where condition occurred: at home.  Patient reports pain:  Anterior and in the joint.  Pain is described as aching and is constant.  Pain radiates to:  Upper arm. Pain is worse in the P.M..  Since onset symptoms are gradually improving.  Associated symptoms:  Loss of motion/stiffness and loss of strength. Symptoms are exacerbated by certain positions and lying on extremity (moving arm too much)  and relieved by ice and rest.      Restrictions due to condition include:  Currently not working due to present  treatment.  Barriers include:  Stairs.                        Objective:  System    Physical Exam    General     DYLLAN Wolf , : 1965, MRN: [id number]    Physical Therapy Objective Findings  Subjective information, goals, clinical impression, daily documentation and other information found in EPISODES tab.  Shoulder Objective Findings  Posture Comments: mild forward head, mild rounded shoulder  Visual inspection: ecchymosis biceps area, good healing, no redness, no increased warmth, good radial pulse, atrophy R deltoid  Screens:                                                                                            Right                                                        Left                          Cervical (ROM, quadrant) Normal motion, - quadrat    Thoracic Mobility hypomobile T1-T5    TOS ([place], [place])              Range of Motion:                                             Right AROM          Right PROM            Left AROM             Left PROM           Flexion  60 wnl    Abduction  50 wnl    External Rotation  10 in 0 degrees wnl in 0 degrees    Internal Rotation  To stomach in 0 degrees wnl in 0 degrees    Other:  Elbow flex/ext wnl  wnl          Manual Muscle Testing (graded 0-5, measured at 0 degrees unless otherwise noted):                                                                                                  Right                                                    Left                             Flexion  5   Abduction  4+   External Rotation (at 0)  4+   Internal Rotation (at 0)     Extension     Mid Trap     Lower Trap     Other:     (+ mild pain, ++ moderate pain, +++ severe pain)    Flexibility:                                                                                                 Right                                                    Left                             Pec Minor (supine) Mild tightness normal   Other     Other          Palpation:  Mild hypomobile incision    Assessment/Plan:    Patient is a 55 year old female with right side shoulder complaints.    Patient has the following significant findings with corresponding treatment plan.                Diagnosis 1:  S/p R rotator cuff repair, distal clavicle excision  Pain -  hot/cold therapy, manual therapy, self management, education and home program  Decreased ROM/flexibility - manual therapy, therapeutic exercise, therapeutic activity and home program  Decreased strength - therapeutic exercise, therapeutic activities and home program  Decreased function - therapeutic activities and home program  Impaired posture - neuro re-education, therapeutic activities and home program    Therapy Evaluation Codes:   1) History comprised of:   Personal factors that impact the plan of care:      None.    Comorbidity factors that impact the plan of care are:      None.     Medications impacting care: Pain.  2) Examination of Body Systems comprised of:   Body structures and functions that impact the plan of care:      Shoulder.   Activity limitations that impact the plan of care are:      Bathing, Cooking, Driving, Dressing, Lifting, Sports, Throwing, Working, Sleeping and Laying down.  3) Clinical presentation characteristics are:   Stable/Uncomplicated.  4) Decision-Making    Low complexity using standardized patient assessment instrument and/or measureable assessment of functional outcome.  Cumulative Therapy Evaluation is: Low complexity.    Previous and current functional limitations:  (See Goal Flow Sheet for this information)    Short term and Long term goals: (See Goal Flow Sheet for this information)     Communication ability:  Patient appears to be able to clearly communicate and understand verbal and written communication and follow directions correctly.  Treatment Explanation - The following has been discussed with the patient:   RX ordered/plan of care  Anticipated outcomes  Possible  risks and side effects  This patient would benefit from PT intervention to resume normal activities.   Rehab potential is good.    Frequency:  2 X week, once daily, 6 weeks, then 1x/week for 8 weeks, then 1x/2 weeks for 8 weeks  Duration:  for 24 weeks  Discharge Plan:  Achieve all LTG.  Independent in home treatment program.  Reach maximal therapeutic benefit.    Please refer to the daily flowsheet for treatment today, total treatment time and time spent performing 1:1 timed codes.     Marc Feliciano,PT, DPT, OCS

## 2021-04-13 ENCOUNTER — THERAPY VISIT (OUTPATIENT)
Dept: PHYSICAL THERAPY | Facility: CLINIC | Age: 56
End: 2021-04-13
Payer: COMMERCIAL

## 2021-04-13 DIAGNOSIS — Z98.890 S/P RIGHT ROTATOR CUFF REPAIR: ICD-10-CM

## 2021-04-13 DIAGNOSIS — M25.511 ACUTE PAIN OF RIGHT SHOULDER: ICD-10-CM

## 2021-04-13 PROCEDURE — 97110 THERAPEUTIC EXERCISES: CPT | Mod: GP | Performed by: PHYSICAL THERAPY ASSISTANT

## 2021-04-13 PROCEDURE — 97140 MANUAL THERAPY 1/> REGIONS: CPT | Mod: GP | Performed by: PHYSICAL THERAPY ASSISTANT

## 2021-04-15 ENCOUNTER — THERAPY VISIT (OUTPATIENT)
Dept: PHYSICAL THERAPY | Facility: CLINIC | Age: 56
End: 2021-04-15
Payer: COMMERCIAL

## 2021-04-15 DIAGNOSIS — S46.011A TRAUMATIC COMPLETE TEAR OF RIGHT ROTATOR CUFF, INITIAL ENCOUNTER: ICD-10-CM

## 2021-04-15 DIAGNOSIS — Z98.890 S/P RIGHT ROTATOR CUFF REPAIR: ICD-10-CM

## 2021-04-15 DIAGNOSIS — M25.511 ACUTE PAIN OF RIGHT SHOULDER: ICD-10-CM

## 2021-04-15 PROCEDURE — 97140 MANUAL THERAPY 1/> REGIONS: CPT | Mod: GP | Performed by: PHYSICAL THERAPIST

## 2021-04-15 PROCEDURE — 97010 HOT OR COLD PACKS THERAPY: CPT | Mod: GP | Performed by: PHYSICAL THERAPIST

## 2021-04-15 PROCEDURE — 97110 THERAPEUTIC EXERCISES: CPT | Mod: GP | Performed by: PHYSICAL THERAPIST

## 2021-04-15 RX ORDER — HYDROCODONE BITARTRATE AND ACETAMINOPHEN 5; 325 MG/1; MG/1
1 TABLET ORAL EVERY 6 HOURS PRN
Qty: 20 TABLET | Refills: 0 | Status: SHIPPED | OUTPATIENT
Start: 2021-04-15 | End: 2021-05-22

## 2021-04-20 ENCOUNTER — THERAPY VISIT (OUTPATIENT)
Dept: PHYSICAL THERAPY | Facility: CLINIC | Age: 56
End: 2021-04-20
Payer: COMMERCIAL

## 2021-04-20 DIAGNOSIS — Z98.890 S/P RIGHT ROTATOR CUFF REPAIR: ICD-10-CM

## 2021-04-20 DIAGNOSIS — M25.511 ACUTE PAIN OF RIGHT SHOULDER: ICD-10-CM

## 2021-04-20 PROCEDURE — 97110 THERAPEUTIC EXERCISES: CPT | Mod: GP | Performed by: PHYSICAL THERAPY ASSISTANT

## 2021-04-22 ENCOUNTER — THERAPY VISIT (OUTPATIENT)
Dept: PHYSICAL THERAPY | Facility: CLINIC | Age: 56
End: 2021-04-22
Payer: COMMERCIAL

## 2021-04-22 DIAGNOSIS — Z98.890 S/P RIGHT ROTATOR CUFF REPAIR: ICD-10-CM

## 2021-04-22 DIAGNOSIS — M25.511 ACUTE PAIN OF RIGHT SHOULDER: ICD-10-CM

## 2021-04-22 PROCEDURE — 97110 THERAPEUTIC EXERCISES: CPT | Mod: GP | Performed by: PHYSICAL THERAPIST

## 2021-04-22 PROCEDURE — 97140 MANUAL THERAPY 1/> REGIONS: CPT | Mod: GP | Performed by: PHYSICAL THERAPIST

## 2021-04-27 ENCOUNTER — THERAPY VISIT (OUTPATIENT)
Dept: PHYSICAL THERAPY | Facility: CLINIC | Age: 56
End: 2021-04-27
Payer: COMMERCIAL

## 2021-04-27 DIAGNOSIS — M25.511 ACUTE PAIN OF RIGHT SHOULDER: Primary | ICD-10-CM

## 2021-04-27 DIAGNOSIS — Z98.890 S/P COMPLETE REPAIR OF ROTATOR CUFF: ICD-10-CM

## 2021-04-27 DIAGNOSIS — Z98.890 S/P RIGHT ROTATOR CUFF REPAIR: ICD-10-CM

## 2021-04-27 PROCEDURE — 97140 MANUAL THERAPY 1/> REGIONS: CPT | Mod: GP | Performed by: PHYSICAL THERAPY ASSISTANT

## 2021-04-27 PROCEDURE — 97110 THERAPEUTIC EXERCISES: CPT | Mod: GP | Performed by: PHYSICAL THERAPY ASSISTANT

## 2021-04-29 ENCOUNTER — OFFICE VISIT (OUTPATIENT)
Dept: ORTHOPEDICS | Facility: OTHER | Age: 56
End: 2021-04-29
Payer: COMMERCIAL

## 2021-04-29 ENCOUNTER — THERAPY VISIT (OUTPATIENT)
Dept: PHYSICAL THERAPY | Facility: CLINIC | Age: 56
End: 2021-04-29
Payer: COMMERCIAL

## 2021-04-29 DIAGNOSIS — S46.011D TRAUMATIC COMPLETE TEAR OF RIGHT ROTATOR CUFF, SUBSEQUENT ENCOUNTER: Primary | ICD-10-CM

## 2021-04-29 DIAGNOSIS — Z98.890 S/P RIGHT ROTATOR CUFF REPAIR: ICD-10-CM

## 2021-04-29 DIAGNOSIS — Z98.890 S/P COMPLETE REPAIR OF ROTATOR CUFF: ICD-10-CM

## 2021-04-29 DIAGNOSIS — M25.511 ACUTE PAIN OF RIGHT SHOULDER: ICD-10-CM

## 2021-04-29 PROCEDURE — 97110 THERAPEUTIC EXERCISES: CPT | Mod: GP | Performed by: PHYSICAL THERAPIST

## 2021-04-29 PROCEDURE — 99024 POSTOP FOLLOW-UP VISIT: CPT | Performed by: ORTHOPAEDIC SURGERY

## 2021-04-29 PROCEDURE — 97140 MANUAL THERAPY 1/> REGIONS: CPT | Mod: GP | Performed by: PHYSICAL THERAPIST

## 2021-04-29 NOTE — PROGRESS NOTES
Subjective:  HPI  Physical Exam                    Objective:  System    Physical Exam    General     ROS    Assessment/Plan:    PROGRESS  REPORT    Progress reporting period is from 4/6/21 to 4/29/21.       SUBJECTIVE  Subjective changes noted by patient:  she is more sore in past week, she is working 6-8hrs/day, she will be more sore by end of day, she will have throbbing in R biceps, lateral shoulder and triceps, sleeping is progressively improving, no longer taking pain meds at night,Current   Current Pain level: 4/10(worst 7/10).     Previous pain level was  NA Initial Pain level: 10/10.   Changes in function:  Yes (See Goal flowsheet attached for changes in current functional level)  Adverse reaction to treatment or activity: activity - pushing things too hard at home and work    OBJECTIVE  Changes noted in objective findings:    Objective: hypomobile ant incision, PROM: shoulder flex 120, abd 80, ER(45) 40     ASSESSMENT/PLAN  Updated problem list and treatment plan: Diagnosis 1:  S/p R rotator cuff repair    Pain -  hot/cold therapy, manual therapy, self management, education and home program  Decreased ROM/flexibility - manual therapy, therapeutic exercise, therapeutic activity and home program  Decreased function - therapeutic activities and home program  STG/LTGs have been met or progress has been made towards goals:  Yes (See Goal flow sheet completed today.)    Assessment of Progress: The patient's condition is improving.  Progress slowed some due to having a fall a couple of weeks ago.    Self Management Plans:  Patient has been instructed in a home treatment program.  I have re-evaluated this patient and find that the nature, scope, duration and intensity of the therapy is appropriate for the medical condition of the patient.  Ophelia continues to require the following intervention to meet STG and LTG's:  PT    Recommendations:  This patient would benefit from continued therapy.     Frequency:  2 X week,  once daily, 3 weeks, then 1x/week for 8 weeks  Duration:  for 11 weeks        Please refer to the daily flowsheet for treatment today, total treatment time and time spent performing 1:1 timed codes.      Marc Feliciano,PT, DPT, OCS

## 2021-04-29 NOTE — PROGRESS NOTES
Follow up right rotator cuff repair, Maria Eugenia Patel  on 3/12/21.    She complains of pain in shoulder if she overdoes it at work..  She had a 2 cms tear  Pain is moderate.   Wound: healing well  Passive Range of Motion:  Flexion 120 degrees.  Abduction 80 degrees.  External rotation 40 degrees.  Active flexion to 80 degrees on right.         Medication renewal: None  She will use tylenol and ibuprofen.    Assessment/Plan:  right rotator cuff repair, healing well, but with stiffness and weakness.  Will continue range of motion and start rotator cuff strengthening and scapular stabilization exercises with tubing.  Use ibuprofen and tylenol for pain.   Return to clinic 6-8 weeks.

## 2021-04-29 NOTE — LETTER
4/29/2021         RE: Ophelia Wolf  [address] [facility] [address]        Dear Colleague,    Thank you for referring your patient, Ophelia Wolf, to the [facility] [place]. Please see a copy of my visit note below.    Follow up right rotator cuff repair, Maria Eugenia Patel  on 3/12/21.    She complains of pain in shoulder if she overdoes it at work..  She had a 2 cms tear  Pain is moderate.   Wound: healing well  Passive Range of Motion:  Flexion 120 degrees.  Abduction 80 degrees.  External rotation 40 degrees.  Active flexion to 80 degrees on right.         Medication renewal: None  She will use tylenol and ibuprofen.    Assessment/Plan:  right rotator cuff repair, healing well, but with stiffness and weakness.  Will continue range of motion and start rotator cuff strengthening and scapular stabilization exercises with tubing.  Use ibuprofen and tylenol for pain.   Return to clinic 6-8 weeks.                Again, thank you for allowing me to participate in the care of your patient.        Sincerely,        César Talavera MD

## 2021-04-29 NOTE — PATIENT INSTRUCTIONS
Will continue range of motion and start rotator cuff strengthening and scapular stabilization exercises with tubing.  Use ibuprofen and tylenol for pain.   Ibuprofen up to 12/ day.  Tylenol up to 3000 mg / day.  Return to clinic 6-8 weeks.

## 2021-04-30 ENCOUNTER — MYC MEDICAL ADVICE (OUTPATIENT)
Dept: ORTHOPEDICS | Facility: OTHER | Age: 56
End: 2021-04-30

## 2021-05-03 NOTE — TELEPHONE ENCOUNTER
Reached out to patient regarding medication question, received voicemail and followed up via [employer] Message.    Elda Mishra MS, LAT, ATC  Certified Athletic Trainer

## 2021-05-04 ENCOUNTER — THERAPY VISIT (OUTPATIENT)
Dept: PHYSICAL THERAPY | Facility: CLINIC | Age: 56
End: 2021-05-04
Payer: COMMERCIAL

## 2021-05-04 ENCOUNTER — MYC REFILL (OUTPATIENT)
Dept: FAMILY MEDICINE | Facility: OTHER | Age: 56
End: 2021-05-04

## 2021-05-04 DIAGNOSIS — M25.511 ACUTE PAIN OF RIGHT SHOULDER: Primary | ICD-10-CM

## 2021-05-04 DIAGNOSIS — Z98.890 S/P COMPLETE REPAIR OF ROTATOR CUFF: ICD-10-CM

## 2021-05-04 DIAGNOSIS — G43.109 MIGRAINE WITH AURA AND WITHOUT STATUS MIGRAINOSUS, NOT INTRACTABLE: ICD-10-CM

## 2021-05-04 DIAGNOSIS — Z98.890 S/P RIGHT ROTATOR CUFF REPAIR: ICD-10-CM

## 2021-05-04 PROCEDURE — 97110 THERAPEUTIC EXERCISES: CPT | Mod: GP | Performed by: PHYSICAL THERAPY ASSISTANT

## 2021-05-04 PROCEDURE — 97140 MANUAL THERAPY 1/> REGIONS: CPT | Mod: GP | Performed by: PHYSICAL THERAPY ASSISTANT

## 2021-05-04 RX ORDER — HYDROCODONE BITARTRATE AND ACETAMINOPHEN 5; 325 MG/1; MG/1
1 TABLET ORAL EVERY 6 HOURS PRN
Qty: 6 TABLET | Refills: 0 | Status: SHIPPED | OUTPATIENT
Start: 2021-05-04 | End: 2021-08-02

## 2021-05-04 NOTE — TELEPHONE ENCOUNTER
Pending Prescriptions:                       Disp   Refills    HYDROcodone-acetaminophen (NORCO) 5-325 MG*6 tabl*0        Sig: Take 1 tablet by mouth every 6 hours as needed for           pain    Routing refill request to provider for review/approval because:  Drug not on the [facility] refill protocol

## 2021-05-06 ENCOUNTER — THERAPY VISIT (OUTPATIENT)
Dept: PHYSICAL THERAPY | Facility: CLINIC | Age: 56
End: 2021-05-06
Payer: COMMERCIAL

## 2021-05-06 DIAGNOSIS — Z98.890 S/P RIGHT ROTATOR CUFF REPAIR: ICD-10-CM

## 2021-05-06 DIAGNOSIS — M25.511 ACUTE PAIN OF RIGHT SHOULDER: ICD-10-CM

## 2021-05-06 PROCEDURE — 97110 THERAPEUTIC EXERCISES: CPT | Mod: GP | Performed by: PHYSICAL THERAPIST

## 2021-05-06 PROCEDURE — 97140 MANUAL THERAPY 1/> REGIONS: CPT | Mod: GP | Performed by: PHYSICAL THERAPIST

## 2021-05-06 NOTE — TELEPHONE ENCOUNTER
FYI to provider - Patient is lost to pap tracking follow-up. Attempts to contact pt have been made per reminder process and there has been no reply and/or no appt scheduled.       2004, 2007 NIL paps.  2007 Supracervical hysterectomy  2015 NIL pap, Neg HPV  10/20/20 NIL pap, + HR HPV 18. Plan colp due bef 1/20/21.  10/27/20 LM for pt to call us back.  11/2/20 LM for pt to call us back.   11/9/20 Result letter sent to [employer].   11/10/20 Pt notified by phone - [place].   12/18/20 Reminder (2mo). MyChart - patient read  1/20/21 [place] not done. Tracking updated for 6 mo colp/pap due 4/20/21.   [facility]  5/6/21 Lost to follow up

## 2021-05-07 NOTE — TELEPHONE ENCOUNTER
I would not consider her lost to follow-up yet, she is doing with another issue, when she has recovered from this I am hopeful she will come in for her Pap.  Susannah Ray PA-C

## 2021-05-11 ENCOUNTER — THERAPY VISIT (OUTPATIENT)
Dept: PHYSICAL THERAPY | Facility: CLINIC | Age: 56
End: 2021-05-11
Payer: COMMERCIAL

## 2021-05-11 DIAGNOSIS — Z98.890 S/P RIGHT ROTATOR CUFF REPAIR: ICD-10-CM

## 2021-05-11 DIAGNOSIS — M25.511 ACUTE PAIN OF RIGHT SHOULDER: ICD-10-CM

## 2021-05-11 PROCEDURE — 97110 THERAPEUTIC EXERCISES: CPT | Mod: GP | Performed by: PHYSICAL THERAPY ASSISTANT

## 2021-05-13 ENCOUNTER — THERAPY VISIT (OUTPATIENT)
Dept: PHYSICAL THERAPY | Facility: CLINIC | Age: 56
End: 2021-05-13
Payer: COMMERCIAL

## 2021-05-13 DIAGNOSIS — Z98.890 S/P RIGHT ROTATOR CUFF REPAIR: ICD-10-CM

## 2021-05-13 DIAGNOSIS — M25.511 ACUTE PAIN OF RIGHT SHOULDER: ICD-10-CM

## 2021-05-13 PROCEDURE — 97110 THERAPEUTIC EXERCISES: CPT | Mod: GP | Performed by: PHYSICAL THERAPIST

## 2021-05-13 PROCEDURE — 97112 NEUROMUSCULAR REEDUCATION: CPT | Mod: GP | Performed by: PHYSICAL THERAPIST

## 2021-05-17 NOTE — PROGRESS NOTES
"Assessment & Plan     Atypical nevus  Procedure as above she tolerated it well without complication path is pending.  She will return to clinic in approximately 10 days for suture removal she will watch for signs of infection which we discussed today keep the wound clean dry and covered  - Dermatological path order and indications             BMI:   Estimated body mass index is 27.42 kg/m  as calculated from the following:    Height as of 3/25/21: 1.6 m (5' 3\").    Weight as of this encounter: 70.2 kg (154 lb 12.8 oz).           Return in about 10 days (around 5/28/2021), or if symptoms worsen or fail to improve.    Susannah Ray PA-C  [facility] RYAN Jacinto is a 55 year old who presents for the following health issues     HPI     Patient is here today for a skin lesion removal. Upper left arm.   It has been present for approximately a year it started as a pimple and never went away now it is a flat darkly pigmented lesion.  She is concerned about and would like to have it removed          Review of Systems   As documented above       Objective    /84   Pulse 86   Temp 97.9  F (36.6  C) (Temporal)   Resp 16   Wt 70.2 kg (154 lb 12.8 oz)   LMP 06/16/2007 (Approximate)   SpO2 97%   BMI 27.42 kg/m    Body mass index is 27.42 kg/m .  Physical Exam   GENERAL: healthy, alert and no distress  SKIN: Darkly pigmented nearly blue lesion on her left upper arm it is approximately 3 mm in diameter, this is much more darkly pigmented than any other mole she has    No results found for any visits on 05/18/21.            "

## 2021-05-18 ENCOUNTER — OFFICE VISIT (OUTPATIENT)
Dept: FAMILY MEDICINE | Facility: OTHER | Age: 56
End: 2021-05-18
Payer: COMMERCIAL

## 2021-05-18 VITALS
TEMPERATURE: 97.9 F | RESPIRATION RATE: 16 BRPM | DIASTOLIC BLOOD PRESSURE: 84 MMHG | OXYGEN SATURATION: 97 % | SYSTOLIC BLOOD PRESSURE: 132 MMHG | HEART RATE: 86 BPM | BODY MASS INDEX: 27.42 KG/M2 | WEIGHT: 154.8 LBS

## 2021-05-18 DIAGNOSIS — D22.9 ATYPICAL NEVUS: Primary | ICD-10-CM

## 2021-05-18 PROCEDURE — 11400 EXC TR-EXT B9+MARG 0.5 CM<: CPT | Performed by: PHYSICIAN ASSISTANT

## 2021-05-18 PROCEDURE — 88305 TISSUE EXAM BY PATHOLOGIST: CPT | Performed by: DERMATOLOGY

## 2021-05-18 NOTE — PROGRESS NOTES
Procedure: Punch biopsy  Diagnosis: atypical nevus  Location: left upper arm  The patient was informed of the nature of the procedure and the inherent risks of unattractive scarring, bleeding, and infection, and that further treatment may be required. She consents to the procedure.  The area surrounding the lesion was prepped with alcohol and anesthetized with 1% xylocaine with epinephrine. A 4 mm punch biopsy was obtained. The wound was closed using 4-0 nylon with 2 sutures..  The wound was dressed with Bacitracin ointment and a bandage. Wound care was explained and a wound care instruction sheet given. Follow up with any wound problems, poor healing, or signs of infection.

## 2021-05-20 ENCOUNTER — THERAPY VISIT (OUTPATIENT)
Dept: PHYSICAL THERAPY | Facility: CLINIC | Age: 56
End: 2021-05-20
Payer: COMMERCIAL

## 2021-05-20 DIAGNOSIS — M25.511 ACUTE PAIN OF RIGHT SHOULDER: ICD-10-CM

## 2021-05-20 DIAGNOSIS — Z98.890 S/P RIGHT ROTATOR CUFF REPAIR: ICD-10-CM

## 2021-05-20 LAB — COPATH REPORT: NORMAL

## 2021-05-20 PROCEDURE — 97140 MANUAL THERAPY 1/> REGIONS: CPT | Performed by: PHYSICAL THERAPIST

## 2021-05-20 PROCEDURE — 97110 THERAPEUTIC EXERCISES: CPT | Performed by: PHYSICAL THERAPIST

## 2021-05-22 ENCOUNTER — HOSPITAL ENCOUNTER (EMERGENCY)
Facility: CLINIC | Age: 56
Discharge: HOME | End: 2021-05-22
Attending: NURSE PRACTITIONER | Admitting: NURSE PRACTITIONER
Payer: COMMERCIAL

## 2021-05-22 ENCOUNTER — APPOINTMENT (OUTPATIENT)
Dept: CT IMAGING | Facility: CLINIC | Age: 56
End: 2021-05-22
Attending: NURSE PRACTITIONER
Payer: COMMERCIAL

## 2021-05-22 VITALS
DIASTOLIC BLOOD PRESSURE: 62 MMHG | SYSTOLIC BLOOD PRESSURE: 118 MMHG | BODY MASS INDEX: 25.71 KG/M2 | HEART RATE: 61 BPM | RESPIRATION RATE: 15 BRPM | TEMPERATURE: 98.1 F | HEIGHT: 64 IN | WEIGHT: 150.6 LBS | OXYGEN SATURATION: 88 %

## 2021-05-22 DIAGNOSIS — K52.9 NON-SPECIFIC COLITIS: ICD-10-CM

## 2021-05-22 LAB
ALBUMIN SERPL-MCNC: 4.3 G/DL (ref 3.4–5)
ALP SERPL-CCNC: 78 U/L (ref 40–150)
ALT SERPL W P-5'-P-CCNC: 32 U/L (ref 0–50)
ANION GAP SERPL CALCULATED.3IONS-SCNC: 7 MMOL/L (ref 3–14)
AST SERPL W P-5'-P-CCNC: 29 U/L (ref 0–45)
BASOPHILS # BLD AUTO: 0 10E9/L (ref 0–0.2)
BASOPHILS NFR BLD AUTO: 0.2 %
BILIRUB SERPL-MCNC: 0.7 MG/DL (ref 0.2–1.3)
BUN SERPL-MCNC: 10 MG/DL (ref 7–30)
CALCIUM SERPL-MCNC: 9.7 MG/DL (ref 8.5–10.1)
CHLORIDE SERPL-SCNC: 108 MMOL/L (ref 94–109)
CO2 SERPL-SCNC: 27 MMOL/L (ref 20–32)
CREAT SERPL-MCNC: 0.57 MG/DL (ref 0.52–1.04)
DIFFERENTIAL METHOD BLD: ABNORMAL
EOSINOPHIL # BLD AUTO: 0.1 10E9/L (ref 0–0.7)
EOSINOPHIL NFR BLD AUTO: 0.6 %
ERYTHROCYTE [DISTWIDTH] IN BLOOD BY AUTOMATED COUNT: 12.4 % (ref 10–15)
GFR SERPL CREATININE-BSD FRML MDRD: >90 ML/MIN/{1.73_M2}
GLUCOSE SERPL-MCNC: 115 MG/DL (ref 70–99)
HCG SERPL QL: NEGATIVE
HCT VFR BLD AUTO: 43.8 % (ref 35–47)
HGB BLD-MCNC: 15.2 G/DL (ref 11.7–15.7)
IMM GRANULOCYTES # BLD: 0 10E9/L (ref 0–0.4)
IMM GRANULOCYTES NFR BLD: 0.4 %
LYMPHOCYTES # BLD AUTO: 1 10E9/L (ref 0.8–5.3)
LYMPHOCYTES NFR BLD AUTO: 9.7 %
MCH RBC QN AUTO: 32.2 PG (ref 26.5–33)
MCHC RBC AUTO-ENTMCNC: 34.7 G/DL (ref 31.5–36.5)
MCV RBC AUTO: 93 FL (ref 78–100)
MONOCYTES # BLD AUTO: 0.8 10E9/L (ref 0–1.3)
MONOCYTES NFR BLD AUTO: 7.8 %
NEUTROPHILS # BLD AUTO: 8.5 10E9/L (ref 1.6–8.3)
NEUTROPHILS NFR BLD AUTO: 81.3 %
NRBC # BLD AUTO: 0 10*3/UL
NRBC BLD AUTO-RTO: 0 /100
PLATELET # BLD AUTO: 235 10E9/L (ref 150–450)
POTASSIUM SERPL-SCNC: 4 MMOL/L (ref 3.4–5.3)
PROT SERPL-MCNC: 7.4 G/DL (ref 6.8–8.8)
RBC # BLD AUTO: 4.72 10E12/L (ref 3.8–5.2)
SODIUM SERPL-SCNC: 142 MMOL/L (ref 133–144)
WBC # BLD AUTO: 10.5 10E9/L (ref 4–11)

## 2021-05-22 PROCEDURE — 250N000009 HC RX 250: Performed by: NURSE PRACTITIONER

## 2021-05-22 PROCEDURE — 96361 HYDRATE IV INFUSION ADD-ON: CPT | Performed by: NURSE PRACTITIONER

## 2021-05-22 PROCEDURE — 96376 TX/PRO/DX INJ SAME DRUG ADON: CPT | Performed by: NURSE PRACTITIONER

## 2021-05-22 PROCEDURE — 80053 COMPREHEN METABOLIC PANEL: CPT | Performed by: NURSE PRACTITIONER

## 2021-05-22 PROCEDURE — 96374 THER/PROPH/DIAG INJ IV PUSH: CPT | Mod: 59 | Performed by: NURSE PRACTITIONER

## 2021-05-22 PROCEDURE — 250N000011 HC RX IP 250 OP 636: Performed by: NURSE PRACTITIONER

## 2021-05-22 PROCEDURE — 99285 EMERGENCY DEPT VISIT HI MDM: CPT | Performed by: NURSE PRACTITIONER

## 2021-05-22 PROCEDURE — 85025 COMPLETE CBC W/AUTO DIFF WBC: CPT | Performed by: NURSE PRACTITIONER

## 2021-05-22 PROCEDURE — 84703 CHORIONIC GONADOTROPIN ASSAY: CPT | Performed by: NURSE PRACTITIONER

## 2021-05-22 PROCEDURE — 74177 CT ABD & PELVIS W/CONTRAST: CPT

## 2021-05-22 PROCEDURE — 99285 EMERGENCY DEPT VISIT HI MDM: CPT | Mod: 25 | Performed by: NURSE PRACTITIONER

## 2021-05-22 PROCEDURE — 258N000003 HC RX IP 258 OP 636: Performed by: NURSE PRACTITIONER

## 2021-05-22 RX ORDER — ONDANSETRON 2 MG/ML
4 INJECTION INTRAMUSCULAR; INTRAVENOUS EVERY 30 MIN PRN
Status: DISCONTINUED | OUTPATIENT
Start: 2021-05-22 | End: 2021-05-23 | Stop reason: HOSPADM

## 2021-05-22 RX ORDER — SODIUM CHLORIDE, SODIUM LACTATE, POTASSIUM CHLORIDE, CALCIUM CHLORIDE 600; 310; 30; 20 MG/100ML; MG/100ML; MG/100ML; MG/100ML
INJECTION, SOLUTION INTRAVENOUS CONTINUOUS
Status: DISCONTINUED | OUTPATIENT
Start: 2021-05-22 | End: 2021-05-23 | Stop reason: HOSPADM

## 2021-05-22 RX ORDER — HYDROMORPHONE HYDROCHLORIDE 1 MG/ML
0.5 INJECTION, SOLUTION INTRAMUSCULAR; INTRAVENOUS; SUBCUTANEOUS
Status: DISCONTINUED | OUTPATIENT
Start: 2021-05-22 | End: 2021-05-23 | Stop reason: HOSPADM

## 2021-05-22 RX ORDER — IOPAMIDOL 755 MG/ML
100 INJECTION, SOLUTION INTRAVASCULAR ONCE
Status: COMPLETED | OUTPATIENT
Start: 2021-05-22 | End: 2021-05-22

## 2021-05-22 RX ADMIN — HYDROMORPHONE HYDROCHLORIDE 0.5 MG: 1 INJECTION, SOLUTION INTRAMUSCULAR; INTRAVENOUS; SUBCUTANEOUS at 22:44

## 2021-05-22 RX ADMIN — IOPAMIDOL 100 ML: 755 INJECTION, SOLUTION INTRAVENOUS at 22:10

## 2021-05-22 RX ADMIN — HYDROMORPHONE HYDROCHLORIDE 0.5 MG: 1 INJECTION, SOLUTION INTRAMUSCULAR; INTRAVENOUS; SUBCUTANEOUS at 22:02

## 2021-05-22 RX ADMIN — SODIUM CHLORIDE, POTASSIUM CHLORIDE, SODIUM LACTATE AND CALCIUM CHLORIDE 1000 ML: 600; 310; 30; 20 INJECTION, SOLUTION INTRAVENOUS at 22:00

## 2021-05-22 RX ADMIN — SODIUM CHLORIDE, POTASSIUM CHLORIDE, SODIUM LACTATE AND CALCIUM CHLORIDE 1000 ML: 600; 310; 30; 20 INJECTION, SOLUTION INTRAVENOUS at 22:45

## 2021-05-22 RX ADMIN — SODIUM CHLORIDE 40 ML: 9 INJECTION, SOLUTION INTRAVENOUS at 22:10

## 2021-05-22 ASSESSMENT — MIFFLIN-ST. JEOR: SCORE: 1263.12

## 2021-05-23 NOTE — DISCHARGE INSTRUCTIONS
Your symptoms are likely caused by a virus   I recommend clear liquids for the next 12 to 24 hours followed by a bland diet.  Your symptoms should subside gradually over the next 12hours to 5 days.  If you are still having no improvement by Wednesday follow-up with your primary care doctor I recommend avoiding ibuprofen or aspirin products until you are completely healed

## 2021-05-23 NOTE — ED PROVIDER NOTES
"  History     Chief Complaint   Patient presents with     Rectal Bleeding     HPI  Ophelia Wolf is a 55 year old female with past medical history of abdominal pain, rotator cuff tear, allergic rhinitis, migraines who presents to the emergency department with concerns of abdominal pain, loose stools followed by bright red rectal bleeding.  Patient reports onset of symptoms was at 730 this morning with notable abdominal pain followed by a loose stool.  Patient states subsequent to this she developed abdominal pains every 45 to 60 minutes where she felt like it was squeezing generalized pain followed by rectal urgency that is \"hurt so bad \".  She said the rectal urgency was followed by 3 to 4 tablespoons of blood in the stool.  Patient reports episodes like this hourly.  Patient denies any eating any recent raw or undercooked foods.  Patient denies any recent antibiotics.  Patient denies history of any inflammatory bowel disease.    Patient denies sweats, ear pain, eye pain, throat pain, chest pain, cough, wheezing, shortness of breath, hematuria, dysuria, speech difficulty, left or right-sided body weakness, mental confusion, thoughts of harming self.  Patient reports feeling well otherwise    Allergies:  Allergies   Allergen Reactions     Amoxicillin Difficulty breathing     Anesthetic Ether      pseudocholinesterase deficiency       Problem List:    Patient Active Problem List    Diagnosis Date Noted     Acute pain of right shoulder 04/06/2021     Priority: Medium     S/P right rotator cuff repair 04/06/2021     Priority: Medium     S/P complete repair of rotator cuff 03/25/2021     Priority: Medium     Pseudocholinesterase deficiency 03/08/2021     Priority: Medium     Traumatic complete tear of right rotator cuff 02/11/2021     Priority: Medium     Arthritis of right acromioclavicular joint 02/11/2021     Priority: Medium     Traumatic complete tear of right rotator cuff, initial encounter 02/11/2021     " Priority: Medium     Added automatically from request for surgery [id number]       Cervical high risk HPV (human papillomavirus) test positive 10/20/2020     Priority: Medium     2004, 2007 NIL paps.  2007 Supracervical hysterectomy  2015 NIL pap, Neg HPV  10/20/20 NIL pap, + HR HPV 18. Plan colp due bef 1/20/21.  10/27/20 LM for pt to call us back.  11/2/20 LM for pt to call us back.   11/9/20 Result letter sent to [employer].   11/10/20 Pt notified by phone - [place].   12/18/20 Reminder (2mo). MyChart - patient read  1/20/21 [place] not done. Tracking updated for 6 mo colp/pap due 4/20/21.   4/7/21 Reminder (6mo) [facility]- Pt read.  5/6/21 Provider does not want pt lost to follow up yet. Will push tracker out another month.                 Heartburn symptom 09/13/2012     Priority: Medium     Abdominal bloating 12/06/2011     Priority: Medium     Chronic abdominal pain 12/06/2011     Priority: Medium     CARDIOVASCULAR SCREENING; LDL GOAL LESS THAN 160 10/31/2010     Priority: Medium     Disturbance of skin sensation 05/06/2008     Priority: Medium     pseudocholinesterase deficiency      Priority: Medium     pseudocholinesterase deficiency       Leiomyoma of uterus 04/26/2007     Priority: Medium     Problem list name updated by automated process. Provider to review       Constipation 03/13/2007     Priority: Medium     Problem list name updated by automated process. Provider to review       Dysmenorrhea 03/13/2007     Priority: Medium     Migraine with aura 12/15/2004     Priority: Medium         Patient is followed by [facility] for ongoing prescription of narcotic pain medicine.  Med: hydrocodone acetaminophen 5/325 .   Maximum use per month: 30 per 3 months, changed plans-- 4 for 60 -90 days August 11, 2016   Expected duration: chronic  Narcotic agreement on file: YES  Clinic visit recommended: Q 6  months      Problem list name updated by automated process. Provider to review         Allergic rhinitis  08/08/2002     Priority: Medium     Problem list name updated by automated process. Provider to review       Headache      Priority: Medium     likely migraine etiology  Problem list name updated by automated process. Provider to review       Premenstrual tension syndrome      Priority: Medium     bloating, irritability, breast tenderness  Problem list name updated by automated process. Provider to review          Past Medical History:    Past Medical History:   Diagnosis Date     ALLERGIC RHINITIS NOS 08/08/2002     Arthritis of right acromioclavicular joint 2/11/2021     Cervical high risk HPV (human papillomavirus) test positive 10/20/2020     CLASS MIGRAIN W/O MENTN INTRACTABLE 12/15/2004     Dysmenorrhea 03/13/2007     Headache      Headache      Headache      History of rabies vaccination      Lesion of plantar nerve      NONSPECIFIC MEDICAL HISTORY      Premenstrual tension syndrome      Tension headache      Tobacco use disorder      UTERINE LEIOMYOMA NOS 04/26/2007       Past Surgical History:    Past Surgical History:   Procedure Laterality Date     ARTHROTOMY SHOULDER, ROTATOR CUFF REPAIR, COMBINED Right 3/12/2021    Procedure: ARTHROTOMY, SHOULDER (anatoly jaleel), WITH ROTATOR CUFF REPAIR open;  Surgeon: César Talavera MD;  Location: [facility]     C LIGATE FALLOPIAN TUBE,POSTPARTUM  1990    Tubal Ligation     C SUPRACERV ABD HYSTERECTOMY  06/28/2007     COLONOSCOPY  04/14/10     [facility] NONSPECIFIC PROCEDURE  1978    jaw surgery       Family History:    Family History   Problem Relation Age of Onset     Neurologic Disorder Mother         Meniere's disease     Anesthesia Reaction Mother         And myself     Osteoporosis Mother      Thyroid Disease Mother         hypo     Heart Disease Maternal Grandmother         stroke     Arthritis Maternal Grandmother         osteoporosis     Diabetes Maternal Grandmother         Type II diabetes     Coronary Artery Disease Maternal Grandmother 40     Cerebrovascular  "Disease Maternal Grandmother 40     Osteoporosis Maternal Grandmother      Heart Disease Maternal Grandfather         heart problems, s/p CAB     Cancer Maternal Grandfather         prostate      Diabetes Maternal Grandfather         Type II diabetes     Prostate Cancer Maternal Grandfather      Hypertension Father      Hyperlipidemia Father      Heart Disease Paternal Grandmother      Anesthesia Reaction Brother      Anesthesia Reaction Brother        Social History:  Marital Status:   [2]  Social History     Tobacco Use     Smoking status: Former Smoker     Packs/day: 0.50     Years: 27.00     Pack years: 13.50     Types: Cigarettes     Quit date: 2007     Years since quittin.4     Smokeless tobacco: Never Used     Tobacco comment: 3-4 cig/day   Substance Use Topics     Alcohol use: Yes     Alcohol/week: 3.3 standard drinks     Comment: weekends     Drug use: No        Medications:    aspirin 81 MG tablet  BIOTIN 5000 PO  Calcium Carbonate-Vitamin D (CALCIUM + D PO)  famotidine (PEPCID) 20 MG tablet  HYDROcodone-acetaminophen (NORCO) 5-325 MG tablet  MELATONIN PO  Progesterone Micronized (PROGESTERONE PO)  SUMAtriptan (IMITREX) 100 MG tablet      Review of Systems  As mentioned above in the history present illness. All other systems were reviewed and are negative.    Physical Exam   BP: (!) 172/99  Pulse: 108  Temp: 98.1  F (36.7  C)  Resp: 20  Height: 162.6 cm (5' 4\")  Weight: 68.3 kg (150 lb 9.6 oz)  SpO2: 97 %      Physical Exam  Vitals signs and nursing note reviewed.   Constitutional:       General: She is not in acute distress.     Appearance: She is well-developed. She is not diaphoretic.   HENT:      Head: Normocephalic and atraumatic.      Right Ear: Hearing, tympanic membrane, ear canal and external ear normal.      Left Ear: Hearing, tympanic membrane, ear canal and external ear normal.      Nose: Nose normal.      Mouth/Throat:      Pharynx: Uvula midline.      Tonsils: No tonsillar " exudate.   Eyes:      General: No scleral icterus.        Right eye: No discharge.         Left eye: No discharge.      Conjunctiva/sclera: Conjunctivae normal.   Neck:      Musculoskeletal: Normal range of motion and neck supple.   Cardiovascular:      Rate and Rhythm: Normal rate and regular rhythm.      Heart sounds: Normal heart sounds. No murmur. No friction rub.   Pulmonary:      Effort: Pulmonary effort is normal. No respiratory distress.      Breath sounds: Normal breath sounds. No stridor. No wheezing or rales.   Chest:      Chest wall: No tenderness.   Abdominal:      General: Bowel sounds are normal. There is no distension.      Palpations: Abdomen is soft. There is no mass.      Tenderness: There is abdominal tenderness (LLQ) in the right upper quadrant, right lower quadrant and left lower quadrant. There is rebound (LLQ). There is no guarding.      Hernia: No hernia is present.   Skin:     General: Skin is warm and dry.      Coloration: Skin is not pale.      Findings: No erythema or rash.   Neurological:      Mental Status: She is alert and oriented to person, place, and time.      Deep Tendon Reflexes: Reflexes normal.         ED Course        Procedures    Results for orders placed or performed during the hospital encounter of 05/22/21 (from the past 24 hour(s))   HCG qualitative Blood   Result Value Ref Range    HCG Qualitative Serum Negative NEG^Negative   CBC with platelets differential   Result Value Ref Range    WBC 10.5 4.0 - 11.0 10e9/L    RBC Count 4.72 3.8 - 5.2 10e12/L    Hemoglobin 15.2 11.7 - 15.7 g/dL    Hematocrit 43.8 35.0 - 47.0 %    MCV 93 78 - 100 fl    MCH 32.2 26.5 - 33.0 pg    MCHC 34.7 31.5 - 36.5 g/dL    RDW 12.4 10.0 - 15.0 %    Platelet Count 235 150 - 450 10e9/L    Diff Method Automated Method     % Neutrophils 81.3 %    % Lymphocytes 9.7 %    % Monocytes 7.8 %    % Eosinophils 0.6 %    % Basophils 0.2 %    % Immature Granulocytes 0.4 %    Nucleated RBCs 0 0 /100    Absolute  Neutrophil 8.5 (H) 1.6 - 8.3 10e9/L    Absolute Lymphocytes 1.0 0.8 - 5.3 10e9/L    Absolute Monocytes 0.8 0.0 - 1.3 10e9/L    Absolute Eosinophils 0.1 0.0 - 0.7 10e9/L    Absolute Basophils 0.0 0.0 - 0.2 10e9/L    Abs Immature Granulocytes 0.0 0 - 0.4 10e9/L    Absolute Nucleated RBC 0.0    Comprehensive metabolic panel   Result Value Ref Range    Sodium 142 133 - 144 mmol/L    Potassium 4.0 3.4 - 5.3 mmol/L    Chloride 108 94 - 109 mmol/L    Carbon Dioxide 27 20 - 32 mmol/L    Anion Gap 7 3 - 14 mmol/L    Glucose 115 (H) 70 - 99 mg/dL    Urea Nitrogen 10 7 - 30 mg/dL    Creatinine 0.57 0.52 - 1.04 mg/dL    GFR Estimate >90 >60 mL/min/[1.73_m2]    GFR Estimate If Black >90 >60 mL/min/[1.73_m2]    Calcium 9.7 8.5 - 10.1 mg/dL    Bilirubin Total 0.7 0.2 - 1.3 mg/dL    Albumin 4.3 3.4 - 5.0 g/dL    Protein Total 7.4 6.8 - 8.8 g/dL    Alkaline Phosphatase 78 40 - 150 U/L    ALT 32 0 - 50 U/L    AST 29 0 - 45 U/L   CT Abdomen Pelvis w Contrast    Narrative    EXAM: CT ABDOMEN PELVIS W CONTRAST  LOCATION: [facility]  DATE/TIME: 5/22/2021 10:04 PM    INDICATION: Abdominal abscess/infection suspected  Diverticulitis suspected  COMPARISON: 1/11/2008  TECHNIQUE: CT scan of the abdomen and pelvis was performed following injection of IV contrast. Multiplanar reformats were obtained. Dose reduction techniques were used.  CONTRAST: 100 mLs IsoVue 370    FINDINGS:   LOWER CHEST: Normal.    HEPATOBILIARY: Fatty infiltration of liver. A few scattered tiny hepatic cysts.  Tiny gallstone present within gallbladder, no inflammatory changes. Bile ducts normal.    PANCREAS: Normal.    SPLEEN: Normal.    ADRENAL GLANDS: Normal.    KIDNEYS/BLADDER: Normal.    BOWEL: There is prominent circumferential wall thickening involving the majority of left hemicolon from splenic flexure to the rectosigmoid junction. Findings are consistent with colitis, infectious or inflammatory. Scattered diverticula are present but   the findings  are not suggestive of diverticulitis. Slight amount of pericolonic soft tissue stranding present but no abscess. No free perforation.  Right hemicolon, appendix and small bowel are normal.    LYMPH NODES: Normal.    VASCULATURE: Unremarkable.    PELVIC ORGANS: Normal.    MUSCULOSKELETAL: There is a complex lobulated cystic appearing mass along the course of the distal right iliopsoas muscle and tendon with maximal dimensions of approximately 13 x 6.5 x 5.5 cm. This is in the region of the iliopsoas bursa and may   represent a chronically distended debris-filled bursal structure though sarcoma could have a similar appearance. If this has not been worked up previously, then recommend a follow-up nonemergent pelvic MRI.      Impression    IMPRESSION:   1.  Colitis of left hemicolon with prominent inflammatory bowel wall thickening.  2.  Complex cystic and solid lesion within right hip along the expected course of the iliopsoas bursa. Recommend follow-up pelvic MRI if this has not been worked up previously.       Medications   lactated ringers BOLUS 1,000 mL (0 mLs Intravenous Stopped 5/22/21 2244)     Followed by   lactated ringers BOLUS 1,000 mL (0 mLs Intravenous Stopped 5/22/21 2305)     Followed by   lactated ringers infusion (has no administration in time range)   ondansetron (ZOFRAN) injection 4 mg (has no administration in time range)   HYDROmorphone (PF) (DILAUDID) injection 0.5 mg (0.5 mg Intravenous Given 5/22/21 2244)   iopamidol (ISOVUE-370) solution 100 mL (100 mLs Intravenous Given 5/22/21 2210)   sodium chloride 0.9 % bag 500mL for CT scan flush use (40 mLs Intravenous Given 5/22/21 2210)   sodium chloride (PF) 0.9% PF flush 3 mL (3 mLs Intravenous Given 5/22/21 2210)       Assessments & Plan (with Medical Decision Making)  Ophelia Wolf is a 55 year old female with past medical history of abdominal pain, rotator cuff tear, allergic rhinitis, migraines who presents to the emergency department with  concerns of abdominal pain, loose stools followed by bright red rectal bleeding.  Patient has no history of inflammatory bowel disease reports concern for possible gluten intolerance.  Patient has not eaten any raw or undercooked foods and no recent multiple antibiotics.  On exam patient is vitally stable with abdominal tenderness noted in the right lower quadrant right upper quadrant and left lower quadrant and some rebound in the left lower quadrant.   differential including acute abdomen, GI bleed, appendicitis, diverticulitis, bowel obstruction.  Complete blood count obtained and is no leukocytosis and no acute anemia, CT of the abdomen pelvis reviewed and reveals colitis of left hemicolon with prominent inflammatory bowel wall thickening.  With complex cystic lesion in the right hip along the expected course of the iliopsoas bursa recommend follow-up pelvic MRI if this had not been worked up previously.  Patient verbalized understanding and discharged in stable condition     I have reviewed the nursing notes.    I have reviewed the findings, diagnosis, plan and need for follow up with the patient.    Discharge Medication List as of 5/22/2021 11:05 PM          Final diagnoses:   Non-specific colitis       5/22/2021   [facility] EMERGENCY DEPT     Deidre Mas, APRN CNP  05/22/21 [id number]

## 2021-06-07 NOTE — TELEPHONE ENCOUNTER
FYI to provider - Patient is lost to pap tracking follow-up. Attempts to contact pt have been made per reminder process and there has been no reply and/or no appt scheduled.       10/20/20 NIL pap, + HR HPV 18. Plan colp due bef 1/20/21.  10/27/20 LM for pt to call us back.  11/2/20 LM for pt to call us back.   11/9/20 Result letter sent to [facility] [employer].   11/10/20 Pt notified by phone - [place].   12/18/20 Reminder (2mo). MyChart - patient read  1/20/21 [place] not done. Tracking updated for 6 mo colp/pap due 4/20/21.   4/7/21 Reminder (6mo) [facility]- Pt read.  5/6/21 Provider does not want pt lost to follow up yet. Will push tracker out another month.  6/7/21 Lost to follow-up for pap tracking

## 2021-06-08 ENCOUNTER — THERAPY VISIT (OUTPATIENT)
Dept: PHYSICAL THERAPY | Facility: CLINIC | Age: 56
End: 2021-06-08
Payer: COMMERCIAL

## 2021-06-08 DIAGNOSIS — M25.511 ACUTE PAIN OF RIGHT SHOULDER: ICD-10-CM

## 2021-06-08 DIAGNOSIS — Z98.890 S/P RIGHT ROTATOR CUFF REPAIR: ICD-10-CM

## 2021-06-08 PROCEDURE — 97110 THERAPEUTIC EXERCISES: CPT | Mod: GP | Performed by: PHYSICAL THERAPIST

## 2021-06-08 PROCEDURE — 97140 MANUAL THERAPY 1/> REGIONS: CPT | Mod: GP | Performed by: PHYSICAL THERAPIST

## 2021-06-09 ENCOUNTER — MYC MEDICAL ADVICE (OUTPATIENT)
Dept: FAMILY MEDICINE | Facility: OTHER | Age: 56
End: 2021-06-09

## 2021-06-15 ENCOUNTER — THERAPY VISIT (OUTPATIENT)
Dept: PHYSICAL THERAPY | Facility: CLINIC | Age: 56
End: 2021-06-15
Payer: COMMERCIAL

## 2021-06-15 DIAGNOSIS — M25.511 ACUTE PAIN OF RIGHT SHOULDER: ICD-10-CM

## 2021-06-15 DIAGNOSIS — Z98.890 S/P RIGHT ROTATOR CUFF REPAIR: ICD-10-CM

## 2021-06-15 PROCEDURE — 97112 NEUROMUSCULAR REEDUCATION: CPT | Mod: GP | Performed by: PHYSICAL THERAPIST

## 2021-06-15 PROCEDURE — 97110 THERAPEUTIC EXERCISES: CPT | Mod: GP | Performed by: PHYSICAL THERAPIST

## 2021-06-22 ENCOUNTER — THERAPY VISIT (OUTPATIENT)
Dept: PHYSICAL THERAPY | Facility: CLINIC | Age: 56
End: 2021-06-22
Payer: COMMERCIAL

## 2021-06-22 DIAGNOSIS — Z98.890 S/P RIGHT ROTATOR CUFF REPAIR: ICD-10-CM

## 2021-06-22 DIAGNOSIS — M25.511 ACUTE PAIN OF RIGHT SHOULDER: ICD-10-CM

## 2021-06-22 PROCEDURE — 97110 THERAPEUTIC EXERCISES: CPT | Mod: GP | Performed by: PHYSICAL THERAPIST

## 2021-07-06 ENCOUNTER — THERAPY VISIT (OUTPATIENT)
Dept: PHYSICAL THERAPY | Facility: CLINIC | Age: 56
End: 2021-07-06
Payer: COMMERCIAL

## 2021-07-06 DIAGNOSIS — M25.511 ACUTE PAIN OF RIGHT SHOULDER: ICD-10-CM

## 2021-07-06 DIAGNOSIS — Z98.890 S/P RIGHT ROTATOR CUFF REPAIR: ICD-10-CM

## 2021-07-06 PROCEDURE — 97110 THERAPEUTIC EXERCISES: CPT | Mod: GP | Performed by: PHYSICAL THERAPIST

## 2021-07-06 PROCEDURE — 97112 NEUROMUSCULAR REEDUCATION: CPT | Mod: GP | Performed by: PHYSICAL THERAPIST

## 2021-07-06 NOTE — PROGRESS NOTES
Subjective:  HPI  Physical Exam                    Objective:  System    Physical Exam    General     ROS    Assessment/Plan:    PROGRESS  REPORT    Progress reporting period is from 4/29/21 to 7/6/21.       SUBJECTIVE  Subjective changes noted by patient:  overall things are going well, her shoulder is a little sore from using it more over the weekend, no issues with keeping R hand on steering wheel, no issues with dressing upper body, she feels a little weak to take things out of upper cabinet    Current Pain level: 1/10.     Previous pain level was  4/10 Initial Pain level: 9/10.   Changes in function:  Yes (See Goal flowsheet attached for changes in current functional level)  Adverse reaction to treatment or activity: None    OBJECTIVE  Changes noted in objective findings:    Objective: AROM:  flex 152, abd 118, ER(0) 63, IR(0) L2,  PROM: flex 165, abd 135, ER(90) 75, IR(90) 35, MMT: shoulder flex 4-/5, abd 4-/5, ER 4+/5, IR 5/5     ASSESSMENT/PLAN  Updated problem list and treatment plan: Diagnosis 1:  S/p R rotator cuff repair    Pain -  hot/cold therapy, manual therapy, self management, education and home program  Decreased ROM/flexibility - manual therapy, therapeutic exercise, therapeutic activity and home program  Decreased strength - therapeutic exercise, therapeutic activities and home program  Decreased function - therapeutic activities and home program  STG/LTGs have been met or progress has been made towards goals:  Yes (See Goal flow sheet completed today.)  Assessment of Progress: The patient's condition is improving.  Self Management Plans:  Patient has been instructed in a home treatment program.  Patient  has been instructed in self management of symptoms.  I have re-evaluated this patient and find that the nature, scope, duration and intensity of the therapy is appropriate for the medical condition of the patient.  Ophelia continues to require the following intervention to meet STG and LTG's:   PT    Recommendations:  This patient would benefit from continued therapy.     Frequency:  1 X/2 weeks, once daily  Duration:  for 8 weeks        Please refer to the daily flowsheet for treatment today, total treatment time and time spent performing 1:1 timed codes.      Marc Feliciano,PT, DPT, OCS

## 2021-07-12 ENCOUNTER — TELEPHONE (OUTPATIENT)
Dept: OBGYN | Facility: CLINIC | Age: 56
End: 2021-07-12

## 2021-07-12 NOTE — TELEPHONE ENCOUNTER
[facility] Call Center    Phone Message    May a detailed message be left on voicemail: yes     Reason for Call: The patient was advised by her PCP to schedule a Colposcopy.  Her preferred location is [facility].  Please advise.  Thank you.     Action Taken: Message routed to:  [facility] p [phone]    Travel Screening: Not Applicable

## 2021-07-27 ENCOUNTER — THERAPY VISIT (OUTPATIENT)
Dept: PHYSICAL THERAPY | Facility: CLINIC | Age: 56
End: 2021-07-27
Payer: COMMERCIAL

## 2021-07-27 DIAGNOSIS — M25.511 ACUTE PAIN OF RIGHT SHOULDER: ICD-10-CM

## 2021-07-27 DIAGNOSIS — Z98.890 S/P RIGHT ROTATOR CUFF REPAIR: ICD-10-CM

## 2021-07-27 PROCEDURE — 97110 THERAPEUTIC EXERCISES: CPT | Mod: GP | Performed by: PHYSICAL THERAPIST

## 2021-07-27 PROCEDURE — 97112 NEUROMUSCULAR REEDUCATION: CPT | Mod: GP | Performed by: PHYSICAL THERAPIST

## 2021-08-02 ENCOUNTER — MYC REFILL (OUTPATIENT)
Dept: FAMILY MEDICINE | Facility: OTHER | Age: 56
End: 2021-08-02

## 2021-08-02 DIAGNOSIS — G43.109 MIGRAINE WITH AURA AND WITHOUT STATUS MIGRAINOSUS, NOT INTRACTABLE: ICD-10-CM

## 2021-08-04 RX ORDER — HYDROCODONE BITARTRATE AND ACETAMINOPHEN 5; 325 MG/1; MG/1
1 TABLET ORAL EVERY 6 HOURS PRN
Qty: 6 TABLET | Refills: 0 | Status: SHIPPED | OUTPATIENT
Start: 2021-08-04 | End: 2021-11-02

## 2021-08-04 NOTE — TELEPHONE ENCOUNTER
Med approved, please call pt- she will need a face to fface visit before next refill in Nov  Susannah Ray PA-C

## 2021-08-09 ENCOUNTER — THERAPY VISIT (OUTPATIENT)
Dept: PHYSICAL THERAPY | Facility: CLINIC | Age: 56
End: 2021-08-09
Payer: COMMERCIAL

## 2021-08-09 DIAGNOSIS — M25.511 ACUTE PAIN OF RIGHT SHOULDER: ICD-10-CM

## 2021-08-09 DIAGNOSIS — Z98.890 S/P RIGHT ROTATOR CUFF REPAIR: ICD-10-CM

## 2021-08-09 PROCEDURE — 97112 NEUROMUSCULAR REEDUCATION: CPT | Mod: GP | Performed by: PHYSICAL THERAPIST

## 2021-08-09 PROCEDURE — 97110 THERAPEUTIC EXERCISES: CPT | Mod: GP | Performed by: PHYSICAL THERAPIST

## 2021-08-13 ENCOUNTER — OFFICE VISIT (OUTPATIENT)
Dept: OBGYN | Facility: OTHER | Age: 56
End: 2021-08-13
Payer: COMMERCIAL

## 2021-08-13 VITALS — SYSTOLIC BLOOD PRESSURE: 132 MMHG | DIASTOLIC BLOOD PRESSURE: 86 MMHG | BODY MASS INDEX: 26.09 KG/M2 | WEIGHT: 152 LBS

## 2021-08-13 DIAGNOSIS — R87.810 CERVICAL HIGH RISK HPV (HUMAN PAPILLOMAVIRUS) TEST POSITIVE: Primary | ICD-10-CM

## 2021-08-13 DIAGNOSIS — N81.4 UTERINE PROLAPSE: ICD-10-CM

## 2021-08-13 PROCEDURE — 88305 TISSUE EXAM BY PATHOLOGIST: CPT | Performed by: PATHOLOGY

## 2021-08-13 PROCEDURE — 57455 BIOPSY OF CERVIX W/SCOPE: CPT | Performed by: OBSTETRICS & GYNECOLOGY
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
## 2021-08-17 LAB
PATH REPORT.COMMENTS IMP SPEC: NORMAL
PATH REPORT.COMMENTS IMP SPEC: NORMAL
PATH REPORT.FINAL DX SPEC: NORMAL
PATH REPORT.GROSS SPEC: NORMAL
PATH REPORT.MICROSCOPIC SPEC OTHER STN: NORMAL
PATH REPORT.RELEVANT HX SPEC: NORMAL
PHOTO IMAGE: NORMAL

## 2021-09-17 ENCOUNTER — OFFICE VISIT (OUTPATIENT)
Dept: OBGYN | Facility: OTHER | Age: 56
End: 2021-09-17
Payer: COMMERCIAL

## 2021-09-17 VITALS — DIASTOLIC BLOOD PRESSURE: 78 MMHG | WEIGHT: 155 LBS | BODY MASS INDEX: 26.61 KG/M2 | SYSTOLIC BLOOD PRESSURE: 122 MMHG

## 2021-09-17 DIAGNOSIS — N81.4 UTERINE PROLAPSE: Primary | ICD-10-CM

## 2021-09-17 PROCEDURE — A4561 PESSARY RUBBER, ANY TYPE: HCPCS | Performed by: OBSTETRICS & GYNECOLOGY

## 2021-09-17 PROCEDURE — 99212 OFFICE O/P EST SF 10 MIN: CPT | Mod: TEL | Performed by: OBSTETRICS & GYNECOLOGY

## 2021-09-17 PROCEDURE — 57160 INSERT PESSARY/OTHER DEVICE: CPT | Performed by: OBSTETRICS & GYNECOLOGY

## 2021-09-17 NOTE — PROGRESS NOTES
Subjective  56 year old non-pregnant female presents today complaining of vaginal prolapse.  I saw patient for a colpo in August.  She was found to have significant uterine prolapse-grade 3.  She was inquiring then about options.  We discussed surgery vs pessary then.  She is here today for a pessary trial.  We discussed this in detail.  We discussed other options as well.  She is wanting the pessary.        ROS: 10 point ROS neg other than the symptoms noted above in the HPI.  Past Medical History:   Diagnosis Date     ALLERGIC RHINITIS NOS 08/08/2002     Arthritis of right acromioclavicular joint 2/11/2021     Cervical high risk HPV (human papillomavirus) test positive 10/20/2020     CLASS MIGRAIN W/O MENTN INTRACTABLE 12/15/2004     Dysmenorrhea 03/13/2007     Headache      Headache      Headache      History of rabies vaccination      Lesion of plantar nerve     Buckley's neuroma/metatarsalgia     NONSPECIFIC MEDICAL HISTORY     pseudocholinesterase deficiency     Premenstrual tension syndrome      Tension headache      Tobacco use disorder      UTERINE LEIOMYOMA NOS 04/26/2007     Past Surgical History:   Procedure Laterality Date     ARTHROTOMY SHOULDER, ROTATOR CUFF REPAIR, COMBINED Right 3/12/2021    Procedure: ARTHROTOMY, SHOULDER ([facility]), WITH ROTATOR CUFF REPAIR open;  Surgeon: César Talavera MD;  Location: [facility] OR     C LIGATE FALLOPIAN TUBE,POSTPARTUM  1990    Tubal Ligation     C SUPRACERV ABD HYSTERECTOMY  06/28/2007     COLONOSCOPY  04/14/10     [facility] NONSPECIFIC PROCEDURE  1978    jaw surgery     Family History   Problem Relation Age of Onset     Neurologic Disorder Mother         Meniere's disease     Anesthesia Reaction Mother         And myself     Osteoporosis Mother      Thyroid Disease Mother         hypo     Heart Disease Maternal Grandmother         stroke     Arthritis Maternal Grandmother         osteoporosis     Diabetes Maternal Grandmother         Type II diabetes     Coronary  Artery Disease Maternal Grandmother 40     Cerebrovascular Disease Maternal Grandmother 40     Osteoporosis Maternal Grandmother      Heart Disease Maternal Grandfather         heart problems, s/p CAB     Cancer Maternal Grandfather         prostate      Diabetes Maternal Grandfather         Type II diabetes     Prostate Cancer Maternal Grandfather      Hypertension Father      Hyperlipidemia Father      Heart Disease Paternal Grandmother      Anesthesia Reaction Brother      Anesthesia Reaction Brother      Social History     Tobacco Use     Smoking status: Former Smoker     Packs/day: 0.50     Years: 27.00     Pack years: 13.50     Types: Cigarettes     Quit date: 2007     Years since quittin.8     Smokeless tobacco: Never Used     Tobacco comment: 3-4 cig/day   Substance Use Topics     Alcohol use: Yes     Alcohol/week: 3.3 standard drinks     Comment: weekends         Objective  Vitals: /78 (BP Location: Left arm, Cuff Size: Adult Regular)   Wt 70.3 kg (155 lb)   LMP 2007 (Approximate)   Breastfeeding No   BMI 26.61 kg/m    BMI= Body mass index is 26.61 kg/m .    General appearance=well developed, well-nourished female  Gait=normal  Psych=mood is stable, alert and oriented x3  PELVIC:    External genitalia: normal without lesions or masses  Urethral meatus: no lesions or prolapse noted, normal size  Urethra: no masses, non tender  Bladder: non tender, no fullness  Vagina: normal mucosa and rugae, no discharge, Grade 3 prolapse  Cervix: normal without lesion, no cervical motion tenderness, healthy, multiparous  Uterus: small, mobile, nontender.  Adnexa: non tender, without masses  Rectal: deffered  Ext=no clubbing or cyanosis, no swelling      Pessary fitting    The appropriate size pessary was fitted with the desired support result.  Pt was very comfortable with the device in place.  Pt was allowed to work thru multiple position changes and to make sure she was able to void prior to  leaving.  With the placement being successful, she will follow up with us in 1-2 weeks. If at any time she has pain, severe bleeding, or is unable to void she is to seek immediate medical attention.  All questions answered.    Size 3 pessary        Assessment  1.)  Uterine prolapse      Plan  1.)  Pessary placement      One undiagnosed new problem with uncertain prognosis and interpretation of ultrasound findings ordered by a different provider.  Nursing notes read and reviewed    Lazara Mora, DO       I called patient and recommended patient take out the pessary that was placed from the pessary kit and bring it in for another one. She will do this and switch them out Monday.

## 2021-09-20 NOTE — PATIENT INSTRUCTIONS
Please call if you any questions.    [address]   [id number]  [phone]        Lazara Mora,

## 2021-09-24 ENCOUNTER — VIRTUAL VISIT (OUTPATIENT)
Dept: OBGYN | Facility: OTHER | Age: 56
End: 2021-09-24
Payer: COMMERCIAL

## 2021-09-24 ENCOUNTER — MYC MEDICAL ADVICE (OUTPATIENT)
Dept: OBGYN | Facility: OTHER | Age: 56
End: 2021-09-24

## 2021-09-24 DIAGNOSIS — N81.4 UTERINE PROLAPSE: Primary | ICD-10-CM

## 2021-09-24 PROCEDURE — A4562 PESSARY, NON RUBBER,ANY TYPE: HCPCS | Performed by: OBSTETRICS & GYNECOLOGY

## 2021-09-24 PROCEDURE — 99212 OFFICE O/P EST SF 10 MIN: CPT | Mod: TEL | Performed by: OBSTETRICS & GYNECOLOGY

## 2021-09-24 NOTE — PROGRESS NOTES
Ophelia is a 56 year old who is being evaluated via a billable telephone visit.      What phone number would you like to be contacted at? [phone]  How would you like to obtain your AVS? Benita Macias  56 year old non-pregnant female presents today for a telephone visit to discuss her pessary.  Patient states the size 3 pessary that she picked up last week works for half of the day but then it falls out.  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  No change in activity level.  No vaginal bleeding.  No problems urinating.  The pessary does fall out when she has a bowel movement.  We discussed trying the next larger size and she is in agreement.  She does not have any problem inserting it and has gotten use to it.        ROS: 10 point ROS neg other than the symptoms noted above in the HPI.  Past Medical History:   Diagnosis Date     ALLERGIC RHINITIS NOS 08/08/2002     Arthritis of right acromioclavicular joint 2/11/2021     Cervical high risk HPV (human papillomavirus) test positive 10/20/2020     CLASS MIGRAIN W/O MENTN INTRACTABLE 12/15/2004     Dysmenorrhea 03/13/2007     Headache      Headache      Headache      History of rabies vaccination      Lesion of plantar nerve     Buckley's neuroma/metatarsalgia     NONSPECIFIC MEDICAL HISTORY     pseudocholinesterase deficiency     Premenstrual tension syndrome      Tension headache      Tobacco use disorder      UTERINE LEIOMYOMA NOS 04/26/2007     Past Surgical History:   Procedure Laterality Date     ARTHROTOMY SHOULDER, ROTATOR CUFF REPAIR, COMBINED Right 3/12/2021    Procedure: ARTHROTOMY, SHOULDER ([facility]), WITH ROTATOR CUFF REPAIR open;  Surgeon: César Talavera MD;  Location: [facility] OR     C LIGATE FALLOPIAN TUBE,POSTPARTUM  1990    Tubal Ligation     C SUPRACERV ABD HYSTERECTOMY  06/28/2007     COLONOSCOPY  04/14/10     [facility] NONSPECIFIC PROCEDURE  1978    jaw surgery     Family History   Problem Relation Age of Onset     Neurologic Disorder Mother          Meniere's disease     Anesthesia Reaction Mother         And myself     Osteoporosis Mother      Thyroid Disease Mother         hypo     Heart Disease Maternal Grandmother         stroke     Arthritis Maternal Grandmother         osteoporosis     Diabetes Maternal Grandmother         Type II diabetes     Coronary Artery Disease Maternal Grandmother 40     Cerebrovascular Disease Maternal Grandmother 40     Osteoporosis Maternal Grandmother      Heart Disease Maternal Grandfather         heart problems, s/p CAB     Cancer Maternal Grandfather         prostate      Diabetes Maternal Grandfather         Type II diabetes     Prostate Cancer Maternal Grandfather      Hypertension Father      Hyperlipidemia Father      Heart Disease Paternal Grandmother      Anesthesia Reaction Brother      Anesthesia Reaction Brother      Social History     Tobacco Use     Smoking status: Former Smoker     Packs/day: 0.50     Years: 27.00     Pack years: 13.50     Types: Cigarettes     Quit date: 2007     Years since quittin.8     Smokeless tobacco: Never Used     Tobacco comment: 3-4 cig/day   Substance Use Topics     Alcohol use: Yes     Alcohol/week: 3.3 standard drinks     Comment: weekends         Objective  Vitals: LMP 2007 (Approximate)   BMI= There is no height or weight on file to calculate BMI.      Assessment  1.)  Uterine prolapse  2.)  Pessary to small      Plan  1.)  Increase pessary size to a 4  2.)  Follow up in 2-3 weeks      Telephone visit: 5min    Nursing notes read and reviewed    Lazara Mora DO

## 2021-09-24 NOTE — TELEPHONE ENCOUNTER
Pt last seen 9/17/2021 for uterine prolapse. Pt was fitted with a size 3 pessary but states it falls out after being in for 1/2 the day.    Pt asking for a different size. Pt will make a f/u appt in 2 weeks, RN routing to provider if pt needs to f/u sooner given her pessary has already fallen out.    Sylvie Alcantara RN on 9/24/2021 at 9:43 AM

## 2021-09-24 NOTE — TELEPHONE ENCOUNTER
Please schedule patient for a telephone visit.  Then we can likely mail one to her.    Lazara Mora, DO

## 2021-10-11 PROBLEM — M25.511 ACUTE PAIN OF RIGHT SHOULDER: Status: RESOLVED | Noted: 2021-04-06 | Resolved: 2021-10-11

## 2021-10-11 PROBLEM — Z98.890 S/P RIGHT ROTATOR CUFF REPAIR: Status: RESOLVED | Noted: 2021-04-06 | Resolved: 2021-10-11

## 2021-10-11 NOTE — PROGRESS NOTES
Discharge Note    Progress reporting period is from last progress note on 07/06/21 to Aug 9, 2021.    Ophelia failed to follow up and current status is unknown.  Please see information below for last relevant information on current status.  Patient seen for 18 visits.    SUBJECTIVE  Subjective changes noted by patient:  she is doing better, her shoulder was a little sore from picking up backpack with R arm while on vacation, sleeping going well, she is using her R arm without having to think about it  .  Current pain level is 0/10.     Previous pain level was  9/10.   Changes in function:  Yes (See Goal flowsheet attached for changes in current functional level)  Adverse reaction to treatment or activity: None    OBJECTIVE  Changes noted in objective findings: AROM:  flex 161, abd 155, ER(0) 65, IR(0) T10,  PROM: flex 165, abd 165, ER(90) 75, IR(90) 35   MMT: shoulder flex 4/5, abd 4/5, ER 4+/5, IR 5/5    ASSESSMENT/PLAN  Diagnosis: s/p R rotator cuff  repair   Updated problem list and treatment plan:   Decreased ROM/flexibility - HEP  Decreased function - HEP  STG/LTGs have been met or progress has been made towards goals:  Yes, please see goal flowsheet for most current information  Assessment of Progress: current status is unknown.    Last current status: Pt is progressing as expected   Self Management Plans:  HEP  I have re-evaluated this patient and find that the nature, scope, duration and intensity of the therapy is appropriate for the medical condition of the patient.  Ophelia continues to require the following intervention to meet STG and LTG's:  HEP.    Recommendations:  Discharge with current home program.  Patient to follow up with MD as needed.    Please refer to the daily flowsheet for treatment today, total treatment time and time spent performing 1:1 timed codes.    Marc Feliciano,PT, DPT, OCS

## 2021-10-15 ENCOUNTER — OFFICE VISIT (OUTPATIENT)
Dept: OBGYN | Facility: OTHER | Age: 56
End: 2021-10-15
Payer: COMMERCIAL

## 2021-10-15 VITALS — WEIGHT: 156 LBS | SYSTOLIC BLOOD PRESSURE: 122 MMHG | BODY MASS INDEX: 26.78 KG/M2 | DIASTOLIC BLOOD PRESSURE: 78 MMHG

## 2021-10-15 DIAGNOSIS — Z46.89 PESSARY MAINTENANCE: Primary | ICD-10-CM

## 2021-10-15 DIAGNOSIS — Z96.0 PRESENCE OF PESSARY: ICD-10-CM

## 2021-10-15 DIAGNOSIS — N81.4 UTERINE PROLAPSE: ICD-10-CM

## 2021-10-15 PROCEDURE — 99214 OFFICE O/P EST MOD 30 MIN: CPT | Performed by: OBSTETRICS & GYNECOLOGY

## 2021-10-17 PROBLEM — Z96.0 PRESENCE OF PESSARY: Status: ACTIVE | Noted: 2021-10-17

## 2021-10-17 NOTE — PROGRESS NOTES
Subjective  56 year old non-pregnant female presents today for a pessary check.  Patient states she is doing well with it.  She is comfortable with taking it out and replacing it by herself.  She denies any pain or vaginal bleeding.  She is using KY to help with placement occasionally.  No problems urinating or having bowel movements with it.   Patient is occasionally sexually active.  No vaginal discharge, itching, or odor.      ROS: 10 point ROS neg other than the symptoms noted above in the HPI.  Past Medical History:   Diagnosis Date     ALLERGIC RHINITIS NOS 08/08/2002     Arthritis of right acromioclavicular joint 2/11/2021     Cervical high risk HPV (human papillomavirus) test positive 10/20/2020     CLASS MIGRAIN W/O MENTN INTRACTABLE 12/15/2004     Dysmenorrhea 03/13/2007     Headache      Headache      Headache      History of rabies vaccination      Lesion of plantar nerve     Buckley's neuroma/metatarsalgia     NONSPECIFIC MEDICAL HISTORY     pseudocholinesterase deficiency     Premenstrual tension syndrome      Tension headache      Tobacco use disorder      UTERINE LEIOMYOMA NOS 04/26/2007     Past Surgical History:   Procedure Laterality Date     ARTHROTOMY SHOULDER, ROTATOR CUFF REPAIR, COMBINED Right 3/12/2021    Procedure: ARTHROTOMY, SHOULDER (anatoly Mcalister), WITH ROTATOR CUFF REPAIR open;  Surgeon: César Talavera MD;  Location: [facility]     C LIGATE FALLOPIAN TUBE,POSTPARTUM  1990    Tubal Ligation     C SUPRACERV ABD HYSTERECTOMY  06/28/2007     COLONOSCOPY  04/14/10     [facility] NONSPECIFIC PROCEDURE  1978    jaw surgery     Family History   Problem Relation Age of Onset     Neurologic Disorder Mother         Meniere's disease     Anesthesia Reaction Mother         And myself     Osteoporosis Mother      Thyroid Disease Mother         hypo     Heart Disease Maternal Grandmother         stroke     Arthritis Maternal Grandmother         osteoporosis     Diabetes Maternal Grandmother         Type II  diabetes     Coronary Artery Disease Maternal Grandmother 40     Cerebrovascular Disease Maternal Grandmother 40     Osteoporosis Maternal Grandmother      Heart Disease Maternal Grandfather         heart problems, s/p CAB     Cancer Maternal Grandfather         prostate      Diabetes Maternal Grandfather         Type II diabetes     Prostate Cancer Maternal Grandfather      Hypertension Father      Hyperlipidemia Father      Heart Disease Paternal Grandmother      Anesthesia Reaction Brother      Anesthesia Reaction Brother      Social History     Tobacco Use     Smoking status: Former Smoker     Packs/day: 0.50     Years: 27.00     Pack years: 13.50     Types: Cigarettes     Quit date: 2007     Years since quittin.8     Smokeless tobacco: Never Used     Tobacco comment: 3-4 cig/day   Substance Use Topics     Alcohol use: Yes     Alcohol/week: 3.3 standard drinks     Comment: weekends         Objective  Vitals: /78 (BP Location: Right arm, Cuff Size: Adult Regular)   Wt 70.8 kg (156 lb)   LMP 2007 (Approximate)   BMI 26.78 kg/m    BMI= Body mass index is 26.78 kg/m .    General appearance=well developed, well-nourished female  Gait=normal  Psych=mood is stable, alert and oriented x3  Abd=soft, Nontender/nondistended, no masses, no signs of hernias, no evidence of hepatosplenomegaly  PELVIC:    External genitalia: normal without lesions or masses  Urethral meatus: no lesions or prolapse noted, normal size  Urethra: no masses, non tender  Bladder: non tender, no fullness  Vagina: normal mucosa and rugae, no discharge, no excoriations or erosions seen, no vaginal bleeding   Cervix: normal without lesion, no cervical motion tenderness, healthy, multiparous  Uterus: small, mobile, nontender, grade 2/3 prolapse  Adnexa: non tender, without masses  Rectal: deffered  Ext=no clubbing or cyanosis, no swelling    Procedure:   I cleaned the pessary with soap and water.  I applied KY to it and replaced  it for her.       Assessment  1.)  Pessary check      Plan  1.)  Continue with pessary  2.)  Follow-up as needed or in a year      25 minutes were spent on the date of the encounter doing chart review, history and exam, documentation, and further activities as noted above.      Nursing notes read and reviewed    Lazara Mora DO

## 2021-10-23 ENCOUNTER — HEALTH MAINTENANCE LETTER (OUTPATIENT)
Age: 56
End: 2021-10-23

## 2021-11-02 ENCOUNTER — MYC MEDICAL ADVICE (OUTPATIENT)
Dept: FAMILY MEDICINE | Facility: OTHER | Age: 56
End: 2021-11-02

## 2021-11-02 DIAGNOSIS — G43.109 MIGRAINE WITH AURA AND WITHOUT STATUS MIGRAINOSUS, NOT INTRACTABLE: ICD-10-CM

## 2021-11-02 RX ORDER — HYDROCODONE BITARTRATE AND ACETAMINOPHEN 5; 325 MG/1; MG/1
1 TABLET ORAL EVERY 6 HOURS PRN
Qty: 6 TABLET | Refills: 0 | Status: SHIPPED | OUTPATIENT
Start: 2021-11-02 | End: 2022-01-26

## 2021-11-02 NOTE — TELEPHONE ENCOUNTER
HYDROcodone-acetaminophen (NORCO) 5-325 MG tablet      Last Written Prescription Date:  08/04/2021  Last Fill Quantity: 6,   # refills: 0  Last Office Visit: 5/18/2021  Future Office visit:       Routing refill request to provider for review/approval because:  Drug not on the [facility], [facility] or [facility] refill protocol or controlled substance    Radha Mejia RN

## 2021-12-13 DIAGNOSIS — R12 HEARTBURN SYMPTOM: ICD-10-CM

## 2021-12-15 RX ORDER — FAMOTIDINE 20 MG/1
TABLET, FILM COATED ORAL
Qty: 180 TABLET | Refills: 0 | Status: SHIPPED | OUTPATIENT
Start: 2021-12-15 | End: 2022-10-06

## 2021-12-15 NOTE — TELEPHONE ENCOUNTER
Pending Prescriptions:                       Disp   Refills    famotidine (PEPCID) 20 MG tablet [Pharmacy*180 ta*1        Sig: TAKE ONE TABLET BY MOUTH TWICE A DAY    Routing refill request to provider for review/approval because:  A break in medication

## 2021-12-18 ENCOUNTER — HEALTH MAINTENANCE LETTER (OUTPATIENT)
Age: 56
End: 2021-12-18

## 2022-01-09 NOTE — TELEPHONE ENCOUNTER
Reason for Call:  Other appointment    Detailed comments: Patient had surgery on Friday 03/12 and later that evening around 8:00pm she developed a headache. She had some headache medicine at home that relieves it for a couple hours but then it comes back again and she still has it today. Wondering if this is normal? Please call patient    Phone Number Patient can be reached at: Home number on file [phone] (home)    Best Time: any    Can we leave a detailed message on this number? YES    Call taken on 3/15/2021 at 8:23 AM by Melonie Buckley     No

## 2022-01-20 ENCOUNTER — VIRTUAL VISIT (OUTPATIENT)
Dept: FAMILY MEDICINE | Facility: OTHER | Age: 57
End: 2022-01-20
Payer: COMMERCIAL

## 2022-01-20 DIAGNOSIS — J01.00 ACUTE NON-RECURRENT MAXILLARY SINUSITIS: Primary | ICD-10-CM

## 2022-01-20 PROCEDURE — 99213 OFFICE O/P EST LOW 20 MIN: CPT | Mod: TEL | Performed by: PHYSICIAN ASSISTANT

## 2022-01-20 RX ORDER — AZITHROMYCIN 250 MG/1
TABLET, FILM COATED ORAL
Qty: 6 TABLET | Refills: 0 | Status: SHIPPED | OUTPATIENT
Start: 2022-01-20 | End: 2022-01-25

## 2022-01-20 NOTE — PROGRESS NOTES
"Ophelia is a 56 year old who is being evaluated via a billable telephone visit.      What phone number would you like to be contacted at? [phone]   How would you like to obtain your AVS? MyChart    Assessment & Plan     Acute non-recurrent maxillary sinusitis  She will continue with Lois pot and over-the-counter meds as needed we will use Zithromax which is her preferred antibiotic  - azithromycin (ZITHROMAX) 250 MG tablet; Take 2 tablets (500 mg) by mouth daily for 1 day, THEN 1 tablet (250 mg) daily for 4 days.             BMI:   Estimated body mass index is 26.78 kg/m  as calculated from the following:    Height as of 5/22/21: 1.626 m (5' 4\").    Weight as of 10/15/21: 70.8 kg (156 lb).   Not discussed at virtual visit        Return in about 1 week (around 1/27/2022), or if symptoms worsen or fail to improve.    Susannah Ray PA-C  [facility]   Ophelia is a 56 year old who presents for the following health issues     HPI     Acute Illness  Acute illness concerns: Sinus   Onset/Duration: Dec 27th, sinus symptoms started after her COVID diagnosis.  Her only symptom of COVID was a very strange back ache.  That is what triggered her to get tested.  She did very well with COVID and feels fully recovered with the exception of her sinus symptoms.  She typically gets a sinus infection at least annually  Symptoms:  Fever: no  Chills/Sweats: no  Headache (location?): YES, her sinus headaches are triggering migraines and she is tired of trying to get rid of the sinus infection with the Lois pot.  Sinus Pressure: YES  Conjunctivitis:  no  Ear Pain: no  Rhinorrhea: YES  Congestion: YES, green drainage  Sore Throat: no  Cough: no  Wheeze: no  Decreased Appetite: no  Nausea: no  Vomiting: no  Diarrhea: no  Dysuria/Freq.: no  Dysuria or Hematuria: no  Fatigue/Achiness: no  Sick/Strep Exposure: no  Therapies tried and outcome: None          Review of Systems   As above      Objective     "       Vitals:  No vitals were obtained today due to virtual visit.    Physical Exam   healthy, alert and no distress  PSYCH: Alert and oriented times 3; coherent speech, normal   rate and volume, able to articulate logical thoughts, able   to abstract reason, no tangential thoughts, no hallucinations   or delusions  Her affect is normal and pleasant  RESP: No cough, no audible wheezing, able to talk in full sentences  Remainder of exam unable to be completed due to telephone visits            Phone call duration: 5 minutes

## 2022-01-24 ENCOUNTER — E-VISIT (OUTPATIENT)
Dept: FAMILY MEDICINE | Facility: OTHER | Age: 57
End: 2022-01-24
Payer: COMMERCIAL

## 2022-01-24 DIAGNOSIS — R22.41 HIP REGION MASS, RIGHT: Primary | ICD-10-CM

## 2022-01-24 PROCEDURE — 99421 OL DIG E/M SVC 5-10 MIN: CPT | Performed by: PHYSICIAN ASSISTANT

## 2022-01-26 DIAGNOSIS — G43.109 MIGRAINE WITH AURA AND WITHOUT STATUS MIGRAINOSUS, NOT INTRACTABLE: ICD-10-CM

## 2022-01-26 RX ORDER — HYDROCODONE BITARTRATE AND ACETAMINOPHEN 5; 325 MG/1; MG/1
TABLET ORAL
Qty: 6 TABLET | Refills: 0 | Status: ON HOLD | OUTPATIENT
Start: 2022-01-26 | End: 2022-04-21

## 2022-02-03 ENCOUNTER — MEDICAL CORRESPONDENCE (OUTPATIENT)
Dept: FAMILY MEDICINE | Facility: OTHER | Age: 57
End: 2022-02-03

## 2022-02-15 DIAGNOSIS — G43.109 MIGRAINE WITH AURA AND WITHOUT STATUS MIGRAINOSUS, NOT INTRACTABLE: ICD-10-CM

## 2022-02-15 RX ORDER — SUMATRIPTAN 100 MG/1
TABLET, FILM COATED ORAL
Qty: 18 TABLET | Status: SHIPPED | OUTPATIENT
Start: 2022-02-15 | End: 2022-12-20

## 2022-02-15 NOTE — TELEPHONE ENCOUNTER
Pending Prescriptions:                       Disp   Refills    SUMAtriptan (IMITREX) 100 MG tablet [Pharm*18 tab*PRN      Sig: TAKE 1 TABLET BY MOUTH AT ONSET OF MIGRAINE    Routing refill request to provider for review/approval because:  A break in medication

## 2022-02-23 ENCOUNTER — HOSPITAL ENCOUNTER (OUTPATIENT)
Dept: MRI IMAGING | Facility: CLINIC | Age: 57
Discharge: HOME | End: 2022-02-23
Attending: PHYSICIAN ASSISTANT | Admitting: PHYSICIAN ASSISTANT
Payer: COMMERCIAL

## 2022-02-23 DIAGNOSIS — R22.41 HIP REGION MASS, RIGHT: ICD-10-CM

## 2022-02-23 LAB — RADIOLOGIST FLAGS: NORMAL

## 2022-02-23 PROCEDURE — A9585 GADOBUTROL INJECTION: HCPCS | Performed by: PHYSICIAN ASSISTANT

## 2022-02-23 PROCEDURE — 72197 MRI PELVIS W/O & W/DYE: CPT

## 2022-02-23 PROCEDURE — 72197 MRI PELVIS W/O & W/DYE: CPT | Mod: 26 | Performed by: RADIOLOGY

## 2022-02-23 PROCEDURE — 255N000002 HC RX 255 OP 636: Performed by: PHYSICIAN ASSISTANT

## 2022-02-23 RX ORDER — GADOBUTROL 604.72 MG/ML
7.5 INJECTION INTRAVENOUS ONCE
Status: COMPLETED | OUTPATIENT
Start: 2022-02-23 | End: 2022-02-23

## 2022-02-23 RX ADMIN — GADOBUTROL 7.5 ML: 604.72 INJECTION INTRAVENOUS at 08:34

## 2022-02-24 ENCOUNTER — TELEPHONE (OUTPATIENT)
Dept: FAMILY MEDICINE | Facility: OTHER | Age: 57
End: 2022-02-24
Payer: COMMERCIAL

## 2022-02-24 DIAGNOSIS — M62.89 MASS OF ILIOPSOAS MUSCLE GROUP: Primary | ICD-10-CM

## 2022-02-24 NOTE — TELEPHONE ENCOUNTER
Called to speak with patient regarding her results.  I will release them to her on my chart and I will order a referral to orthopedic oncology.  Susannah Ray PA-C

## 2022-02-25 NOTE — TELEPHONE ENCOUNTER
DIAGNOSIS: Mass of iliopsoas muscle group/[facility] [place]/HP/MRI/XR   APPOINTMENT DATE: 3.7.22   NOTES STATUS DETAILS   DISCHARGE REPORT from the ER Internal 5.22.21 [facility]   MEDICATION LIST Internal    LABS     CBC/DIFF Internal    MRI Internal 2.23.22 pelvic bones   CT Internal 5.22.21 abdomen pelvis   XRAYS (IMAGES & REPORTS) Internal 9.13.12 pelvis

## 2022-03-07 ENCOUNTER — ANCILLARY PROCEDURE (OUTPATIENT)
Dept: GENERAL RADIOLOGY | Facility: CLINIC | Age: 57
End: 2022-03-07
Attending: ORTHOPAEDIC SURGERY
Payer: COMMERCIAL

## 2022-03-07 ENCOUNTER — OFFICE VISIT (OUTPATIENT)
Dept: ORTHOPEDICS | Facility: CLINIC | Age: 57
End: 2022-03-07
Payer: COMMERCIAL

## 2022-03-07 ENCOUNTER — PRE VISIT (OUTPATIENT)
Dept: ORTHOPEDICS | Facility: CLINIC | Age: 57
End: 2022-03-07
Payer: COMMERCIAL

## 2022-03-07 VITALS — BODY MASS INDEX: 25.61 KG/M2 | WEIGHT: 150 LBS | HEIGHT: 64 IN

## 2022-03-07 DIAGNOSIS — M62.89 MASS OF ILIOPSOAS MUSCLE GROUP: ICD-10-CM

## 2022-03-07 PROCEDURE — 87070 CULTURE OTHR SPECIMN AEROBIC: CPT | Mod: 90 | Performed by: PATHOLOGY

## 2022-03-07 PROCEDURE — 99213 OFFICE O/P EST LOW 20 MIN: CPT | Mod: 25 | Performed by: ORTHOPAEDIC SURGERY

## 2022-03-07 PROCEDURE — 88307 TISSUE EXAM BY PATHOLOGIST: CPT | Mod: TC | Performed by: PHYSICIAN ASSISTANT

## 2022-03-07 PROCEDURE — 73502 X-RAY EXAM HIP UNI 2-3 VIEWS: CPT | Mod: RT | Performed by: RADIOLOGY

## 2022-03-07 PROCEDURE — 88307 TISSUE EXAM BY PATHOLOGIST: CPT | Mod: 26 | Performed by: PATHOLOGY

## 2022-03-07 PROCEDURE — 88305 TISSUE EXAM BY PATHOLOGIST: CPT | Mod: 26 | Performed by: PATHOLOGY

## 2022-03-07 PROCEDURE — 88342 IMHCHEM/IMCYTCHM 1ST ANTB: CPT | Mod: 26 | Performed by: PATHOLOGY

## 2022-03-07 PROCEDURE — 99000 SPECIMEN HANDLING OFFICE-LAB: CPT | Performed by: PATHOLOGY

## 2022-03-07 PROCEDURE — 88305 TISSUE EXAM BY PATHOLOGIST: CPT | Mod: TC | Performed by: ORTHOPAEDIC SURGERY

## 2022-03-07 PROCEDURE — 88341 IMHCHEM/IMCYTCHM EA ADD ANTB: CPT | Mod: 26 | Performed by: PATHOLOGY

## 2022-03-07 PROCEDURE — 88342 IMHCHEM/IMCYTCHM 1ST ANTB: CPT | Mod: TC | Performed by: PHYSICIAN ASSISTANT

## 2022-03-07 PROCEDURE — 88112 CYTOPATH CELL ENHANCE TECH: CPT | Mod: TC | Performed by: ORTHOPAEDIC SURGERY

## 2022-03-07 PROCEDURE — 20206 BIOPSY MUSCLE PERQ NEEDLE: CPT | Mod: RT | Performed by: ORTHOPAEDIC SURGERY

## 2022-03-07 PROCEDURE — 88112 CYTOPATH CELL ENHANCE TECH: CPT | Mod: 26 | Performed by: PATHOLOGY
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
## 2022-03-09 LAB
PATH REPORT.COMMENTS IMP SPEC: NORMAL
PATH REPORT.FINAL DX SPEC: NORMAL
PATH REPORT.GROSS SPEC: NORMAL
PATH REPORT.MICROSCOPIC SPEC OTHER STN: NORMAL
PATH REPORT.RELEVANT HX SPEC: NORMAL

## 2022-03-10 ENCOUNTER — TELEPHONE (OUTPATIENT)
Dept: ORTHOPEDICS | Facility: CLINIC | Age: 57
End: 2022-03-10
Payer: COMMERCIAL

## 2022-03-10 NOTE — RESULT ENCOUNTER NOTE
Dear Ms. Wolf:    Your case was discussed at our tumor board this morning.  I asked our pathologist to do some additional testing.  I will plan on discussing these results with you next week at your follow-up appointment.  Best wishes.      Richard Katz MD  3/10/2022  4:56 PM

## 2022-03-10 NOTE — TELEPHONE ENCOUNTER
Follow up call to Ophelia after message left.   Discussed that Dr. Katz sent her a my chart message, she had not read it, this RN read it to her.  Waiting on further testing on her pathology,  Dr. Katz will discuss further on Monday.    Ophelia verbalized understanding.    Shannon Irby, JEREMYN, RN  RN Care Coordinator, Dr. Katz  [facility] Orthopedic [facility]

## 2022-03-11 LAB
PATH REPORT.ADDENDUM SPEC: NORMAL
PATH REPORT.COMMENTS IMP SPEC: NORMAL
PATH REPORT.FINAL DX SPEC: NORMAL
PATH REPORT.GROSS SPEC: NORMAL
PATH REPORT.MICROSCOPIC SPEC OTHER STN: NORMAL
PHOTO IMAGE: NORMAL

## 2022-03-12 LAB — BACTERIA FLD CULT: NO GROWTH

## 2022-03-14 ENCOUNTER — TELEPHONE (OUTPATIENT)
Dept: ORTHOPEDICS | Facility: CLINIC | Age: 57
End: 2022-03-14

## 2022-03-14 ENCOUNTER — VIRTUAL VISIT (OUTPATIENT)
Dept: ORTHOPEDICS | Facility: CLINIC | Age: 57
End: 2022-03-14
Payer: COMMERCIAL

## 2022-03-14 DIAGNOSIS — D36.10: Primary | ICD-10-CM

## 2022-03-14 PROCEDURE — 99213 OFFICE O/P EST LOW 20 MIN: CPT | Mod: GT | Performed by: ORTHOPAEDIC SURGERY

## 2022-03-14 NOTE — TELEPHONE ENCOUNTER
Follow up call to Ophelia to confirm surgical date of 4/19. COVID test scheduled.  Ophelia will schedule preop h&p with primary MD in [place]. Will mail packet to Ophelia and follow-up after received for further teaching.    JEREMY AguilarN, RN  RN Care Coordinator, Dr. Sterling BAIG [facility]

## 2022-03-14 NOTE — PROGRESS NOTES
[facility] Physicians, Orthopaedic Oncology Surgery Consultation  by Richard Katz M.D.    Ophelia Wolf MRN# [id number]    YOB: 1965     Requesting physician: Susannah Hou     Background history:  DX:  1. Rotator cuff degeneration.  Right shoulder  2. 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 R femoral neuropraxia    TREATMENTS:  1. 3/12/2021 Right rotator cuff repair, [facility]  2. Partial Hysterectomy  3. 3/7/2022, Core needle biopsy , US guided, ([facility]    I had a virtual visit with Ophelia today to review the findings of her core needle biopsy.  I reassured her there is no evidence of malignancy present at the diagnosis of a benign schwannoma has been made.  This is confirmed on immunohistochemical staining pattern.  The imaging is somewhat atypical for a schwannoma and I discussed this with the pathologist it Dr. Morales is quite confident in this diagnosis.  Therefore, given that the patient has experienced symptoms of dysesthesias involving her femoral nerve distribution but not yet any weakness, I have advised proceeding with an excision of the tumor.  This would consist of a excision of a large schwannoma involving her right groin and will require a retroperitoneal and left groin dissection as well as dissection of her femoral artery and vein.  Furthermore involve decompression of the femoral nerve.  I advised the patient that there is the risk of sustaining damage to her femoral nerve and in worst case scenario would involve complete loss in flaccid paralysis of the quadriceps muscle.  While this is unlikely, I do believe  that is quite possible she would have some residual weakness of the femoral nerve due to the large size of the mass and the fact that it is causing dysesthesias already.  She denies any motor weakness at the present time.    I also discussed with her the expected recovery and the need to use crutches or a walker for  1 to 2 weeks depending upon how her femoral nerve recovers afterwards.    She asked about delaying surgery for 3 to 5 months as it is a busy time at her workplace however I do not think it is in her best interest to delay surgery longer than necessary given the dysesthesias that she is already experiencing and possible further damage to her femoral nerve.    She will give this further consideration and discuss this with her workplace supervisor before contacting us with a decision about whether to proceed and when to proceed with the surgical procedure as recommended above.    Proposed procedure:  1.  Excision of large schwannoma of right retroperitoneum and groin  2.  Decompression neuroplasty of right femoral nerve  3.  Vascular dissection of iliac and common femoral artery and vein.    2 hours, tier 3.  23-hour overnight stay.    Richard Katz MD  [facility] Family Professor  Oncology and Adult Reconstructive Surgery  Dept Orthopaedic Surgery, [facility]  [phone] office, [phone] pager  [email or web address]      Surgical Pathology Report                         Case: [id number]                                   Authorizing Provider:  Leodan Hooker PA-C    Collected:           03/07/2022 11:28 AM           Ordering Location:     [facility]          Received:            03/07/2022 12:01 PM                                  Orthopedic Clinic                                                                                    [place]                                                                   Pathologist:           Ashwini Morales MD                                                            Specimen:    Groin, Right                                                                               Addendum   Immunohistochemical stain for SOX-10 is also positive, further supporting the diagnosis of schwannoma.   Addendum electronically signed by Ashwini Morales MD on 3/11/2022 at  8:18 PM  "  Final Diagnosis   Soft tissue, right groin, needle biopsy:  - Schwannoma  - Negative for malignancy   Electronically signed by Ashwini Morales MD on 3/9/2022 at  7:35 PM   Comment  [facility]   Immunohistochemical stains show the tumor is diffusely positive for S100 while CD34 is negative.  EMA shows nonspecific reactivity.  These results support the diagnosis.   Gross Description  [facility]   A(A). Groin, Right, :  The specimen is received in formalin with proper patient identification, labeled \"groin, right\".  The specimen consists of 2 tan-yellow soft tissue cores, 1.2 and 1.5 cm in length by 0.2 cm in diameter.  Wrapped and submitted in A1.       Microscopic Description  [facility]MA[facility]   Microscopic examination is performed.   Performing Labs  [facility]   The technical component of this testing was completed at [facility]                 Specimen Collected: 03/07/22 11:28 AM Last Resulted: 03/11/22  8:18 PM             Virtual-Visit Details    Type of service:  Video/telephone Visit  Video/telephone total duration (including visit time, pre and post visit work time as documented above on the same day of service): 20    Visit start time: 1430  Visit end time:2:51 PM  Originating Location (pt. Location): Home  Distant Location (provider location):  [facility] ORTHOPEDIC [facility]   Platform used for Virtual Visit: YouBeQB        "

## 2022-03-14 NOTE — LETTER
3/14/2022         RE: Ophelia Wolf  [address]  [facility] [address]        Dear Colleague,    Thank you for referring your patient, Ophelia Wolf, to the [facility] ORTHOPEDIC CLINIC [place]. Please see a copy of my visit note below.        [facility] Physicians, Orthopaedic Oncology Surgery Consultation  by Richard Katz M.D.    Ophelia Wolf MRN# [id number]    YOB: 1965     Requesting physician: Susannah Hou     Background history:  DX:  1. Rotator cuff degeneration.  Right shoulder  2. 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 R femoral neuropraxia    TREATMENTS:  1. 3/12/2021 Right rotator cuff repair, [facility]  2. Partial Hysterectomy  3. 3/7/2022, Core needle biopsy , US guided, ([facility]    I had a virtual visit with Ophelia today to review the findings of her core needle biopsy.  I reassured her there is no evidence of malignancy present at the diagnosis of a benign schwannoma has been made.  This is confirmed on immunohistochemical staining pattern.  The imaging is somewhat atypical for a schwannoma and I discussed this with the pathologist it Dr. Morales is quite confident in this diagnosis.  Therefore, given that the patient has experienced symptoms of dysesthesias involving her femoral nerve distribution but not yet any weakness, I have advised proceeding with an excision of the tumor.  This would consist of a excision of a large schwannoma involving her right groin and will require a retroperitoneal and left groin dissection as well as dissection of her femoral artery and vein.  Furthermore involve decompression of the femoral nerve.  I advised the patient that there is the risk of sustaining damage to her femoral nerve and in worst case scenario would involve complete loss in flaccid paralysis of the quadriceps muscle.  While this is unlikely, I do believe  that is quite possible she would have some residual  weakness of the femoral nerve due to the large size of the mass and the fact that it is causing dysesthesias already.  She denies any motor weakness at the present time.    I also discussed with her the expected recovery and the need to use crutches or a walker for 1 to 2 weeks depending upon how her femoral nerve recovers afterwards.    She asked about delaying surgery for 3 to 5 months as it is a busy time at her workplace however I do not think it is in her best interest to delay surgery longer than necessary given the dysesthesias that she is already experiencing and possible further damage to her femoral nerve.    She will give this further consideration and discuss this with her workplace supervisor before contacting us with a decision about whether to proceed and when to proceed with the surgical procedure as recommended above.    Proposed procedure:  1.  Excision of large schwannoma of right retroperitoneum and groin  2.  Decompression neuroplasty of right femoral nerve  3.  Vascular dissection of iliac and common femoral artery and vein.    2 hours, tier 3.  23-hour overnight stay.    Richard Katz MD  [facility] Family Professor  Oncology and Adult Reconstructive Surgery  Dept Orthopaedic Surgery, [facility]  [phone] office, [phone] pager  [email or web address]      Surgical Pathology Report                         Case: [id number]                                   Authorizing Provider:  Leodan Hooker PA-C    Collected:           03/07/2022 11:28 AM           Ordering Location:     [facility]          Received:            03/07/2022 12:01 PM                                  Orthopedic Clinic                                                                                    [place]                                                                   Pathologist:           Ashwini Morales MD                                                            Specimen:    Groin, Right          "                                                                      Addendum   Immunohistochemical stain for SOX-10 is also positive, further supporting the diagnosis of schwannoma.   Addendum electronically signed by Ashwini Morales MD on 3/11/2022 at  8:18 PM   Final Diagnosis   Soft tissue, right groin, needle biopsy:  - Schwannoma  - Negative for malignancy   Electronically signed by Ashwini Morales MD on 3/9/2022 at  7:35 PM   Comment  [facility]   Immunohistochemical stains show the tumor is diffusely positive for S100 while CD34 is negative.  EMA shows nonspecific reactivity.  These results support the diagnosis.   Gross Description  [facility]O   A(A). Groin, Right, :  The specimen is received in formalin with proper patient identification, labeled \"groin, right\".  The specimen consists of 2 tan-yellow soft tissue cores, 1.2 and 1.5 cm in length by 0.2 cm in diameter.  Wrapped and submitted in A1.       Microscopic Description  [facility]MAYO   Microscopic examination is performed.   Performing Labs  [facility]   The technical component of this testing was completed at [facility]                 Specimen Collected: 03/07/22 11:28 AM Last Resulted: 03/11/22  8:18 PM             Virtual-Visit Details    Type of service:  Video/telephone Visit  Video/telephone total duration (including visit time, pre and post visit work time as documented above on the same day of service): 20    Visit start time: 1430  Visit end time:2:51 PM  Originating Location (pt. Location): Home  Distant Location (provider location):  [facility] ORTHOPEDIC [facility]   Platform used for Virtual Visit: Petey      " English

## 2022-03-15 ENCOUNTER — PREP FOR PROCEDURE (OUTPATIENT)
Dept: ORTHOPEDICS | Facility: CLINIC | Age: 57
End: 2022-03-15
Payer: COMMERCIAL

## 2022-03-15 DIAGNOSIS — D36.10: Primary | ICD-10-CM

## 2022-03-15 DIAGNOSIS — Z11.59 ENCOUNTER FOR SCREENING FOR OTHER VIRAL DISEASES: Primary | ICD-10-CM

## 2022-03-25 ENCOUNTER — TELEPHONE (OUTPATIENT)
Dept: ORTHOPEDICS | Facility: CLINIC | Age: 57
End: 2022-03-25
Payer: COMMERCIAL

## 2022-03-25 NOTE — TELEPHONE ENCOUNTER
Ophelia received surgical packet and reviewed contents.  will be present day of surgery. Ophelia aware that based on her surgical plan/recovery, an inpatient stay may be required.  Ophelia denies cardiac or pulm history Denies diabetes. Denies bleeding or clotting disorders.Denies cancer.  Reviewed medications and medications to hold.  Ophelia does have a pseudocholinesterase deficiency. Noted in her alllergies  Ophelia has had previous surgeries including hysterectomy and rotator cuff surgery, and tolerated them well as long as anesthesia is aware and the appropriate medications are used by anesthesia.    Teaching Flowsheet   Relevant Diagnosis: Excision of large schwannoma of Right retroperitoneum and groin     , decompression neuroplasty of Right femoral nerve   , vascular dissection of iliac and common femoral artery and vein     Teaching Topic: Preop Teaching for above     Person(s) involved in teaching:   Patient     Motivation Level:  Asks Questions: Yes  Eager to Learn: Yes  Cooperative: Yes  Receptive (willing/able to accept information): Yes  Any cultural factors/Syn[employer] beliefs that may influence understanding or compliance? No       Patient demonstrates understanding of the following:  Reason for the appointment, diagnosis and treatment plan: Yes  Knowledge of proper use of medications and conditions for which they are ordered (with special attention to potential side effects or drug interactions): Yes  Which situations necessitate calling provider and whom to contact: Yes       Teaching Concerns Addressed: see above    Stoplight Tool discussed:  yes  Patient verbalized understanding:instructed to review        Nutritional needs and diet plan: Yes  Pain management techniques: Yes  Wound Care: Yes  How and/when to access community resources: NA     Instructional Materials Used/Given: Preop Packet and Antiseptic Soap       Time spent with patient: 30 minutes.    JEREMY AguilarN, RN  RN Care Coordinator,  Dr. Katz  [facility]

## 2022-04-06 ENCOUNTER — OFFICE VISIT (OUTPATIENT)
Dept: FAMILY MEDICINE | Facility: OTHER | Age: 57
End: 2022-04-06
Payer: COMMERCIAL

## 2022-04-06 VITALS
OXYGEN SATURATION: 99 % | HEIGHT: 64 IN | WEIGHT: 166.8 LBS | DIASTOLIC BLOOD PRESSURE: 88 MMHG | BODY MASS INDEX: 28.48 KG/M2 | HEART RATE: 76 BPM | TEMPERATURE: 98 F | SYSTOLIC BLOOD PRESSURE: 137 MMHG

## 2022-04-06 DIAGNOSIS — E88.09 PSEUDOCHOLINESTERASE DEFICIENCY: ICD-10-CM

## 2022-04-06 DIAGNOSIS — Z01.818 PRE-OPERATIVE GENERAL PHYSICAL EXAMINATION: Primary | ICD-10-CM

## 2022-04-06 DIAGNOSIS — G43.109 MIGRAINE WITH AURA AND WITHOUT STATUS MIGRAINOSUS, NOT INTRACTABLE: ICD-10-CM

## 2022-04-06 DIAGNOSIS — D36.10 SCHWANNOMA: ICD-10-CM

## 2022-04-06 LAB
ALBUMIN SERPL-MCNC: 4.2 G/DL (ref 3.4–5)
ALP SERPL-CCNC: 88 U/L (ref 40–150)
ALT SERPL W P-5'-P-CCNC: 27 U/L (ref 0–50)
AMPHETAMINES UR QL: NOT DETECTED
ANION GAP SERPL CALCULATED.3IONS-SCNC: 4 MMOL/L (ref 3–14)
AST SERPL W P-5'-P-CCNC: 18 U/L (ref 0–45)
BARBITURATES UR QL SCN: NOT DETECTED
BENZODIAZ UR QL SCN: NOT DETECTED
BILIRUB SERPL-MCNC: 0.4 MG/DL (ref 0.2–1.3)
BUN SERPL-MCNC: 13 MG/DL (ref 7–30)
BUPRENORPHINE UR QL: NOT DETECTED
CALCIUM SERPL-MCNC: 9.9 MG/DL (ref 8.5–10.1)
CANNABINOIDS UR QL: NOT DETECTED
CHLORIDE BLD-SCNC: 102 MMOL/L (ref 94–109)
CO2 SERPL-SCNC: 31 MMOL/L (ref 20–32)
COCAINE UR QL SCN: NOT DETECTED
CREAT SERPL-MCNC: 0.68 MG/DL (ref 0.52–1.04)
D-METHAMPHET UR QL: NOT DETECTED
ERYTHROCYTE [DISTWIDTH] IN BLOOD BY AUTOMATED COUNT: 13.1 % (ref 10–15)
GFR SERPL CREATININE-BSD FRML MDRD: >90 ML/MIN/1.73M2
GLUCOSE BLD-MCNC: 104 MG/DL (ref 70–99)
HCT VFR BLD AUTO: 43.1 % (ref 35–47)
HGB BLD-MCNC: 14.3 G/DL (ref 11.7–15.7)
MCH RBC QN AUTO: 31.9 PG (ref 26.5–33)
MCHC RBC AUTO-ENTMCNC: 33.2 G/DL (ref 31.5–36.5)
MCV RBC AUTO: 96 FL (ref 78–100)
METHADONE UR QL SCN: NOT DETECTED
OPIATES UR QL SCN: NOT DETECTED
OXYCODONE UR QL SCN: NOT DETECTED
PCP UR QL SCN: NOT DETECTED
PLATELET # BLD AUTO: 233 10E3/UL (ref 150–450)
POTASSIUM BLD-SCNC: 3.9 MMOL/L (ref 3.4–5.3)
PROPOXYPH UR QL: NOT DETECTED
PROT SERPL-MCNC: 7.6 G/DL (ref 6.8–8.8)
RBC # BLD AUTO: 4.48 10E6/UL (ref 3.8–5.2)
SODIUM SERPL-SCNC: 137 MMOL/L (ref 133–144)
TRICYCLICS UR QL SCN: NOT DETECTED
WBC # BLD AUTO: 5.1 10E3/UL (ref 4–11)

## 2022-04-06 PROCEDURE — 85027 COMPLETE CBC AUTOMATED: CPT | Performed by: PHYSICIAN ASSISTANT

## 2022-04-06 PROCEDURE — 36415 COLL VENOUS BLD VENIPUNCTURE: CPT | Performed by: PHYSICIAN ASSISTANT

## 2022-04-06 PROCEDURE — 80306 DRUG TEST PRSMV INSTRMNT: CPT | Performed by: PHYSICIAN ASSISTANT

## 2022-04-06 PROCEDURE — 80053 COMPREHEN METABOLIC PANEL: CPT | Performed by: PHYSICIAN ASSISTANT

## 2022-04-06 PROCEDURE — 99214 OFFICE O/P EST MOD 30 MIN: CPT | Performed by: PHYSICIAN ASSISTANT

## 2022-04-06 ASSESSMENT — PAIN SCALES - GENERAL: PAINLEVEL: NO PAIN (0)

## 2022-04-06 NOTE — PROGRESS NOTES
{PROVIDER CHARTING PREFERENCE:696849}    Gilberto Jacinto is a 56 year old who presents for the following health issues {ACCOMPANIED BY STATEMENT (Optional):799798}    HPI     {SUPERLIST (Optional):042757}  {additonal problems for provider to add (Optional):033542}    Review of Systems   {ROS COMP (Optional):580728}      Objective    LMP 06/16/2007 (Approximate)   There is no height or weight on file to calculate BMI.  Physical Exam   {Exam List (Optional):412097}    {Diagnostic Test Results (Optional):055915}    {AMBULATORY ATTESTATION (Optional):118453}

## 2022-04-06 NOTE — PROGRESS NOTES
SURGERY PLAN (PRE-OP PLAN)    BRIEF:  Ancef// Supine on regular table abdomen and RLE prepped into field// Excision right illiopsoas schwannoma    Background history:  DX:  1. Rotator cuff degeneration.  Right shoulder  2. 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 R femoral neuropraxia     TREATMENTS:  1. 3/12/2021 Right rotator cuff repair, [facility]  2. Partial Hysterectomy  3. 3/7/2022, Core needle biopsy , US guided, ([facility]) [facility]     I had a virtual visit with Ophelia today to review the findings of her core needle biopsy.  I reassured her there is no evidence of malignancy present at the diagnosis of a benign schwannoma has been made.  This is confirmed on immunohistochemical staining pattern.  The imaging is somewhat atypical for a schwannoma and I discussed this with the pathologist it Dr. Morales is quite confident in this diagnosis.  Therefore, given that the patient has experienced symptoms of dysesthesias involving her femoral nerve distribution but not yet any weakness, I have advised proceeding with an excision of the tumor.  This would consist of a excision of a large schwannoma involving her right groin and will require a retroperitoneal and left groin dissection as well as dissection of her femoral artery and vein.  Furthermore involve decompression of the femoral nerve.  I advised the patient that there is the risk of sustaining damage to her femoral nerve and in worst case scenario would involve complete loss in flaccid paralysis of the quadriceps muscle.  While this is unlikely, I do believe  that is quite possible she would have some residual weakness of the femoral nerve due to the large size of the mass and the fact that it is causing dysesthesias already.  She denies any motor weakness at the present time.     I also discussed with her the expected recovery and the need to use crutches or a walker for 1 to 2 weeks depending upon how her femoral nerve recovers  afterwards.    Proposed procedure:  1.  Excision of large schwannoma of right retroperitoneum and groin  2.  Decompression neuroplasty of right femoral nerve  3.  Vascular dissection of iliac and common femoral artery and vein.          Patient Position (indicated by x):  x  Supine     Supine with torso rolled up on a bump     Floppy lateral on torso length bean bag     Lateral decubitus, bean bag, full length     Lateral decubitus, Wixson hip positioner     Safety paddle side supports x 2 clamped to side rail     Lithotomy, both legs in yellow padded leg vu     Lithotomy, single leg in yellow padded leg vu     Prone on blanket rolls/round gel pad     Prone on Sean (arched) frame on Stepan table     Single thigh in orange arthroscopy clamp     Beach chair semi recumbent     Spider limb positioner     Arm out on radiolucent arm table   x  Split drape with top bar, impervious and cloth stockinet, coban     Revision SP drape with plastic side bags for leg     Extremity drape     Shoulder pack drape     Laparotomy drape     Farmer catheter          General Equipment Requests (indicated by x):      C-Arm with C-Armor drape     C-Arm (video capable, [email or web address] 9900 model)     O-Arm with Stealth imaging     Fracture Table   x  Regular table     SurgiGraphic 6000 (diving board) fluoro table     Cell saver     Katz Biopsy trephine set w/ K-wire & pituitary rongeurs     Small pituitary rongeur     Sterling's angled curettes, narrow shaft     Bone graft, kapner gouges     Midas Kvng Medtronic shiva, electric motor   x  2-0, 3-0 silk ties, extra hemostats for passing   x  red and blue clips   x  bipolar cautery   x  checkpoint nerve stimulator   x  Vascular ultrasound   x  Blunt Pelvic Retractor (.55, Blunt Hohmann with  slight bend), malleable retractors     (1) Portable hand held radiation detector machine for sentinel node biopsy and (2) Lymphazurin     [place]otte Osteotomes     Specimens and cultures (indicated by x):      Tissue cultures, aerobic and anaerobic without gram stain     Frozen section     pathology specimens - fresh   x  pathology specimens - formalin       Rick Stroud DO  Adult Joint Reconstruction Fellow  Dept Orthopaedic Surgery, [facility]

## 2022-04-06 NOTE — PROGRESS NOTES
[address]  [facility] [address]  Phone: [phone]  Primary Provider: Susannah Ray    PREOPERATIVE EVALUATION:  Today's date: 4/6/2022    Ophelia Wolf is a 56 year old female who presents for a preoperative evaluation.    Surgical Information:  Surgery/Procedure:   Excision of large schwannoma of Right retroperitoneum and groin   Right General   decompression neuroplasty of Right femoral nerve   Right General   vascular dissection of iliac and common femoral artery and vein         Surgery Location: [facility]  Surgeon: Dr. Katz  Surgery Date: 04/19/2022  Time of Surgery: TBD  Where patient plans to recover: At home with family  Fax number for surgical facility: Note does not need to be faxed, will be available electronically in Epic.    Type of Anesthesia Anticipated: General    Assessment & Plan     The proposed surgical procedure is considered INTERMEDIATE risk.    Pre-operative general physical examination  56 year old female with schwannoma causing femoral nerve dysesthesias     Schwannoma  Scheduled for procedure as above    Pseudocholinesterase deficiency  Pt reports doing well with anesthesia she had with her shoulder surgery 3-12-21    Migraine with aura and without status migrainosus, not intractable  Stable, a bit more frequent currently due to increased stress  - CBC with platelets; Future  - Comprehensive metabolic panel; Future  - Drug Abuse Screen Panel 13, Urine (Pain Care Package) - lab collect; Future  - CBC with platelets  - Comprehensive metabolic panel  - Drug Abuse Screen Panel 13, Urine (Pain Care Package) - lab collect      Risks and Recommendations:  The patient has the following additional risks and recommendations for perioperative complications:   - No identified additional risk factors other than previously addressed   - pseudocholinesterase deficiency     Medication Instructions:  Patient is to take all scheduled  medications on the day of surgery EXCEPT for modifications listed below:   - aspirin: Discontinue aspirin 7-10 days prior to procedure to reduce bleeding risk. It should be resumed postoperatively.   She does NOT need to resume daily ASA after procedure for primary prevention    RECOMMENDATION:  APPROVAL GIVEN to proceed with proposed procedure, without further diagnostic evaluation.    Subjective     HPI related to upcoming procedure: She has had a history of right femoral nerve dysesthesias for many many years.  She always attributed it to having a hysterectomy.  She had an incidental finding of a mass on an abdominal CT scan in May 2021 indicating a mass on her iliopsoas muscle.  Her symptoms have continued and she is scheduled for an excision.    Preop Questions 4/4/2022   1. Have you ever had a heart attack or stroke? No   2. Have you ever had surgery on your heart or blood vessels, such as a stent placement, a coronary artery bypass, or surgery on an artery in your head, neck, heart, or legs? No, she has jaw surgery in 6th grade but no other vascular surgery   3. Do you have chest pain with activity? No   4. Do you have a history of  heart failure? No   5. Do you currently have a cold, bronchitis or symptoms of other infection? No   6. Do you have a cough, shortness of breath, or wheezing? No   7. Do you or anyone in your family have previous history of blood clots? No   8. Do you or does anyone in your family have a serious bleeding problem such as prolonged bleeding following surgeries or cuts? No   9. Have you ever had problems with anemia or been told to take iron pills? No   10. Have you had any abnormal blood loss such as black, tarry or bloody stools, or abnormal vaginal bleeding? No   11. Have you ever had a blood transfusion? No   12. Are you willing to have a blood transfusion if it is medically needed before, during, or after your surgery? Yes   13. Have you or any of your relatives ever had  problems with anesthesia? **YES - difficulty waking up with anesthesia, has pseudocholinesterase deficiency .  Her brother has this also he stopped breathing with anesthesia as a child.  All her family have been tested and many family members have it as well. **   14. Do you have sleep apnea, excessive snoring or daytime drowsiness? No   15. Do you have any artifical heart valves or other implanted medical devices like a pacemaker, defibrillator, or continuous glucose monitor? No   16. Do you have artificial joints? No   17. Are you allergic to latex? No   18. Is there any chance that you may be pregnant? No       Health Care Directive:  Patient does not have a Health Care Directive or Living Will: Patient states has Advance Directive and will bring in a copy to clinic.    Preoperative Review of :   reviewed - controlled substances reflected in medication list.      Status of Chronic Conditions:  See problem list for active medical problems.  Problems all longstanding and stable, except as noted/documented.  See ROS for pertinent symptoms related to these conditions.      Review of Systems  CONSTITUTIONAL: NEGATIVE for fever, chills, change in weight  EYES: NEGATIVE for vision changes or irritation  ENT/MOUTH: NEGATIVE for ear, mouth and throat problems  RESP: NEGATIVE for significant cough or SOB  CV: NEGATIVE for chest pain, palpitations or peripheral edema  GI: NEGATIVE for nausea, abdominal pain, heartburn, or change in bowel habits  : NEGATIVE for frequency, dysuria, or hematuria  MUSCULOSKELETAL:Schwannoma, right femoral nerve dysesthesias  NEURO: Dysesthesias as above, she also has a history of migraine headaches which typically are very stable though she has had a few more frequent headaches due to stress in her life  ENDOCRINE: NEGATIVE for temperature intolerance, skin/hair changes  HEME: NEGATIVE for bleeding problems  PSYCHIATRIC: NEGATIVE for changes in mood or affect    Patient Active Problem  List    Diagnosis Date Noted     Presence of pessary 10/17/2021     Priority: Medium     Uterine prolapse 08/13/2021     Priority: Medium     S/P complete repair of rotator cuff 03/25/2021     Priority: Medium     Pseudocholinesterase deficiency 03/08/2021     Priority: Medium     Traumatic complete tear of right rotator cuff 02/11/2021     Priority: Medium     Arthritis of right acromioclavicular joint 02/11/2021     Priority: Medium     Traumatic complete tear of right rotator cuff, initial encounter 02/11/2021     Priority: Medium     Added automatically from request for surgery [id number]       Cervical high risk HPV (human papillomavirus) test positive 10/20/2020     Priority: Medium     2004, 2007 NIL paps.  2007 Supracervical hysterectomy  2015 NIL pap, Neg HPV  10/20/20 NIL pap, + HR HPV 18. Plan colp due bef 1/20/21.  10/27/20 LM for pt to call us back.  11/2/20 LM for pt to call us back.   11/9/20 Result letter sent to [employer].   11/10/20 Pt notified by phone - [place].   12/18/20 Reminder (2mo). [employer] - patient read  1/20/21 [place] not done. Tracking updated for 6 mo colp/pap due 4/20/21.   4/7/21 Reminder (6mo) [employer]- Pt read.  5/6/21 Provider does not want pt lost to follow up yet. Will push tracker out another month.  6/7/21 Lost to follow-up for pap tracking         Heartburn symptom 09/13/2012     Priority: Medium     Abdominal bloating 12/06/2011     Priority: Medium     Chronic abdominal pain 12/06/2011     Priority: Medium     CARDIOVASCULAR SCREENING; LDL GOAL LESS THAN 160 10/31/2010     Priority: Medium     Disturbance of skin sensation 05/06/2008     Priority: Medium     pseudocholinesterase deficiency      Priority: Medium     pseudocholinesterase deficiency       Leiomyoma of uterus 04/26/2007     Priority: Medium     Problem list name updated by automated process. Provider to review       Constipation 03/13/2007     Priority: Medium     Problem list name updated by automated process.  Provider to review       Dysmenorrhea 03/13/2007     Priority: Medium     Migraine with aura 12/15/2004     Priority: Medium         Patient is followed by [facility] for ongoing prescription of narcotic pain medicine.  Med: hydrocodone acetaminophen 5/325 .   Maximum use per month: 30 per 3 months, changed plans-- 4 for 60 -90 days August 11, 2016   Expected duration: chronic  Narcotic agreement on file: YES  Clinic visit recommended: Q 6  months      Problem list name updated by automated process. Provider to review         Allergic rhinitis 08/08/2002     Priority: Medium     Problem list name updated by automated process. Provider to review       Headache      Priority: Medium     likely migraine etiology  Problem list name updated by automated process. Provider to review       Premenstrual tension syndrome      Priority: Medium     bloating, irritability, breast tenderness  Problem list name updated by automated process. Provider to review        Past Medical History:   Diagnosis Date     ALLERGIC RHINITIS NOS 08/08/2002     Arthritis of right acromioclavicular joint 2/11/2021     Cervical high risk HPV (human papillomavirus) test positive 10/20/2020     CLASS MIGRAIN W/O MENTN INTRACTABLE 12/15/2004     Dysmenorrhea 03/13/2007     Headache      Headache      Headache      History of rabies vaccination      Lesion of plantar nerve     Buckley's neuroma/metatarsalgia     NONSPECIFIC MEDICAL HISTORY     pseudocholinesterase deficiency     Premenstrual tension syndrome      Tension headache      Tobacco use disorder      UTERINE LEIOMYOMA NOS 04/26/2007     Past Surgical History:   Procedure Laterality Date     ARTHROTOMY SHOULDER, ROTATOR CUFF REPAIR, COMBINED Right 3/12/2021    Procedure: ARTHROTOMY, SHOULDER (anatoly marmolejo), WITH ROTATOR CUFF REPAIR open;  Surgeon: César Talavera MD;  Location: [facility] OR     COLONOSCOPY  04/14/10     [facility] LIGATE FALLOPIAN TUBE,POSTPARTUM  1990    Tubal Ligation     [facility]  NONSPECIFIC PROCEDURE      jaw surgery     [facility] SUPRACERV ABD HYSTERECTOMY  2007     Current Outpatient Medications   Medication Sig Dispense Refill     Calcium Carbonate-Vitamin D (CALCIUM + D PO) Take  by mouth 2 times daily.       famotidine (PEPCID) 20 MG tablet TAKE ONE TABLET BY MOUTH TWICE A  tablet 0     HYDROcodone-acetaminophen (NORCO) 5-325 MG tablet TAKE ONE TABLET BY MOUTH EVERY 6 HOURS AS NEEDED FOR PAIN *CAUTION: OPIOID. RISK OF OVERDOSE AND ADDICTION. 6 tablet 0     SUMAtriptan (IMITREX) 100 MG tablet TAKE 1 TABLET BY MOUTH AT ONSET OF MIGRAINE 18 tablet PRN       Allergies   Allergen Reactions     Amoxicillin Difficulty breathing     Anesthetic Ether      pseudocholinesterase deficiency        Social History     Tobacco Use     Smoking status: Former Smoker     Packs/day: 0.50     Years: 27.00     Pack years: 13.50     Types: Cigarettes     Quit date: 2007     Years since quittin.3     Smokeless tobacco: Never Used     Tobacco comment: 3-4 cig/day   Substance Use Topics     Alcohol use: Yes     Alcohol/week: 3.3 standard drinks     Comment: weekends     Family History   Problem Relation Age of Onset     Neurologic Disorder Mother         Meniere's disease     Anesthesia Reaction Mother         And myself     Osteoporosis Mother      Thyroid Disease Mother         hypo     Heart Disease Maternal Grandmother         stroke     Arthritis Maternal Grandmother         osteoporosis     Diabetes Maternal Grandmother         Type II diabetes     Coronary Artery Disease Maternal Grandmother 40     Cerebrovascular Disease Maternal Grandmother 40     Osteoporosis Maternal Grandmother      Heart Disease Maternal Grandfather         heart problems, s/p CAB     Cancer Maternal Grandfather         prostate      Diabetes Maternal Grandfather         Type II diabetes     Prostate Cancer Maternal Grandfather      Hypertension Father      Hyperlipidemia Father      Heart Disease Paternal  "Grandmother      Anesthesia Reaction Brother      Anesthesia Reaction Brother      History   Drug Use No         Objective     /88   Pulse 76   Temp 98  F (36.7  C) (Oral)   Ht 1.622 m (5' 3.86\")   Wt 75.7 kg (166 lb 12.8 oz)   LMP 06/16/2007 (Approximate)   SpO2 99%   BMI 28.76 kg/m      Physical Exam    GENERAL APPEARANCE: healthy, alert and no distress     EYES: EOMI, PERRL     HENT: ear canals and TM's normal and nose and mouth without ulcers or lesions     NECK: no adenopathy, no asymmetry, masses, or scars and thyroid normal to palpation     RESP: lungs clear to auscultation - no rales, rhonchi or wheezes     CV: regular rates and rhythm, normal S1 S2, no S3 or S4 and no murmur, click or rub     ABDOMEN:  soft, nontender, no HSM or masses and bowel sounds normal     MS: extremities normal- no gross deformities noted, no evidence of inflammation in joints, FROM in all extremities.     SKIN: no suspicious lesions or rashes     NEURO: Normal strength and tone, sensory exam grossly normal, mentation intact and speech normal     PSYCH: mentation appears normal. and affect normal/bright     LYMPHATICS: No cervical adenopathy    Recent Labs   Lab Test 05/22/21 2128 12/18/20  0839   HGB 15.2 15.2    206    142   POTASSIUM 4.0 4.5   CR 0.57 0.62        Diagnostics:  Labs pending at this time.  Results will be reviewed when available.  Recent Results (from the past 24 hour(s))   CBC with platelets    Collection Time: 04/06/22 10:49 AM   Result Value Ref Range    WBC Count 5.1 4.0 - 11.0 10e3/uL    RBC Count 4.48 3.80 - 5.20 10e6/uL    Hemoglobin 14.3 11.7 - 15.7 g/dL    Hematocrit 43.1 35.0 - 47.0 %    MCV 96 78 - 100 fL    MCH 31.9 26.5 - 33.0 pg    MCHC 33.2 31.5 - 36.5 g/dL    RDW 13.1 10.0 - 15.0 %    Platelet Count 233 150 - 450 10e3/uL   She has her Covid test scheduled for April 15  No EKG required, no history of coronary heart disease, significant arrhythmia, peripheral arterial " disease or other structural heart disease.    Revised Cardiac Risk Index (RCRI):  The patient has the following serious cardiovascular risks for perioperative complications:   - No serious cardiac risks = 0 points     RCRI Interpretation: 0 points: Class I (very low risk - 0.4% complication rate)           Signed Electronically by: Susannah Ray PA-C  Copy of this evaluation report is provided to requesting physician.

## 2022-04-15 ENCOUNTER — LAB (OUTPATIENT)
Dept: LAB | Facility: OTHER | Age: 57
End: 2022-04-15
Payer: COMMERCIAL

## 2022-04-15 DIAGNOSIS — Z11.59 ENCOUNTER FOR SCREENING FOR OTHER VIRAL DISEASES: ICD-10-CM

## 2022-04-15 PROCEDURE — U0003 INFECTIOUS AGENT DETECTION BY NUCLEIC ACID (DNA OR RNA); SEVERE ACUTE RESPIRATORY SYNDROME CORONAVIRUS 2 (SARS-COV-2) (CORONAVIRUS DISEASE [COVID-19]), AMPLIFIED PROBE TECHNIQUE, MAKING USE OF HIGH THROUGHPUT TECHNOLOGIES AS DESCRIBED BY CMS-2020-01-R: HCPCS

## 2022-04-15 PROCEDURE — U0005 INFEC AGEN DETEC AMPLI PROBE: HCPCS

## 2022-04-16 LAB — SARS-COV-2 RNA RESP QL NAA+PROBE: NEGATIVE

## 2022-04-18 ENCOUNTER — ANESTHESIA EVENT (OUTPATIENT)
Dept: SURGERY | Facility: CLINIC | Age: 57
End: 2022-04-18
Payer: COMMERCIAL

## 2022-04-18 ASSESSMENT — LIFESTYLE VARIABLES: TOBACCO_USE: 1

## 2022-04-18 NOTE — ANESTHESIA PREPROCEDURE EVALUATION
Anesthesia Pre-Procedure Evaluation    Patient: Ophelia Wolf   MRN: [id number] : 1965        Procedure : Procedure(s):  Excision of Large Schwannoma of Right Retroperitoneum and Groin  Decompression Neuroplasty of Right Femoral Nerve  Vascular Dissection of Iliac and Common Femoral Artery and Vein          Past Medical History:   Diagnosis Date     ALLERGIC RHINITIS NOS 2002     Arthritis of right acromioclavicular joint 2021     Cervical high risk HPV (human papillomavirus) test positive 10/20/2020     CLASS MIGRAIN W/O MENTN INTRACTABLE 12/15/2004     Dysmenorrhea 2007     Headache      Headache      Headache      History of rabies vaccination      Lesion of plantar nerve     Buckley's neuroma/metatarsalgia     Malignant hyperthermia      NONSPECIFIC MEDICAL HISTORY     pseudocholinesterase deficiency     Premenstrual tension syndrome      Tension headache      Tobacco use disorder      UTERINE LEIOMYOMA NOS 2007      Past Surgical History:   Procedure Laterality Date     ARTHROTOMY SHOULDER, ROTATOR CUFF REPAIR, COMBINED Right 3/12/2021    Procedure: ARTHROTOMY, SHOULDER ([facility]), WITH ROTATOR CUFF REPAIR open;  Surgeon: César Talavera MD;  Location: [facility]     COLONOSCOPY  04/14/10     Z LIGATE FALLOPIAN TUBE,POSTPARTUM      Tubal Ligation     Z NONSPECIFIC PROCEDURE      jaw surgery     ZZC SUPRACERV ABD HYSTERECTOMY  2007      Allergies   Allergen Reactions     Amoxicillin Difficulty breathing     Anesthetic Ether      pseudocholinesterase deficiency      Social History     Tobacco Use     Smoking status: Former Smoker     Packs/day: 0.50     Years: 27.00     Pack years: 13.50     Types: Cigarettes     Quit date: 2007     Years since quittin.3     Smokeless tobacco: Never Used     Tobacco comment: 3-4 cig/day   Substance Use Topics     Alcohol use: Yes     Alcohol/week: 3.3 standard drinks     Comment: weekends      Wt Readings from Last  1 Encounters:   04/06/22 75.7 kg (166 lb 12.8 oz)        Anesthesia Evaluation   Pt has had prior anesthetic. Type: General and Regional.    History of anesthetic complications   - pseudocholinesterase deficiency.    ROS/MED HX  ENT/Pulmonary:     (+) allergic rhinitis, tobacco use (quit 2007), Past use,     Neurologic: Comment: - tumor- related femoral nerve dysesthesias     (+) migraines,     Cardiovascular:     (+) -----Previous cardiac testing (Coronary CTA (9/21/2015), negative forstenosis or plazue)     METS/Exercise Tolerance:     Hematologic:  - neg hematologic  ROS  (-) anemia and history of blood transfusion   Musculoskeletal: Comment: - S/P R shoulder rotator cuff repair      GI/Hepatic:  - neg GI/hepatic ROS     Renal/Genitourinary:  - neg Renal ROS     Endo:  - neg endo ROS     Psychiatric/Substance Use:  - neg psychiatric ROS     Infectious Disease:  - neg infectious disease ROS     Malignancy: Comment: - Schwannoma of R retroperitoneum and groin      Other:  - neg other ROS             OUTSIDE LABS:  CBC:   Lab Results   Component Value Date    WBC 5.1 04/06/2022    WBC 10.5 05/22/2021    HGB 14.3 04/06/2022    HGB 15.2 05/22/2021    HCT 43.1 04/06/2022    HCT 43.8 05/22/2021     04/06/2022     05/22/2021     BMP:   Lab Results   Component Value Date     04/06/2022     05/22/2021    POTASSIUM 3.9 04/06/2022    POTASSIUM 4.0 05/22/2021    CHLORIDE 102 04/06/2022    CHLORIDE 108 05/22/2021    CO2 31 04/06/2022    CO2 27 05/22/2021    BUN 13 04/06/2022    BUN 10 05/22/2021    CR 0.68 04/06/2022    CR 0.57 05/22/2021     (H) 04/06/2022     (H) 05/22/2021     COAGS: No results found for: PTT, INR, FIBR  POC:   Lab Results   Component Value Date    HCG Negative 06/28/2007    HCGS Negative 05/22/2021     HEPATIC:   Lab Results   Component Value Date    ALBUMIN 4.2 04/06/2022    PROTTOTAL 7.6 04/06/2022    ALT 27 04/06/2022    AST 18 04/06/2022    ALKPHOS 88 04/06/2022     BILITOTAL 0.4 04/06/2022     OTHER:   Lab Results   Component Value Date    A1C 4.8 01/27/2003    LYNNE 9.9 04/06/2022    TSH 1.03 02/04/2015    T4 0.9 01/27/2003    CRP <3.0 05/06/2008    SED 11 12/06/2011       Anesthesia Plan    ASA Status:  2      Anesthesia Type: General.     - Airway: ETT   Induction: Intravenous.      Techniques and Equipment:     - Lines/Monitors: 2nd IV, BIS     Consents         - Patient is DNR/DNI Status: No         Postoperative Care    Pain management: Oral pain medications, IV analgesics.   PONV prophylaxis: Ondansetron (or other 5HT-3), Dexamethasone or Solumedrol     Comments:                Jeison Ace MD

## 2022-04-19 ENCOUNTER — ANESTHESIA (OUTPATIENT)
Dept: SURGERY | Facility: CLINIC | Age: 57
End: 2022-04-19
Payer: COMMERCIAL

## 2022-04-19 ENCOUNTER — HOSPITAL ENCOUNTER (OUTPATIENT)
Facility: CLINIC | Age: 57
Discharge: HOME | End: 2022-04-21
Attending: ORTHOPAEDIC SURGERY | Admitting: ORTHOPAEDIC SURGERY
Payer: COMMERCIAL

## 2022-04-19 DIAGNOSIS — D36.13 SCHWANNOMA OF NERVE OF LOWER EXTREMITY: Primary | ICD-10-CM

## 2022-04-19 LAB
ABO/RH(D): NORMAL
ANION GAP SERPL CALCULATED.3IONS-SCNC: 8 MMOL/L (ref 3–14)
ANTIBODY SCREEN: NEGATIVE
BLD PROD TYP BPU: NORMAL
BLD PROD TYP BPU: NORMAL
BLOOD COMPONENT TYPE: NORMAL
BLOOD COMPONENT TYPE: NORMAL
BUN SERPL-MCNC: 14 MG/DL (ref 7–30)
CALCIUM SERPL-MCNC: 8.5 MG/DL (ref 8.5–10.1)
CHLORIDE BLD-SCNC: 103 MMOL/L (ref 94–109)
CO2 SERPL-SCNC: 24 MMOL/L (ref 20–32)
CODING SYSTEM: NORMAL
CODING SYSTEM: NORMAL
CREAT SERPL-MCNC: 0.66 MG/DL (ref 0.52–1.04)
CROSSMATCH: NORMAL
CROSSMATCH: NORMAL
GFR SERPL CREATININE-BSD FRML MDRD: >90 ML/MIN/1.73M2
GLUCOSE BLD-MCNC: 187 MG/DL (ref 70–99)
GLUCOSE BLDC GLUCOMTR-MCNC: 120 MG/DL (ref 70–99)
HGB BLD-MCNC: 10.8 G/DL (ref 11.7–15.7)
LACTATE SERPL-SCNC: 2.6 MMOL/L (ref 0.7–2)
POTASSIUM BLD-SCNC: 4.1 MMOL/L (ref 3.4–5.3)
SODIUM SERPL-SCNC: 135 MMOL/L (ref 133–144)
SPECIMEN EXPIRATION DATE: NORMAL
UNIT ABO/RH: NORMAL
UNIT ABO/RH: NORMAL
UNIT NUMBER: NORMAL
UNIT NUMBER: NORMAL
UNIT STATUS: NORMAL
UNIT STATUS: NORMAL
UNIT TYPE ISBT: 6200
UNIT TYPE ISBT: 6200

## 2022-04-19 PROCEDURE — 272N000001 HC OR GENERAL SUPPLY STERILE: Performed by: ORTHOPAEDIC SURGERY

## 2022-04-19 PROCEDURE — 250N000011 HC RX IP 250 OP 636: Performed by: STUDENT IN AN ORGANIZED HEALTH CARE EDUCATION/TRAINING PROGRAM

## 2022-04-19 PROCEDURE — 99203 OFFICE O/P NEW LOW 30 MIN: CPT | Performed by: INTERNAL MEDICINE

## 2022-04-19 PROCEDURE — 36415 COLL VENOUS BLD VENIPUNCTURE: CPT | Performed by: NURSE PRACTITIONER

## 2022-04-19 PROCEDURE — 272N000002 HC OR SUPPLY OTHER OPNP: Performed by: ORTHOPAEDIC SURGERY

## 2022-04-19 PROCEDURE — 80048 BASIC METABOLIC PNL TOTAL CA: CPT | Performed by: NURSE PRACTITIONER

## 2022-04-19 PROCEDURE — 88342 IMHCHEM/IMCYTCHM 1ST ANTB: CPT | Mod: 26 | Performed by: PATHOLOGY

## 2022-04-19 PROCEDURE — 64784 REMOVE NERVE LESION: CPT | Mod: 22 | Performed by: ORTHOPAEDIC SURGERY

## 2022-04-19 PROCEDURE — 250N000009 HC RX 250: Performed by: ORTHOPAEDIC SURGERY

## 2022-04-19 PROCEDURE — 88307 TISSUE EXAM BY PATHOLOGIST: CPT | Mod: 26 | Performed by: PATHOLOGY

## 2022-04-19 PROCEDURE — 258N000003 HC RX IP 258 OP 636: Performed by: STUDENT IN AN ORGANIZED HEALTH CARE EDUCATION/TRAINING PROGRAM

## 2022-04-19 PROCEDURE — 258N000003 HC RX IP 258 OP 636: Performed by: NURSE PRACTITIONER

## 2022-04-19 PROCEDURE — 82962 GLUCOSE BLOOD TEST: CPT

## 2022-04-19 PROCEDURE — 370N000017 HC ANESTHESIA TECHNICAL FEE, PER MIN: Performed by: ORTHOPAEDIC SURGERY

## 2022-04-19 PROCEDURE — 86901 BLOOD TYPING SEROLOGIC RH(D): CPT | Performed by: STUDENT IN AN ORGANIZED HEALTH CARE EDUCATION/TRAINING PROGRAM

## 2022-04-19 PROCEDURE — 250N000009 HC RX 250: Performed by: STUDENT IN AN ORGANIZED HEALTH CARE EDUCATION/TRAINING PROGRAM

## 2022-04-19 PROCEDURE — 85018 HEMOGLOBIN: CPT | Performed by: NURSE PRACTITIONER

## 2022-04-19 PROCEDURE — 360N000077 HC SURGERY LEVEL 4, PER MIN: Performed by: ORTHOPAEDIC SURGERY

## 2022-04-19 PROCEDURE — 258N000003 HC RX IP 258 OP 636: Performed by: ORTHOPAEDIC SURGERY

## 2022-04-19 PROCEDURE — 999N000141 HC STATISTIC PRE-PROCEDURE NURSING ASSESSMENT: Performed by: ORTHOPAEDIC SURGERY

## 2022-04-19 PROCEDURE — 83605 ASSAY OF LACTIC ACID: CPT | Performed by: NURSE PRACTITIONER

## 2022-04-19 PROCEDURE — 35703 EXPL N/FLWD SURG LXTR ART: CPT | Mod: 22 | Performed by: ORTHOPAEDIC SURGERY

## 2022-04-19 PROCEDURE — 88307 TISSUE EXAM BY PATHOLOGIST: CPT | Mod: TC | Performed by: ORTHOPAEDIC SURGERY

## 2022-04-19 PROCEDURE — 36415 COLL VENOUS BLD VENIPUNCTURE: CPT | Performed by: STUDENT IN AN ORGANIZED HEALTH CARE EDUCATION/TRAINING PROGRAM

## 2022-04-19 PROCEDURE — 250N000013 HC RX MED GY IP 250 OP 250 PS 637: Performed by: STUDENT IN AN ORGANIZED HEALTH CARE EDUCATION/TRAINING PROGRAM

## 2022-04-19 PROCEDURE — 250N000011 HC RX IP 250 OP 636: Performed by: NURSE ANESTHETIST, CERTIFIED REGISTERED

## 2022-04-19 PROCEDURE — 250N000013 HC RX MED GY IP 250 OP 250 PS 637: Performed by: PHYSICIAN ASSISTANT

## 2022-04-19 PROCEDURE — 710N000010 HC RECOVERY PHASE 1, LEVEL 2, PER MIN: Performed by: ORTHOPAEDIC SURGERY

## 2022-04-19 PROCEDURE — 250N000011 HC RX IP 250 OP 636: Performed by: ANESTHESIOLOGY

## 2022-04-19 PROCEDURE — 86923 COMPATIBILITY TEST ELECTRIC: CPT | Performed by: ORTHOPAEDIC SURGERY

## 2022-04-19 PROCEDURE — 250N000025 HC SEVOFLURANE, PER MIN: Performed by: ORTHOPAEDIC SURGERY

## 2022-04-19 PROCEDURE — 250N000013 HC RX MED GY IP 250 OP 250 PS 637: Performed by: ORTHOPAEDIC SURGERY

## 2022-04-19 PROCEDURE — 250N000011 HC RX IP 250 OP 636: Performed by: ORTHOPAEDIC SURGERY

## 2022-04-19 RX ORDER — CLINDAMYCIN PHOSPHATE 900 MG/50ML
900 INJECTION, SOLUTION INTRAVENOUS EVERY 8 HOURS
Status: COMPLETED | OUTPATIENT
Start: 2022-04-19 | End: 2022-04-20

## 2022-04-19 RX ORDER — NALOXONE HYDROCHLORIDE 0.4 MG/ML
0.4 INJECTION, SOLUTION INTRAMUSCULAR; INTRAVENOUS; SUBCUTANEOUS
Status: DISCONTINUED | OUTPATIENT
Start: 2022-04-19 | End: 2022-04-21 | Stop reason: HOSPADM

## 2022-04-19 RX ORDER — PHENYLEPHRINE HCL IN 0.9% NACL 50MG/250ML
.5-1.25 PLASTIC BAG, INJECTION (ML) INTRAVENOUS CONTINUOUS
Status: DISCONTINUED | OUTPATIENT
Start: 2022-04-19 | End: 2022-04-19 | Stop reason: DRUGHIGH

## 2022-04-19 RX ORDER — MULTIVITAMIN,THERAPEUTIC
1 TABLET ORAL DAILY
COMMUNITY

## 2022-04-19 RX ORDER — GABAPENTIN 100 MG/1
300 CAPSULE ORAL
Status: COMPLETED | OUTPATIENT
Start: 2022-04-19 | End: 2022-04-19

## 2022-04-19 RX ORDER — PROCHLORPERAZINE MALEATE 5 MG
10 TABLET ORAL EVERY 6 HOURS PRN
Status: DISCONTINUED | OUTPATIENT
Start: 2022-04-19 | End: 2022-04-21 | Stop reason: HOSPADM

## 2022-04-19 RX ORDER — FENTANYL CITRATE 50 UG/ML
INJECTION, SOLUTION INTRAMUSCULAR; INTRAVENOUS PRN
Status: DISCONTINUED | OUTPATIENT
Start: 2022-04-19 | End: 2022-04-19

## 2022-04-19 RX ORDER — ACETAMINOPHEN 325 MG/1
975 TABLET ORAL ONCE
Status: COMPLETED | OUTPATIENT
Start: 2022-04-19 | End: 2022-04-19

## 2022-04-19 RX ORDER — HYDROMORPHONE HCL IN WATER/PF 6 MG/30 ML
0.2 PATIENT CONTROLLED ANALGESIA SYRINGE INTRAVENOUS
Status: DISCONTINUED | OUTPATIENT
Start: 2022-04-19 | End: 2022-04-21 | Stop reason: HOSPADM

## 2022-04-19 RX ORDER — CEFAZOLIN SODIUM 2 G/100ML
INJECTION, SOLUTION INTRAVENOUS PRN
Status: DISCONTINUED | OUTPATIENT
Start: 2022-04-19 | End: 2022-04-19

## 2022-04-19 RX ORDER — LIDOCAINE 40 MG/G
CREAM TOPICAL
Status: DISCONTINUED | OUTPATIENT
Start: 2022-04-19 | End: 2022-04-21 | Stop reason: HOSPADM

## 2022-04-19 RX ORDER — METOPROLOL TARTRATE 1 MG/ML
1-2 INJECTION, SOLUTION INTRAVENOUS EVERY 5 MIN PRN
Status: DISCONTINUED | OUTPATIENT
Start: 2022-04-19 | End: 2022-04-19 | Stop reason: HOSPADM

## 2022-04-19 RX ORDER — PROPOFOL 10 MG/ML
INJECTION, EMULSION INTRAVENOUS PRN
Status: DISCONTINUED | OUTPATIENT
Start: 2022-04-19 | End: 2022-04-19

## 2022-04-19 RX ORDER — CLINDAMYCIN PHOSPHATE 900 MG/50ML
900 INJECTION, SOLUTION INTRAVENOUS SEE ADMIN INSTRUCTIONS
Status: DISCONTINUED | OUTPATIENT
Start: 2022-04-19 | End: 2022-04-19 | Stop reason: HOSPADM

## 2022-04-19 RX ORDER — LIDOCAINE HYDROCHLORIDE 20 MG/ML
INJECTION, SOLUTION INFILTRATION; PERINEURAL PRN
Status: DISCONTINUED | OUTPATIENT
Start: 2022-04-19 | End: 2022-04-19

## 2022-04-19 RX ORDER — ONDANSETRON 2 MG/ML
INJECTION INTRAMUSCULAR; INTRAVENOUS PRN
Status: DISCONTINUED | OUTPATIENT
Start: 2022-04-19 | End: 2022-04-19

## 2022-04-19 RX ORDER — LIDOCAINE 40 MG/G
CREAM TOPICAL
Status: DISCONTINUED | OUTPATIENT
Start: 2022-04-19 | End: 2022-04-19 | Stop reason: HOSPADM

## 2022-04-19 RX ORDER — OXYCODONE HYDROCHLORIDE 5 MG/1
5 TABLET ORAL EVERY 4 HOURS PRN
Status: DISCONTINUED | OUTPATIENT
Start: 2022-04-19 | End: 2022-04-19 | Stop reason: HOSPADM

## 2022-04-19 RX ORDER — AMOXICILLIN 250 MG
1 CAPSULE ORAL 2 TIMES DAILY
Status: DISCONTINUED | OUTPATIENT
Start: 2022-04-19 | End: 2022-04-21 | Stop reason: HOSPADM

## 2022-04-19 RX ORDER — ONDANSETRON 4 MG/1
4 TABLET, ORALLY DISINTEGRATING ORAL EVERY 6 HOURS PRN
Status: DISCONTINUED | OUTPATIENT
Start: 2022-04-19 | End: 2022-04-21 | Stop reason: HOSPADM

## 2022-04-19 RX ORDER — SODIUM CHLORIDE, SODIUM LACTATE, POTASSIUM CHLORIDE, CALCIUM CHLORIDE 600; 310; 30; 20 MG/100ML; MG/100ML; MG/100ML; MG/100ML
INJECTION, SOLUTION INTRAVENOUS CONTINUOUS
Status: DISCONTINUED | OUTPATIENT
Start: 2022-04-19 | End: 2022-04-21 | Stop reason: HOSPADM

## 2022-04-19 RX ORDER — SODIUM CHLORIDE, SODIUM LACTATE, POTASSIUM CHLORIDE, CALCIUM CHLORIDE 600; 310; 30; 20 MG/100ML; MG/100ML; MG/100ML; MG/100ML
INJECTION, SOLUTION INTRAVENOUS CONTINUOUS
Status: DISCONTINUED | OUTPATIENT
Start: 2022-04-19 | End: 2022-04-19 | Stop reason: HOSPADM

## 2022-04-19 RX ORDER — SODIUM CHLORIDE, SODIUM LACTATE, POTASSIUM CHLORIDE, CALCIUM CHLORIDE 600; 310; 30; 20 MG/100ML; MG/100ML; MG/100ML; MG/100ML
INJECTION, SOLUTION INTRAVENOUS CONTINUOUS PRN
Status: DISCONTINUED | OUTPATIENT
Start: 2022-04-19 | End: 2022-04-19

## 2022-04-19 RX ORDER — KETOROLAC TROMETHAMINE 30 MG/ML
INJECTION, SOLUTION INTRAMUSCULAR; INTRAVENOUS PRN
Status: DISCONTINUED | OUTPATIENT
Start: 2022-04-19 | End: 2022-04-19

## 2022-04-19 RX ORDER — HYDROMORPHONE HCL IN WATER/PF 6 MG/30 ML
0.2 PATIENT CONTROLLED ANALGESIA SYRINGE INTRAVENOUS EVERY 5 MIN PRN
Status: DISCONTINUED | OUTPATIENT
Start: 2022-04-19 | End: 2022-04-19 | Stop reason: HOSPADM

## 2022-04-19 RX ORDER — ONDANSETRON 4 MG/1
4 TABLET, ORALLY DISINTEGRATING ORAL EVERY 30 MIN PRN
Status: DISCONTINUED | OUTPATIENT
Start: 2022-04-19 | End: 2022-04-19 | Stop reason: HOSPADM

## 2022-04-19 RX ORDER — CLINDAMYCIN PHOSPHATE 900 MG/50ML
900 INJECTION, SOLUTION INTRAVENOUS
Status: DISCONTINUED | OUTPATIENT
Start: 2022-04-19 | End: 2022-04-19 | Stop reason: HOSPADM

## 2022-04-19 RX ORDER — NALOXONE HYDROCHLORIDE 0.4 MG/ML
0.2 INJECTION, SOLUTION INTRAMUSCULAR; INTRAVENOUS; SUBCUTANEOUS
Status: DISCONTINUED | OUTPATIENT
Start: 2022-04-19 | End: 2022-04-21 | Stop reason: HOSPADM

## 2022-04-19 RX ORDER — BISACODYL 10 MG
10 SUPPOSITORY, RECTAL RECTAL DAILY PRN
Status: DISCONTINUED | OUTPATIENT
Start: 2022-04-19 | End: 2022-04-21 | Stop reason: HOSPADM

## 2022-04-19 RX ORDER — ACETAMINOPHEN 325 MG/1
975 TABLET ORAL EVERY 8 HOURS
Status: DISCONTINUED | OUTPATIENT
Start: 2022-04-19 | End: 2022-04-21 | Stop reason: HOSPADM

## 2022-04-19 RX ORDER — FENTANYL CITRATE 50 UG/ML
25 INJECTION, SOLUTION INTRAMUSCULAR; INTRAVENOUS
Status: DISCONTINUED | OUTPATIENT
Start: 2022-04-19 | End: 2022-04-19 | Stop reason: HOSPADM

## 2022-04-19 RX ORDER — ACETAMINOPHEN 325 MG/1
650 TABLET ORAL EVERY 4 HOURS PRN
Status: DISCONTINUED | OUTPATIENT
Start: 2022-04-22 | End: 2022-04-21 | Stop reason: HOSPADM

## 2022-04-19 RX ORDER — FENTANYL CITRATE 50 UG/ML
25 INJECTION, SOLUTION INTRAMUSCULAR; INTRAVENOUS EVERY 5 MIN PRN
Status: DISCONTINUED | OUTPATIENT
Start: 2022-04-19 | End: 2022-04-19 | Stop reason: HOSPADM

## 2022-04-19 RX ORDER — POLYETHYLENE GLYCOL 3350 17 G/17G
17 POWDER, FOR SOLUTION ORAL DAILY
Status: DISCONTINUED | OUTPATIENT
Start: 2022-04-20 | End: 2022-04-21 | Stop reason: HOSPADM

## 2022-04-19 RX ORDER — DEXAMETHASONE SODIUM PHOSPHATE 4 MG/ML
INJECTION, SOLUTION INTRA-ARTICULAR; INTRALESIONAL; INTRAMUSCULAR; INTRAVENOUS; SOFT TISSUE PRN
Status: DISCONTINUED | OUTPATIENT
Start: 2022-04-19 | End: 2022-04-19

## 2022-04-19 RX ORDER — ONDANSETRON 2 MG/ML
4 INJECTION INTRAMUSCULAR; INTRAVENOUS EVERY 30 MIN PRN
Status: DISCONTINUED | OUTPATIENT
Start: 2022-04-19 | End: 2022-04-19 | Stop reason: HOSPADM

## 2022-04-19 RX ORDER — ONDANSETRON 4 MG/1
4 TABLET, ORALLY DISINTEGRATING ORAL ONCE
Status: COMPLETED | OUTPATIENT
Start: 2022-04-19 | End: 2022-04-19

## 2022-04-19 RX ORDER — OXYCODONE HYDROCHLORIDE 5 MG/1
5 TABLET ORAL EVERY 4 HOURS PRN
Status: DISCONTINUED | OUTPATIENT
Start: 2022-04-19 | End: 2022-04-20

## 2022-04-19 RX ORDER — HYDROMORPHONE HCL IN WATER/PF 6 MG/30 ML
0.4 PATIENT CONTROLLED ANALGESIA SYRINGE INTRAVENOUS
Status: DISCONTINUED | OUTPATIENT
Start: 2022-04-19 | End: 2022-04-21 | Stop reason: HOSPADM

## 2022-04-19 RX ORDER — MEPERIDINE HYDROCHLORIDE 25 MG/ML
12.5 INJECTION INTRAMUSCULAR; INTRAVENOUS; SUBCUTANEOUS
Status: DISCONTINUED | OUTPATIENT
Start: 2022-04-19 | End: 2022-04-19 | Stop reason: HOSPADM

## 2022-04-19 RX ORDER — ASPIRIN 81 MG/1
162 TABLET ORAL DAILY
Status: DISCONTINUED | OUTPATIENT
Start: 2022-04-20 | End: 2022-04-21 | Stop reason: HOSPADM

## 2022-04-19 RX ORDER — OXYCODONE HYDROCHLORIDE 5 MG/1
10 TABLET ORAL EVERY 4 HOURS PRN
Status: DISCONTINUED | OUTPATIENT
Start: 2022-04-19 | End: 2022-04-20

## 2022-04-19 RX ORDER — ONDANSETRON 2 MG/ML
4 INJECTION INTRAMUSCULAR; INTRAVENOUS EVERY 6 HOURS PRN
Status: DISCONTINUED | OUTPATIENT
Start: 2022-04-19 | End: 2022-04-21 | Stop reason: HOSPADM

## 2022-04-19 RX ORDER — EPHEDRINE SULFATE 50 MG/ML
INJECTION, SOLUTION INTRAMUSCULAR; INTRAVENOUS; SUBCUTANEOUS PRN
Status: DISCONTINUED | OUTPATIENT
Start: 2022-04-19 | End: 2022-04-19

## 2022-04-19 RX ORDER — HYDRALAZINE HYDROCHLORIDE 20 MG/ML
2.5-5 INJECTION INTRAMUSCULAR; INTRAVENOUS EVERY 10 MIN PRN
Status: DISCONTINUED | OUTPATIENT
Start: 2022-04-19 | End: 2022-04-19 | Stop reason: HOSPADM

## 2022-04-19 RX ADMIN — ROCURONIUM BROMIDE 20 MG: 50 INJECTION, SOLUTION INTRAVENOUS at 09:01

## 2022-04-19 RX ADMIN — HYDROMORPHONE HYDROCHLORIDE 0.2 MG: 1 INJECTION, SOLUTION INTRAMUSCULAR; INTRAVENOUS; SUBCUTANEOUS at 12:02

## 2022-04-19 RX ADMIN — ONDANSETRON 4 MG: 2 INJECTION INTRAMUSCULAR; INTRAVENOUS at 10:48

## 2022-04-19 RX ADMIN — Medication 5 MG: at 08:53

## 2022-04-19 RX ADMIN — KETOROLAC TROMETHAMINE 30 MG: 30 INJECTION, SOLUTION INTRAMUSCULAR at 07:32

## 2022-04-19 RX ADMIN — DEXAMETHASONE SODIUM PHOSPHATE 8 MG: 4 INJECTION, SOLUTION INTRAMUSCULAR; INTRAVENOUS at 08:15

## 2022-04-19 RX ADMIN — ROCURONIUM BROMIDE 20 MG: 50 INJECTION, SOLUTION INTRAVENOUS at 09:18

## 2022-04-19 RX ADMIN — SODIUM CHLORIDE, SODIUM LACTATE, POTASSIUM CHLORIDE, CALCIUM CHLORIDE: 600; 310; 30; 20 INJECTION, SOLUTION INTRAVENOUS at 08:07

## 2022-04-19 RX ADMIN — SODIUM CHLORIDE, POTASSIUM CHLORIDE, SODIUM LACTATE AND CALCIUM CHLORIDE 1000 ML: 600; 310; 30; 20 INJECTION, SOLUTION INTRAVENOUS at 21:15

## 2022-04-19 RX ADMIN — PROPOFOL 120 MG: 10 INJECTION, EMULSION INTRAVENOUS at 08:15

## 2022-04-19 RX ADMIN — SODIUM CHLORIDE, POTASSIUM CHLORIDE, SODIUM LACTATE AND CALCIUM CHLORIDE 500 ML: 600; 310; 30; 20 INJECTION, SOLUTION INTRAVENOUS at 22:50

## 2022-04-19 RX ADMIN — OXYCODONE HYDROCHLORIDE 10 MG: 5 TABLET ORAL at 22:16

## 2022-04-19 RX ADMIN — LIDOCAINE HYDROCHLORIDE 80 MG: 20 INJECTION, SOLUTION INFILTRATION; PERINEURAL at 08:15

## 2022-04-19 RX ADMIN — OXYCODONE HYDROCHLORIDE 5 MG: 5 TABLET ORAL at 13:07

## 2022-04-19 RX ADMIN — ROCURONIUM BROMIDE 50 MG: 50 INJECTION, SOLUTION INTRAVENOUS at 08:15

## 2022-04-19 RX ADMIN — SODIUM CHLORIDE, POTASSIUM CHLORIDE, SODIUM LACTATE AND CALCIUM CHLORIDE: 600; 310; 30; 20 INJECTION, SOLUTION INTRAVENOUS at 15:03

## 2022-04-19 RX ADMIN — HYDROMORPHONE HYDROCHLORIDE 0.2 MG: 1 INJECTION, SOLUTION INTRAMUSCULAR; INTRAVENOUS; SUBCUTANEOUS at 12:21

## 2022-04-19 RX ADMIN — HYDROMORPHONE HYDROCHLORIDE 0.5 MG: 1 INJECTION, SOLUTION INTRAMUSCULAR; INTRAVENOUS; SUBCUTANEOUS at 10:45

## 2022-04-19 RX ADMIN — PHENYLEPHRINE HYDROCHLORIDE 100 MCG: 10 INJECTION INTRAVENOUS at 11:14

## 2022-04-19 RX ADMIN — ACETAMINOPHEN 975 MG: 325 TABLET ORAL at 15:02

## 2022-04-19 RX ADMIN — FENTANYL CITRATE 50 MCG: 50 INJECTION, SOLUTION INTRAMUSCULAR; INTRAVENOUS at 09:02

## 2022-04-19 RX ADMIN — OXYCODONE HYDROCHLORIDE 5 MG: 5 TABLET ORAL at 17:53

## 2022-04-19 RX ADMIN — HYDROMORPHONE HYDROCHLORIDE 0.2 MG: 1 INJECTION, SOLUTION INTRAMUSCULAR; INTRAVENOUS; SUBCUTANEOUS at 11:56

## 2022-04-19 RX ADMIN — SENNOSIDES AND DOCUSATE SODIUM 1 TABLET: 50; 8.6 TABLET ORAL at 20:49

## 2022-04-19 RX ADMIN — CEFAZOLIN SODIUM 2 G: 2 INJECTION, SOLUTION INTRAVENOUS at 08:58

## 2022-04-19 RX ADMIN — CLINDAMYCIN IN 5 PERCENT DEXTROSE 900 MG: 18 INJECTION, SOLUTION INTRAVENOUS at 20:49

## 2022-04-19 RX ADMIN — Medication 5 MG: at 11:02

## 2022-04-19 RX ADMIN — ACETAMINOPHEN 975 MG: 325 TABLET ORAL at 22:16

## 2022-04-19 RX ADMIN — HYDROMORPHONE HYDROCHLORIDE 0.2 MG: 1 INJECTION, SOLUTION INTRAMUSCULAR; INTRAVENOUS; SUBCUTANEOUS at 12:09

## 2022-04-19 RX ADMIN — ONDANSETRON 4 MG: 4 TABLET, ORALLY DISINTEGRATING ORAL at 07:10

## 2022-04-19 RX ADMIN — FENTANYL CITRATE 50 MCG: 50 INJECTION, SOLUTION INTRAMUSCULAR; INTRAVENOUS at 08:15

## 2022-04-19 RX ADMIN — ACETAMINOPHEN 975 MG: 325 TABLET ORAL at 06:41

## 2022-04-19 RX ADMIN — GABAPENTIN 300 MG: 300 CAPSULE ORAL at 06:41

## 2022-04-19 RX ADMIN — Medication 5 MG: at 08:57

## 2022-04-19 RX ADMIN — SODIUM CHLORIDE, POTASSIUM CHLORIDE, SODIUM LACTATE AND CALCIUM CHLORIDE: 600; 310; 30; 20 INJECTION, SOLUTION INTRAVENOUS at 08:15

## 2022-04-19 RX ADMIN — Medication 1 TABLET: at 20:49

## 2022-04-19 RX ADMIN — HYDROMORPHONE HYDROCHLORIDE 0.2 MG: 0.2 INJECTION, SOLUTION INTRAMUSCULAR; INTRAVENOUS; SUBCUTANEOUS at 16:56

## 2022-04-19 RX ADMIN — SUGAMMADEX 200 MG: 100 INJECTION, SOLUTION INTRAVENOUS at 09:35

## 2022-04-19 NOTE — ANESTHESIA POSTPROCEDURE EVALUATION
Patient: Ophelia Wolf    Procedure: Procedure(s):  Excision of Large Schwannoma of Right Retroperitoneum and Groin  Decompression Neuroplasty of Right Femoral Nerve  Vascular Dissection of Iliac and Common Femoral Artery and Vein       Anesthesia Type:  General    Note:  Disposition: Inpatient   Postop Pain Control: Uneventful            Sign Out: Well controlled pain   PONV: No   Neuro/Psych: Uneventful            Sign Out: Acceptable/Baseline neuro status   Airway/Respiratory: Uneventful            Sign Out: Acceptable/Baseline resp. status   CV/Hemodynamics: Uneventful            Sign Out: Acceptable CV status; No obvious hypovolemia; No obvious fluid overload   Other NRE: NONE   DID A NON-ROUTINE EVENT OCCUR? No           Last vitals:  Vitals Value Taken Time   BP 98/62 04/19/22 1315   Temp 36.5  C (97.7  F) 04/19/22 1300   Pulse 78 04/19/22 1316   Resp 15 04/19/22 1317   SpO2 97 % 04/19/22 1315   Vitals shown include unvalidated device data.    Electronically Signed By: Rita Levy MD  April 19, 2022  2:41 PM

## 2022-04-19 NOTE — BRIEF OP NOTE
Orthopaedic Brief Operative Note 4/19/2022 11:55 AM    Patient: Ophelia Wolf  MRN: [id number]       Pre-operative diagnosis: Cellular schwannoma [D36.10]   Post-operative diagnosis: Same   Procedure: Procedure(s):  Excision of Large Schwannoma of Right Retroperitoneum and Groin  Decompression Neuroplasty of Right Femoral Nerve  Vascular Dissection of Iliac and Common Femoral Artery and Vein     Surgeon: Richard Katz MD   Assistant(s): Rick Stroud MD     Anesthesia: General   Estimated blood loss: Less than 50 ml   Total IV fluids: (See anesthesia record)   Total urine output: (See anesthesia record)   Blood transfusion No transfusion was given during surgery   Drains: right Stepan-Perez   Specimens: Right groin schwannoma   Implants: none  See dictated operative report for full details   Grafts: none   Findings: See dictated operative report for full details   Complications: None   Disposition: Stable and extubated to PACU     Plan:    Pain: Scheduled Tylenol, IV Dilaudid PRN, oxycodone PRN, Flexeril PRN  Activity: Weight bearing as tolerated.   Wound care: keep dressing in place at least 1 week, longer if possible.    Drains: Document output per shift, discontinue before discharge  Abx: Ancef/clindamycin periop  Diet: NPO until return of bowel sounds then clear liquids ADAT to regular  DVT ppx: Mechanical  Disposition: discontinue to home POD #1 if pain controlled and doing well with therapy         Rick Stroud DO  Adult Reconstrution Fellow

## 2022-04-19 NOTE — ANESTHESIA CARE TRANSFER NOTE
Patient: Ophelia Wolf    Procedure: Procedure(s):  Excision of Large Schwannoma of Right Retroperitoneum and Groin  Decompression Neuroplasty of Right Femoral Nerve  Vascular Dissection of Iliac and Common Femoral Artery and Vein       Diagnosis: Cellular schwannoma [D36.10]  Diagnosis Additional Information: No value filed.    Anesthesia Type:   General     Note:    Oropharynx: oropharynx clear of all foreign objects  Level of Consciousness: awake  Oxygen Supplementation: face mask  Level of Supplemental Oxygen (L/min / FiO2): 6  Independent Airway: airway patency satisfactory and stable  Dentition: dentition unchanged  Vital Signs Stable: post-procedure vital signs reviewed and stable  Report to RN Given: handoff report given  Patient transferred to: PACU  Comments: VSS. Breathing spontaneously at a regular rate with adequate tidal volumes and maintaining O2 sats. Denies nausea or pain. No apparent complications from anesthesia.     Jeison Ace MD  Anesthesia CA-1    Handoff Report: Identifed the Patient, Identified the Reponsible Provider, Reviewed the pertinent medical history, Discussed the surgical course, Reviewed Intra-OP anesthesia mangement and issues during anesthesia, Set expectations for post-procedure period and Allowed opportunity for questions and acknowledgement of understanding      Vitals:  Vitals Value Taken Time   /70 04/19/22 1140   Temp     Pulse 104 04/19/22 1141   Resp 10 04/19/22 1141   SpO2 99 % 04/19/22 1141   Vitals shown include unvalidated device data.    Electronically Signed By: Jeison Ace MD  April 19, 2022  11:42 AM

## 2022-04-19 NOTE — ANESTHESIA PROCEDURE NOTES
Airway         Procedure Start/Stop Times: 4/19/2022 8:19 AM  Staff -        Anesthesiologist:  Rita Levy MD       Resident/Fellow: Jeison Ace MD       Performed By: residentIndications and Patient Condition       Indications for airway management: theresa-procedural       Induction type:intravenous       Mask difficulty assessment: 2 - vent by mask + OA or adjuvant +/- NMBA    Final Airway Details       Final airway type: endotracheal airway       Successful airway: ETT - single and Oral  Endotracheal Airway Details        ETT size (mm): 7.0       Cuffed: yes       Successful intubation technique: direct laryngoscopy       DL Blade Type: MAC 3       Grade View of Cords: 1       Adjucts: stylet       Position: Left       Measured from: gums/teeth       Secured at (cm): 22       Bite block used: Soft    Post intubation assessment        Placement verified by: capnometry, equal breath sounds and chest rise        Number of attempts at approach: 1       Number of other approaches attempted: 0       Secured with: pink tape       Ease of procedure: easy       Dentition: Intact and Unchanged    Medication(s) Administered   Medication Administration Time: 4/19/2022 8:19 AM

## 2022-04-19 NOTE — PLAN OF CARE
VS: VSS, pt denied CP or SOB.   O2: Room air sat. 95%, pt    Output: Farmer in place with clear yellow urine.   Last BM: 04/19/22   Activity: Not out of bed yet.   Skin: Intact except surgical incision.   Pain: Comfortably manageable with PRN medication.   Neuro: CMS and neuro intact to baseline.    Dressing: CDI.    Diet: Regular tolerating okay.   LDA: PIV infusing on right upper arm and SL on left hand.    Equipment: IV pole, CAPNO, and personal belongings.    Plan: TBD.   Additional Info: Pt arrived on 5 ortho room 548 about 13:45 pm, pt oriented to room and call light, pt resting comfortable with call light in reach.

## 2022-04-19 NOTE — CONSULTS
[facility]  Consult Note - Hospitalist Service, GOLD TEAM 17  Date of Admission:  4/19/2022  Consult Requested by: Ortho   Reason for Consult: Medical co-management.     Assessment & Plan   Ophelia Wolf is a 56 year old female admitted on 4/19/2022. POD # 0 s/p Excision of Large Schwannoma of Right Retroperitoneum and Groin. Decompression Neuroplasty of Right Femoral Nerve. Vascular Dissection of Iliac and Common Femoral Artery and Vein. EBL 50 ml. No complications during surgery.   Internal medicine consulted for medical co-management. She has a PMHx significant for Migraine, Allergic Rhinitis, GERD and Chronic abdominal pain. Also hx of large cellular schwannoma of right retroperitoneum and groin.    Excision of Large Schwannoma of Right Retroperitoneum and Groin.  -Ortho following the patient.   -DVT prophylaxis, theresa op antibiotics and pain management per Ortho.   -Gentle hydration.   -Capnography per protocol.   -Drain management per Ortho.   -Continue monitoring HGB.   -Bowel regimen.    -Disposition per Ortho.          The patient's care was discussed with the Patient.    Asael Koch MD  [facility]  Securely message with the Vocera Web Console (learn more here)  Text page via [facility] Paging/Directory   Please see signed in provider for up to date coverage information    Hospitalist Service, GOLD TEAM 17    ______________________________________________________________________    Chief Complaint   Groin pain.     History is obtained from the patient    History of Present Illness   Ophelia Wolf is a 56 year old female admitted on 4/19/2022. POD # 0 s/p Excision of Large Schwannoma of Right Retroperitoneum and Groin. Decompression Neuroplasty of Right Femoral Nerve. Vascular Dissection of Iliac and Common Femoral Artery and Vein. EBL 50 ml. No complications during surgery.   Internal medicine consulted for  medical co-management. She has a PMHx significant for Migraine, Allergic Rhinitis, GERD and Chronic abdominal pain. Also hx of large cellular schwannoma of right retroperitoneum and groin.  I saw the patient after surgery in the medical floor. She was pleasant and hemodynamically stable. No chest pain, palpitations, dizziness, abdominal pain, fever, chills or shortness of breath.       Review of Systems   The 10 point Review of Systems is negative other than noted in the HPI or here. Other ROS negative.     Past Medical History    I have reviewed this patient's medical history and updated it with pertinent information if needed.   Past Medical History:   Diagnosis Date     ALLERGIC RHINITIS NOS 08/08/2002     Arthritis of right acromioclavicular joint 02/11/2021     Cervical high risk HPV (human papillomavirus) test positive 10/20/2020     CLASS MIGRAIN W/O MENTN INTRACTABLE 12/15/2004     Dysmenorrhea 03/13/2007     Headache      Headache      Headache      History of rabies vaccination      Lesion of plantar nerve     Buckley's neuroma/metatarsalgia     Malignant hyperthermia      NONSPECIFIC MEDICAL HISTORY     pseudocholinesterase deficiency     Premenstrual tension syndrome      Tension headache      Tobacco use disorder      UTERINE LEIOMYOMA NOS 04/26/2007       Past Surgical History   I have reviewed this patient's surgical history and updated it with pertinent information if needed.  Past Surgical History:   Procedure Laterality Date     ARTHROTOMY SHOULDER, ROTATOR CUFF REPAIR, COMBINED Right 3/12/2021    Procedure: ARTHROTOMY, SHOULDER ([facility]), WITH ROTATOR CUFF REPAIR open;  Surgeon: César Talavera MD;  Location: [facility]     COLONOSCOPY  04/14/10     [facility] LIGATE FALLOPIAN TUBE,POSTPARTUM  1990    Tubal Ligation     [facility] NONSPECIFIC PROCEDURE  1978    jaw surgery     [facility] SUPRACERV ABD HYSTERECTOMY  06/28/2007       Social History   I have reviewed this patient's social history and updated it with  pertinent information if needed.  Social History     Tobacco Use     Smoking status: Former Smoker     Packs/day: 0.50     Years: 27.00     Pack years: 13.50     Types: Cigarettes     Quit date: 2007     Years since quittin.3     Smokeless tobacco: Never Used     Tobacco comment: 3-4 cig/day   Vaping Use     Vaping Use: Never used   Substance Use Topics     Alcohol use: Yes     Alcohol/week: 3.3 standard drinks     Comment: weekends     Drug use: No       Family History   I have reviewed this patient's family history and updated it with pertinent information if needed.  Family History   Problem Relation Age of Onset     Neurologic Disorder Mother         Meniere's disease     Anesthesia Reaction Mother         And myself     Osteoporosis Mother      Thyroid Disease Mother         hypo     Heart Disease Maternal Grandmother         stroke     Arthritis Maternal Grandmother         osteoporosis     Diabetes Maternal Grandmother         Type II diabetes     Coronary Artery Disease Maternal Grandmother 40     Cerebrovascular Disease Maternal Grandmother 40     Osteoporosis Maternal Grandmother      Heart Disease Maternal Grandfather         heart problems, s/p CAB     Cancer Maternal Grandfather         prostate      Diabetes Maternal Grandfather         Type II diabetes     Prostate Cancer Maternal Grandfather      Hypertension Father      Hyperlipidemia Father      Heart Disease Paternal Grandmother      Anesthesia Reaction Brother      Anesthesia Reaction Brother        Medications   Current Facility-Administered Medications   Medication     [START ON 2022] acetaminophen (TYLENOL) tablet 650 mg     acetaminophen (TYLENOL) tablet 975 mg     [START ON 2022] aspirin EC tablet 162 mg     bisacodyl (DULCOLAX) Suppository 10 mg     calcium citrate-vitamin D (CITRACAL) 315-250 MG-UNIT per tablet 1 tablet     HYDROmorphone (DILAUDID) injection 0.2 mg    Or     HYDROmorphone (DILAUDID) injection 0.4 mg      lactated ringers infusion     lidocaine (LMX4) cream     lidocaine 1 % 0.1-1 mL     magnesium hydroxide (MILK OF MAGNESIA) suspension 30 mL     naloxone (NARCAN) injection 0.2 mg    Or     naloxone (NARCAN) injection 0.4 mg    Or     naloxone (NARCAN) injection 0.2 mg    Or     naloxone (NARCAN) injection 0.4 mg     ondansetron (ZOFRAN-ODT) ODT tab 4 mg    Or     ondansetron (ZOFRAN) injection 4 mg     oxyCODONE (ROXICODONE) tablet 5 mg    Or     oxyCODONE (ROXICODONE) tablet 10 mg     [START ON 4/20/2022] polyethylene glycol (MIRALAX) Packet 17 g     prochlorperazine (COMPAZINE) injection 10 mg    Or     prochlorperazine (COMPAZINE) tablet 10 mg     senna-docusate (SENOKOT-S/PERICOLACE) 8.6-50 MG per tablet 1 tablet     sodium chloride (PF) 0.9% PF flush 3 mL     sodium chloride (PF) 0.9% PF flush 3 mL       Allergies   Allergies   Allergen Reactions     Amoxicillin Difficulty breathing     Anesthetic Ether      pseudocholinesterase deficiency       Physical Exam   Vital Signs: Temp: 98  F (36.7  C) Temp src: Axillary BP: 102/55 Pulse: 88   Resp: 16 SpO2: 93 % O2 Device: None (Room air) Oxygen Delivery: 2 LPM  Weight: 162 lbs 14.72 oz    General Appearance: Alert, room air, no acute distress.   Eyes: Pupils equal and reactive to light.   HEENT: Oral mucosa moist.   Respiratory: Normal respiratory effort, clear lungs.   Cardiovascular: RRR, no murmur.   GI: Abdomen soft, + BS, no tenderness to palpation.   Lymph/Hematologic: No peripheral edema.   Genitourinary: Deferred.   Skin: No rash.   Musculoskeletal: R groin with drain. No erythema.   Neurologic: Alert, oriented, moving all extremities.   Psychiatric: Mood stable.     Data   Results for orders placed or performed during the hospital encounter of 04/19/22 (from the past 24 hour(s))   Glucose by meter   Result Value Ref Range    GLUCOSE BY METER POCT 120 (H) 70 - 99 mg/dL   ABO/Rh type and screen    Narrative    The following orders were created for panel  order ABO/Rh type and screen.  Procedure                               Abnormality         Status                     ---------                               -----------         ------                     Adult Type and Screen[[id number]]                            Final result                 Please view results for these tests on the individual orders.   Adult Type and Screen   Result Value Ref Range    ABO/RH(D) A POS     Antibody Screen Negative Negative    SPECIMEN EXPIRATION DATE [id number]    Prepare red blood cells (unit)   Result Value Ref Range    CROSSMATCH Compatible     UNIT ABO/RH A Pos     Unit Number [id number]     Unit Status Ready     Blood Component Type Red Blood Cells     Product Code F8075K46     CODING SYSTEM [id number]     UNIT TYPE ISBT 6200    Prepare red blood cells (unit)   Result Value Ref Range    CROSSMATCH Compatible     UNIT ABO/RH A Pos     Unit Number [id number]     Unit Status Ready     Blood Component Type Red Blood Cells     Product Code [id number]     CODING SYSTEM [id number]     UNIT TYPE ISBT 6200

## 2022-04-19 NOTE — OR NURSING
PACU to Inpatient Nursing Handoff    Patient Ophelia Wolf is a 56 year old female who speaks English.   Procedure Procedure(s):  Excision of Large Schwannoma of Right Retroperitoneum and Groin  Decompression Neuroplasty of Right Femoral Nerve  Vascular Dissection of Iliac and Common Femoral Artery and Vein   Surgeon(s) Primary: Richard Katz MD  Fellow - Assisting: Rick Stroud MD     Allergies   Allergen Reactions     Amoxicillin Difficulty breathing     Anesthetic Ether      pseudocholinesterase deficiency       Isolation  [unfilled]     Past Medical History   has a past medical history of ALLERGIC RHINITIS NOS (08/08/2002), Arthritis of right acromioclavicular joint (02/11/2021), Cervical high risk HPV (human papillomavirus) test positive (10/20/2020), CLASS MIGRAIN W/O MENTN INTRACTABLE (12/15/2004), Dysmenorrhea (03/13/2007), Headache, Headache, Headache, History of rabies vaccination, Lesion of plantar nerve, Malignant hyperthermia, NONSPECIFIC MEDICAL HISTORY, Premenstrual tension syndrome, Tension headache, Tobacco use disorder, and UTERINE LEIOMYOMA NOS (04/26/2007).    Anesthesia General   Dermatome Level     Preop Meds acetaminophen (Tylenol) - time given: 0641  gabapentin (Neurontin) - time given: 0641  ketorolac (Toradol) - time given: 0732  zofran 4 mg  - time given: 0710   Nerve block Not applicable   Intraop Meds fentaNYL (SUBLIMAZE) injection (mcg)  Total dose:  100 mcg    Date/Time Rate/Dose/Volume Action Route Admin User Audit    04/19/22 0815 50 mcg Given Intravenous Juan Robbins MD     0902 50 mcg Given Intravenous Jeison Ace MD         lidocaine 2% (mg)  Total dose:  80 mg    Date/Time Rate/Dose/Volume Action Route Admin User Audit    04/19/22 0815 80 mg Given Intravenous Juan Robbins MD       propofol (DIPRIVAN) injection 10 mg/mL vial (mg)  Total dose:  120 mg    Date/Time Rate/Dose/Volume Action Route Admin User Audit    04/19/22 0815 120 mg Given  Intravenous Juan Robbins MD       rocuronium 10mg/mL (mg)  Total dose:  90 mg    Date/Time Rate/Dose/Volume Action Route Admin User Audit    04/19/22 0815 50 mg Given Intravenous Juan Robbins MD     0901 20 mg Given Intravenous Jeison Ace MD     0918 20 mg Given Intravenous Jeison Ace MD       dexamethasone 4mg/mL (mg)  Total dose:  8 mg    Date/Time Rate/Dose/Volume Action Route Admin User Audit    04/19/22 0815 8 mg Given Intravenous Juan Robbins MD       ondansetron 2mg/mL (mg)  Total dose:  4 mg    Date/Time Rate/Dose/Volume Action Route Admin User Audit    04/19/22 1048 4 mg - 2 mL Given Intravenous Jeison Ace MD       HYDROmorphone (mg)  Total dose:  0.5 mg    Date/Time Rate/Dose/Volume Action Route Admin User Audit    04/19/22 1045 0.5 mg Given Intravenous Jeison Ace MD       phenylephrine (DORY-SYNEPHRINE) injection (mcg)  Total dose:  100 mcg    Date/Time Rate/Dose/Volume Action Route Admin User Audit    04/19/22 1114 100 mcg New Bag Intravenous Jeison Ace MD       ePHEDrine 5 mg/mL (mg)  Total dose:  15 mg    Date/Time Rate/Dose/Volume Action Route Admin User Audit    04/19/22 0853 5 mg Given Intravenous Jeison Ace MD     0857 5 mg Given Intravenous Jeison Ace MD     1102 5 mg Given Intravenous Jeison Ace MD       sugammadex (BRIDION) 200mg/2ml (mg)  Total dose:  200 mg    Date/Time Rate/Dose/Volume Action Route Admin User Audit    04/19/22 0935 200 mg Given Intravenous Jeison Ace MD     Comment: reversed per surgeon request for dissection       ketorolac 30mg/mL (mg)  Total dose:  30 mg    Date/Time Rate/Dose/Volume Action Route Admin User Audit    04/19/22 0732 30 mg Given Intravenous Jane Griffin APRN CRNA       ceFAZolin intermittent infusion 2 g in 100 mL dextrose PRE-MIX - GH (g)  Total dose:  2 g    Date/Time Rate/Dose/Volume Action Route Admin User Audit    04/19/22 0858 2 g Given Intravenous Jeisno Ace MD           Local Meds No   Antibiotics cefazolin (Ancef) - last given at 2mg @ 858am     Pain Patient Currently in Pain: denies   PACU meds  hydromorphone (Dilaudid): 0.8 mg (total dose) last given at 1220pm    PCA / epidural No   Capnography     Telemetry ECG Rhythm: Normal sinus rhythm   Inpatient Telemetry Monitor Ordered? No        Labs Glucose Lab Results   Component Value Date     04/19/2022     04/06/2022     05/22/2021       Hgb Lab Results   Component Value Date    HGB 14.3 04/06/2022    HGB 15.2 05/22/2021       INR No results found for: INR   PACU Imaging Not applicable     Wound/Incision Incision/Surgical Site 03/12/21 Right Shoulder (Active)   Number of days: 403       Incision/Surgical Site 04/19/22 Inner;Right;Upper Thigh (Active)   Incision Assessment UTV 04/19/22 1137   Closure KENA 04/19/22 1137   Dressing Intervention Clean, dry, intact 04/19/22 1137   Number of days: 0      CMS        Equipment bulb suction    Other LDA       IV Access Peripheral IV 04/19/22 Left Hand (Active)   Site Assessment [facility] 04/19/22 1137   Line Status Saline locked 04/19/22 1137   Phlebitis Scale 0-->no symptoms 04/19/22 1137   Infiltration Scale 0 04/19/22 1137   Number of days: 0       Peripheral IV Right Upper forearm (Active)   Site Assessment [facility] 04/19/22 1137   Line Status Infusing 04/19/22 1137   Phlebitis Scale 0-->no symptoms 04/19/22 1137   Infiltration Scale 0 04/19/22 1137   Number of days:        Peripheral IV Left Lower forearm (Active)   Site Assessment [facility] 04/19/22 1137   Line Status Saline locked 04/19/22 1137   Phlebitis Scale 0-->no symptoms 04/19/22 1137   Infiltration Scale 0 04/19/22 1137   Number of days:       Blood Products Not applicable  mL   Intake/Output Date 04/19/22 0700 - 04/20/22 0659   Shift 3210-2024 2880-6459 7748-5192 24 Hour Total   INTAKE   I.V. 1500   1500   IV Piggyback 2   2   Shift Total(mL/kg) 1502(20.32)   1502(20.32)   OUTPUT   Urine 300   300   Blood 30   30    Shift Total(mL/kg) 330(4.47)   330(4.47)   Weight (kg) 73.9 73.9 73.9 73.9      Drains / Farmer Closed/Suction Drain 1 Right;Medial Thigh Bulb 15 Amharic (Active)   Status To bulb suction 04/19/22 1137   Number of days: 0       Urethral Catheter 04/19/22 Latex;Straight-tip 16 fr (Active)   Securement Method Securing device (Describe) 04/19/22 1137   Rationale for Continued Use Anesthesia 04/19/22 1137   Number of days: 0      Time of void PreOp Void Prior to Procedure: 0615 (04/19/22 0648)    PostOp      Diapered? No   Bladder Scan     PO    water     Vitals    B/P: 98/63  T: 98.1  F (36.7  C)    Temp src: Axillary  P:  Pulse: 93 (04/19/22 1200)          R: 15  O2:  SpO2: 96 %             Family/support present significant other   Patient belongings     Patient transported on cart   DC meds/scripts (obs/outpt)    Inpatient Pain Meds Released? Yes       Special needs/considerations None   Tasks needing completion None       Eloina Duque, RN  ASCOM [id number]

## 2022-04-19 NOTE — PHARMACY-ADMISSION MEDICATION HISTORY
Admission Medication History Completed by Pharmacy    See [facility] Admission Navigator for allergy information, preferred outpatient pharmacy, prior to admission medications and immunization status.     Medication History Sources:     Patient    Medication fill history via [employer]    Changes made to PTA medication list (reason):    Added: Multivitamin, Vitamin C and vitamin D    Deleted: None    Changed: Calcium-vit D sig >> 1 tablet po daily    Famotidine 20 mg po bid >> 20 mg po daily prn     Additional Information:    Patient could not recall the strength of Vitamin C, D and Calcium-vit D and the products are OCT ones.    Prior to Admission medications    Medication Sig Last Dose Taking? Auth Provider   Ascorbic Acid (VITAMIN C PO) Take 1 tablet by mouth daily Past Month at Unknown time Yes Unknown, Entered By History   Calcium Carbonate-Vitamin D (CALCIUM + D PO) Take 1 tablet by mouth daily Past Month at Unknown time Yes Reported, Patient   famotidine (PEPCID) 20 MG tablet TAKE ONE TABLET BY MOUTH TWICE A DAY  Patient taking differently: Take 20 mg by mouth daily as needed 4/18/2022 at 1200 Yes Susannah Ray PA-C   HYDROcodone-acetaminophen (NORCO) 5-325 MG tablet TAKE ONE TABLET BY MOUTH EVERY 6 HOURS AS NEEDED FOR PAIN *CAUTION: OPIOID. RISK OF OVERDOSE AND ADDICTION. 4/19/2022 at 0330 Yes Susannah Ray PA-C   multivitamin, therapeutic (THERA-VIT) TABS tablet Take 1 tablet by mouth daily Past Month at Unknown time Yes Unknown, Entered By History   SUMAtriptan (IMITREX) 100 MG tablet TAKE 1 TABLET BY MOUTH AT ONSET OF MIGRAINE 4/18/2022 at 1200 Yes Susannah Ray PA-C   VITAMIN D PO Take 1 tablet by mouth daily Past Month at Unknown time Yes Unknown, Entered By History       Date completed: 04/19/22    Medication history completed by: Tori Collado [facility]

## 2022-04-20 ENCOUNTER — APPOINTMENT (OUTPATIENT)
Dept: PHYSICAL THERAPY | Facility: CLINIC | Age: 57
End: 2022-04-20
Attending: ORTHOPAEDIC SURGERY
Payer: COMMERCIAL

## 2022-04-20 ENCOUNTER — DOCUMENTATION ONLY (OUTPATIENT)
Dept: NEUROSURGERY | Facility: CLINIC | Age: 57
End: 2022-04-20
Payer: COMMERCIAL

## 2022-04-20 ENCOUNTER — APPOINTMENT (OUTPATIENT)
Dept: CT IMAGING | Facility: CLINIC | Age: 57
End: 2022-04-20
Attending: PEDIATRICS
Payer: COMMERCIAL

## 2022-04-20 LAB
GLUCOSE BLDC GLUCOMTR-MCNC: 98 MG/DL (ref 70–99)
HGB BLD-MCNC: 9.7 G/DL (ref 11.7–15.7)
HGB BLD-MCNC: 9.8 G/DL (ref 11.7–15.7)
HOLD SPECIMEN: NORMAL
LACTATE SERPL-SCNC: 1.4 MMOL/L (ref 0.7–2)

## 2022-04-20 PROCEDURE — 250N000011 HC RX IP 250 OP 636: Performed by: ORTHOPAEDIC SURGERY

## 2022-04-20 PROCEDURE — 82962 GLUCOSE BLOOD TEST: CPT

## 2022-04-20 PROCEDURE — 250N000013 HC RX MED GY IP 250 OP 250 PS 637: Performed by: INTERNAL MEDICINE

## 2022-04-20 PROCEDURE — 97116 GAIT TRAINING THERAPY: CPT | Mod: GP

## 2022-04-20 PROCEDURE — 999N000111 HC STATISTIC OT IP EVAL DEFER

## 2022-04-20 PROCEDURE — 74178 CT ABD&PLV WO CNTR FLWD CNTR: CPT | Mod: 26 | Performed by: RADIOLOGY

## 2022-04-20 PROCEDURE — 97161 PT EVAL LOW COMPLEX 20 MIN: CPT | Mod: GP

## 2022-04-20 PROCEDURE — 74178 CT ABD&PLV WO CNTR FLWD CNTR: CPT

## 2022-04-20 PROCEDURE — 36415 COLL VENOUS BLD VENIPUNCTURE: CPT | Performed by: NURSE PRACTITIONER

## 2022-04-20 PROCEDURE — 85018 HEMOGLOBIN: CPT | Performed by: NURSE PRACTITIONER

## 2022-04-20 PROCEDURE — 250N000013 HC RX MED GY IP 250 OP 250 PS 637: Performed by: CLINICAL NURSE SPECIALIST

## 2022-04-20 PROCEDURE — 83605 ASSAY OF LACTIC ACID: CPT | Performed by: NURSE PRACTITIONER

## 2022-04-20 PROCEDURE — 250N000013 HC RX MED GY IP 250 OP 250 PS 637: Performed by: ORTHOPAEDIC SURGERY

## 2022-04-20 PROCEDURE — 97530 THERAPEUTIC ACTIVITIES: CPT | Mod: GP

## 2022-04-20 PROCEDURE — 250N000009 HC RX 250: Performed by: ORTHOPAEDIC SURGERY

## 2022-04-20 PROCEDURE — 99212 OFFICE O/P EST SF 10 MIN: CPT | Performed by: INTERNAL MEDICINE

## 2022-04-20 PROCEDURE — 250N000011 HC RX IP 250 OP 636: Performed by: STUDENT IN AN ORGANIZED HEALTH CARE EDUCATION/TRAINING PROGRAM

## 2022-04-20 RX ORDER — OXYCODONE HYDROCHLORIDE 5 MG/1
5 TABLET ORAL EVERY 4 HOURS PRN
Qty: 40 TABLET | Refills: 0 | Status: SHIPPED | OUTPATIENT
Start: 2022-04-20 | End: 2022-04-29

## 2022-04-20 RX ORDER — OXYCODONE HYDROCHLORIDE 5 MG/1
5 TABLET ORAL
Status: DISCONTINUED | OUTPATIENT
Start: 2022-04-20 | End: 2022-04-21 | Stop reason: HOSPADM

## 2022-04-20 RX ORDER — OXYCODONE HYDROCHLORIDE 10 MG/1
10 TABLET ORAL
Status: DISCONTINUED | OUTPATIENT
Start: 2022-04-20 | End: 2022-04-21 | Stop reason: HOSPADM

## 2022-04-20 RX ORDER — ACETAMINOPHEN 325 MG/1
650 TABLET ORAL EVERY 4 HOURS PRN
Qty: 60 TABLET | Refills: 0 | Status: SHIPPED | OUTPATIENT
Start: 2022-04-22 | End: 2022-04-27

## 2022-04-20 RX ORDER — SUMATRIPTAN 25 MG/1
25 TABLET, FILM COATED ORAL ONCE
Status: COMPLETED | OUTPATIENT
Start: 2022-04-20 | End: 2022-04-20

## 2022-04-20 RX ORDER — POLYETHYLENE GLYCOL 3350 17 G/17G
17 POWDER, FOR SOLUTION ORAL DAILY
Qty: 7 PACKET | Refills: 0 | Status: ON HOLD | OUTPATIENT
Start: 2022-04-21 | End: 2022-05-23

## 2022-04-20 RX ORDER — IOPAMIDOL 755 MG/ML
100 INJECTION, SOLUTION INTRAVASCULAR ONCE
Status: COMPLETED | OUTPATIENT
Start: 2022-04-20 | End: 2022-04-20

## 2022-04-20 RX ORDER — AMOXICILLIN 250 MG
1 CAPSULE ORAL 2 TIMES DAILY
Qty: 30 TABLET | Refills: 0 | Status: ON HOLD | OUTPATIENT
Start: 2022-04-20 | End: 2022-05-23

## 2022-04-20 RX ADMIN — ASPIRIN 162 MG: 81 TABLET ORAL at 08:28

## 2022-04-20 RX ADMIN — SODIUM CHLORIDE 73 ML: 9 INJECTION, SOLUTION INTRAVENOUS at 04:02

## 2022-04-20 RX ADMIN — OXYCODONE HYDROCHLORIDE 5 MG: 5 TABLET ORAL at 13:19

## 2022-04-20 RX ADMIN — IOPAMIDOL 99 ML: 755 INJECTION, SOLUTION INTRAVENOUS at 04:01

## 2022-04-20 RX ADMIN — ACETAMINOPHEN 975 MG: 325 TABLET ORAL at 05:03

## 2022-04-20 RX ADMIN — OXYCODONE HYDROCHLORIDE 5 MG: 5 TABLET ORAL at 02:13

## 2022-04-20 RX ADMIN — ACETAMINOPHEN 975 MG: 325 TABLET ORAL at 13:19

## 2022-04-20 RX ADMIN — ACETAMINOPHEN 975 MG: 325 TABLET ORAL at 21:25

## 2022-04-20 RX ADMIN — OXYCODONE HYDROCHLORIDE 5 MG: 5 TABLET ORAL at 06:11

## 2022-04-20 RX ADMIN — Medication 1 TABLET: at 21:19

## 2022-04-20 RX ADMIN — OXYCODONE HYDROCHLORIDE 5 MG: 5 TABLET ORAL at 09:38

## 2022-04-20 RX ADMIN — OXYCODONE HYDROCHLORIDE 5 MG: 5 TABLET ORAL at 21:25

## 2022-04-20 RX ADMIN — SUMATRIPTAN SUCCINATE 25 MG: 25 TABLET, FILM COATED ORAL at 17:05

## 2022-04-20 RX ADMIN — SENNOSIDES AND DOCUSATE SODIUM 1 TABLET: 50; 8.6 TABLET ORAL at 21:19

## 2022-04-20 RX ADMIN — SENNOSIDES AND DOCUSATE SODIUM 1 TABLET: 50; 8.6 TABLET ORAL at 08:28

## 2022-04-20 RX ADMIN — OXYCODONE HYDROCHLORIDE 5 MG: 5 TABLET ORAL at 16:08

## 2022-04-20 RX ADMIN — POLYETHYLENE GLYCOL 3350 17 G: 17 POWDER, FOR SOLUTION ORAL at 08:28

## 2022-04-20 RX ADMIN — CLINDAMYCIN IN 5 PERCENT DEXTROSE 900 MG: 18 INJECTION, SOLUTION INTRAVENOUS at 04:26

## 2022-04-20 RX ADMIN — Medication 1 TABLET: at 08:29

## 2022-04-20 NOTE — PROGRESS NOTES
"Orthopaedic Surgery Progress Note     4/20/2022    E: No acute events overnight.    S: Pain well controlled, \" I am exhausted\"    O:   /64 (BP Location: Left arm)   Pulse 81   Temp 97.3  F (36.3  C) (Oral)   Resp 16   Ht 1.626 m (5' 4\")   Wt 73.9 kg (162 lb 14.7 oz)   LMP 06/16/2007 (Approximate)   SpO2 98%   BMI 27.97 kg/m      Drain 50/ not documented    Exam:    Gen: NAD, A/O x 3  Resp: Comfortable, non-labored breathing  RLE:  -Wound dressed, c/d/i  -Sens: Intact  -Motor: 5/5  -Vasc: 2+ pulses  -Skin:Swelling of RLE     Recent Labs   Lab 04/20/22  0607 04/19/22 2121 04/19/22 0627   NA  --  135  --    POTASSIUM  --  4.1  --    CHLORIDE  --  103  --    CO2  --  24  --    BUN  --  14  --    CR  --  0.66  --    GLC 98 187* 120*     Recent Labs   Lab 04/20/22  0623 04/20/22  0108 04/19/22 2121   HGB 9.8* 9.7* 10.8*     No lab results found in last 7 days.      Impression: 56 year old female s/p Excision of Large Schwannoma of Right Retroperitoneum and Groin Decompression Neuroplasty of Right Femoral Nerve  Vascular Dissection of Iliac and Common Femoral Artery and Vein on 4/19/2022    Plan:  - Activity:  Weight bearing as tolerated  - Antibiotics: Ancef x 24 hours  - Diet: Regular  - DVT prophylaxis: ASA 81 mg BID x 4 weeks.  - Wound Care: keep dressing in place at least 1 week, longer if possible  - Drain: Continue drain   - Pain management: Oral and IV Pain meds prn  - Physical Therapy: Evaluate and treat  - Occupational Therapy: Evaluate and treat  - Dispo: Discharge to home POD # 2  - Follow up: Follow up with Dr Katz as scheduled on 5/5/2022 at 1245 pm  Future Appointments   Date Time Provider Department Center   4/20/2022  1:30 PM Orquidea Abernathy, PT [facility]   5/5/2022 12:45 PM Richard Katz MD [facility]        Erika Echavarria APRN [facility]  Department of Orthopedic Surgery  [facility]  [phone]    For any questions regarding this patient please page me at the above number prior to " contacting the ortho resident on call.       not available

## 2022-04-20 NOTE — PROGRESS NOTES
Prior Authorization **INITIATED**    Medication: Oxycoedone 5mg tabs **APPROVED**  Insurance Company: [employer] - Phone [phone] Fax [phone]  Pharmacy Filling the Rx: [place], [place] - [address]  Filling Pharmacy Phone: [phone]  Filling Pharmacy Fax: [phone]  Start Date: 4/20/2022  Reference #: CoverMyMeds Key: [id number] - [place] Case ID: [id number]  Comments:  Plan limits to 7 days of opioids per 90 days. This RX exceeds plan limit.      Ophelia Ram[facility]rez [facility]iaison  Pronouns: She/Her/Hers    [facility]  [address]  [address], [place] [address]   [email or web address]  [email or web address]   Phone: [phone]  Pager: [phone]  Fax: [phone]

## 2022-04-20 NOTE — PROGRESS NOTES
04/20/22 1100   Quick Adds   Type of Visit Initial PT Evaluation   Living Environment   People in Home alone;child(kell), adult  (pt staying with daughter at her home at [place])   Current Living Arrangements house   Home Accessibility no concerns  (patio entrance, single floor no MYKEL or inside)   Transportation Anticipated car, drives self;family or friend will provide   Living Environment Comments Pt reports having 3 MYKEL at her own home but will plan to stay with her daughter at daughters home where there are no MYKEL or stairs inside. Pt reports driving prior to surgery, family able to provide over recovery.   Self-Care   Usual Activity Tolerance excellent   Current Activity Tolerance moderate   Regular Exercise No   Equipment Currently Used at Home none  (has SPC and walker at daughters home)   Fall history within last six months no   Activity/Exercise/Self-Care Comment Pt reports being fully IND with all ADLs and home care prior to surgery. Pt planning to stay with daughter at discharge where daughter is available to assist all day as she does not work. Pt has no prior use of ADs but has SPC and walker available at the house. No history of falls.   General Information   Onset of Illness/Injury or Date of Surgery 04/19/22   Referring Physician Rick Stroud MD   Patient/Family Therapy Goals Statement (PT) Return Home   Pertinent History of Current Problem (include personal factors and/or comorbidities that impact the POC) Ophelia Wolf is a 56 year old female admitted on 4/19/2022. POD # 0 s/p Excision of Large Schwannoma of Right Retroperitoneum and Groin. Decompression Neuroplasty of Right Femoral Nerve. Vascular Dissection of Iliac and Common Femoral Artery and Vein. EBL 50 ml. No complications during surgery.   Existing Precautions/Restrictions no known precautions/restrictions   Weight-Bearing Status - LUE full weight-bearing   Weight-Bearing Status - RUE full weight-bearing   Weight-Bearing Status - LLE full  weight-bearing   Weight-Bearing Status - RLE weight-bearing as tolerated   General Observations Activity: Up with assist   Cognition   Affect/Mental Status (Cognition) WNL   Orientation Status (Cognition) oriented x 4   Pain Assessment   Patient Currently in Pain Yes, see Vital Sign flowsheet  (at R hip with movement and WB)   Integumentary/Edema   Integumentary/Edema Comments Mild edema of R thigh 2/2 surgery. No edema of B ankles or feet. Incision with dressing. Other skin intact.   Posture    Posture Forward head position   Range of Motion (ROM)   Range of Motion ROM deficits secondary to surgical procedure   ROM Comment R hip and knee limited to increased swelling around hip, WNL at B UE and L LE   Strength (Manual Muscle Testing)   Strength Comments Limited at R hip and knee 2/2 pain, globally 4/5 at LE.   Bed Mobility   Comment, (Bed Mobility) Min-ModA at R LE for bed mob   Transfers   Comment, (Transfers) CGA for STS   Gait/Stairs (Locomotion)   Comment, (Gait/Stairs) CGA with FWW   Balance   Balance Comments Fait sitting balance, pt leans 2/2 pain. Stable in standing and gait with FWW   Sensory Examination   Sensory Perception patient reports no sensory changes   Sensory Perception Comments Pt reporting no changes in vision or sensation from baseline. Normal screens. Pt has decreased balance 2/2 pain.   Coordination   Coordination no deficits were identified   Coordination Comments Per observation of functional movement   Muscle Tone   Muscle Tone no deficits were identified   Muscle Tone Comments Per observation of functional movement   Clinical Impression   Criteria for Skilled Therapeutic Intervention Yes, treatment indicated   PT Diagnosis (PT) Impaired functional mobility secondary to surgery   Influenced by the following impairments Pain, decreased ROM, strength, activity tolerance, balance   Functional limitations due to impairments Pt presenting below baseline level of function 2/2 to surgical  procedure   Clinical Presentation (PT Evaluation Complexity) Stable/Uncomplicated   Clinical Presentation Rationale Pt on RA, medically stable, clinical judgment   Clinical Decision Making (Complexity) low complexity   Planned Therapy Interventions (PT) balance training;bed mobility training;gait training;neuromuscular re-education;home exercise program;orthotic fitting/training;patient/family education;postural re-education;ROM (range of motion);stair training;strengthening;stretching;transfer training;progressive activity/exercise;risk factor education;home program guidelines   Risk & Benefits of therapy have been explained evaluation/treatment results reviewed;care plan/treatment goals reviewed;risks/benefits reviewed;current/potential barriers reviewed;participants voiced agreement with care plan;participants included;patient   Clinical Impression Comments Pt will benefit from skilled PT services to address impairments in functional mobility.   PT Discharge Planning   PT Discharge Recommendation (DC Rec) home with assist   PT Rationale for DC Rec Pt previously IND at home, current mobility limited mostly by pain, will be safe to return home when medically ready and adequate pain control. Has assist from family as needed.   PT Brief overview of current status Min-ModA bed mobility, CGA with FWW   Plan of Care Review   Plan of Care Reviewed With patient   Total Evaluation Time   Total Evaluation Time (Minutes) 10   Physical Therapy Goals   PT Frequency Daily   PT Predicted Duration/Target Date for Goal Attainment 04/22/22   PT Goals Bed Mobility;Transfers;Gait   PT: Bed Mobility Supervision/stand-by assist   PT: Transfers Modified independent;Assistive device   PT: Gait Modified independent;Assistive device;150 feet

## 2022-04-20 NOTE — PLAN OF CARE
OT orders received and chart review completed. Upon discussion with patient and PT it was decided that patient does not have IPOT needs. Patient has a good support system at home and can receive assist as needed. If anything changes please re-consult. Orders completed.

## 2022-04-20 NOTE — PROGRESS NOTES
Prior Authorization **APPROVED**    Authorization Effective Date: 4/20/2022  Authorization Expiration Date: 4/20/2023  Medication: Oxycoedone 5mg tabs **APPROVED**  Approved Dose/Quantity: Greater than 7 days of opioid pain meds per 90 days  Reference #: CoverMyMeds Key: [id number] - [place] Case ID: [id number]   Insurance Company: [employer] - Phone [phone] Fax [phone]  Expected CoPay: $2.82     CoPay Card Available: No    Foundation Assistance Needed: n/a  Which Pharmacy is filling the prescription (Not needed for infusion/clinic administered): [facility] [place], [place] - [address]  Pharmacy Notified: Yes  Patient Notified: Yes  Comments:  Plan limits to 7 days of opioids per 90 days. This RX exceeds plan limit.            Ophelia Ramírez CPhT  [place] Discharge Pharmacy Liaison  Pronouns: She/Her/Hers    [facility]  [address]  [address] [address]   [email or web address]  [email or web address]   Phone: [phone]  Pager: [phone]  Fax: [phone]

## 2022-04-20 NOTE — PROGRESS NOTES
[facility]    Medicine Progress Note - Hospitalist Service, GOLD TEAM 17    Date of Admission:  4/19/2022    Assessment & Plan        POD # 1 s/p Excision of Large Schwannoma of Right Retroperitoneum and Groin. Decompression Neuroplasty of Right Femoral Nerve. Vascular Dissection of Iliac and Common Femoral Artery and Vein. EBL 50 ml. No complications during surgery.      Excision of Large Schwannoma of Right Retroperitoneum and Groin.  -Ortho following the patient.   -DVT prophylaxis, theresa op antibiotics and pain management per Ortho.   -She had episodes of hypotension last night, no clear etiology, this could be related to anesthesia. HGB stable. CT abdomen / pelvis without bleeding.   -Capnography per protocol.   -Drain management per Ortho.   -Continue monitoring HGB.   -Bowel regimen.    -Disposition per Ortho.   -PT / OT          Diet: Advance Diet as Tolerated: Regular Diet Adult  Regular Diet Adult    DVT Prophylaxis: Defer to primary service  Farmer Catheter: Not present  Central Lines: None  Cardiac Monitoring: None  Code Status: Full Code      Disposition Plan   Expected Discharge: 04/21/2022     Anticipated discharge location: home with familyAwaiting care coordination huddle     The patient's care was discussed with the Patient.    Asael Koch MD  Hospitalist Service, GOLD TEAM 17  [facility]  Securely message with the Vocera Web Console (learn more here)  Text page via [employer] Paging/Directory   Please see signed in provider for up to date coverage information    ______________________________________________________________________    Interval History     Hypotension overnight.   She was pleasant.   No chest pain or palpitations.   No shortness of breath or cough.   No nausea, vomit or abdominal pain.   R groin with swelling.     Data reviewed today: I reviewed all medications, new labs and imaging  results over the last 24 hours.     Physical Exam   Vital Signs: Temp: 97.3  F (36.3  C) Temp src: Oral BP: 104/64 Pulse: 81   Resp: 16 SpO2: 98 % O2 Device: None (Room air) Oxygen Delivery: 2 LPM  Weight: 162 lbs 14.72 oz  General Appearance: Alert, room air, no acute distress.   Respiratory: Normal respiratory effort, clear lungs.   Cardiovascular: RRR, no murmur.   GI: Abdomen soft, + BS, no tenderness to palpation.   Musculoskeletal: R groin with drain. No erythema.   Neurologic: Alert, oriented, moving all extremities.   Psychiatric: Mood stable.     Data   Recent Labs   Lab 04/20/22  0623 04/20/22  0607 04/20/22  0108 04/19/22 2121 04/19/22 0627   HGB 9.8*  --  9.7* 10.8*  --    NA  --   --   --  135  --    POTASSIUM  --   --   --  4.1  --    CHLORIDE  --   --   --  103  --    CO2  --   --   --  24  --    BUN  --   --   --  14  --    CR  --   --   --  0.66  --    ANIONGAP  --   --   --  8  --    LYNNE  --   --   --  8.5  --    GLC  --  98  --  187* 120*     No results found for this or any previous visit (from the past 24 hour(s)).  Medications     lactated ringers 75 mL/hr at 04/19/22 2310       acetaminophen  975 mg Oral Q8H     aspirin  162 mg Oral Daily     calcium citrate-vitamin D  1 tablet Oral BID     polyethylene glycol  17 g Oral Daily     senna-docusate  1 tablet Oral BID     sodium chloride (PF)  3 mL Intracatheter Q8H

## 2022-04-20 NOTE — PROGRESS NOTES
CROSS COVER NOTE:    Contacted by bedside RN regarding hypotension 79/50.  This reading was rechecked multiple times. Patient had surgery today for removal of large schwannoma of right retroperitoneum and groin    Visited patient at bedside. She is resting comfortably. Awake, alert, texting grandson on phone. She is not symptomatic with this BP. No change in alertness, no dizziness or light-headedness. She has not been out of bed since surgery. On exam, LS are CTA. There are no external signs of bleeding. The area around the incision does not appear abnormally swollen.     Plan:  1. 1L LR bolus   2. STAT hemoglobin check  3. Lactic acid check  4. BMP for baseline    If Hgb low, would transfuse and do CTA a/p looking for retroperitoneal bleed. Patient already had a type & screen today. Already has large-bore IV in case of need for transfusion or scan with contrast.    Discussed plan of care with HARLEY Weinstein and the patient.    Florina Perales, APRN, ACNPC-AG  BA, BSN-RN, CNRN, TCRN  Pgr [phone]    ADDENDUM 22:05 - Hgb returned 10.8 (pt baseline 14). Lactic acid returned 2.6, confirming hypoperfusion. Recheck BP 88/51. Plan to give additional 500mL bolus, monitor BP for further improvement. Recheck Hgb q4h overnight to be cautious. Recheck lactic acid as well at 0200. [facility] medicine for new/worsening abdominal and/or back pain. Ensure patient has 18 gauge, patient IV.      ADDENDUM 0645h  Hgb recheck slightly , BP trend remained low (SBP 80s), CT Abd stat wo evidence of RP bleeding (my read, rads report pending), c/w Hgb trending, BP improved slightly without intervention    Shahzad Bojorquez MD

## 2022-04-20 NOTE — OP NOTE
[facility] Orthopaedic Surgery  Operative Note            Procedure date April 20, 2022   Pre-operative diagnosis: [id number] schwannoma [D36.10]  Compression neuropathy R femoral nerve     Post-operative diagnosis Same as pre-op   Procedure: Procedure(s):  Excision of Large Schwannoma of Right Retroperitoneum and Groin  Decompression Neuroplasty of Right Femoral Nerve  Vascular Dissection of Iliac and Common Femoral Artery and Vein   Surgeon: Richard Katz MD   Assistants(s): Rick Stroud DO      Anesthesia: General    Estimated blood loss: Less than 100 ml   Total IV fluids: (See anesthesia record)   Blood transfusion: (See anesthesia record)   Total urine output: (See anesthesia record)   Drains: Stepan-Perez   Specimens: ID Type Source Tests Collected by Time Destination   1 : Right retroperitoneal inguinal thigh mass Tissue Thigh, Right SURGICAL PATHOLOGY EXAM Richard Katz MD 4/19/2022 10:50 AM       Findings: Large schwannoma arising from femoral nerve   Implants: none     * No implants in log *    Indications:  This patient had a large schwannoma approximately 10 cm arising in the retroperitoneal and right inguinal groin region compressing her femoral nerve and common femoral artery and vein as he was experiencing dysesthesias.    Procedure details:  Patient was placed on the operative table supine position.  Sterile prep and drape of the right lower extremity groin and abdominal wall created.    Longitudinal incision extending from just medial to the anterior superior iliac spine across the lower abdominal wall, inguinal ligament and into the femoral triangle was created.  Dissection through the subcutaneous tissue undertaken.  A lymphatic vessels encountered were ligated with hemoclips or cauterized.  I continued the dissection along the medial border of the sartorius muscle.  The mass was immediately encountered deep to the femoral nerve and  soft tissues.  Noted to preserve all the femoral nerve branches, I used a specialized nerve stimulator to identify different branches of the femoral nerve there innervating the various thigh musculature.  All of these branches were preserved as possible.    The dissection was continued over the medial side of the mass and the common femoral artery and vein were identified.  They were under tension as a mass that extends beneath the vessels and the vessels were displaced.  A vascular dissection was commenced.  I then dissected along the lateral border of the common femoral artery and the external Ilac artery by retracting the inguinal ligament proximally.  Satisfactory exposure of the proximal end of the mass could not be safely undertaken and therefore I elected to detach the inguinal ligament from the anterior superior leg spine.    A retroperitoneal closure was undertaken by detaching the inguinal ligament using electrocautery for distance approximately 2 inches along the anterior iliac crest and the anterior superior iliac spine.  This was tagged with an Ethibond suture.  This allowed me to retract the abdominal wall and superior and medial direction in order to further provide exposure of the mass within the retroperitoneum.  The iliac us muscle was identified.  The psoas muscle could not be seen but was posterior lateral to the mass.  It was difficult to visualize the femoral nerve as it had been attenuated and spread out over the mass.    I continued the vascular dissection now along the lateral border of the common femoral artery and external iliac artery.  The lateral circumflex vessels were divided between clamps and ligated with 2-0 silk sutures.  Multiple other branches had to be divided between hemoclips as well.  I gradually  the common femoral and the external iliac artery from the mass itself.  Blunt dissection was undertaken.  Careful dissection was used to separate the associated external  iliac and common femoral vein from the medial border of the mass as well.  Once this was sufficiently mobilized I directed my attention to the femoral nerve area.    The neurostimulator was used to assist in identifying nerve branches as I performed a neuroplasty of the femoral nerve.  The femoral nerve proximally was identified with a nerve stimulator and various branches extending from the mass and outward to the quadriceps and sartorius muscle were identified.  These were preserved as possible.  The proximal end of the mass was palpated and I dissected sharply to separate the soft tissues of the retroperitoneal adipose tissue from the mass itself.  This allowed me that then bluntly dissected around the mass.  In order to preserve the maximal amount of femoral nerve branches, I divided the soft tissues along the most lateral portion of the mass and continue dissection along the mass while reflecting the pseudocapsular tissue directly off the mass itself with care being taken to preserve the longitudinal continuity of all of the tissue within this area as it is identified as having nerve branches from the femoral nerve as determined using the nerve stimulator.  I continued dissecting the soft tissues around the anterior aspect of the mass throughout its entire length.  More distally branches of the profunda femoris artery were encountered and were divided between ligatures.  Once the soft tissue around the anterior portion of masses and sufficiently mobilized I continued the dissection medially that was able to reflect the soft tissues containing branches of the femoral nerve completely along the medial border of the mass.  At this point I dissected along the proximal surface of the mass was able to continue the dissection down to the psoas tendon.  The mass was adherent to the psoas tendon I did sharply divide a portion of the psoas tendon longitudinally along the course of the mass.  Approximately 30% of the  remaining within the psoas tendon was preserved in continuity down to the level of the insertion on the lesser trochanter.    Having completed that neuroplasty and reflecting the femoral nerve and preserving as many branches as possible, I continued the dissection of the mass mobilizing it from the psoas tendon both in the lateral and medial direction.  Gradually the proximal pole of the mass was mobilized and reflected this anteriorly out of the wound.  With blunt dissection I was able to palpate the lesser trochanter and continue dissected around the mass but preserving as much of the psoas tendon as possible.  The distal end of the mass was encountered and mobilized after ligating any bridging vessels.  I gradually delivered the mass into the wound.  Due to the amount of traction required, a small lobular portion of the distal mass opened up and some of the soft neoplastic tissue exited into the surrounding musculature.  This was evacuated and irrigated and all neoplastic tissue was completely removed.  The entire mass was now removed en bloc and delivered from the wound.    Hemostasis was now secured by expecting the wound bed and using electrocautery or ligatures.  2-0 silk ligatures were required in various areas with care being taken to preserve the femoral nerve branches and being mindful of the adjacent external iliac, common femoral and superficial femoral artery.  Once hemostasis was secured, the wound was then thoroughly irrigated to ensure all of the wound was cleansed and any possible leakage from the tumor had been completely removed and evacuated.  Once accomplished, wound closure was then undertaken.    A drain was placed in the most deep this portion of the wound adjacent to the lesser trochanter underneath the femoral nerve adjacent to the common femoral artery.  It was brought out through a distal stab wound incision.    The inguinal ligament was now reattached to the anterior superior leg spine  using several interrupted #1 Ethibond sutures and placing them through the bone itself thereby firmly attaching the lateral portion of the inguinal ligament to the anterior superior leg spine.  The remaining portion of the abdominal wall was then reattached to the portion of the iliac crest where had been dissected from initially.  I inspected the femoral triangle to ensure that there is no evidence of any herniation of abdominal contents.    The subcutaneous tissue from the femoral triangle and alongside the sartorius muscle and now reapposed with 0 Vicryl sutures in a figure-of-eight interrupted fashion.  The remainder of the subcutaneous tissue and skin were then closed with interrupted 2-0 Vicryl and 3-0 subcuticular running sutures, respectively.  Sterile dressings were applied.     The 22 modifier is appended for the following reasons.  The complexity of preserving the femoral nerve branches was quite difficult due to the very large size of the tumor and the location deep within the retroperitoneum and femoral triangle extending down to the lesser trochanter and abductor region of the thigh.  This greatly increased the level of technical difficulty in performing this surgery procedure as well also preserving her external iliac and common femoral artery and vein.  This is also represented by the increased operative time associated these procedures of 3 hours and 27 minutes which is 8% greater that normally associate with these procedures.    Postoperative plan:  1.  Patient may be weightbearing as tolerated on right lower extremity  2.  Drain and Farmer catheter will be kept in place for 24 hours  3.  Surgical dressing to be kept in place for 1 week  4.  Patient may discharge from hospital once oral pain analgesic medication is satisfactory and she is sufficiently mobile to be cared for at home with her .  5.  Follow-up in my clinic office in 2 weeks time for review pathology and wound  evaluation.      MD Ayan Lewis Family Professor  Oncology and Adult Reconstructive Surgery  Dept Orthopaedic Surgery, [facility]  [phone] office, [phone] pager  [email or web address]

## 2022-04-20 NOTE — PLAN OF CARE
VS: VSS besides low BP's. MD made aware and LR bolus of 1000mL and an additional 500mL bolus were given.    O2: >90% on RA   Output: Farmer in place to be removed PODx1   Last BM: 4/19/22 prior to surgery per pt report   Activity: Ax1 with walker and gait belt. Pt stood at edge of bed overnight.    Up for meals? N/A   Skin: R groin incision and bruising   Pain: Managed with scheduled Tylenol, PRN Oxycodone, and ice packs.    CMS: R groin/leg numbness reported. Pt had general anesthesia. A&O x4   Dressing: Alginate dressing to R groin - CDI   Diet: Regular pt denies nausea   LDA: PIV R forearm - infusing LR at 75mL   PIV L hand and forearm - SL   SHANTE drain to bulb suction    Equipment: IV pole, pump x2, walker, gait belt, and personal belongings.    Plan: TBD   Additional Info:

## 2022-04-20 NOTE — PROGRESS NOTES
"Attending progress note:    Vital signs:  Temp: (!) 96  F (35.6  C) Temp src: Oral BP: (!) 89/56 Pulse: 77   Resp: 15 SpO2: 92 % O2 Device: None (Room air) Oxygen Delivery: 2 LPM Height: 162.6 cm (5' 4\") Weight: 73.9 kg (162 lb 14.7 oz)  Estimated body mass index is 27.97 kg/m  as calculated from the following:    Height as of this encounter: 1.626 m (5' 4\").    Weight as of this encounter: 73.9 kg (162 lb 14.7 oz).      Recent Labs   Lab Test 04/20/22  0623 04/20/22  0108 04/19/22 2121 04/06/22  1049 04/06/22  1049 05/22/21 2128 12/18/20  0839   HGB 9.8* 9.7* 10.8*   < > 14.3 15.2 15.2   WBC  --   --   --   --  5.1 10.5 4.5    < > = values in this interval not displayed.     No results for input(s): FTYP, FNEU, FOTH, FCOL, FAPR, FWBC, KS0674, REDCELLCOUNT, PMN, MONONCLRS in the last 74759 hours.    Recent Labs   Lab Test 04/20/22  0607 04/19/22 2121 04/19/22  0627 04/06/22  1048   NA  --  135  --  137   POTASSIUM  --  4.1  --  3.9   CHLORIDE  --  103  --  102   CO2  --  24  --  31   ANIONGAP  --  8  --  4   GLC 98 187*   < > 104*   BUN  --  14  --  13   CR  --  0.66  --  0.68   LYNNE  --  8.5  --  9.9    < > = values in this interval not displayed.       Output by Drain (mL) 04/18/22 0700 - 04/18/22 1459 04/18/22 1500 - 04/18/22 2259 04/18/22 2300 - 04/19/22 0659 04/19/22 0700 - 04/19/22 1459 04/19/22 1500 - 04/19/22 2259 04/19/22 2300 - 04/20/22 0659 04/20/22 0700 - 04/20/22 0714   Closed/Suction Drain 1 Right;Medial Thigh Bulb 15 Albanian    30 40         I/O last 3 completed shifts:  In: 1662 [P.O.:160; I.V.:1500; IV Piggyback:2]  Out: 2335 [Urine:2235; Drains:70; Blood:30]      PLAN:  1. OK to increase frequency of oxycodone to 5mg po q3-4h prn.  2. Monitor BP as she increases activity  3. discontinue lemon and drain this AM.  4. PT: gait training, WBAT  5. Leave dressing on x 1 week.  6. If VSS and po pain meds sufficient, OK to discharge today or tomorrow and followup in clinic 2 weeks.    Richard Katz, " MD Botello Family Professor  Oncology and Adult Reconstructive Surgery  Dept Orthopaedic Surgery, [facility]  [phone] office, [phone] pager  [email or web address]

## 2022-04-20 NOTE — PLAN OF CARE
VS: VSS low BP's this morning pt is asymptomatic BP improved WNL, pt denied CP or SOB.   O2: >90% on RA   Output: Farmer dcd in the morning pt voiding adequate amount with out difficulty.   Last BM: 4/19/22 prior to surgery per pt report   Activity: Ax1 with walker and gait belt, pt up to bathroom and walked with PT.    Up for meals? N/A   Skin: R groin dressing CDI.   Pain: comfortably manageable with PRN po medication, and ice packs.    CMS: CMS and neuro intact.    Dressing:  dressing to R groin - CDI   Diet: Regular tolerating with out N/V   LDA: 2 PIV SL on left hand.  SHANTE drain.   Equipment: IV pole, walker, gait belt, and personal belongings.    Plan: TBD   Additional Info:

## 2022-04-21 ENCOUNTER — APPOINTMENT (OUTPATIENT)
Dept: PHYSICAL THERAPY | Facility: CLINIC | Age: 57
End: 2022-04-21
Attending: ORTHOPAEDIC SURGERY
Payer: COMMERCIAL

## 2022-04-21 VITALS
HEART RATE: 99 BPM | HEIGHT: 64 IN | BODY MASS INDEX: 27.81 KG/M2 | OXYGEN SATURATION: 97 % | RESPIRATION RATE: 16 BRPM | DIASTOLIC BLOOD PRESSURE: 61 MMHG | SYSTOLIC BLOOD PRESSURE: 104 MMHG | WEIGHT: 162.92 LBS | TEMPERATURE: 97.4 F

## 2022-04-21 LAB
HGB BLD-MCNC: 9.2 G/DL (ref 11.7–15.7)
HOLD SPECIMEN: NORMAL

## 2022-04-21 PROCEDURE — 97530 THERAPEUTIC ACTIVITIES: CPT | Mod: GP | Performed by: PHYSICAL THERAPIST

## 2022-04-21 PROCEDURE — 250N000013 HC RX MED GY IP 250 OP 250 PS 637: Performed by: ORTHOPAEDIC SURGERY

## 2022-04-21 PROCEDURE — 85018 HEMOGLOBIN: CPT | Performed by: ORTHOPAEDIC SURGERY

## 2022-04-21 PROCEDURE — 250N000013 HC RX MED GY IP 250 OP 250 PS 637: Performed by: CLINICAL NURSE SPECIALIST

## 2022-04-21 PROCEDURE — 97116 GAIT TRAINING THERAPY: CPT | Mod: GP | Performed by: PHYSICAL THERAPIST

## 2022-04-21 PROCEDURE — 99212 OFFICE O/P EST SF 10 MIN: CPT | Performed by: INTERNAL MEDICINE

## 2022-04-21 PROCEDURE — 250N000013 HC RX MED GY IP 250 OP 250 PS 637: Performed by: INTERNAL MEDICINE

## 2022-04-21 PROCEDURE — 36415 COLL VENOUS BLD VENIPUNCTURE: CPT | Performed by: ORTHOPAEDIC SURGERY

## 2022-04-21 RX ORDER — SUMATRIPTAN 50 MG/1
50 TABLET, FILM COATED ORAL ONCE
Status: COMPLETED | OUTPATIENT
Start: 2022-04-21 | End: 2022-04-21

## 2022-04-21 RX ADMIN — SENNOSIDES AND DOCUSATE SODIUM 1 TABLET: 50; 8.6 TABLET ORAL at 08:14

## 2022-04-21 RX ADMIN — ACETAMINOPHEN 975 MG: 325 TABLET ORAL at 06:01

## 2022-04-21 RX ADMIN — OXYCODONE HYDROCHLORIDE 5 MG: 5 TABLET ORAL at 03:40

## 2022-04-21 RX ADMIN — SUMATRIPTAN SUCCINATE 50 MG: 50 TABLET ORAL at 11:45

## 2022-04-21 RX ADMIN — OXYCODONE HYDROCHLORIDE 5 MG: 5 TABLET ORAL at 10:57

## 2022-04-21 RX ADMIN — ASPIRIN 162 MG: 81 TABLET ORAL at 08:14

## 2022-04-21 RX ADMIN — OXYCODONE HYDROCHLORIDE 5 MG: 5 TABLET ORAL at 06:40

## 2022-04-21 RX ADMIN — Medication 1 TABLET: at 08:14

## 2022-04-21 RX ADMIN — POLYETHYLENE GLYCOL 3350 17 G: 17 POWDER, FOR SOLUTION ORAL at 08:14

## 2022-04-21 RX ADMIN — OXYCODONE HYDROCHLORIDE 5 MG: 5 TABLET ORAL at 00:40

## 2022-04-21 NOTE — PLAN OF CARE
DISCHARGE SUMMARY    Pt discharging to: home.  Transportation: family.  AVS given and discussed: yes  Stoplight Tool given and discussed: yes  Medications given: yes  Belongings returned: yes  Comments:   Pt understand discharge plan.

## 2022-04-21 NOTE — PROGRESS NOTES
[facility]    Medicine Progress Note - Hospitalist Service, GOLD TEAM 17    Date of Admission:  4/19/2022    Assessment & Plan        POD # 2 s/p Excision of Large Schwannoma of Right Retroperitoneum and Groin. Decompression Neuroplasty of Right Femoral Nerve. Vascular Dissection of Iliac and Common Femoral Artery and Vein. EBL 50 ml. No complications during surgery.      Excision of Large Schwannoma of Right Retroperitoneum and Groin.  -Ortho following the patient.   -DVT prophylaxis, theresa op antibiotics and pain management per Ortho.   -HGB stable. CT abdomen / pelvis showed: Postsurgical changes in the right groin and iliopsoas region for tumor resection, now with multifocal hematomas throughout the surgical beds. There is a tiny focus of active extravasation in the right groin. No retroperitoneal hemorrhage.  -Hypotension resolved. Episodes of hypotension most likely related to anesthesia.   -Drain management per Ortho.   -Continue monitoring HGB.   -Bowel regimen.    -Disposition per Ortho.         Diet: Advance Diet as Tolerated: Regular Diet Adult  Regular Diet Adult    DVT Prophylaxis: Defer to primary service  Farmer Catheter: Not present  Central Lines: None  Cardiac Monitoring: None  Code Status: Full Code      Disposition Plan   Expected Discharge: 04/21/2022     Anticipated discharge location: home with familyAwaiting care coordination huddle     The patient's care was discussed with the Patient.    Asael Koch MD  Hospitalist Service, GOLD TEAM 17  [facility]  Securely message with the Vocera Web Console (learn more here)  Text page via [employer] Paging/Directory   Please see signed in provider for up to date coverage information    ______________________________________________________________________    Interval History     No acute events overnight.   She was pleasant.   No chest pain or palpitations.    No shortness of breath or cough.   No nausea, vomit or abdominal pain.   + moderate groin pain. No hematoma.     Data reviewed today: I reviewed all medications, new labs and imaging results over the last 24 hours.     Physical Exam   Vital Signs: Temp: 97.4  F (36.3  C) Temp src: Oral BP: 104/61 Pulse: 99   Resp: 16 SpO2: 97 % O2 Device: None (Room air)    Weight: 162 lbs 14.72 oz  General Appearance: Alert, room air, no acute distress.   Respiratory: Normal respiratory effort, clear lungs.   Cardiovascular: RRR, no murmur.   GI: Abdomen soft, + BS, no tenderness to palpation.   Musculoskeletal: R groin with drain. No erythema or erythema.    Neurologic: Alert, oriented, moving all extremities.   Psychiatric: Mood stable.     Data   Recent Labs   Lab 04/21/22  0630 04/20/22  0623 04/20/22  0607 04/20/22  0108 04/19/22 2121 04/19/22 2121 04/19/22  0627   HGB 9.2* 9.8*  --  9.7*   < > 10.8*  --    NA  --   --   --   --   --  135  --    POTASSIUM  --   --   --   --   --  4.1  --    CHLORIDE  --   --   --   --   --  103  --    CO2  --   --   --   --   --  24  --    BUN  --   --   --   --   --  14  --    CR  --   --   --   --   --  0.66  --    ANIONGAP  --   --   --   --   --  8  --    LYNNE  --   --   --   --   --  8.5  --    GLC  --   --  98  --   --  187* 120*    < > = values in this interval not displayed.     No results found for this or any previous visit (from the past 24 hour(s)).  Medications     lactated ringers 75 mL/hr at 04/19/22 2310       acetaminophen  975 mg Oral Q8H     aspirin  162 mg Oral Daily     calcium citrate-vitamin D  1 tablet Oral BID     polyethylene glycol  17 g Oral Daily     senna-docusate  1 tablet Oral BID     sodium chloride (PF)  3 mL Intracatheter Q8H

## 2022-04-21 NOTE — PLAN OF CARE
Physical Therapy Discharge Summary    Reason for therapy discharge:    Discharged to home with outpatient therapy.    Progress towards therapy goal(s). See goals on Care Plan in [facility] electronic health record for goal details.  Goals met    Therapy recommendation(s):    Continued therapy is recommended.  Rationale/Recommendations:  for ambulation .

## 2022-04-21 NOTE — PROGRESS NOTES
Care Management Discharge Note    Discharge Date: 04/21/2022       Discharge Disposition:  Home    Discharge Services:  NA    Discharge DME:      Discharge Transportation: car, drives self, family or friend will provide    Handoff Referral Completed: Yes    Additional Information:  No home care or outpatient therapies indicated at this time. RNCC available as needed.    Clarice Aviles RN, BSN  Care Coordinator, 5 Ortho  Phone [phone]  Pager [phone]

## 2022-04-21 NOTE — PROGRESS NOTES
"Orthopaedic Surgery Progress Note     4/21/2022    E: No acute events overnight.    S: Pain well controlled, anticipating discharge today    O:   /61 (BP Location: Right arm)   Pulse 99   Temp 97.4  F (36.3  C) (Oral)   Resp 16   Ht 1.626 m (5' 4\")   Wt 73.9 kg (162 lb 14.7 oz)   LMP 06/16/2007 (Approximate)   SpO2 97%   BMI 27.97 kg/m      Exam:  Gen: NAD, A/O x 3  Resp: Comfortable, non-labored breathing  LLE:  -Wound dressed, c/d/i  -Sens: SILT dp/sp/s/s/t  -Motor: 5/5 EHL/FHL/TA/GSc  -Vasc: 2+ pulses, wwp, brisk cap refill    Recent Labs   Lab 04/20/22  0607 04/19/22 2121 04/19/22  0627   NA  --  135  --    POTASSIUM  --  4.1  --    CHLORIDE  --  103  --    CO2  --  24  --    BUN  --  14  --    CR  --  0.66  --    GLC 98 187* 120*     Recent Labs   Lab 04/21/22  0630 04/20/22  0623 04/20/22  0108 04/19/22 2121   HGB 9.2* 9.8* 9.7* 10.8*     No lab results found in last 7 days.    Impression: 56 year old female s/p Excision of Large Schwannoma of Right Retroperitoneum and Groin Decompression Neuroplasty of Right Femoral Nerve  Vascular Dissection of Iliac and Common Femoral Artery and Vein on 4/19/2022     Plan:  - Activity:  Weight bearing as tolerated  - Antibiotics: Ancef x 24 hours  - Diet: Regular  - DVT prophylaxis: ASA 81 mg BID x 4 weeks.  - Wound Care: keep dressing in place at least 1 week, longer if possible  - Drain: Remove drain this AM  - Pain management: Oral and IV Pain meds prn  - Physical Therapy: Evaluate and treat  - Occupational Therapy: Evaluate and treat  - Dispo: Discharge to home POD # 2  - Follow up: Follow up with Dr Katz as scheduled on 5/5/2022 at 1245 pm    Future Appointments   Date Time Provider Department Center   5/5/2022 12:45 PM Richard Katz MD [facility]  Department of Orthopedic Surgery  [facility]  [phone]    For any questions regarding this patient please page me at the above number prior to contacting the ortho " resident on call.

## 2022-04-21 NOTE — PLAN OF CARE
VS: VSS, pt denied CP or SOB.   O2: >90% on RA   Output:  pt voiding adequate amount with out difficulty.   Last BM: 4/19/22 prior to surgery per pt report   Activity: Ax1 with walker and gait belt, pt up to bathroom and walked with PT, up for meals sitting on chair.    Skin: R groin dressing CDI.   Pain: comfortably manageable with PRN po medication, and ice packs.    CMS: CMS and neuro intact.    Dressing:  dressing to R groin - CDI   Diet: Regular tolerating with out N/V   LDA: SHANTE and IV dcd.   Equipment: IV pole, walker, gait belt, and personal belongings.    Plan: Discharge home today.    Additional Info:

## 2022-04-21 NOTE — PLAN OF CARE
VS: Stable except slightly tachycardic at times, HR low 100's.   O2: RA.   Output: Voiding adequately in bathroom.   Last BM: 4/19.   Activity: Ax1 with walker and gait belt, WAR.   Skin: Incision, bruising, drain.   Pain: Throbbing pain in right groin managed with oxycodone, scheduled tylenol, and ice.   Neuro: A/Ox4. Numbness in right big toe and thigh.   Dressing: CDI.   Diet: Regular, denies nausea.   LDA: PIV left hand SL. PIV left lower forearm SL. SHANTE to bulb suction with bloody/red output.   Equipment: Walker, gait belt, IS, PCDs, call light within reach.   Plan: Continue to monitor.   Additional Info:

## 2022-04-24 LAB
PATH REPORT.COMMENTS IMP SPEC: NORMAL
PATH REPORT.FINAL DX SPEC: NORMAL
PATH REPORT.GROSS SPEC: NORMAL
PATH REPORT.MICROSCOPIC SPEC OTHER STN: NORMAL
PATH REPORT.RELEVANT HX SPEC: NORMAL
PHOTO IMAGE: NORMAL

## 2022-04-26 ENCOUNTER — TELEPHONE (OUTPATIENT)
Dept: ORTHOPEDICS | Facility: CLINIC | Age: 57
End: 2022-04-26
Payer: COMMERCIAL

## 2022-04-26 ENCOUNTER — MYC MEDICAL ADVICE (OUTPATIENT)
Dept: FAMILY MEDICINE | Facility: OTHER | Age: 57
End: 2022-04-26
Payer: COMMERCIAL

## 2022-04-26 DIAGNOSIS — G43.109 MIGRAINE WITH AURA AND WITHOUT STATUS MIGRAINOSUS, NOT INTRACTABLE: ICD-10-CM

## 2022-04-26 NOTE — CONFIDENTIAL NOTE
"-Spoke with this patient over the phone and here is what she reported:    -Patient had surgery on 4/19/22: Excision of Large Schwannoma of Right Retroperitoneum and GroinDecompression Neuroplasty of Right Femoral Nerve Vascular Dissection of Iliac and Common Femoral Artery and Vein.    -Post operatively, the patient notes that overall she is \"doing great!\"  -Patient denies any redness/ irritation/ sign of infection  -Patient is able to do her exercises & her mobility has even improved  -Patient notes that she has been up and walking around, and even reports that she cleaned the house for a few hours today (with the help of her daughters)  -Overall, the patient is doing great  -However, she called in reporting occasional \"tingling\" & \"electric shocks running down her leg\"    -The patient was encouraged that this was all part of the healing process as that is likely her \"nerves waking back up\"  -The patient was advised to continue with her exercises and keep walking and moving about as directed.  -The patient was notified that this information would be forwarded along to Dr. Katz, to see if he had any further advise or recommendations regarding that tingling/ shock sensation in her leg.    -Will call the patient back with any tips or tricks.  -Otherwise, reassured this is a normal part of healing.  -Patient was grateful for the call back and will call us with any further questions or concerns.     Mary Maddox RN  4/26/2022 4:29 PM     "

## 2022-04-26 NOTE — TELEPHONE ENCOUNTER
Routing to provider to review. 40 tablet oxycodone prescription noted in patients chart.     I do not see a 3 month supply of hydrocodone. It looks like this was discontinued upon discharge from hospital.    Orquidea LUNAN, RN

## 2022-04-26 NOTE — TELEPHONE ENCOUNTER
Health Call Center    Phone Message    May a detailed message be left on voicemail: yes     Reason for Call: Other: Pt calling  in re: post surgery and has questions .   Pt had surgery with Dr. Katz for excision of larch schwannoma of right Retroperitoneum and groin on 04/19  Pt states she is having tingling and twitching sensation and continues down leg.  Pt would like call back re: concerns. Pt can be reached at [phone]      Action Taken: Message routed to:  [facility] Orthopedics - Dr. Katz    Travel Screening: Not Applicable

## 2022-04-27 RX ORDER — HYDROCODONE BITARTRATE AND ACETAMINOPHEN 5; 325 MG/1; MG/1
TABLET ORAL
Qty: 6 TABLET | Refills: 0 | Status: ON HOLD | OUTPATIENT
Start: 2022-04-27 | End: 2022-05-24

## 2022-05-05 ENCOUNTER — OFFICE VISIT (OUTPATIENT)
Dept: ORTHOPEDICS | Facility: CLINIC | Age: 57
End: 2022-05-05
Payer: COMMERCIAL

## 2022-05-05 VITALS — WEIGHT: 162 LBS | HEIGHT: 64 IN | BODY MASS INDEX: 27.66 KG/M2

## 2022-05-05 DIAGNOSIS — T81.31XA SUPERFICIAL DISRUPTION OR DEHISCENCE OF OPERATION WOUND, INITIAL ENCOUNTER: Primary | ICD-10-CM

## 2022-05-05 PROCEDURE — 99024 POSTOP FOLLOW-UP VISIT: CPT | Performed by: ORTHOPAEDIC SURGERY

## 2022-05-05 RX ORDER — CEPHALEXIN 500 MG/1
500 CAPSULE ORAL 4 TIMES DAILY
Qty: 56 CAPSULE | Refills: 0 | Status: ON HOLD | OUTPATIENT
Start: 2022-05-05 | End: 2022-05-24

## 2022-05-05 RX ORDER — POLYETHYLENE GLYCOL 3350 17 G/17G
POWDER, FOR SOLUTION ORAL
Status: ON HOLD | COMMUNITY
Start: 2022-04-20 | End: 2022-05-20

## 2022-05-05 NOTE — PROGRESS NOTES
[facility], Orthopaedic Oncology Surgery Consultation  by Richard Katz M.D.    Ophelia Wolf MRN# [id number]    YOB: 1965     Requesting physician: Susannah Hou     Background history:  DX:  1. Rotator cuff degeneration.  Right shoulder  2. 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 R femoral neuropraxia    TREATMENTS:  1. 3/12/2021 Right rotator cuff repair, [facility]  2. Partial Hysterectomy  3. 3/7/2022, Core needle biopsy , US guided, ([facility]) [facility]  4. 4/19/2022, excision of 12cm schwannoma from right retroperitoneum and anterior groin.  Right femoral nerve neuroplasty and common femoral artery dissection.  ([facility]) [facility]      Ophelia returns for follow-up check after the above-mentioned surgery.  She is doing well and denies fever or any problems.    Dressing is removed today.  The wound edges are debrided somewhat.  There is a small midportion area in the groin which is moist and has superficial purulence.  No active drainage at this time.  New dressing applied.    She has intact femoral nerve function with 5/5 strength of her right quadriceps muscle.  4/5 hip flexor strength.    Impression and plan:    Wound care instructions given to patient.  She may shower bathe and reapply gauze.  Use bacitracin ointment as desired.  Prescription for Keflex 500 mg p.o. 4 times daily given to her x2 weeks.    Return for follow-up check and examination in person in 2 weeks.    MD Ayan Lewis Family Professor  Oncology and Adult Reconstructive Surgery  Dept Orthopaedic Surgery, [facility]  [phone] office, [phone] pager  [email or web address]

## 2022-05-05 NOTE — NURSING NOTE
"Chief Complaint   Patient presents with     Surgical Followup     2 week POP Schwannoma excision Right Groin DOS:4 19/22        56 year old  1965    Ht 1.626 m (5' 4\")   Wt 73.5 kg (162 lb)   LMP 06/16/2007 (Approximate)   BMI 27.81 kg/m        Date/Surgery/Surgeon/Hospital:  Past Surgical History:   Procedure Laterality Date     ARTHROTOMY SHOULDER, ROTATOR CUFF REPAIR, COMBINED Right 3/12/2021    Procedure: ARTHROTOMY, SHOULDER (anatoly marmolejo), WITH ROTATOR CUFF REPAIR open;  Surgeon: César Talavera MD;  Location: [facility] OR     COLONOSCOPY  04/14/10     EXCISE MASS GROIN Right 4/19/2022    Procedure: Excision of Large Schwannoma of Right Retroperitoneum and Groin;  Surgeon: Richard Katz MD;  Location: [facility] OR     REPAIR SCIATIC NERVE Right 4/19/2022    Procedure: Decompression Neuroplasty of Right Femoral Nerve;  Surgeon: Richard Katz MD;  [facility] OR     VASCULAR REPAIR LOWER EXTREMITY N/A 4/19/2022    Procedure: Vascular Dissection of Iliac and Common Femoral Artery and Vein;  Surgeon: Richard Katz MD;  Location: [facility] OR     ZZC LIGATE FALLOPIAN TUBE,POSTPARTUM  1990    Tubal Ligation     ZZC NONSPECIFIC PROCEDURE  1978    jaw surgery     ZZC SUPRACERV ABD HYSTERECTOMY  06/28/2007   1.              Pain Assessment  Patient Currently in Pain: Yes  0-10 Pain Scale: 3        [facility] [place], [place] - [id number] - [place], [place] - [address] [facility] [address] [facility] [id number] - [place] - [address] [facility] [id number] - [place], [place] - [address] [id number] - [place], [place] - [address] [facility] [address]        Allergies   Allergen Reactions     Amoxicillin Difficulty breathing     Anesthetic Ether      pseudocholinesterase deficiency         Current Outpatient Medications   Medication     acetaminophen (TYLENOL) 325 MG tablet     Ascorbic Acid (VITAMIN C PO)     aspirin (ASA) 81 MG EC tablet     " Calcium Carbonate-Vitamin D (CALCIUM + D PO)     famotidine (PEPCID) 20 MG tablet     HYDROcodone-acetaminophen (NORCO) 5-325 MG tablet     multivitamin, therapeutic (THERA-VIT) TABS tablet     oxyCODONE (ROXICODONE) 5 MG tablet     polyethylene glycol (MIRALAX) 17 g packet     polyethylene glycol (MIRALAX) 17 GM/Dose powder     senna-docusate (SENOKOT-S/PERICOLACE) 8.6-50 MG tablet     SUMAtriptan (IMITREX) 100 MG tablet     VITAMIN D PO     No current facility-administered medications for this visit.             Questionnaires:      Promis 10 Assessment    PROMIS 10 5/2/2022   In general, would you say your health is: Very good   In general, would you say your quality of life is: Very good   In general, how would you rate your physical health? Very good   In general, how would you rate your mental health, including your mood and your ability to think? Excellent   In general, how would you rate your satisfaction with your social activities and relationships? Excellent   In general, please rate how well you carry out your usual social activities and roles Excellent   To what extent are you able to carry out your everyday physical activities such as walking, climbing stairs, carrying groceries, or moving a chair? Completely   How often have you been bothered by emotional problems such as feeling anxious, depressed or irritable? Never   How would you rate your fatigue on average? Mild   How would you rate your pain on average?   0 = No Pain  to  10 = Worst Imaginable Pain 2   In general, would you say your health is: 4   In general, would you say your quality of life is: 4   In general, how would you rate your physical health? 4   In general, how would you rate your mental health, including your mood and your ability to think? 5   In general, how would you rate your satisfaction with your social activities and relationships? 5   In general, please rate how well you carry out your usual social activities and roles.  (This includes activities at home, at work and in your community, and responsibilities as a parent, child, spouse, employee, friend, etc.) 5   To what extent are you able to carry out your everyday physical activities such as walking, climbing stairs, carrying groceries, or moving a chair? 5   In the past 7 days, how often have you been bothered by emotional problems such as feeling anxious, depressed, or irritable? 1   In the past 7 days, how would you rate your fatigue on average? 2   In the past 7 days, how would you rate your pain on average, where 0 means no pain, and 10 means worst imaginable pain? 2   Global Mental Health Score 19   Global Physical Health Score 17   PROMIS TOTAL - SUBSCORES 36   Some recent data might be hidden

## 2022-05-05 NOTE — LETTER
5/5/2022         RE: Ophelia Wolf  [address]  [facility] [address]        Dear Colleague,    Thank you for referring your patient, Ophelia Wolf, to the [facility] [place]. Please see a copy of my visit note below.        [facility], Orthopaedic Oncology Surgery Consultation  by Richard Katz M.D.    Ophelia Wolf MRN# [id number]    YOB: 1965     Requesting physician: Susannah Hou     Background history:  DX:  1. Rotator cuff degeneration.  Right shoulder  2. 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 R femoral neuropraxia    TREATMENTS:  1. 3/12/2021 Right rotator cuff repair, [facility]  2. Partial Hysterectomy  3. 3/7/2022, Core needle biopsy , US guided, ([facility]) [facility]  4. 4/19/2022, excision of 12cm schwannoma from right retroperitoneum and anterior groin.  Right femoral nerve neuroplasty and common femoral artery dissection.  ([facility]) [facility]      Ophelia returns for follow-up check after the above-mentioned surgery.  She is doing well and denies fever or any problems.    Dressing is removed today.  The wound edges are debrided somewhat.  There is a small midportion area in the groin which is moist and has superficial purulence.  No active drainage at this time.  New dressing applied.    She has intact femoral nerve function with 5/5 strength of her right quadriceps muscle.  4/5 hip flexor strength.    Impression and plan:    Wound care instructions given to patient.  She may shower bathe and reapply gauze.  Use bacitracin ointment as desired.  Prescription for Keflex 500 mg p.o. 4 times daily given to her x2 weeks.    Return for follow-up check and examination in person in 2 weeks.    MD Ayan Lewis Family Professor  Oncology and Adult Reconstructive Surgery  Dept Orthopaedic Surgery, [facility]  [phone] office, [phone] pager  [email or web address]

## 2022-05-06 ENCOUNTER — MYC MEDICAL ADVICE (OUTPATIENT)
Dept: ORTHOPEDICS | Facility: CLINIC | Age: 57
End: 2022-05-06
Payer: COMMERCIAL

## 2022-05-06 NOTE — TELEPHONE ENCOUNTER
Writer called patient back. Writer had Cheri the PA review the image and Dr. Katz's last visit note. Cheri states that patient is doing what they are suppose to and it will take the antibiotics at least 48 hours to start taking affect. Patient is to message in on Monday with an update. Pt agreed to plan and thanked writer for call.     Judith Horner LPN

## 2022-05-10 ENCOUNTER — MYC MEDICAL ADVICE (OUTPATIENT)
Dept: ORTHOPEDICS | Facility: CLINIC | Age: 57
End: 2022-05-10
Payer: COMMERCIAL

## 2022-05-10 NOTE — TELEPHONE ENCOUNTER
Hi Ophelia,     If the redness is not getting worse, the incision remains closed, there is no active drainage and you don't have a fever we should stick with the plan. It can take a awhile for the redness to go away but as long as it is not getting worse you should be ok. Let us know if you have any other questions or concerns.      Thank you,  Leodan Hooker PA-C         [facility] message sent to patient

## 2022-05-18 ENCOUNTER — TELEPHONE (OUTPATIENT)
Dept: ORTHOPEDICS | Facility: CLINIC | Age: 57
End: 2022-05-18
Payer: COMMERCIAL

## 2022-05-18 ENCOUNTER — MYC MEDICAL ADVICE (OUTPATIENT)
Dept: ORTHOPEDICS | Facility: CLINIC | Age: 57
End: 2022-05-18
Payer: COMMERCIAL

## 2022-05-18 DIAGNOSIS — L02.91 ABSCESS: Primary | ICD-10-CM

## 2022-05-18 NOTE — CONFIDENTIAL NOTE
-Spoke to the patient over the phone.    -Verified that we received her [employer] message, along with the photos she sent to us as well.    -Have forwarded her note & photos to both Dr. Katz & FAN Alcocer for further review re: IV antibiotics.    -Patient to await a call back with further details.    -In the meantime, she was encouraged to finish her oral antibiotics as prescribed & keep the area clean and dry.    Mary Maddox RN  5/18/2022 12:44 PM

## 2022-05-18 NOTE — CONFIDENTIAL NOTE
-Order placed for a CT- abdomen/ pelvis.  -Rule out: right groin abscess.    -Message sent to Bijal DRAKE & Matthew HERNANDEZ, schedulers, to reach out to the patient to get this scan scheduled for TODAY at the [facility].    Mary Maddox RN  5/18/2022 2:59 PM

## 2022-05-18 NOTE — CONFIDENTIAL NOTE
-Spoke to the patient over the phone again.    -FAN Alcocer reviewed her [employer] message & photos.  He would like the patient to come in to the clinic tomorrow at 11:45 am.    -FAN Alcocer also asked Dr. Katz if he would like to do anything in the meantime?  Will await a response on that, otherwise will just see the patient in the clinic tomorrow.    -The patient voiced an understanding & will proceed with tomorrow's visit as discussed.    VIRAL Oconnell to add patient to the clinic schedule.     Mary Maddox RN  5/18/2022 2:27 PM

## 2022-05-18 NOTE — TELEPHONE ENCOUNTER
[facility] Call Center    Phone Message    May a detailed message be left on voicemail: yes     Reason for Call: Other: Patient sent mychart msg with photos and concerns of infection, Please contact right away DOS 4/19. May 5th not getting better      Action Taken: Message routed to:  [facility] ([facility] ortho    Travel Screening: Not Applicable

## 2022-05-18 NOTE — TELEPHONE ENCOUNTER
Attending note:  I called and spoke to patient after reviewing the photographs that she for today of her wound.  Clearly there has been progression of the erythema and swelling in the groin area concerning for a worsening wound infection.  She denies having a fever although does not have a thermometer.  There is been no active ongoing drainage other than staining of the dressing.    Plan:  1. Obtain CT scan today for evaluation of possible underlying deeper abscess.  2. If abscess present, will plan to admit patient for IV therapy and potential surgical drainage this week.  I will ask a  to reserve OR time Friday.  3. If no abscess present, will arrange for PICC line with empiric IV therapy after discussion with infectious disease curbside consult.    Richard Katz MD  [facility] Family Professor  Oncology and Adult Reconstructive Surgery  Dept Orthopaedic Surgery, [facility]  [phone] office, [phone] pager  [email or web address]

## 2022-05-19 ENCOUNTER — PREP FOR PROCEDURE (OUTPATIENT)
Dept: ORTHOPEDICS | Facility: CLINIC | Age: 57
End: 2022-05-19

## 2022-05-19 ENCOUNTER — DOCUMENTATION ONLY (OUTPATIENT)
Dept: ORTHOPEDICS | Facility: CLINIC | Age: 57
End: 2022-05-19
Payer: COMMERCIAL

## 2022-05-19 ENCOUNTER — TELEPHONE (OUTPATIENT)
Dept: ORTHOPEDICS | Facility: CLINIC | Age: 57
End: 2022-05-19
Payer: COMMERCIAL

## 2022-05-19 ENCOUNTER — OFFICE VISIT (OUTPATIENT)
Dept: ORTHOPEDICS | Facility: CLINIC | Age: 57
End: 2022-05-19
Payer: COMMERCIAL

## 2022-05-19 ENCOUNTER — DOCUMENTATION ONLY (OUTPATIENT)
Dept: ORTHOPEDICS | Facility: CLINIC | Age: 57
End: 2022-05-19

## 2022-05-19 ENCOUNTER — TELEPHONE (OUTPATIENT)
Dept: ORTHOPEDICS | Facility: CLINIC | Age: 57
End: 2022-05-19

## 2022-05-19 ENCOUNTER — ANESTHESIA EVENT (OUTPATIENT)
Dept: SURGERY | Facility: CLINIC | Age: 57
DRG: 857 | End: 2022-05-19
Payer: COMMERCIAL

## 2022-05-19 ENCOUNTER — LAB (OUTPATIENT)
Dept: LAB | Facility: CLINIC | Age: 57
End: 2022-05-19

## 2022-05-19 DIAGNOSIS — L02.91 ABSCESS: Primary | ICD-10-CM

## 2022-05-19 DIAGNOSIS — L02.91 ABSCESS: ICD-10-CM

## 2022-05-19 LAB
ABO/RH(D): NORMAL
ERYTHROCYTE [DISTWIDTH] IN BLOOD BY AUTOMATED COUNT: 12.9 % (ref 10–15)
HCT VFR BLD AUTO: 39.8 % (ref 35–47)
HGB BLD-MCNC: 12.9 G/DL (ref 11.7–15.7)
MCH RBC QN AUTO: 30.7 PG (ref 26.5–33)
MCHC RBC AUTO-ENTMCNC: 32.4 G/DL (ref 31.5–36.5)
MCV RBC AUTO: 95 FL (ref 78–100)
PLATELET # BLD AUTO: 207 10E3/UL (ref 150–450)
RBC # BLD AUTO: 4.2 10E6/UL (ref 3.8–5.2)
SARS-COV-2 RNA RESP QL NAA+PROBE: NEGATIVE
SPECIMEN EXPIRATION DATE: NORMAL
WBC # BLD AUTO: 4.9 10E3/UL (ref 4–11)

## 2022-05-19 PROCEDURE — 86900 BLOOD TYPING SEROLOGIC ABO: CPT | Mod: 90 | Performed by: PATHOLOGY

## 2022-05-19 PROCEDURE — 99000 SPECIMEN HANDLING OFFICE-LAB: CPT | Performed by: PATHOLOGY

## 2022-05-19 PROCEDURE — U0005 INFEC AGEN DETEC AMPLI PROBE: HCPCS | Mod: 90 | Performed by: PATHOLOGY

## 2022-05-19 PROCEDURE — 85027 COMPLETE CBC AUTOMATED: CPT | Performed by: PATHOLOGY

## 2022-05-19 PROCEDURE — 86901 BLOOD TYPING SEROLOGIC RH(D): CPT | Mod: 90 | Performed by: PATHOLOGY

## 2022-05-19 PROCEDURE — 36415 COLL VENOUS BLD VENIPUNCTURE: CPT | Performed by: PATHOLOGY

## 2022-05-19 PROCEDURE — 99024 POSTOP FOLLOW-UP VISIT: CPT | Performed by: ORTHOPAEDIC SURGERY

## 2022-05-19 PROCEDURE — U0003 INFECTIOUS AGENT DETECTION BY NUCLEIC ACID (DNA OR RNA); SEVERE ACUTE RESPIRATORY SYNDROME CORONAVIRUS 2 (SARS-COV-2) (CORONAVIRUS DISEASE [COVID-19]), AMPLIFIED PROBE TECHNIQUE, MAKING USE OF HIGH THROUGHPUT TECHNOLOGIES AS DESCRIBED BY CMS-2020-01-R: HCPCS | Mod: 90 | Performed by: PATHOLOGY

## 2022-05-19 ASSESSMENT — LIFESTYLE VARIABLES: TOBACCO_USE: 1

## 2022-05-19 NOTE — PROGRESS NOTES
Patient is scheduled for surgery with Dr. Katz     Spoke with: Dr Katz    Date of Surgery: 5/20/22    Location: [place]    Post op: 2 weeks    Pre op with Provider: Complete today    H&P: may do day of surgery?    Pre-procedure COVID-19 Test: 5/19/22 at [facility]    Additional imaging/appointments: N/A    Surgery packet: Received in clinic     Additional comments: Patient just had surgery with Dr Katz on 4/19/22

## 2022-05-19 NOTE — LETTER
5/19/2022         RE: Ophelia Wolf  [address]  [facility] [address]        Dear Colleague,    Thank you for referring your patient, Ophelia Wolf, to the [facility] [place]. Please see a copy of my visit note below.        [facility] Phys[facility], Orthopaedic Oncology Surgery Consultation  by Richard Katz M.D.    Ophelia Wolf MRN# [id number]    YOB: 1965     Requesting physician: Susannah Hou     Background history:  DX:  1. Rotator cuff degeneration.  Right shoulder  2. 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 R femoral neuropraxia    TREATMENTS:  1. 3/12/2021 Right rotator cuff repair, [facility]  2. Partial Hysterectomy  3. 3/7/2022, Core needle biopsy , US guided, ([facility]) [facility]  4. 4/19/2022, excision of 12cm schwannoma from right retroperitoneum and anterior groin.  Right femoral nerve neuroplasty and common femoral artery dissection.  ([facility]) [facility]      I met with Jeremy and examined her wound today.  There were areas of discoloration of the skin and dehiscence.  Is my recommendation that given the appearance and after review of imaging with radiology that we proceed with surgical debridement and exploration of her wound tomorrow.  Discussed the rationale for this recommendation.  We will obtain CBC, type and screen and COVID testing today in preparation.    MD Ayan Lewis Family Professor  Oncology and Adult Reconstructive Surgery  Dept Orthopaedic Surgery, [facility]  [phone] office, [phone] pager  [email or web address]      Total combined visit time and work time before and after clinic visit = 30 min       No Yes

## 2022-05-19 NOTE — PROGRESS NOTES
[facility], Orthopaedic Oncology Surgery Consultation  by Richard Katz M.D.    Ophelia Wolf MRN# [id number]    YOB: 1965     Requesting physician: Susannah Hou     Background history:  DX:  1. Rotator cuff degeneration.  Right shoulder  2. 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 R femoral neuropraxia    TREATMENTS:  1. 3/12/2021 Right rotator cuff repair, [facility]  2. Partial Hysterectomy  3. 3/7/2022, Core needle biopsy , US guided, ([facility]) [facility]  4. 4/19/2022, excision of 12cm schwannoma from right retroperitoneum and anterior groin.  Right femoral nerve neuroplasty and common femoral artery dissection.  ([facility]) [facility]      I met with Jeremy and examined her wound today.  There were areas of discoloration of the skin and dehiscence.  Is my recommendation that given the appearance and after review of imaging with radiology that we proceed with surgical debridement and exploration of her wound tomorrow.  Discussed the rationale for this recommendation.  We will obtain CBC, type and screen and COVID testing today in preparation.    MD Ayan Lewis Family Professor  Oncology and Adult Reconstructive Surgery  Dept Orthopaedic Surgery, [facility]  [phone] office, [phone] pager  [email or web address]      Total combined visit time and work time before and after clinic visit = 30 min

## 2022-05-19 NOTE — PROGRESS NOTES
Outpatient IR Biopsy Referral    Patient is a 57 y/o female with a PMH of migraines, chronic abdominal pain, leiomyoma of the uterus, s/p schwannoma excision from her right retroperitoneum and anterior groin, right femoral nerve neuroplasty and common femoral artery discection. IR has been asked to follow the plan outlined by Dr. Katz:     Plan from Dr. Katz:  1.  Refer to interventional radiology as outpatient for 3 urgent procedures to be done today:     Right abdominal wall subcutaneous area aspiration for cell count and aerobic and anaerobic cultures    Right proximal thigh aspiration for cell count with aerobic and anaerobic cultures as well as placement of pigtail catheter.    Placement of PICC line for intravenous therapy.  Will institute antibiotic therapy with vancomycin and Zosyn and narrow the antibiotic coverage once culture results become available     Case and imaging CT 22 was reviewed with Dr. Katz and Dr. Izaguirre from IR and the abdominal area of interest is most likely scaring or inflammation, nothing to aspirate.  The proximal right thigh fluid collection should have an US of the area for appropriate scheduling and IR would be amenable to fluid aspiration but it is to small for drain placement. Patient is not symptomatic, not having fevers, chills, IR is unable to accommodate same day scheduling. Patient is on antibiotic therapy.  IR to review US of right thigh fluid collection for appropriate scheduling. PICC order to be placed and IR schedulers to coordinate with patient.  Patient will need covid screening prior to aspiration.     After Dr. Katz assessed patient and reviewed IR recommendations he has changed his plan to  proceed with surgical debridement and exploration of her wound tomorrow 22. He will contact IR if he'd like fluid collection of the right thigh aspirated at a later date. [phone].    Primary team Dr. Katz made aware of IR recommendations via epic  messaging. I spoke with Kourtney RN to recap referrals and plan of care.     ABHI Cain CNP  Interventional Radiology   IR on-call pager: [phone]

## 2022-05-19 NOTE — CONFIDENTIAL NOTE
-Spoke to the patient over the phone today.    -The patient is scheduled for surgery with Dr. Katz tomorrow (5/20/22).    Teaching Flowsheet   Relevant Diagnosis: Pre-Op Teaching   Teaching Topic: Wound I & D.      Person(s) involved in teaching:   Patient     Motivation Level:  Asks Questions: Yes  Eager to Learn: Yes  Cooperative: Yes  Receptive (willing/able to accept information): Yes  Any cultural factors/Baptist beliefs that may influence understanding or compliance? No  Comments:      Patient demonstrates understanding of the following:  Reason for the appointment, diagnosis and treatment plan: Yes  Knowledge of proper use of medications and conditions for which they are ordered (with special attention to potential side effects or drug interactions): Yes  Which situations necessitate calling provider and whom to contact: Yes  What has the patient tried?    Has your symptoms improved?       Teaching Concerns Addressed:   Comments:      Proper use and care of surgical scrub (given to patient at the time of clinic visit today).    (medical equip, care aids, etc.): Yes  Nutritional needs and diet plan: Yes  Pain management techniques: Yes  Wound Care: Yes  How and/when to access community resources: Yes     Instructional Materials Used/Given: surgical scrub & packet of info given to patient at clinic visit today.    -In addition to the information above, showering instructions, medications to avoid taking before surgery, stop light tool, and who will be driving her home/ staying with her for 24 hours after surgery- her daughter, Jenny, were also discussed.    -The patient obtained a COVID test today, before leaving the [facility]    -Patient will wait for a call from the hospital re: timing of tomorrow's procedure.    Mary Maddox RN  5/19/2022 3:27 PM          Time spent with patient: 15 minutes.

## 2022-05-19 NOTE — TELEPHONE ENCOUNTER
Attending note:  I reviewed the CT scan performed on Ophelia this morning.  2 areas of concern are noted.  A deeper area of either postoperative hematoma or abscess formation deep to the vasculature is noted within the proximal thigh.  A second area more proximally and along the abdominal wall the subcutaneous tissue was also noted and much smaller.    Plan:  1.  Refer to interventional radiology as outpatient for 3 urgent procedures to be done today:     Right abdominal wall subcutaneous area aspiration for cell count and aerobic and anaerobic cultures    Right proximal thigh aspiration for cell count with aerobic and anaerobic cultures as well as placement of pigtail catheter.    Placement of PICC line for intravenous therapy.  Will institute antibiotic therapy with vancomycin and Zosyn and narrow the antibiotic coverage once culture results become available.              MD Ayan Lewis Family Professor  Oncology and Adult Reconstructive Surgery  Dept Orthopaedic Surgery, [facility]  [phone] office, [phone] pager  [email or web address]

## 2022-05-20 ENCOUNTER — HOSPITAL ENCOUNTER (INPATIENT)
Facility: CLINIC | Age: 57
LOS: 4 days | Discharge: HOME HEALTH | DRG: 857 | End: 2022-05-24
Attending: ORTHOPAEDIC SURGERY | Admitting: ORTHOPAEDIC SURGERY
Payer: COMMERCIAL

## 2022-05-20 ENCOUNTER — ANESTHESIA (OUTPATIENT)
Dept: SURGERY | Facility: CLINIC | Age: 57
DRG: 857 | End: 2022-05-20
Payer: COMMERCIAL

## 2022-05-20 DIAGNOSIS — Z98.890 STATUS POST SURGERY: ICD-10-CM

## 2022-05-20 DIAGNOSIS — D36.13 SCHWANNOMA OF NERVE OF LOWER EXTREMITY: Primary | ICD-10-CM

## 2022-05-20 DIAGNOSIS — T81.42XD INFECTION OF DEEP INCISIONAL SURGICAL SITE AFTER PROCEDURE, SUBSEQUENT ENCOUNTER: ICD-10-CM

## 2022-05-20 LAB
CREAT SERPL-MCNC: 0.61 MG/DL (ref 0.52–1.04)
GFR SERPL CREATININE-BSD FRML MDRD: >90 ML/MIN/1.73M2
GLUCOSE BLDC GLUCOMTR-MCNC: 111 MG/DL (ref 70–99)

## 2022-05-20 PROCEDURE — 11042 DBRDMT SUBQ TIS 1ST 20SQCM/<: CPT | Mod: 78 | Performed by: ORTHOPAEDIC SURGERY

## 2022-05-20 PROCEDURE — 258N000003 HC RX IP 258 OP 636: Performed by: PHYSICIAN ASSISTANT

## 2022-05-20 PROCEDURE — 0JBL0ZZ EXCISION OF RIGHT UPPER LEG SUBCUTANEOUS TISSUE AND FASCIA, OPEN APPROACH: ICD-10-PCS | Performed by: ORTHOPAEDIC SURGERY

## 2022-05-20 PROCEDURE — 99232 SBSQ HOSP IP/OBS MODERATE 35: CPT | Performed by: INTERNAL MEDICINE

## 2022-05-20 PROCEDURE — 999N000141 HC STATISTIC PRE-PROCEDURE NURSING ASSESSMENT: Performed by: ORTHOPAEDIC SURGERY

## 2022-05-20 PROCEDURE — 120N000002 HC R&B MED SURG/OB UMMC

## 2022-05-20 PROCEDURE — 250N000011 HC RX IP 250 OP 636: Performed by: STUDENT IN AN ORGANIZED HEALTH CARE EDUCATION/TRAINING PROGRAM

## 2022-05-20 PROCEDURE — 82962 GLUCOSE BLOOD TEST: CPT

## 2022-05-20 PROCEDURE — 0JB80ZZ EXCISION OF ABDOMEN SUBCUTANEOUS TISSUE AND FASCIA, OPEN APPROACH: ICD-10-PCS | Performed by: ORTHOPAEDIC SURGERY

## 2022-05-20 PROCEDURE — 250N000011 HC RX IP 250 OP 636: Performed by: NURSE ANESTHETIST, CERTIFIED REGISTERED

## 2022-05-20 PROCEDURE — 250N000009 HC RX 250: Performed by: NURSE ANESTHETIST, CERTIFIED REGISTERED

## 2022-05-20 PROCEDURE — 250N000009 HC RX 250: Performed by: ORTHOPAEDIC SURGERY

## 2022-05-20 PROCEDURE — 87075 CULTR BACTERIA EXCEPT BLOOD: CPT | Performed by: ORTHOPAEDIC SURGERY

## 2022-05-20 PROCEDURE — 0Y9C3ZX DRAINAGE OF RIGHT UPPER LEG, PERCUTANEOUS APPROACH, DIAGNOSTIC: ICD-10-PCS | Performed by: ORTHOPAEDIC SURGERY

## 2022-05-20 PROCEDURE — 250N000013 HC RX MED GY IP 250 OP 250 PS 637: Performed by: PHYSICIAN ASSISTANT

## 2022-05-20 PROCEDURE — 258N000003 HC RX IP 258 OP 636: Performed by: NURSE ANESTHETIST, CERTIFIED REGISTERED

## 2022-05-20 PROCEDURE — 13160 SEC CLSR SURG WND/DEHSN XTN: CPT | Mod: 78 | Performed by: ORTHOPAEDIC SURGERY

## 2022-05-20 PROCEDURE — 82565 ASSAY OF CREATININE: CPT

## 2022-05-20 PROCEDURE — 87077 CULTURE AEROBIC IDENTIFY: CPT | Performed by: ORTHOPAEDIC SURGERY

## 2022-05-20 PROCEDURE — 250N000011 HC RX IP 250 OP 636: Performed by: PHYSICIAN ASSISTANT

## 2022-05-20 PROCEDURE — 99223 1ST HOSP IP/OBS HIGH 75: CPT | Mod: 24 | Performed by: INTERNAL MEDICINE

## 2022-05-20 PROCEDURE — 250N000013 HC RX MED GY IP 250 OP 250 PS 637: Performed by: STUDENT IN AN ORGANIZED HEALTH CARE EDUCATION/TRAINING PROGRAM

## 2022-05-20 PROCEDURE — 360N000075 HC SURGERY LEVEL 2, PER MIN: Performed by: ORTHOPAEDIC SURGERY

## 2022-05-20 PROCEDURE — 250N000025 HC SEVOFLURANE, PER MIN: Performed by: ORTHOPAEDIC SURGERY

## 2022-05-20 PROCEDURE — 87070 CULTURE OTHR SPECIMN AEROBIC: CPT | Performed by: ORTHOPAEDIC SURGERY

## 2022-05-20 PROCEDURE — 97605 NEG PRS WND THER DME<=50SQCM: CPT | Mod: 78 | Performed by: ORTHOPAEDIC SURGERY

## 2022-05-20 PROCEDURE — 370N000017 HC ANESTHESIA TECHNICAL FEE, PER MIN: Performed by: ORTHOPAEDIC SURGERY

## 2022-05-20 PROCEDURE — 710N000010 HC RECOVERY PHASE 1, LEVEL 2, PER MIN: Performed by: ORTHOPAEDIC SURGERY

## 2022-05-20 PROCEDURE — 36415 COLL VENOUS BLD VENIPUNCTURE: CPT

## 2022-05-20 PROCEDURE — 250N000011 HC RX IP 250 OP 636: Performed by: INTERNAL MEDICINE

## 2022-05-20 PROCEDURE — 272N000001 HC OR GENERAL SUPPLY STERILE: Performed by: ORTHOPAEDIC SURGERY

## 2022-05-20 RX ORDER — OXYCODONE HYDROCHLORIDE 10 MG/1
10 TABLET ORAL
Status: DISCONTINUED | OUTPATIENT
Start: 2022-05-20 | End: 2022-05-24 | Stop reason: HOSPADM

## 2022-05-20 RX ORDER — ONDANSETRON 2 MG/ML
4 INJECTION INTRAMUSCULAR; INTRAVENOUS EVERY 30 MIN PRN
Status: DISCONTINUED | OUTPATIENT
Start: 2022-05-20 | End: 2022-05-20 | Stop reason: HOSPADM

## 2022-05-20 RX ORDER — ONDANSETRON 2 MG/ML
4 INJECTION INTRAMUSCULAR; INTRAVENOUS EVERY 6 HOURS PRN
Status: DISCONTINUED | OUTPATIENT
Start: 2022-05-20 | End: 2022-05-24 | Stop reason: HOSPADM

## 2022-05-20 RX ORDER — PIPERACILLIN SODIUM, TAZOBACTAM SODIUM 3; .375 G/15ML; G/15ML
3.38 INJECTION, POWDER, LYOPHILIZED, FOR SOLUTION INTRAVENOUS EVERY 6 HOURS
Status: DISCONTINUED | OUTPATIENT
Start: 2022-05-20 | End: 2022-05-20

## 2022-05-20 RX ORDER — SUMATRIPTAN 100 MG/1
100 TABLET, FILM COATED ORAL ONCE
Status: COMPLETED | OUTPATIENT
Start: 2022-05-20 | End: 2022-05-21

## 2022-05-20 RX ORDER — NALOXONE HYDROCHLORIDE 0.4 MG/ML
0.2 INJECTION, SOLUTION INTRAMUSCULAR; INTRAVENOUS; SUBCUTANEOUS
Status: DISCONTINUED | OUTPATIENT
Start: 2022-05-20 | End: 2022-05-24 | Stop reason: HOSPADM

## 2022-05-20 RX ORDER — HYDROMORPHONE HCL IN WATER/PF 6 MG/30 ML
0.2 PATIENT CONTROLLED ANALGESIA SYRINGE INTRAVENOUS EVERY 5 MIN PRN
Status: DISCONTINUED | OUTPATIENT
Start: 2022-05-20 | End: 2022-05-20 | Stop reason: HOSPADM

## 2022-05-20 RX ORDER — CEFAZOLIN SODIUM 1 G/50ML
1250 SOLUTION INTRAVENOUS EVERY 12 HOURS
Status: DISCONTINUED | OUTPATIENT
Start: 2022-05-20 | End: 2022-05-22

## 2022-05-20 RX ORDER — FENTANYL CITRATE 50 UG/ML
INJECTION, SOLUTION INTRAMUSCULAR; INTRAVENOUS PRN
Status: DISCONTINUED | OUTPATIENT
Start: 2022-05-20 | End: 2022-05-20

## 2022-05-20 RX ORDER — HYDROMORPHONE HCL IN WATER/PF 6 MG/30 ML
0.2 PATIENT CONTROLLED ANALGESIA SYRINGE INTRAVENOUS
Status: DISCONTINUED | OUTPATIENT
Start: 2022-05-20 | End: 2022-05-24 | Stop reason: HOSPADM

## 2022-05-20 RX ORDER — AMOXICILLIN 250 MG
1 CAPSULE ORAL 2 TIMES DAILY
Status: DISCONTINUED | OUTPATIENT
Start: 2022-05-20 | End: 2022-05-24 | Stop reason: HOSPADM

## 2022-05-20 RX ORDER — PROPOFOL 10 MG/ML
INJECTION, EMULSION INTRAVENOUS PRN
Status: DISCONTINUED | OUTPATIENT
Start: 2022-05-20 | End: 2022-05-20

## 2022-05-20 RX ORDER — HYDROMORPHONE HCL IN WATER/PF 6 MG/30 ML
0.4 PATIENT CONTROLLED ANALGESIA SYRINGE INTRAVENOUS
Status: DISCONTINUED | OUTPATIENT
Start: 2022-05-20 | End: 2022-05-24 | Stop reason: HOSPADM

## 2022-05-20 RX ORDER — FENTANYL CITRATE-0.9 % NACL/PF 10 MCG/ML
PLASTIC BAG, INJECTION (ML) INTRAVENOUS PRN
Status: DISCONTINUED | OUTPATIENT
Start: 2022-05-20 | End: 2022-05-20

## 2022-05-20 RX ORDER — ACETAMINOPHEN 325 MG/1
650 TABLET ORAL EVERY 4 HOURS PRN
Status: DISCONTINUED | OUTPATIENT
Start: 2022-05-23 | End: 2022-05-24 | Stop reason: HOSPADM

## 2022-05-20 RX ORDER — SODIUM CHLORIDE, SODIUM LACTATE, POTASSIUM CHLORIDE, CALCIUM CHLORIDE 600; 310; 30; 20 MG/100ML; MG/100ML; MG/100ML; MG/100ML
INJECTION, SOLUTION INTRAVENOUS CONTINUOUS
Status: DISCONTINUED | OUTPATIENT
Start: 2022-05-20 | End: 2022-05-20 | Stop reason: HOSPADM

## 2022-05-20 RX ORDER — SODIUM CHLORIDE, SODIUM LACTATE, POTASSIUM CHLORIDE, CALCIUM CHLORIDE 600; 310; 30; 20 MG/100ML; MG/100ML; MG/100ML; MG/100ML
INJECTION, SOLUTION INTRAVENOUS CONTINUOUS
Status: DISCONTINUED | OUTPATIENT
Start: 2022-05-20 | End: 2022-05-24 | Stop reason: HOSPADM

## 2022-05-20 RX ORDER — ONDANSETRON 4 MG/1
4 TABLET, ORALLY DISINTEGRATING ORAL EVERY 30 MIN PRN
Status: DISCONTINUED | OUTPATIENT
Start: 2022-05-20 | End: 2022-05-20 | Stop reason: HOSPADM

## 2022-05-20 RX ORDER — LIDOCAINE 40 MG/G
CREAM TOPICAL
Status: DISCONTINUED | OUTPATIENT
Start: 2022-05-20 | End: 2022-05-24 | Stop reason: HOSPADM

## 2022-05-20 RX ORDER — MAGNESIUM HYDROXIDE 1200 MG/15ML
LIQUID ORAL PRN
Status: DISCONTINUED | OUTPATIENT
Start: 2022-05-20 | End: 2022-05-20 | Stop reason: HOSPADM

## 2022-05-20 RX ORDER — OXYCODONE HYDROCHLORIDE 5 MG/1
5 TABLET ORAL
Status: DISCONTINUED | OUTPATIENT
Start: 2022-05-20 | End: 2022-05-24 | Stop reason: HOSPADM

## 2022-05-20 RX ORDER — ACETAMINOPHEN 325 MG/1
975 TABLET ORAL ONCE
Status: COMPLETED | OUTPATIENT
Start: 2022-05-20 | End: 2022-05-20

## 2022-05-20 RX ORDER — FENTANYL CITRATE 50 UG/ML
25 INJECTION, SOLUTION INTRAMUSCULAR; INTRAVENOUS EVERY 5 MIN PRN
Status: DISCONTINUED | OUTPATIENT
Start: 2022-05-20 | End: 2022-05-20 | Stop reason: HOSPADM

## 2022-05-20 RX ORDER — CLINDAMYCIN PHOSPHATE 900 MG/50ML
900 INJECTION, SOLUTION INTRAVENOUS
Status: COMPLETED | OUTPATIENT
Start: 2022-05-20 | End: 2022-05-20

## 2022-05-20 RX ORDER — ONDANSETRON 2 MG/ML
INJECTION INTRAMUSCULAR; INTRAVENOUS PRN
Status: DISCONTINUED | OUTPATIENT
Start: 2022-05-20 | End: 2022-05-20

## 2022-05-20 RX ORDER — FENTANYL CITRATE 50 UG/ML
25 INJECTION, SOLUTION INTRAMUSCULAR; INTRAVENOUS
Status: DISCONTINUED | OUTPATIENT
Start: 2022-05-20 | End: 2022-05-20 | Stop reason: HOSPADM

## 2022-05-20 RX ORDER — BISACODYL 10 MG
10 SUPPOSITORY, RECTAL RECTAL DAILY PRN
Status: DISCONTINUED | OUTPATIENT
Start: 2022-05-20 | End: 2022-05-24 | Stop reason: HOSPADM

## 2022-05-20 RX ORDER — PROPOFOL 10 MG/ML
INJECTION, EMULSION INTRAVENOUS CONTINUOUS PRN
Status: DISCONTINUED | OUTPATIENT
Start: 2022-05-20 | End: 2022-05-20

## 2022-05-20 RX ORDER — ACETAMINOPHEN 325 MG/1
975 TABLET ORAL EVERY 8 HOURS
Status: COMPLETED | OUTPATIENT
Start: 2022-05-20 | End: 2022-05-23

## 2022-05-20 RX ORDER — POLYETHYLENE GLYCOL 3350 17 G/17G
17 POWDER, FOR SOLUTION ORAL DAILY
Status: DISCONTINUED | OUTPATIENT
Start: 2022-05-21 | End: 2022-05-24 | Stop reason: HOSPADM

## 2022-05-20 RX ORDER — EPHEDRINE SULFATE 50 MG/ML
INJECTION, SOLUTION INTRAMUSCULAR; INTRAVENOUS; SUBCUTANEOUS PRN
Status: DISCONTINUED | OUTPATIENT
Start: 2022-05-20 | End: 2022-05-20

## 2022-05-20 RX ORDER — CLINDAMYCIN PHOSPHATE 900 MG/50ML
900 INJECTION, SOLUTION INTRAVENOUS SEE ADMIN INSTRUCTIONS
Status: DISCONTINUED | OUTPATIENT
Start: 2022-05-20 | End: 2022-05-20 | Stop reason: HOSPADM

## 2022-05-20 RX ORDER — NALOXONE HYDROCHLORIDE 0.4 MG/ML
0.4 INJECTION, SOLUTION INTRAMUSCULAR; INTRAVENOUS; SUBCUTANEOUS
Status: DISCONTINUED | OUTPATIENT
Start: 2022-05-20 | End: 2022-05-24 | Stop reason: HOSPADM

## 2022-05-20 RX ORDER — OXYCODONE HYDROCHLORIDE 5 MG/1
5 TABLET ORAL EVERY 4 HOURS PRN
Status: DISCONTINUED | OUTPATIENT
Start: 2022-05-20 | End: 2022-05-20

## 2022-05-20 RX ORDER — GABAPENTIN 100 MG/1
300 CAPSULE ORAL
Status: COMPLETED | OUTPATIENT
Start: 2022-05-20 | End: 2022-05-20

## 2022-05-20 RX ORDER — SODIUM CHLORIDE, SODIUM LACTATE, POTASSIUM CHLORIDE, CALCIUM CHLORIDE 600; 310; 30; 20 MG/100ML; MG/100ML; MG/100ML; MG/100ML
INJECTION, SOLUTION INTRAVENOUS CONTINUOUS PRN
Status: DISCONTINUED | OUTPATIENT
Start: 2022-05-20 | End: 2022-05-20

## 2022-05-20 RX ORDER — LIDOCAINE HYDROCHLORIDE 20 MG/ML
INJECTION, SOLUTION INFILTRATION; PERINEURAL PRN
Status: DISCONTINUED | OUTPATIENT
Start: 2022-05-20 | End: 2022-05-20

## 2022-05-20 RX ORDER — ONDANSETRON 4 MG/1
4 TABLET, ORALLY DISINTEGRATING ORAL EVERY 6 HOURS PRN
Status: DISCONTINUED | OUTPATIENT
Start: 2022-05-20 | End: 2022-05-24 | Stop reason: HOSPADM

## 2022-05-20 RX ADMIN — LIDOCAINE HYDROCHLORIDE 70 MG: 20 INJECTION, SOLUTION INFILTRATION; PERINEURAL at 10:22

## 2022-05-20 RX ADMIN — Medication 50 MCG: at 10:45

## 2022-05-20 RX ADMIN — FENTANYL CITRATE 25 MCG: 50 INJECTION, SOLUTION INTRAMUSCULAR; INTRAVENOUS at 11:59

## 2022-05-20 RX ADMIN — CLINDAMYCIN PHOSPHATE 107 MG: 900 INJECTION, SOLUTION INTRAVENOUS at 11:50

## 2022-05-20 RX ADMIN — Medication 5 MG: at 11:08

## 2022-05-20 RX ADMIN — CLINDAMYCIN PHOSPHATE 1 MG: 900 INJECTION, SOLUTION INTRAVENOUS at 11:47

## 2022-05-20 RX ADMIN — OXYCODONE HYDROCHLORIDE 5 MG: 5 TABLET ORAL at 16:23

## 2022-05-20 RX ADMIN — PHENYLEPHRINE HYDROCHLORIDE 0.2 MCG/KG/MIN: 10 INJECTION INTRAVENOUS at 11:01

## 2022-05-20 RX ADMIN — ACETAMINOPHEN 325MG 975 MG: 325 TABLET ORAL at 22:41

## 2022-05-20 RX ADMIN — Medication 5 MG: at 10:51

## 2022-05-20 RX ADMIN — PROPOFOL 140 MG: 10 INJECTION, EMULSION INTRAVENOUS at 10:23

## 2022-05-20 RX ADMIN — PHENYLEPHRINE HYDROCHLORIDE 0.2 MCG/KG/MIN: 10 INJECTION INTRAVENOUS at 11:23

## 2022-05-20 RX ADMIN — ACETAMINOPHEN 325MG 975 MG: 325 TABLET ORAL at 09:35

## 2022-05-20 RX ADMIN — OXYCODONE HYDROCHLORIDE 10 MG: 10 TABLET ORAL at 19:33

## 2022-05-20 RX ADMIN — Medication 100 MCG: at 10:34

## 2022-05-20 RX ADMIN — HYDROMORPHONE HYDROCHLORIDE 0.2 MG: 1 INJECTION, SOLUTION INTRAMUSCULAR; INTRAVENOUS; SUBCUTANEOUS at 13:22

## 2022-05-20 RX ADMIN — Medication 100 MCG: at 11:01

## 2022-05-20 RX ADMIN — FENTANYL CITRATE 25 MCG: 50 INJECTION, SOLUTION INTRAMUSCULAR; INTRAVENOUS at 10:22

## 2022-05-20 RX ADMIN — GABAPENTIN 300 MG: 300 CAPSULE ORAL at 09:35

## 2022-05-20 RX ADMIN — CEFEPIME HYDROCHLORIDE 2 G: 2 INJECTION, POWDER, FOR SOLUTION INTRAVENOUS at 18:53

## 2022-05-20 RX ADMIN — OXYCODONE HYDROCHLORIDE 10 MG: 10 TABLET ORAL at 22:41

## 2022-05-20 RX ADMIN — Medication 100 MCG: at 10:56

## 2022-05-20 RX ADMIN — Medication 50 MCG: at 10:41

## 2022-05-20 RX ADMIN — MIDAZOLAM 1 MG: 1 INJECTION INTRAMUSCULAR; INTRAVENOUS at 10:12

## 2022-05-20 RX ADMIN — PROPOFOL 35 MCG/KG/MIN: 10 INJECTION, EMULSION INTRAVENOUS at 10:25

## 2022-05-20 RX ADMIN — Medication 5 MG: at 10:41

## 2022-05-20 RX ADMIN — ACETAMINOPHEN 325MG 975 MG: 325 TABLET ORAL at 16:23

## 2022-05-20 RX ADMIN — HYDROMORPHONE HYDROCHLORIDE 0.2 MG: 1 INJECTION, SOLUTION INTRAMUSCULAR; INTRAVENOUS; SUBCUTANEOUS at 12:42

## 2022-05-20 RX ADMIN — FENTANYL CITRATE 25 MCG: 50 INJECTION, SOLUTION INTRAMUSCULAR; INTRAVENOUS at 10:53

## 2022-05-20 RX ADMIN — ONDANSETRON 4 MG: 2 INJECTION INTRAMUSCULAR; INTRAVENOUS at 11:49

## 2022-05-20 RX ADMIN — VANCOMYCIN HYDROCHLORIDE 1250 MG: 1 INJECTION, POWDER, LYOPHILIZED, FOR SOLUTION INTRAVENOUS at 16:28

## 2022-05-20 RX ADMIN — Medication 5 MG: at 10:45

## 2022-05-20 RX ADMIN — HYDROMORPHONE HYDROCHLORIDE 0.2 MG: 1 INJECTION, SOLUTION INTRAMUSCULAR; INTRAVENOUS; SUBCUTANEOUS at 12:58

## 2022-05-20 RX ADMIN — SODIUM CHLORIDE, POTASSIUM CHLORIDE, SODIUM LACTATE AND CALCIUM CHLORIDE: 600; 310; 30; 20 INJECTION, SOLUTION INTRAVENOUS at 10:12

## 2022-05-20 RX ADMIN — FENTANYL CITRATE 25 MCG: 50 INJECTION, SOLUTION INTRAMUSCULAR; INTRAVENOUS at 11:00

## 2022-05-20 RX ADMIN — HYDROMORPHONE HYDROCHLORIDE 0.2 MG: 1 INJECTION, SOLUTION INTRAMUSCULAR; INTRAVENOUS; SUBCUTANEOUS at 12:49

## 2022-05-20 RX ADMIN — SENNOSIDES AND DOCUSATE SODIUM 1 TABLET: 50; 8.6 TABLET ORAL at 19:33

## 2022-05-20 RX ADMIN — Medication 100 MCG: at 10:51

## 2022-05-20 ASSESSMENT — ACTIVITIES OF DAILY LIVING (ADL)
ADLS_ACUITY_SCORE: 38
WEAR_GLASSES_OR_BLIND: NO
DIFFICULTY_EATING/SWALLOWING: NO
ADLS_ACUITY_SCORE: 36
CHANGE_IN_FUNCTIONAL_STATUS_SINCE_ONSET_OF_CURRENT_ILLNESS/INJURY: NO
CONCENTRATING,_REMEMBERING_OR_MAKING_DECISIONS_DIFFICULTY: NO
ADLS_ACUITY_SCORE: 38
DOING_ERRANDS_INDEPENDENTLY_DIFFICULTY: NO
EQUIPMENT_CURRENTLY_USED_AT_HOME: WALKER, STANDARD
ADLS_ACUITY_SCORE: 36
TOILETING_ISSUES: NO
ADLS_ACUITY_SCORE: 38
FALL_HISTORY_WITHIN_LAST_SIX_MONTHS: NO
WALKING_OR_CLIMBING_STAIRS_DIFFICULTY: NO
DRESSING/BATHING_DIFFICULTY: NO
ADLS_ACUITY_SCORE: 21

## 2022-05-20 NOTE — ANESTHESIA POSTPROCEDURE EVALUATION
"Patient: Ophelia Wolf    Procedure: Procedure(s):  Irrigation and debridement Right thigh and abdomen       Anesthesia Type:  General    Note:  Disposition: Inpatient; Admission   Postop Pain Control: Uneventful            Sign Out: Well controlled pain   PONV: No   Neuro/Psych: Uneventful            Sign Out: Acceptable/Baseline neuro status   Airway/Respiratory: Uneventful            Sign Out: Acceptable/Baseline resp. status   CV/Hemodynamics: Uneventful            Sign Out: Acceptable CV status; No obvious hypovolemia; No obvious fluid overload   Other NRE: NONE   DID A NON-ROUTINE EVENT OCCUR? No    Event details/Postop Comments:  Patient reports she feels \"not too shabby.\" Pain managed, no nausea. Denies questions re anesthesia.           Last vitals:  Vitals Value Taken Time   /76 05/20/22 1245   Temp 36.9  C (98.4  F) 05/20/22 1230   Pulse 87 05/20/22 1248   Resp 11 05/20/22 1248   SpO2 97 % 05/20/22 1248   Vitals shown include unvalidated device data.    Electronically Signed By: Winter Connelly MD  May 20, 2022  12:49 PM  "

## 2022-05-20 NOTE — ANESTHESIA PREPROCEDURE EVALUATION
Anesthesia Pre-Procedure Evaluation    Patient: Ophelia Wolf   MRN: [id number] : 1965        Procedure : Procedure(s):  Irrigation and debridement Right thigh and abdomen          Past Medical History:   Diagnosis Date     ALLERGIC RHINITIS NOS 2002     Arthritis of right acromioclavicular joint 2021     Cervical high risk HPV (human papillomavirus) test positive 10/20/2020     CLASS MIGRAIN W/O MENTN INTRACTABLE 12/15/2004     Dysmenorrhea 2007     Headache      Headache      Headache      History of rabies vaccination      Lesion of plantar nerve     Buckley's neuroma/metatarsalgia     Malignant hyperthermia      NONSPECIFIC MEDICAL HISTORY     pseudocholinesterase deficiency     Premenstrual tension syndrome      Tension headache      Tobacco use disorder      UTERINE LEIOMYOMA NOS 2007      Past Surgical History:   Procedure Laterality Date     ARTHROTOMY SHOULDER, ROTATOR CUFF REPAIR, COMBINED Right 3/12/2021    Procedure: ARTHROTOMY, SHOULDER (anatoly marmolejo), WITH ROTATOR CUFF REPAIR open;  Surgeon: César Talavera MD;  [facility]     COLONOSCOPY  04/14/10     EXCISE MASS GROIN Right 2022    Procedure: Excision of Large Schwannoma of Right Retroperitoneum and Groin;  Surgeon: Richard Katz MD;  [facility]     REPAIR SCIATIC NERVE Right 2022    Procedure: Decompression Neuroplasty of Right Femoral Nerve;  Surgeon: Richard Katz MD;  [facility]     VASCULAR REPAIR LOWER EXTREMITY N/A 2022    Procedure: Vascular Dissection of Iliac and Common Femoral Artery and Vein;  Surgeon: Richard Katz MD;  [facility]     ZZC LIGATE FALLOPIAN TUBE,POSTPARTUM      Tubal Ligation     ZZC NONSPECIFIC PROCEDURE      jaw surgery     ZZC SUPRACERV ABD HYSTERECTOMY  2007      Allergies   Allergen Reactions     Amoxicillin Difficulty breathing     Anesthetic Ether      pseudocholinesterase deficiency      Social History     Tobacco Use      Smoking status: Former Smoker     Packs/day: 0.50     Years: 27.00     Pack years: 13.50     Types: Cigarettes     Quit date: 2007     Years since quittin.4     Smokeless tobacco: Never Used     Tobacco comment: 3-4 cig/day   Substance Use Topics     Alcohol use: Yes     Alcohol/week: 3.3 standard drinks     Comment: weekends      Wt Readings from Last 1 Encounters:   22 73.5 kg (162 lb)        Anesthesia Evaluation   Pt has had prior anesthetic. Type: General and Regional.    History of anesthetic complications (denies malignant hyperthermia)   - pseudocholinesterase deficiency.    ROS/MED HX  ENT/Pulmonary:     (+) allergic rhinitis, tobacco use (quit ), Past use,     Neurologic: Comment: - tumor- related femoral nerve dysesthesias     (+) migraines (last with prior surgery),     Cardiovascular: Comment: 138/88 on preop. Higher today - neg cardiovascular ROS   (+) -----Previous cardiac testing (Coronary CTA (2015), negative forstenosis or plazue)  (-) murmur   METS/Exercise Tolerance: >4 METS    Hematologic:  - neg hematologic  ROS  (-) anemia and history of blood transfusion   Musculoskeletal: Comment: - S/P R shoulder rotator cuff repair      GI/Hepatic:  - neg GI/hepatic ROS     Renal/Genitourinary:  - neg Renal ROS     Endo:  - neg endo ROS     Psychiatric/Substance Use:  - neg psychiatric ROS     Infectious Disease: Comment: Likely infected surgical wound      Malignancy: Comment: - Schwannoma of R retroperitoneum and groin      Other:  - neg other ROS          Physical Exam    Airway  airway exam normal      Mallampati: II   TM distance: > 3 FB   Neck ROM: full   Mouth opening: > 3 cm    Respiratory Devices and Support         Dental  no notable dental history         Cardiovascular   cardiovascular exam normal       Rhythm and rate: regular and normal (-) no murmur    Pulmonary   pulmonary exam normal        breath sounds clear to auscultation           OUTSIDE LABS:  CBC:    Lab Results   Component Value Date    WBC 4.9 05/19/2022    WBC 5.1 04/06/2022    HGB 12.9 05/19/2022    HGB 9.2 (L) 04/21/2022    HCT 39.8 05/19/2022    HCT 43.1 04/06/2022     05/19/2022     04/06/2022     BMP:   Lab Results   Component Value Date     04/19/2022     04/06/2022    POTASSIUM 4.1 04/19/2022    POTASSIUM 3.9 04/06/2022    CHLORIDE 103 04/19/2022    CHLORIDE 102 04/06/2022    CO2 24 04/19/2022    CO2 31 04/06/2022    BUN 14 04/19/2022    BUN 13 04/06/2022    CR 0.66 04/19/2022    CR 0.68 04/06/2022    GLC 98 04/20/2022     (H) 04/19/2022     COAGS: No results found for: PTT, INR, FIBR  POC:   Lab Results   Component Value Date    HCG Negative 06/28/2007    HCGS Negative 05/22/2021     HEPATIC:   Lab Results   Component Value Date    ALBUMIN 4.2 04/06/2022    PROTTOTAL 7.6 04/06/2022    ALT 27 04/06/2022    AST 18 04/06/2022    ALKPHOS 88 04/06/2022    BILITOTAL 0.4 04/06/2022     OTHER:   Lab Results   Component Value Date    LACT 1.4 04/20/2022    A1C 4.8 01/27/2003    LYNNE 8.5 04/19/2022    TSH 1.03 02/04/2015    T4 0.9 01/27/2003    CRP <3.0 05/06/2008    SED 11 12/06/2011       Anesthesia Plan    ASA Status:  3   NPO Status:  NPO Appropriate    Anesthesia Type: General.     - Airway: LMA   Induction: Intravenous.   Maintenance: Balanced.   Techniques and Equipment:     - Lines/Monitors: BIS     Consents    Anesthesia Plan(s) and associated risks, benefits, and realistic alternatives discussed. Questions answered and patient/representative(s) expressed understanding.    - Discussed:     - Discussed with:  Patient      - Extended Intubation/Ventilatory Support Discussed: No.      - Patient is DNR/DNI Status: No    Use of blood products discussed: No .     Postoperative Care    Pain management: Oral pain medications, IV analgesics, Multi-modal analgesia.   PONV prophylaxis: Ondansetron (or other 5HT-3), Background Propofol Infusion     Comments:    Other Comments: No  shravan    Discussed risks of anesthesia including nausea, vomiting, sore throat, dental damage, cardiopulmonary complications, agitation, neurologic complications, and serious complications.            Winter Connelly MD

## 2022-05-20 NOTE — CONSULTS
"[facility]  Consult Note - Hospitalist Service  Date of Admission:  5/20/2022  Consult Requested by: Priyanka Vázquez PA-C  Reason for Consult: Co-medical management    Assessment & Plan   Ophelia Wolf is a very pleasant  56 year-old  female admitted on 5/20/2022. Patient underwent irrigation and debridement of her right abdomen and thigh (surgical documentation pending) on 5/20/2022. Patient is recovering from surgery well. Patient reports some right groin discomfort. Patient reports effects from analgesic regimen are waning. Patient denies any pertinent PMH. Patient reports only taking multivitamin at home.    #Status post irrigation and debridement right abdomen and thigh  #Status post excision of 12 cm Schwannoma from right retroperitoneum and anterior groin (4/19/2022)  #Status post Right femoral nerve neuroplasty and common femoral artery dissection (4/19/2022)  -Post-operative DVT prophylaxis per ortho surgx  -Will add pneumatic compression devices for now  -PRN pain regimen; patient reports effects are waning  -Will order additional dose Dilaudid 0.2 mg IV once PRN  -Physical, occupational therapy evaluations  -Patient currently on Zosyn, vancomycin IV  -Abx regimen per infectious disease    #Pulmonary nodule  -Recommend close outpatient follow-up     The patient's care was discussed with the Bedside Nurse and Patient.    Juan Chu DO  [facility]  Securely message with the Vocera Web Console (learn more here)  Text page via [facility] Paging/Directory       Hospitalist Service    Clinically Significant Risk Factors Present on Admission                # Platelet Defect: home medication list includes an antiplatelet medication   # Overweight: Estimated body mass index is 27.7 kg/m  as calculated from the following:    Height as of this encounter: 1.626 m (5' 4\").    Weight as of this encounter: 73.2 kg (161 lb 6 " oz).      ______________________________________________________________________    Chief Complaint   Surgical debridement and exploration of right thigh wound.    History is obtained from the patient & electronic medical record.    History of Present Illness   Ophelia Wolf is a very pleasant  56 year-old  female admitted on 5/20/2022. Patient underwent irrigation and debridement of her right abdomen and thigh (surgical documentation pending) on 5/20/2022. Patient is recovering from surgery well. Patient reports some right groin discomfort. Patient reports effects from analgesic regimen is waning. Patient denies any pertinent PMH. Patient reports only taking multivitamin at home.    Review of Systems   Patient denies fever, sweats, chills.  Patient denies headache, visual disturbances.  Patient denies chest pain, SOB.  Patient denies abdominal pain, nausea, vomiting.  Patient reports regular bowel movements.  Patient denies urinary symptoms.  Patient reports some right thigh discomfort.    Past Medical History    I have reviewed this patient's medical history and updated it with pertinent information if needed.   Past Medical History:   Diagnosis Date     ALLERGIC RHINITIS NOS 08/08/2002     Arthritis of right acromioclavicular joint 02/11/2021     Cervical high risk HPV (human papillomavirus) test positive 10/20/2020     CLASS MIGRAIN W/O MENTN INTRACTABLE 12/15/2004     Dysmenorrhea 03/13/2007     Headache      Headache      Headache      History of rabies vaccination      Lesion of plantar nerve     Buckley's neuroma/metatarsalgia     Malignant hyperthermia      NONSPECIFIC MEDICAL HISTORY     pseudocholinesterase deficiency     Premenstrual tension syndrome      Tension headache      Tobacco use disorder      UTERINE LEIOMYOMA NOS 04/26/2007       Past Surgical History   I have reviewed this patient's surgical history and updated it with pertinent information if needed.  Past Surgical History:    Procedure Laterality Date     ARTHROTOMY SHOULDER, ROTATOR CUFF REPAIR, COMBINED Right 3/12/2021    Procedure: ARTHROTOMY, SHOULDER (anatoly garciaford), WITH ROTATOR CUFF REPAIR open;  Surgeon: César Talavera MD;  [facility]     COLONOSCOPY  04/14/10     EXCISE MASS GROIN Right 2022    Procedure: Excision of Large Schwannoma of Right Retroperitoneum and Groin;  Surgeon: Richard Katz MD;  [facility]     REPAIR SCIATIC NERVE Right 2022    Procedure: Decompression Neuroplasty of Right Femoral Nerve;  Surgeon: Richard Katz MD;  [facility]     VASCULAR REPAIR LOWER EXTREMITY N/A 2022    Procedure: Vascular Dissection of Iliac and Common Femoral Artery and Vein;  Surgeon: Richard Katz MD;  [facility]     ZZC LIGATE FALLOPIAN TUBE,POSTPARTUM      Tubal Ligation     ZZC NONSPECIFIC PROCEDURE      jaw surgery     ZZC SUPRACERV ABD HYSTERECTOMY  2007       Social History   I have reviewed this patient's social history and updated it with pertinent information if needed.  Social History     Tobacco Use     Smoking status: Former Smoker     Packs/day: 0.50     Years: 27.00     Pack years: 13.50     Types: Cigarettes     Quit date: 2007     Years since quittin.4     Smokeless tobacco: Never Used     Tobacco comment: 3-4 cig/day   Vaping Use     Vaping Use: Never used   Substance Use Topics     Alcohol use: Yes     Alcohol/week: 3.3 standard drinks     Comment: weekends     Drug use: No       Family History   I have reviewed this patient's family history and updated it with pertinent information if needed.  Family History   Problem Relation Age of Onset     Neurologic Disorder Mother         Meniere's disease     Anesthesia Reaction Mother         And myself     Osteoporosis Mother      Thyroid Disease Mother         hypo     Heart Disease Maternal Grandmother         stroke     Arthritis Maternal Grandmother         osteoporosis     Diabetes Maternal  Grandmother         Type II diabetes     Coronary Artery Disease Maternal Grandmother 40     Cerebrovascular Disease Maternal Grandmother 40     Osteoporosis Maternal Grandmother      Heart Disease Maternal Grandfather         heart problems, s/p CAB     Cancer Maternal Grandfather         prostate      Diabetes Maternal Grandfather         Type II diabetes     Prostate Cancer Maternal Grandfather      Hypertension Father      Hyperlipidemia Father      Heart Disease Paternal Grandmother      Anesthesia Reaction Brother      Anesthesia Reaction Brother        Medications   Medications Prior to Admission   Medication Sig Dispense Refill Last Dose     acetaminophen (TYLENOL) 325 MG tablet Take 2 tablets (650 mg) by mouth every 4 hours as needed for other 60 tablet 0 Past Week at Unknown time     Ascorbic Acid (VITAMIN C PO) Take 1 tablet by mouth daily   Past Week at Unknown time     aspirin (ASA) 81 MG EC tablet Take 2 tablets (162 mg) by mouth daily 60 tablet 0 Past Month at Unknown time     Calcium Carbonate-Vitamin D (CALCIUM + D PO) Take 1 tablet by mouth daily   Past Week at Unknown time     famotidine (PEPCID) 20 MG tablet TAKE ONE TABLET BY MOUTH TWICE A DAY (Patient taking differently: Take 20 mg by mouth daily as needed) 180 tablet 0 Past Week at Unknown time     HYDROcodone-acetaminophen (NORCO) 5-325 MG tablet TAKE ONE TABLET BY MOUTH EVERY 6 HOURS AS NEEDED FOR PAIN *CAUTION: OPIOID. RISK OF OVERDOSE AND ADDICTION. 6 tablet 0 Past Month at Unknown time     multivitamin, therapeutic (THERA-VIT) TABS tablet Take 1 tablet by mouth daily   Past Month at Unknown time     oxyCODONE (ROXICODONE) 5 MG tablet Take 1 tablet (5 mg) by mouth every 6 hours as needed for moderate to severe pain 20 tablet 0 5/19/2022 at 1800     polyethylene glycol (MIRALAX) 17 g packet Take 17 g by mouth daily 7 packet 0 Past Week at Unknown time     senna-docusate (SENOKOT-S/PERICOLACE) 8.6-50 MG tablet Take 1 tablet by mouth 2 times  daily 30 tablet 0 Past Week at Unknown time     SUMAtriptan (IMITREX) 100 MG tablet TAKE 1 TABLET BY MOUTH AT ONSET OF MIGRAINE 18 tablet PRN 2022 at 0400     VITAMIN D PO Take 1 tablet by mouth daily   Past Month at Unknown time     [] cephALEXin (KEFLEX) 500 MG capsule Take 1 capsule (500 mg) by mouth 4 times daily for 14 days 56 capsule 0        Allergies   Allergies   Allergen Reactions     Amoxicillin Difficulty breathing     Anesthetic Ether      pseudocholinesterase deficiency       Physical Exam   Vital Signs: Temp: 97.4  F (36.3  C) Temp src: Oral BP: 99/61 Pulse: 72   Resp: 13 SpO2: 97 % O2 Device: Nasal cannula Oxygen Delivery: 1 LPM  Weight: 161 lbs 6.03 oz    GENERAL: Patient appears well; no acute distress.  HEENT: Normocephalic; atraumatic; PERRLA; MMM.  CV: RRR; normal S1, S2; no rubs, murmurs, or gallops.  RESP: Lung fields clear to aucultation B/L; no wheezing or crepitations.  GI: Abdomen is soft, nontender, nondistended; no organomegaly; normal bowel sounds.  : Deferred genital examination.   MSK: No clubbing, cyanosis, or edema.  DERM: Right groin wound vac with minimal drainage.  NEURO: No focal deficits appreciated; strength & sensorium are grossly intact.  PSYCH: No active hallucinations; affect, insight appear within normal limits.    Data   Results for orders placed or performed during the hospital encounter of 22 (from the past 24 hour(s))   Glucose by meter   Result Value Ref Range    GLUCOSE BY METER POCT 111 (H) 70 - 99 mg/dL   Creatinine   Result Value Ref Range    Creatinine 0.61 0.52 - 1.04 mg/dL    GFR Estimate >90 >60 mL/min/1.73m2

## 2022-05-20 NOTE — LETTER
Health Information Management Services               Recipient:    [facility]  Attn: Joyce    Sender:  Clarice Aviles RN, BSN  Care Coordinator, [facility]  Phone [phone]  Pager [phone]          Date: May 24, 2022  Patient Name:  Ophelia Wolf  Patient YOB: 1965  Routing Message:      Home Care Referral      The documents accompanying this notice contain confidential information belonging to the sender.  This information is intended only for the use of the individual or entity named above.  The authorized recipient of this information is prohibited from disclosing this information to any other party and is required to destroy the information after its stated need has been fulfilled, unless otherwise required by state law.      If you are not the intended recipient, you are hereby notified that any disclosure, copy, distribution or action taken in reliance on the contents of these documents is strictly prohibited.  If you have received this document in error, please return it by fax to [phone] with a note on the cover sheet explaining why you are returning it (e.g. not your patient, not your provider, etc.).  If you need further assistance, please call [facility] Centralized Transcription at [phone].  Documents may also be returned by mail to [employer], , [address], [place], [place] [address].

## 2022-05-20 NOTE — OR NURSING
PACU to Inpatient Nursing Handoff    Patient Ophelia Wolf is a 56 year old female who speaks English.   Procedure Procedure(s):  Irrigation and debridement Right thigh and abdomen   Surgeon(s) Primary: Richard Katz MD  Fellow - Assisting: Rick Stroud MD     Allergies   Allergen Reactions     Amoxicillin Difficulty breathing     Anesthetic Ether      pseudocholinesterase deficiency       Isolation  No active isolations     Past Medical History   has a past medical history of ALLERGIC RHINITIS NOS (08/08/2002), Arthritis of right acromioclavicular joint (02/11/2021), Cervical high risk HPV (human papillomavirus) test positive (10/20/2020), CLASS MIGRAIN W/O MENTN INTRACTABLE (12/15/2004), Dysmenorrhea (03/13/2007), Headache, Headache, Headache, History of rabies vaccination, Lesion of plantar nerve, Malignant hyperthermia, NONSPECIFIC MEDICAL HISTORY, Premenstrual tension syndrome, Tension headache, Tobacco use disorder, and UTERINE LEIOMYOMA NOS (04/26/2007).    Anesthesia General   Dermatome Level     Preop Meds acetaminophen (Tylenol) - time given: 935  gabapentin (Neurontin) - time given: 935   Nerve block Not applicable   Intraop Meds Fentanyl 100, zofran 11:40   Local Meds No   Antibiotics clindamycin (Cleocin) - last given at 1150     Pain Patient Currently in Pain: yes   PACU meds  hydromorphone (Dilaudid): 0.8 mg (total dose) last given at 1322    PCA / epidural No   Capnography Respiratory Monitoring (EtCO2): 41 mmHg   Telemetry ECG Rhythm: Normal sinus rhythm   Inpatient Telemetry Monitor Ordered? No        Labs Glucose Lab Results   Component Value Date     05/20/2022     04/19/2022     05/22/2021       Hgb Lab Results   Component Value Date    HGB 12.9 05/19/2022    HGB 15.2 05/22/2021       INR No results found for: INR   PACU Imaging Not applicable     Wound/Incision Negative Pressure Wound Therapy Leg Right (Active)   Wound Type Surgical 05/20/22 1345   Dressing Pieces  Removed (# of Each Type) Silver foam 05/20/22 1345   Cycle Continuous 05/20/22 1345   Target Pressure (mmHg) 125 05/20/22 1345   (Retired) Dressing Status Clean, dry, intact 05/20/22 1345   Number of days: 0       Incision/Surgical Site 03/12/21 Right Shoulder (Active)   Number of days: 434       Incision/Surgical Site 04/19/22 Inner;Right;Upper Thigh (Active)   Incision Assessment UTV 05/20/22 1233   Sheila-Incision Assessment Other (Comment) 05/20/22 1233   Closure Other (Comment) 05/20/22 1233   Incision Drainage Amount None 04/20/22 2056   Incision Care Ice applied 04/20/22 2056   Dressing Intervention New dressing applied 05/20/22 1235   Number of days: 31      CMS        Equipment Neg pressure wound vac- continuous, 125   Other LDA       IV Access Peripheral IV 05/20/22 Left;Posterior Hand (Active)   Site Assessment WDL 05/20/22 1345   Line Status Infusing 05/20/22 1345   Number of days: 0      Blood Products Not applicable EBL 5 mL   Intake/Output Date 05/20/22 0700 - 05/21/22 0659   Shift 7854-9715 7310-8519 0352-7942 24 Hour Total   INTAKE   I.V. 900   900   Shift Total(mL/kg) 900(12.3)   900(12.3)   OUTPUT   Blood 5   5   Shift Total(mL/kg) 5(0.07)   5(0.07)   Weight (kg) 73.2 73.2 73.2 73.2      Drains / Farmer Negative Pressure Wound Therapy Leg Right (Active)   Wound Type Surgical 05/20/22 1345   Dressing Pieces Removed (# of Each Type) Silver foam 05/20/22 1345   Cycle Continuous 05/20/22 1345   Target Pressure (mmHg) 125 05/20/22 1345   (Retired) Dressing Status Clean, dry, intact 05/20/22 1345   Number of days: 0      Time of void PreOp Void Prior to Procedure: 0830 (05/20/22 0917)    PostOp      Diapered? No   Bladder Scan     PO    tolerating sips     Vitals    B/P: 93/63  T: 97.5  F (36.4  C)    Temp src: Oral  P:  Pulse: 82 (05/20/22 1330)          R: 15  O2:  SpO2: 99 %         Oxygen Delivery: 2 LPM (05/20/22 1330)         Family/support present no family here   Patient belongings     Patient  transported on cart   DC meds/scripts (obs/outpt) Not applicable   Inpatient Pain Meds Released? Yes       Special needs/considerations None   Tasks needing completion None       Shannon Berg, RN  ASCOM [id number]

## 2022-05-20 NOTE — PHARMACY-ADMISSION MEDICATION HISTORY
Admission Medication History Completed by Pharmacy    See [facility] Admission Navigator for allergy information, preferred outpatient pharmacy, prior to admission medications and immunization status.     Medication History Sources:     Pharmacy medication history completed 4/19/22 during previous admission    [facility] discharge summary from 4/21/22    Pharmacy fill history via [employer]    Changes made to PTA medication list (reason):    Added: None    Deleted: duplicate Miralax entry    Changed: None    Additional Information:    Prescribed cephalexin 5/5/22 for 14 day course for post-op wound infection    Prior to Admission medications    Medication Sig Last Dose Taking? Auth Provider   acetaminophen (TYLENOL) 325 MG tablet Take 2 tablets (650 mg) by mouth every 4 hours as needed for other Past Week at Unknown time Yes Leodan Hooker PA-C   Ascorbic Acid (VITAMIN C PO) Take 1 tablet by mouth daily Past Week at Unknown time Yes Unknown, Entered By History   aspirin (ASA) 81 MG EC tablet Take 2 tablets (162 mg) by mouth daily Past Month at Unknown time Yes Sonali Echavarria APRN CNS   Calcium Carbonate-Vitamin D (CALCIUM + D PO) Take 1 tablet by mouth daily Past Week at Unknown time Yes Reported, Patient   famotidine (PEPCID) 20 MG tablet TAKE ONE TABLET BY MOUTH TWICE A DAY  Patient taking differently: Take 20 mg by mouth daily as needed Past Week at Unknown time Yes Susannah Ray PA-C   HYDROcodone-acetaminophen (NORCO) 5-325 MG tablet TAKE ONE TABLET BY MOUTH EVERY 6 HOURS AS NEEDED FOR PAIN *CAUTION: OPIOID. RISK OF OVERDOSE AND ADDICTION. Past Month at Unknown time Yes Susannah Ray PA-C   multivitamin, therapeutic (THERA-VIT) TABS tablet Take 1 tablet by mouth daily Past Month at Unknown time Yes Unknown, Entered By History   oxyCODONE (ROXICODONE) 5 MG tablet Take 1 tablet (5 mg) by mouth every 6 hours as needed for moderate to severe pain 5/19/2022 at 1800 Yes Leodan Hooker PA-C   polyethylene  glycol (MIRALAX) 17 g packet Take 17 g by mouth daily Past Week at Unknown time Yes Sonali Echavarria APRN CNS   senna-docusate (SENOKOT-S/PERICOLACE) 8.6-50 MG tablet Take 1 tablet by mouth 2 times daily Past Week at Unknown time Yes Sonali Echavarria APRN CNS   SUMAtriptan (IMITREX) 100 MG tablet TAKE 1 TABLET BY MOUTH AT ONSET OF MIGRAINE 5/20/2022 at 0400 Yes Susannah Ray PA-C   VITAMIN D PO Take 1 tablet by mouth daily Past Month at Unknown time Yes Unknown, Entered By History       Date completed: 05/20/22    Medication history completed by: Janet Dewey, [facility]

## 2022-05-20 NOTE — INTERVAL H&P NOTE
"I have reviewed the surgical (or preoperative) H&P that is linked to this encounter, and examined the patient. There are no significant changes    Clinical Conditions Present on Arrival:  Clinically Significant Risk Factors Present on Admission                  # Platelet Defect: home medication list includes an antiplatelet medication  # Overweight: Estimated body mass index is 27.7 kg/m  as calculated from the following:    Height as of this encounter: 1.626 m (5' 4\").    Weight as of this encounter: 73.2 kg (161 lb 6 oz).       "

## 2022-05-20 NOTE — PLAN OF CARE
Pt arrived to unit around 1400, received general anesthesia. EBL of 5.     VS: Some soft BPs, pt asymptomatic. Other VSS. Denies CP/SOB. A/O x4.    O2: >90% on RA, desats when falling asleep- placed on 1 liter. IS encouraged.    Output: Voided in PACU w/o pain or difficulty, bladder scanned once arrived to unit- PVR was 148. Voided in bathroom once this shift.    Last BM: 5/19 per pt report   Activity: Up with 1 assist, GB and walker. Ambulated to bathroom, tolerated well. WBAT   Up for meals? Declined    Skin: R groin surgical incision otherwise intact    Pain: Pain in R groin, managing with PRN oxy, scheduled tylenol.    CMS: Intact    Dressing: CDI    Diet: Regular, tolerating well    LDA: PIV in L hand infusing. Wound Vac continuous at 125/hr    Equipment: IV pole, capno, PCDs   Plan: TBD    Additional Info:
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
## 2022-05-20 NOTE — PROGRESS NOTES
Negative pressure wound machine keeps beeping that it is blocked. OR nurse looked at it and was unable to fix it. PA paged and she will come and look at it. In the mean time the machine said to put it lower than the wound. WE did that and it stopped beeping. Both clamps on the tubing are unclamped, and  there are no kinks in the tubing. The site is clean dry and intact without signs of suction being interrupted.

## 2022-05-20 NOTE — ANESTHESIA CARE TRANSFER NOTE
Patient: Ophelia Wolf    Procedure: Procedure(s):  Irrigation and debridement Right thigh and abdomen       Diagnosis: Abscess [L02.91]  Diagnosis Additional Information: No value filed.    Anesthesia Type:   General     Note:    Oropharynx: oropharynx clear of all foreign objects and spontaneously breathing  Level of Consciousness: drowsy and awake  Oxygen Supplementation: face mask  Level of Supplemental Oxygen (L/min / FiO2): 6  Independent Airway: airway patency satisfactory and stable  Dentition: dentition unchanged  Vital Signs Stable: post-procedure vital signs reviewed and stable  Report to RN Given: handoff report given  Patient transferred to: PACU    Handoff Report: Identifed the Patient, Identified the Reponsible Provider, Reviewed the pertinent medical history, Discussed the surgical course, Reviewed Intra-OP anesthesia mangement and issues during anesthesia, Set expectations for post-procedure period and Allowed opportunity for questions and acknowledgement of understanding      Vitals:  Vitals Value Taken Time   /76    Temp 36.9    Pulse 94 05/20/22 1217   Resp 14 05/20/22 1217   SpO2 100 % 05/20/22 1217   Vitals shown include unvalidated device data.    Electronically Signed By: ABHI Jesus CRNA  May 20, 2022  12:18 PM

## 2022-05-20 NOTE — H&P
"S: Ms. Wolf returns to surgery today for irrigation and debridement of her right abdomen and thigh. She denies any changes to her health from previous H&P completed on 4/6/22. No history of cardiac or lung disease.     O:  Vital signs:  Temp: 97.7  F (36.5  C) Temp src: Oral BP: (!) 179/89 Pulse: 86   Resp: 20 SpO2: 100 %     Height: 162.6 cm (5' 4\") Weight: 73.2 kg (161 lb 6 oz)  Estimated body mass index is 27.7 kg/m  as calculated from the following:    Height as of this encounter: 1.626 m (5' 4\").    Weight as of this encounter: 73.2 kg (161 lb 6 oz).    Alert and oriented x 3, NAD.   Head atraumatic normocephalic.   Breathing comfortably on room air.     Lungs: CTA, no crackles, rales or wheezes.   Heart: normal rate and rhythm, no murmurs or rubs.   Abdomen soft and nontender. Normal bowel sounds.     Right abdominal/thigh incision with mild surrounding erythema with dehicence.         Labs:  Admission on 05/20/2022   Component Date Value Ref Range Status     GLUCOSE BY METER POCT 05/20/2022 111 (A) 70 - 99 mg/dL Final   Lab on 05/19/2022   Component Date Value Ref Range Status     SARS CoV2 PCR 05/19/2022 Negative  Negative, Testing sent to reference lab. Results will be returned via unsolicited result Final     WBC Count 05/19/2022 4.9  4.0 - 11.0 10e3/uL Final     RBC Count 05/19/2022 4.20  3.80 - 5.20 10e6/uL Final     Hemoglobin 05/19/2022 12.9  11.7 - 15.7 g/dL Final     Hematocrit 05/19/2022 39.8  35.0 - 47.0 % Final     MCV 05/19/2022 95  78 - 100 fL Final     MCH 05/19/2022 30.7  26.5 - 33.0 pg Final     MCHC 05/19/2022 32.4  31.5 - 36.5 g/dL Final     RDW 05/19/2022 12.9  10.0 - 15.0 % Final     Platelet Count 05/19/2022 207  150 - 450 10e3/uL Final     ABO/RH(D) 05/19/2022 A POS   Final     SPECIMEN EXPIRATION DATE 05/19/2022 [id number]   Final       Assessment:  1. Incisional dehiscence s/p excision of 12cm schwannoma from right retroperitoneum and anterior groin.  Right femoral nerve " neuroplasty and common femoral artery dissection on 4/19/2022 with Dr. Katz.     Plan: Patient may proceed with planned surgery without further workup, no changes in health or risk factors from previous H&P on 4/6/22.     Priyanka Vázquez PA-C  Orthopedic Surgery.       The below note has been copied forward from previous H&P completed on 4/6/22 to be referenced and updated.        [address]  [facility] [address]  Phone: [phone]  Primary Provider: Susannah Ray     PREOPERATIVE EVALUATION:  Today's date: 4/6/2022     Ophelia Wolf is a 56 year old female who presents for a preoperative evaluation.     Surgical Information:  Surgery/Procedure:   Excision of large schwannoma of Right retroperitoneum and groin   Right General   decompression neuroplasty of Right femoral nerve   Right General   vascular dissection of iliac and common femoral artery and vein             Surgery Location: [facility]  Surgeon: Dr. Katz  Surgery Date: 04/19/2022  Time of Surgery: TBD  Where patient plans to recover: At home with family  Fax number for surgical facility: Note does not need to be faxed, will be available electronically in Epic.     Type of Anesthesia Anticipated: General        Assessment & Plan         The proposed surgical procedure is considered INTERMEDIATE risk.     Pre-operative general physical examination  56 year old female with schwannoma causing femoral nerve dysesthesias      Schwannoma  Scheduled for procedure as above     Pseudocholinesterase deficiency  Pt reports doing well with anesthesia she had with her shoulder surgery 3-12-21     Migraine with aura and without status migrainosus, not intractable  Stable, a bit more frequent currently due to increased stress  - CBC with platelets; Future  - Comprehensive metabolic panel; Future  - Drug Abuse Screen Panel 13, Urine (Pain Care Package) - lab collect; Future  - CBC with platelets  -  Comprehensive metabolic panel  - Drug Abuse Screen Panel 13, Urine (Pain Care Package) - lab collect        Risks and Recommendations:  The patient has the following additional risks and recommendations for perioperative complications:   - No identified additional risk factors other than previously addressed   - pseudocholinesterase deficiency      Medication Instructions:  Patient is to take all scheduled medications on the day of surgery EXCEPT for modifications listed below:   - aspirin: Discontinue aspirin 7-10 days prior to procedure to reduce bleeding risk. It should be resumed postoperatively.   She does NOT need to resume daily ASA after procedure for primary prevention     RECOMMENDATION:  APPROVAL GIVEN to proceed with proposed procedure, without further diagnostic evaluation.           Subjective         HPI related to upcoming procedure: She has had a history of right femoral nerve dysesthesias for many many years.  She always attributed it to having a hysterectomy.  She had an incidental finding of a mass on an abdominal CT scan in May 2021 indicating a mass on her iliopsoas muscle.  Her symptoms have continued and she is scheduled for an excision.     Preop Questions 4/4/2022   1. Have you ever had a heart attack or stroke? No   2. Have you ever had surgery on your heart or blood vessels, such as a stent placement, a coronary artery bypass, or surgery on an artery in your head, neck, heart, or legs? No, she has jaw surgery in 6th grade but no other vascular surgery   3. Do you have chest pain with activity? No   4. Do you have a history of  heart failure? No   5. Do you currently have a cold, bronchitis or symptoms of other infection? No   6. Do you have a cough, shortness of breath, or wheezing? No   7. Do you or anyone in your family have previous history of blood clots? No   8. Do you or does anyone in your family have a serious bleeding problem such as prolonged bleeding following surgeries or  cuts? No   9. Have you ever had problems with anemia or been told to take iron pills? No   10. Have you had any abnormal blood loss such as black, tarry or bloody stools, or abnormal vaginal bleeding? No   11. Have you ever had a blood transfusion? No   12. Are you willing to have a blood transfusion if it is medically needed before, during, or after your surgery? Yes   13. Have you or any of your relatives ever had problems with anesthesia? **YES - difficulty waking up with anesthesia, has pseudocholinesterase deficiency .  Her brother has this also he stopped breathing with anesthesia as a child.  All her family have been tested and many family members have it as well. **   14. Do you have sleep apnea, excessive snoring or daytime drowsiness? No   15. Do you have any artifical heart valves or other implanted medical devices like a pacemaker, defibrillator, or continuous glucose monitor? No   16. Do you have artificial joints? No   17. Are you allergic to latex? No   18. Is there any chance that you may be pregnant? No         Health Care Directive:  Patient does not have a Health Care Directive or Living Will: Patient states has Advance Directive and will bring in a copy to clinic.     Preoperative Review of :   reviewed - controlled substances reflected in medication list.        Status of Chronic Conditions:  See problem list for active medical problems.  Problems all longstanding and stable, except as noted/documented.  See ROS for pertinent symptoms related to these conditions.        Review of Systems  CONSTITUTIONAL: NEGATIVE for fever, chills, change in weight  EYES: NEGATIVE for vision changes or irritation  ENT/MOUTH: NEGATIVE for ear, mouth and throat problems  RESP: NEGATIVE for significant cough or SOB  CV: NEGATIVE for chest pain, palpitations or peripheral edema  GI: NEGATIVE for nausea, abdominal pain, heartburn, or change in bowel habits  : NEGATIVE for frequency, dysuria, or  hematuria  MUSCULOSKELETAL:Schwannoma, right femoral nerve dysesthesias  NEURO: Dysesthesias as above, she also has a history of migraine headaches which typically are very stable though she has had a few more frequent headaches due to stress in her life  ENDOCRINE: NEGATIVE for temperature intolerance, skin/hair changes  HEME: NEGATIVE for bleeding problems  PSYCHIATRIC: NEGATIVE for changes in mood or affect           Patient Active Problem List     Diagnosis Date Noted     Presence of pessary 10/17/2021       Priority: Medium     Uterine prolapse 08/13/2021       Priority: Medium     S/P complete repair of rotator cuff 03/25/2021       Priority: Medium     Pseudocholinesterase deficiency 03/08/2021       Priority: Medium     Traumatic complete tear of right rotator cuff 02/11/2021       Priority: Medium     Arthritis of right acromioclavicular joint 02/11/2021       Priority: Medium     Traumatic complete tear of right rotator cuff, initial encounter 02/11/2021       Priority: Medium       Added automatically from request for surgery [id number]        Cervical high risk HPV (human papillomavirus) test positive 10/20/2020       Priority: Medium       2004, 2007 NIL paps.  2007 Supracervical hysterectomy  2015 NIL pap, Neg HPV  10/20/20 NIL pap, + HR HPV 18. Plan colp due bef 1/20/21.  10/27/20 LM for pt to call us back.  11/2/20 LM for pt to call us back.   11/9/20 Result letter sent to [facility].   11/10/20 Pt notified by phone - [place].   12/18/20 Reminder (2mo). MyChart - patient read  1/20/21 [place] not done. Tracking updated for 6 mo colp/pap due 4/20/21.   4/7/21 Reminder (6mo) [facility]- Pt read.  5/6/21 Provider does not want pt lost to follow up yet. Will push tracker out another month.  6/7/21 Lost to follow-up for pap tracking           Heartburn symptom 09/13/2012       Priority: Medium     Abdominal bloating 12/06/2011       Priority: Medium     Chronic abdominal pain 12/06/2011       Priority: Medium      CARDIOVASCULAR SCREENING; LDL GOAL LESS THAN 160 10/31/2010       Priority: Medium     Disturbance of skin sensation 05/06/2008       Priority: Medium     pseudocholinesterase deficiency         Priority: Medium       pseudocholinesterase deficiency        Leiomyoma of uterus 04/26/2007       Priority: Medium       Problem list name updated by automated process. Provider to review        Constipation 03/13/2007       Priority: Medium       Problem list name updated by automated process. Provider to review        Dysmenorrhea 03/13/2007       Priority: Medium     Migraine with aura 12/15/2004       Priority: Medium             Patient is followed by [facility] for ongoing prescription of narcotic pain medicine.  Med: hydrocodone acetaminophen 5/325 .   Maximum use per month: 30 per 3 months, changed plans-- 4 for 60 -90 days August 11, 2016   Expected duration: chronic  Narcotic agreement on file: YES  Clinic visit recommended: Q 6  months        Problem list name updated by automated process. Provider to review           Allergic rhinitis 08/08/2002       Priority: Medium       Problem list name updated by automated process. Provider to review        Headache         Priority: Medium       likely migraine etiology  Problem list name updated by automated process. Provider to review        Premenstrual tension syndrome         Priority: Medium       bloating, irritability, breast tenderness  Problem list name updated by automated process. Provider to review         Past Medical History        Past Medical History:   Diagnosis Date     ALLERGIC RHINITIS NOS 08/08/2002     Arthritis of right acromioclavicular joint 2/11/2021     Cervical high risk HPV (human papillomavirus) test positive 10/20/2020     CLASS MIGRAIN W/O MENTN INTRACTABLE 12/15/2004     Dysmenorrhea 03/13/2007     Headache       Headache       Headache       History of rabies vaccination       Lesion of plantar nerve       Buckley's  neuroma/metatarsalgia     NONSPECIFIC MEDICAL HISTORY       pseudocholinesterase deficiency     Premenstrual tension syndrome       Tension headache       Tobacco use disorder       UTERINE LEIOMYOMA NOS 2007         Past Surgical History         Past Surgical History:   Procedure Laterality Date     ARTHROTOMY SHOULDER, ROTATOR CUFF REPAIR, COMBINED Right 3/12/2021     Procedure: ARTHROTOMY, SHOULDER (anatoly garciaford), WITH ROTATOR CUFF REPAIR open;  Surgeon: César Talavera MD;  Location: [facility] OR     COLONOSCOPY   04/14/10     [facility] LIGATE FALLOPIAN TUBE,POSTPARTUM        Tubal Ligation     [facility] NONSPECIFIC PROCEDURE        jaw surgery     [facility] SUPRACERV ABD HYSTERECTOMY   2007         Current Outpatient Prescriptions          Current Outpatient Medications   Medication Sig Dispense Refill     Calcium Carbonate-Vitamin D (CALCIUM + D PO) Take  by mouth 2 times daily.         famotidine (PEPCID) 20 MG tablet TAKE ONE TABLET BY MOUTH TWICE A  tablet 0     HYDROcodone-acetaminophen (NORCO) 5-325 MG tablet TAKE ONE TABLET BY MOUTH EVERY 6 HOURS AS NEEDED FOR PAIN *CAUTION: OPIOID. RISK OF OVERDOSE AND ADDICTION. 6 tablet 0     SUMAtriptan (IMITREX) 100 MG tablet TAKE 1 TABLET BY MOUTH AT ONSET OF MIGRAINE 18 tablet PRN                  Allergies   Allergen Reactions     Amoxicillin Difficulty breathing     Anesthetic Ether         pseudocholinesterase deficiency         Social History            Tobacco Use     Smoking status: Former Smoker       Packs/day: 0.50       Years: 27.00       Pack years: 13.50       Types: Cigarettes       Quit date: 2007       Years since quittin.3     Smokeless tobacco: Never Used     Tobacco comment: 3-4 cig/day   Substance Use Topics     Alcohol use: Yes       Alcohol/week: 3.3 standard drinks       Comment: weekends      Family History         Family History   Problem Relation Age of Onset     Neurologic Disorder Mother           Meniere's disease      "Anesthesia Reaction Mother           And myself     Osteoporosis Mother       Thyroid Disease Mother           hypo     Heart Disease Maternal Grandmother           stroke     Arthritis Maternal Grandmother           osteoporosis     Diabetes Maternal Grandmother           Type II diabetes     Coronary Artery Disease Maternal Grandmother 40     Cerebrovascular Disease Maternal Grandmother 40     Osteoporosis Maternal Grandmother       Heart Disease Maternal Grandfather           heart problems, s/p CAB     Cancer Maternal Grandfather           prostate      Diabetes Maternal Grandfather           Type II diabetes     Prostate Cancer Maternal Grandfather       Hypertension Father       Hyperlipidemia Father       Heart Disease Paternal Grandmother       Anesthesia Reaction Brother       Anesthesia Reaction Brother               History   Drug Use No               Objective         /88   Pulse 76   Temp 98  F (36.7  C) (Oral)   Ht 1.622 m (5' 3.86\")   Wt 75.7 kg (166 lb 12.8 oz)   LMP 06/16/2007 (Approximate)   SpO2 99%   BMI 28.76 kg/m       Physical Exam    GENERAL APPEARANCE: healthy, alert and no distress     EYES: EOMI, PERRL     HENT: ear canals and TM's normal and nose and mouth without ulcers or lesions     NECK: no adenopathy, no asymmetry, masses, or scars and thyroid normal to palpation     RESP: lungs clear to auscultation - no rales, rhonchi or wheezes     CV: regular rates and rhythm, normal S1 S2, no S3 or S4 and no murmur, click or rub     ABDOMEN:  soft, nontender, no HSM or masses and bowel sounds normal     MS: extremities normal- no gross deformities noted, no evidence of inflammation in joints, FROM in all extremities.     SKIN: no suspicious lesions or rashes     NEURO: Normal strength and tone, sensory exam grossly normal, mentation intact and speech normal     PSYCH: mentation appears normal. and affect normal/bright     LYMPHATICS: No cervical adenopathy          Recent Labs   Lab " Test 05/22/21 2128 12/18/20  0839   HGB 15.2 15.2    206    142   POTASSIUM 4.0 4.5   CR 0.57 0.62         Diagnostics:  Labs pending at this time.  Results will be reviewed when available.        Recent Results (from the past 24 hour(s))   CBC with platelets     Collection Time: 04/06/22 10:49 AM   Result Value Ref Range     WBC Count 5.1 4.0 - 11.0 10e3/uL     RBC Count 4.48 3.80 - 5.20 10e6/uL     Hemoglobin 14.3 11.7 - 15.7 g/dL     Hematocrit 43.1 35.0 - 47.0 %     MCV 96 78 - 100 fL     MCH 31.9 26.5 - 33.0 pg     MCHC 33.2 31.5 - 36.5 g/dL     RDW 13.1 10.0 - 15.0 %     Platelet Count 233 150 - 450 10e3/uL   She has her Covid test scheduled for April 15  No EKG required, no history of coronary heart disease, significant arrhythmia, peripheral arterial disease or other structural heart disease.     Revised Cardiac Risk Index (RCRI):  The patient has the following serious cardiovascular risks for perioperative complications:   - No serious cardiac risks = 0 points      RCRI Interpretation: 0 points: Class I (very low risk - 0.4% complication rate)                 Signed Electronically by: Susannah Ray PA-C  Copy of this evaluation report is provided to requesting physician.

## 2022-05-20 NOTE — LETTER
Health Information Management Services               Recipient:    [facility]  Attn: Blanca      Sender:    Clarice Aviles RN, BSN  Care Coordinator, [facility] Orth[facility]  Phone [phone]  Pager [phone]        Date: May 23, 2022  Patient Name:  Ophelia Wolf  Patient YOB: 1965  Routing Message:            The documents accompanying this notice contain confidential information belonging to the sender.  This information is intended only for the use of the individual or entity named above.  The authorized recipient of this information is prohibited from disclosing this information to any other party and is required to destroy the information after its stated need has been fulfilled, unless otherwise required by state law.      If you are not the intended recipient, you are hereby notified that any disclosure, copy, distribution or action taken in reliance on the contents of these documents is strictly prohibited.  If you have received this document in error, please return it by fax to [phone] with a note on the cover sheet explaining why you are returning it (e.g. not your patient, not your provider, etc.).  If you need further assistance, please call [facility] Centralized Transcription at [phone].  Documents may also be returned by mail to [employer], , [facility] [address], [place], [place] [address].

## 2022-05-20 NOTE — ANESTHESIA PROCEDURE NOTES
Airway       Patient location during procedure: OR       Procedure Start/Stop Times: 5/20/2022 10:25 AM and 5/20/2022 10:25 AM  Staff -        CRNA: Aisha Kay APRN CRNA       Performed By: CRNAIndications and Patient Condition       Indications for airway management: theresa-procedural       Induction type:intravenous       Mask difficulty assessment: 1 - vent by mask    Final Airway Details       Final airway type: supraglottic airway    Supraglottic Airway Details        Type: LMA       Brand: Air-Q       LMA size: 3.5    Post intubation assessment        Placement verified by: capnometry, equal breath sounds and chest rise        Number of attempts at approach: 1       Secured with: silk tape       Ease of procedure: easy       Dentition: Intact and Unchanged    Medication(s) Administered   Medication Administration Time: 5/20/2022 10:25 AM

## 2022-05-20 NOTE — PROGRESS NOTES
Fellow and PA stopped to look at wound vac and talk to pt. Since we lowered it , it has not beeped.

## 2022-05-20 NOTE — LETTER
Health Information Management Services               Recipient:  [facility]  Attn: Albertina        Sender:    Clarice Aviles RN, BSN  Care Coordinator, [facility] Ortho  Phone [phone]  Pager [phone]        Date: May 25, 2022  Patient Name:  Ophelia Wolf  Patient YOB: 1965  Routing Message:      Wound vac dressing change instructions.      The documents accompanying this notice contain confidential information belonging to the sender.  This information is intended only for the use of the individual or entity named above.  The authorized recipient of this information is prohibited from disclosing this information to any other party and is required to destroy the information after its stated need has been fulfilled, unless otherwise required by state law.      If you are not the intended recipient, you are hereby notified that any disclosure, copy, distribution or action taken in reliance on the contents of these documents is strictly prohibited.  If you have received this document in error, please return it by fax to [phone] with a note on the cover sheet explaining why you are returning it (e.g. not your patient, not your provider, etc.).  If you need further assistance, please call [employer] Centralized Transcription at [phone].  Documents may also be returned by mail to [employer], , [facility] [address], [place], [place] [address].

## 2022-05-20 NOTE — PHARMACY-VANCOMYCIN DOSING SERVICE
"Pharmacy Vancomycin Initial Note  Date of Service May 20, 2022  Patient's  1965  56 year old, female    Indication: Postoperative Infection    Current estimated CrCl = Estimated Creatinine Clearance: 101 mL/min (based on SCr of 0.61 mg/dL).    Creatinine for last 3 days  2022:  3:18 PM Creatinine 0.61 mg/dL    Recent Vancomycin Level(s) for last 3 days  No results found for requested labs within last 72 hours.      Vancomycin IV Administrations (past 72 hours)      No vancomycin orders with administrations in past 72 hours.                Nephrotoxins and other renal medications (From now, onward)    Start     Dose/Rate Route Frequency Ordered Stop    22 1630  vancomycin (VANCOCIN) 1,250 mg in sodium chloride 0.9 % 250 mL intermittent infusion         1,250 mg  over 90 Minutes Intravenous EVERY 12 HOURS 22 1229      22 1230  piperacillin-tazobactam (ZOSYN) 3.375 g vial to attach to  mL bag        Note to Pharmacy: For [facility], [facility] and [facility]: For Zosyn-naive patients, use the \"Zosyn initial dose + extended infusion\" order panel.    3.375 g  over 30 Minutes Intravenous EVERY 6 HOURS 22 1229            Contrast Orders - past 72 hours (72h ago, onward)    None          [employer] Prediction of Planned Initial Vancomycin Regimen  Loading dose: N/A  Regimen: 1250 mg IV every 12 hours.  Start time: 15:51 on 2022  Exposure target: AUC24 (range)400-600 mg/L.hr   AUC24,ss: 553 mg/L.hr  Probability of AUC24 > 400: 81 %  Ctrough,ss: 16.6 mg/L  Probability of Ctrough,ss > 20: 35 %  Probability of nephrotoxicity (Lodise ANAI ): 12 %          Plan:  1. Start vancomycin  1250 mg IV q12h.   2. Vancomycin monitoring method: AUC  3. Vancomycin therapeutic monitoring goal: 400-600 mg*h/L  4. Pharmacy will check vancomycin levels as appropriate in 1-3 Days.    5. Serum creatinine levels will be ordered daily for the first week of therapy and at least twice weekly for subsequent weeks.  "     Janet Dewey, [facility]

## 2022-05-21 ENCOUNTER — APPOINTMENT (OUTPATIENT)
Dept: PHYSICAL THERAPY | Facility: CLINIC | Age: 57
DRG: 857 | End: 2022-05-21
Attending: PHYSICIAN ASSISTANT
Payer: COMMERCIAL

## 2022-05-21 ENCOUNTER — APPOINTMENT (OUTPATIENT)
Dept: OCCUPATIONAL THERAPY | Facility: CLINIC | Age: 57
DRG: 857 | End: 2022-05-21
Attending: PHYSICIAN ASSISTANT
Payer: COMMERCIAL

## 2022-05-21 LAB
ALBUMIN SERPL-MCNC: 2.9 G/DL (ref 3.4–5)
ALP SERPL-CCNC: 70 U/L (ref 40–150)
ALT SERPL W P-5'-P-CCNC: 15 U/L (ref 0–50)
ANION GAP SERPL CALCULATED.3IONS-SCNC: 3 MMOL/L (ref 3–14)
AST SERPL W P-5'-P-CCNC: 11 U/L (ref 0–45)
BASOPHILS # BLD AUTO: 0 10E3/UL (ref 0–0.2)
BASOPHILS NFR BLD AUTO: 0 %
BILIRUB SERPL-MCNC: 0.5 MG/DL (ref 0.2–1.3)
BUN SERPL-MCNC: 9 MG/DL (ref 7–30)
CALCIUM SERPL-MCNC: 8.9 MG/DL (ref 8.5–10.1)
CHLORIDE BLD-SCNC: 111 MMOL/L (ref 94–109)
CO2 SERPL-SCNC: 30 MMOL/L (ref 20–32)
CREAT SERPL-MCNC: 0.6 MG/DL (ref 0.52–1.04)
EOSINOPHIL # BLD AUTO: 0.3 10E3/UL (ref 0–0.7)
EOSINOPHIL NFR BLD AUTO: 7 %
ERYTHROCYTE [DISTWIDTH] IN BLOOD BY AUTOMATED COUNT: 12.9 % (ref 10–15)
GFR SERPL CREATININE-BSD FRML MDRD: >90 ML/MIN/1.73M2
GLUCOSE BLD-MCNC: 98 MG/DL (ref 70–99)
HCT VFR BLD AUTO: 35.4 % (ref 35–47)
HGB BLD-MCNC: 11.3 G/DL (ref 11.7–15.7)
IMM GRANULOCYTES # BLD: 0 10E3/UL
IMM GRANULOCYTES NFR BLD: 1 %
LYMPHOCYTES # BLD AUTO: 0.7 10E3/UL (ref 0.8–5.3)
LYMPHOCYTES NFR BLD AUTO: 16 %
MCH RBC QN AUTO: 31 PG (ref 26.5–33)
MCHC RBC AUTO-ENTMCNC: 31.9 G/DL (ref 31.5–36.5)
MCV RBC AUTO: 97 FL (ref 78–100)
MONOCYTES # BLD AUTO: 0.5 10E3/UL (ref 0–1.3)
MONOCYTES NFR BLD AUTO: 11 %
NEUTROPHILS # BLD AUTO: 2.8 10E3/UL (ref 1.6–8.3)
NEUTROPHILS NFR BLD AUTO: 65 %
NRBC # BLD AUTO: 0 10E3/UL
NRBC BLD AUTO-RTO: 0 /100
PLATELET # BLD AUTO: 152 10E3/UL (ref 150–450)
POTASSIUM BLD-SCNC: 3.9 MMOL/L (ref 3.4–5.3)
PROT SERPL-MCNC: 6.1 G/DL (ref 6.8–8.8)
RBC # BLD AUTO: 3.64 10E6/UL (ref 3.8–5.2)
SODIUM SERPL-SCNC: 144 MMOL/L (ref 133–144)
WBC # BLD AUTO: 4.3 10E3/UL (ref 4–11)

## 2022-05-21 PROCEDURE — 999N000127 HC STATISTIC PERIPHERAL IV START W US GUIDANCE

## 2022-05-21 PROCEDURE — 80053 COMPREHEN METABOLIC PANEL: CPT | Performed by: INTERNAL MEDICINE

## 2022-05-21 PROCEDURE — 250N000011 HC RX IP 250 OP 636: Performed by: INTERNAL MEDICINE

## 2022-05-21 PROCEDURE — 97165 OT EVAL LOW COMPLEX 30 MIN: CPT | Mod: GO

## 2022-05-21 PROCEDURE — 250N000011 HC RX IP 250 OP 636: Performed by: PHYSICIAN ASSISTANT

## 2022-05-21 PROCEDURE — 97535 SELF CARE MNGMENT TRAINING: CPT | Mod: GO

## 2022-05-21 PROCEDURE — 99232 SBSQ HOSP IP/OBS MODERATE 35: CPT | Performed by: INTERNAL MEDICINE

## 2022-05-21 PROCEDURE — 36415 COLL VENOUS BLD VENIPUNCTURE: CPT | Performed by: INTERNAL MEDICINE

## 2022-05-21 PROCEDURE — 97116 GAIT TRAINING THERAPY: CPT | Mod: GP | Performed by: PHYSICAL THERAPIST

## 2022-05-21 PROCEDURE — 250N000013 HC RX MED GY IP 250 OP 250 PS 637: Performed by: INTERNAL MEDICINE

## 2022-05-21 PROCEDURE — 250N000013 HC RX MED GY IP 250 OP 250 PS 637: Performed by: PHYSICIAN ASSISTANT

## 2022-05-21 PROCEDURE — 250N000013 HC RX MED GY IP 250 OP 250 PS 637: Performed by: STUDENT IN AN ORGANIZED HEALTH CARE EDUCATION/TRAINING PROGRAM

## 2022-05-21 PROCEDURE — 97161 PT EVAL LOW COMPLEX 20 MIN: CPT | Mod: GP | Performed by: PHYSICAL THERAPIST

## 2022-05-21 PROCEDURE — 97530 THERAPEUTIC ACTIVITIES: CPT | Mod: GP | Performed by: PHYSICAL THERAPIST

## 2022-05-21 PROCEDURE — 85025 COMPLETE CBC W/AUTO DIFF WBC: CPT | Performed by: INTERNAL MEDICINE

## 2022-05-21 PROCEDURE — 258N000003 HC RX IP 258 OP 636: Performed by: PHYSICIAN ASSISTANT

## 2022-05-21 PROCEDURE — 250N000013 HC RX MED GY IP 250 OP 250 PS 637: Performed by: PEDIATRICS

## 2022-05-21 PROCEDURE — 250N000013 HC RX MED GY IP 250 OP 250 PS 637: Performed by: ORTHOPAEDIC SURGERY

## 2022-05-21 PROCEDURE — 120N000002 HC R&B MED SURG/OB UMMC

## 2022-05-21 RX ORDER — ASPIRIN 81 MG/1
162 TABLET ORAL DAILY
Status: DISCONTINUED | OUTPATIENT
Start: 2022-05-21 | End: 2022-05-24 | Stop reason: HOSPADM

## 2022-05-21 RX ORDER — SUMATRIPTAN 100 MG/1
100 TABLET, FILM COATED ORAL ONCE
Status: COMPLETED | OUTPATIENT
Start: 2022-05-21 | End: 2022-05-21

## 2022-05-21 RX ORDER — PROCHLORPERAZINE 25 MG
25 SUPPOSITORY, RECTAL RECTAL EVERY 12 HOURS PRN
Status: DISCONTINUED | OUTPATIENT
Start: 2022-05-21 | End: 2022-05-24 | Stop reason: HOSPADM

## 2022-05-21 RX ORDER — PROCHLORPERAZINE MALEATE 10 MG
10 TABLET ORAL EVERY 6 HOURS PRN
Status: DISCONTINUED | OUTPATIENT
Start: 2022-05-21 | End: 2022-05-24 | Stop reason: HOSPADM

## 2022-05-21 RX ADMIN — SENNOSIDES AND DOCUSATE SODIUM 1 TABLET: 50; 8.6 TABLET ORAL at 20:17

## 2022-05-21 RX ADMIN — ONDANSETRON 4 MG: 4 TABLET, ORALLY DISINTEGRATING ORAL at 14:30

## 2022-05-21 RX ADMIN — CEFEPIME HYDROCHLORIDE 2 G: 2 INJECTION, POWDER, FOR SOLUTION INTRAVENOUS at 10:08

## 2022-05-21 RX ADMIN — SUMATRIPTAN SUCCINATE 100 MG: 100 TABLET, FILM COATED ORAL at 12:22

## 2022-05-21 RX ADMIN — ACETAMINOPHEN 325MG 975 MG: 325 TABLET ORAL at 04:28

## 2022-05-21 RX ADMIN — SENNOSIDES AND DOCUSATE SODIUM 1 TABLET: 50; 8.6 TABLET ORAL at 08:09

## 2022-05-21 RX ADMIN — PROCHLORPERAZINE EDISYLATE 10 MG: 5 INJECTION INTRAMUSCULAR; INTRAVENOUS at 17:35

## 2022-05-21 RX ADMIN — VANCOMYCIN HYDROCHLORIDE 1250 MG: 1 INJECTION, POWDER, LYOPHILIZED, FOR SOLUTION INTRAVENOUS at 04:27

## 2022-05-21 RX ADMIN — HYDROMORPHONE HYDROCHLORIDE 0.4 MG: 0.2 INJECTION, SOLUTION INTRAMUSCULAR; INTRAVENOUS; SUBCUTANEOUS at 16:41

## 2022-05-21 RX ADMIN — OXYCODONE HYDROCHLORIDE 10 MG: 10 TABLET ORAL at 01:33

## 2022-05-21 RX ADMIN — HYDROMORPHONE HYDROCHLORIDE 0.4 MG: 0.2 INJECTION, SOLUTION INTRAMUSCULAR; INTRAVENOUS; SUBCUTANEOUS at 14:16

## 2022-05-21 RX ADMIN — SUMATRIPTAN SUCCINATE 100 MG: 100 TABLET, FILM COATED ORAL at 01:34

## 2022-05-21 RX ADMIN — ACETAMINOPHEN 325MG 975 MG: 325 TABLET ORAL at 20:17

## 2022-05-21 RX ADMIN — ASPIRIN 162 MG: 81 TABLET ORAL at 12:22

## 2022-05-21 RX ADMIN — ONDANSETRON 4 MG: 2 INJECTION INTRAMUSCULAR; INTRAVENOUS at 15:46

## 2022-05-21 RX ADMIN — OXYCODONE HYDROCHLORIDE 10 MG: 10 TABLET ORAL at 04:28

## 2022-05-21 RX ADMIN — ACETAMINOPHEN 325MG 975 MG: 325 TABLET ORAL at 12:22

## 2022-05-21 RX ADMIN — OXYCODONE HYDROCHLORIDE 10 MG: 10 TABLET ORAL at 20:20

## 2022-05-21 RX ADMIN — OXYCODONE HYDROCHLORIDE 10 MG: 10 TABLET ORAL at 08:09

## 2022-05-21 RX ADMIN — POLYETHYLENE GLYCOL 3350 17 G: 17 POWDER, FOR SOLUTION ORAL at 08:09

## 2022-05-21 RX ADMIN — VANCOMYCIN HYDROCHLORIDE 1250 MG: 1 INJECTION, POWDER, LYOPHILIZED, FOR SOLUTION INTRAVENOUS at 16:53

## 2022-05-21 RX ADMIN — CEFEPIME HYDROCHLORIDE 2 G: 2 INJECTION, POWDER, FOR SOLUTION INTRAVENOUS at 19:04

## 2022-05-21 RX ADMIN — OXYCODONE HYDROCHLORIDE 10 MG: 10 TABLET ORAL at 12:22

## 2022-05-21 RX ADMIN — CEFEPIME HYDROCHLORIDE 2 G: 2 INJECTION, POWDER, FOR SOLUTION INTRAVENOUS at 01:34

## 2022-05-21 ASSESSMENT — ACTIVITIES OF DAILY LIVING (ADL)
ADLS_ACUITY_SCORE: 22
ADLS_ACUITY_SCORE: 22
ADLS_ACUITY_SCORE: 21
ADLS_ACUITY_SCORE: 21
ADLS_ACUITY_SCORE: 22
ADLS_ACUITY_SCORE: 21
ADLS_ACUITY_SCORE: 21
ADLS_ACUITY_SCORE: 22
ADLS_ACUITY_SCORE: 21

## 2022-05-21 NOTE — PROGRESS NOTES
Care Management Follow Up    Length of Stay (days): 1    Expected Discharge Date:  2/23/2022     Concerns to be Addressed:  IV abx, wound care.    Anticipated Discharge Disposition:  Home with wound vac cares and possibly IV abx.     Anticipated Discharge Services:  Home Care  Anticipated Discharge DME:  Wound Vac    Additional Information:  Spoke with patient briefly, she was having a migraine.Gave brief education on wound vac and wound cares along with possibility of home IV antibiotics. Wound vac ordered, will need completed documentation sent on 5/23. Benefit check for home IV abx to be done on 5/23. [facility] will continue to follow.    Clarice Aviles RN, BSN  Care Coordinator, 5 Ortho  Phone [phone]  Pager [phone]

## 2022-05-21 NOTE — PLAN OF CARE
VS: VSS. Denies CP/SOB. A/O x4.    O2: >90% on RA   Output: Voiding adequate amounts w/o pain or difficulty    Last BM: 5/19 per pt report   Activity: Up with 1/SBA, GB and walker. WBAT. Tolerating well.    Up for meals? Declined    Skin: R groin surgical incision otherwise intact    Pain: Pain in R groin, managing with PRN oxy, scheduled tylenol. Pt developed a migraine this afternoon- Imitrex given, IV dilaudid needed.    CMS: Intact    Dressing: CDI    Diet: Regular, tolerated breakfast well. After pt developed a migraine she was nauseous and needed zofran and compazine.     LDA: PIV saline locked. Wound Vac continuous at 125/hr    Equipment: IV pole, PCDs   Plan: Awaiting abx plan for discharge.     Additional Info:

## 2022-05-21 NOTE — PROGRESS NOTES
BRIEF ID NOTE    Chart reviewed. Patient not seen. Currently on vancomycin and cefepime for surgical site infection at site of R iliopsoas schwannoma. Afebrile and hemodynamically stable. Awaiting intra-operative culture growth - today now showing aerobic GNR in multiple samples. Cefepime appropriate empiric therapy. Reasonable to continue vancomycin for now to see if any GPCs develop as well.     Jarad Mejia MD on 5/21/2022 at 12:09 PM  [phone]

## 2022-05-21 NOTE — PROGRESS NOTES
[facility]    Medicine Progress Note - Hospitalist Service, GOLD TEAM 17    Date of Admission:  5/20/2022    Assessment & Plan        Ophelia Wolf is a very pleasant  56 year-old  female admitted on 5/20/22.  Patient underwent right thigh and abd I&D w/ wound vac placement on 5/20/2022.  Admitted to surgical [facility] for postop care.  Hospitalist service consulted for postop medical management.  Ophelia does not have any complaint other than postop pain..     S/p right thigh and abd I&D w/ wound vac placement  S/p excision of 12 cm Schwannoma from right retroperitoneum and anterior groin (4/19/2022)  S/p right femoral nerve neuroplasty and common femoral artery dissection (4/19/2022)  ---   Continue IV vancomycin and IV cefepime  ---   Continue IVF hydrate  ---   postop pain management per primary team    DVT prophylaxis  ---    mg po daily  ---   PCD    Migraine HA  ---   Imitrex as needed    Pulmonary nodule  ---   Recommend close outpatient follow-up           Diet: Advance Diet as Tolerated: Regular Diet Adult    DVT Prophylaxis: Pneumatic Compression Devices  Farmer Catheter: Not present  Central Lines: None  Cardiac Monitoring: None  Code Status: Full Code    Disposition Plan   Home   Expected Discharge:  Per ortho        Meggan Vyas MD  Hospitalist Service, GOLD TEAM 17  [facility]  Securely message with the Vocera Web Console (learn more here)  Text page via [facility] Paging/Directory   Please see signed in provider for up to date coverage information      ________________________________________________________    Interval History   No complaints.  Uneventful night.  Postop pain under good control.    Data reviewed today: I reviewed all medications, new labs and imaging results over the last 24 hours.     Physical Exam   Vital Signs: Temp: 98.6  F (37  C) Temp src: Oral BP: 105/62 Pulse: 86   Resp: 16 SpO2:  95 % O2 Device: None (Room air) Oxygen Delivery: 1 LPM  Weight: 161 lbs 6.03 oz  General: aao x 3, NAD.  HEENT:  NC/AT, neck supple  CVS:  NL s 1 and s2, no m/r/g.  Lungs:  CTA B/L.   Abd:  Soft, + bs, wound vac noted  Ext:  No c/c.  Lymph:  No edema.  Neuro:  Nonfocal.  Musculoskeletal: No calf tenderness to palpation.    Skin:  No rash.  Psychiatry:  Mood and affect appropriate.      Data   Recent Labs   Lab 05/21/22  0625 05/21/22  0618 05/20/22  1518 05/20/22  0856 05/19/22  1318   WBC 4.3  --   --   --  4.9   HGB 11.3*  --   --   --  12.9   MCV 97  --   --   --  95     --   --   --  207   NA  --  144  --   --   --    POTASSIUM  --  3.9  --   --   --    CHLORIDE  --  111*  --   --   --    CO2  --  30  --   --   --    BUN  --  9  --   --   --    CR  --  0.60 0.61  --   --    ANIONGAP  --  3  --   --   --    LYNNE  --  8.9  --   --   --    GLC  --  98  --  111*  --    ALBUMIN  --  2.9*  --   --   --    PROTTOTAL  --  6.1*  --   --   --    BILITOTAL  --  0.5  --   --   --    ALKPHOS  --  70  --   --   --    ALT  --  15  --   --   --    AST  --  11  --   --   --      Medications     lactated ringers 75 mL/hr at 05/20/22 1619       acetaminophen  975 mg Oral Q8H     aspirin  162 mg Oral Daily     ceFEPIme (MAXIPIME) IV  2 g Intravenous Q8H     polyethylene glycol  17 g Oral Daily     senna-docusate  1 tablet Oral BID     sodium chloride (PF)  3 mL Intracatheter Q8H     vancomycin  1,250 mg Intravenous Q12H

## 2022-05-21 NOTE — PLAN OF CARE
Physical Therapy Discharge Summary    Reason for therapy discharge:    All goals and outcomes met, no further needs identified.    Progress towards therapy goal(s). See goals on Care Plan in [facility] electronic health record for goal details.  Goals met    Therapy recommendation(s):    No further therapy is recommended.

## 2022-05-21 NOTE — PROGRESS NOTES
05/21/22 1049   Quick Adds   Type of Visit Initial PT Evaluation       Present no   Language English   Living Environment   People in Home spouse   Current Living Arrangements house   Home Accessibility no concerns   Transportation Anticipated car, drives self;family or friend will provide   Living Environment Comments Pt lives with her spouse and will either have her daughter come and stay with her or go to her daughters home.   Self-Care   Usual Activity Tolerance moderate   Current Activity Tolerance fair   Regular Exercise No   Equipment Currently Used at Home none   Fall history within last six months no   Activity/Exercise/Self-Care Comment Pt was independent with all ADLs at baseline.   General Information   Onset of Illness/Injury or Date of Surgery 05/20/22   Referring Physician Richard Katz MD   Patient/Family Therapy Goals Statement (PT) Pt would like to get back to walking.   Pertinent History of Current Problem (include personal factors and/or comorbidities that impact the POC) Pt is 55 y/o female s/p right thigh and abdomen I&D   Existing Precautions/Restrictions no known precautions/restrictions  (wound vac)   Weight-Bearing Status - LUE full weight-bearing   Weight-Bearing Status - RUE full weight-bearing   Weight-Bearing Status - LLE weight-bearing as tolerated   Weight-Bearing Status - RLE full weight-bearing   Heart Disease Risk Factors Overweight   General Observations Pt supine in bed at start of PT evaluation.  Pt has wound vac in place and getting IV fluids.   Cognition   Affect/Mental Status (Cognition) WNL   Orientation Status (Cognition) other (see comments)  (Not tested)   Follows Commands (Cognition) WNL   Pain Assessment   Patient Currently in Pain Yes, see Vital Sign flowsheet   Integumentary/Edema   Integumentary/Edema Comments Mild edema right LE, pt reported having significant edema prior to admission but has improved.  Pt has wound vac in place.   Range of  Motion (ROM)   Range of Motion ROM deficits secondary to surgical procedure   Strength (Manual Muscle Testing)   Strength (Manual Muscle Testing) Deficits observed during functional mobility   Strength Comments Pt used UE's to assist with lifting right LE into bed.   Bed Mobility   Comment, (Bed Mobility) Supine to/from sitting with HOB elevated and mod I, pt used UE's to assist lifting right LE in/out of bed.   Transfers   Comment, (Transfers) Sit to/from standing with FWW and SBA x 1.   Gait/Stairs (Locomotion)   [place]enzie Level (Gait) supervision   Assistive Device (Gait) walker, front-wheeled   Comment, (Gait/Stairs) Pt ambulated 15' with FWW and SBA x 1.   Balance   Balance Comments Pt demonstrated good sitting balance at EOB and fair standing balance wtih FWW.   Sensory Examination   Sensory Perception Comments Not tested   Clinical Impression   Criteria for Skilled Therapeutic Intervention Yes, treatment indicated   PT Diagnosis (PT) Decreased strength, decreased ROM, and impaired functional mobility.   Influenced by the following impairments Post-op pain, weakness, and s/p right thigh and abdomen I&D.   Functional limitations due to impairments Impaired transfers and gait.   Clinical Presentation (PT Evaluation Complexity) Stable/Uncomplicated   Clinical Presentation Rationale Per clinical judgement   Clinical Decision Making (Complexity) low complexity   Planned Therapy Interventions (PT) transfer training;gait training   Anticipated Equipment Needs at Discharge (PT) other (see comments)  (No equpiment needs)   Risk & Benefits of therapy have been explained evaluation/treatment results reviewed;care plan/treatment goals reviewed;patient   PT Discharge Planning   PT Discharge Recommendation (DC Rec) home with assist   PT Rationale for DC Rec Anticipate pt will be able to discharge home with family for assist, will either discharge to her daughters home or her daughter will stay with her.   PT Brief  overview of current status Pt is assist of 1 with FWW, needs assist with IV pole and wound vac.   Total Evaluation Time   Total Evaluation Time (Minutes) 14   Physical Therapy Goals   PT Frequency One time eval and treatment only   PT Predicted Duration/Target Date for Goal Attainment 05/21/22   PT: Bed Mobility Supervision/stand-by assist;Supine to/from sit;Goal Met   PT: Transfers Sit to/from stand;Assistive device;Supervision/stand-by assist   PT: Gait Supervision/stand-by assist;Rolling walker;150 feet

## 2022-05-21 NOTE — PROGRESS NOTES
"Orthopedic Surgery Progress Note    Subjective / Interval Events:   Patient resting in bed; no acute distress; pain controlled.  Denies chest pain, palpitations, SOB.      Objective:   Vital Signs Temp (24hrs), Av.9  F (36.6  C), Min:97.4  F (36.3  C), Max:98.6  F (37  C)    /62   Pulse 86   Temp 98.6  F (37  C) (Oral)   Resp 16   Ht 1.626 m (5' 4\")   Wt 73.2 kg (161 lb 6 oz)   LMP 2007 (Approximate)   SpO2 95%   BMI 27.70 kg/m      Physical Examination   General: no acute distress  CV: palpable pulses  Resp: Nonlabored breathing  RLE: dressing in tact; clean and dry, VAC in place with negative pressure and no leaks      5/5 Iliopsoas, quadricep, tibialis anterior, extensor houses longus, gastrocsoleus.     Sensation to light touch intact in the L2-S1 dermatome distribution.    Dorsalis pedis pulse palpated to be 2/4.    Tolerates range of motion in all major joints     No significant pain or limitation of range of motion.     Output by Drain (mL) 22 0700 - 22 1459 22 1500 - 22 2259 22 2300 - 22 0659 22 0700 - 22 1459 22 1500 - 22 2259 22 2300 - 22 0659 22 0700 - 22 0948   Requested LDAs do not have output data documented.       Labs (Last 24 hour):   Lab Results   Component Value Date    WBC 4.3 2022    WBC 10.5 2021    HGB 11.3 2022    HGB 15.2 2021    HCT 35.4 2022    HCT 43.8 2021    MCV 97 2022    MCV 93 2021     2022     2021    RDW 12.9 2022    RDW 12.4 2021     Lab Results   Component Value Date    BUN 9 2022    BUN 10 2021     2022     2021    CO2 30 2022    CO2 27 2021    ANIONGAP 3 2022    ANIONGAP 7 2021       All cultures:  No results for input(s): CULT in the last 168 hours.      Current Facility-Administered Medications:      [START ON 2022] " acetaminophen (TYLENOL) tablet 650 mg, 650 mg, Oral, Q4H PRN, Priyanka Vázquez PA-C     acetaminophen (TYLENOL) tablet 975 mg, 975 mg, Oral, Q8H, Priyanka Vázquez PA-C, 975 mg at 05/21/22 0428     bisacodyl (DULCOLAX) Suppository 10 mg, 10 mg, Rectal, Daily PRN, Priyanka Vázquez PA-C     ceFEPIme (MAXIPIME) 2 g vial to attach to  ml bag for ADULTS or 50 ml bag for PEDS, 2 g, Intravenous, Q8H, Gayatri Olson MD, Last Rate: 200 mL/hr at 05/20/22 1853, 2 g at 05/21/22 0134     HYDROmorphone (DILAUDID) injection 0.2 mg, 0.2 mg, Intravenous, Q2H PRN **OR** HYDROmorphone (DILAUDID) injection 0.4 mg, 0.4 mg, Intravenous, Q2H PRN, Priyanka Vázquez PA-C     HYDROmorphone (DILAUDID) injection 0.2 mg, 0.2 mg, Intravenous, Once PRN, Juan Chu,      lactated ringers infusion, , Intravenous, Continuous, Priyanka Vázquez PA-C, Last Rate: 75 mL/hr at 05/20/22 1619, Rate Verify at 05/20/22 1619     lidocaine (LMX4) cream, , Topical, Q1H PRN, Priyanka Vázquez PA-C     lidocaine 1 % 0.1-1 mL, 0.1-1 mL, Other, Q1H PRN, Priyanka Vázquez PA-C     magnesium hydroxide (MILK OF MAGNESIA) suspension 30 mL, 30 mL, Oral, Daily PRN, Priyanka Vázquez PA-C     naloxone (NARCAN) injection 0.2 mg, 0.2 mg, Intravenous, Q2 Min PRN **OR** naloxone (NARCAN) injection 0.4 mg, 0.4 mg, Intravenous, Q2 Min PRN **OR** naloxone (NARCAN) injection 0.2 mg, 0.2 mg, Intramuscular, Q2 Min PRN **OR** naloxone (NARCAN) injection 0.4 mg, 0.4 mg, Intramuscular, Q2 Min PRN, Richard Katz MD     ondansetron (ZOFRAN ODT) ODT tab 4 mg, 4 mg, Oral, Q6H PRN **OR** ondansetron (ZOFRAN) injection 4 mg, 4 mg, Intravenous, Q6H PRN, Priyanka Vázquez PA-C     oxyCODONE (ROXICODONE) tablet 5 mg, 5 mg, Oral, Q3H PRN **OR** oxyCODONE IR (ROXICODONE) tablet 10 mg, 10 mg, Oral, Q3H PRN, Faraz Perkins MD, 10 mg at 05/21/22 0809     polyethylene glycol (MIRALAX) Packet 17 g, 17 g, Oral, Daily, Priyanka Vázquez PA-C, 17 g at 05/21/22 0809     senna-docusate  (SENOKOT-S/PERICOLACE) 8.6-50 MG per tablet 1 tablet, 1 tablet, Oral, BID, Priyanka Vázquez PA-C, 1 tablet at 05/21/22 0809     sodium chloride (PF) 0.9% PF flush 3 mL, 3 mL, Intracatheter, Q8H, Priyanka Vázquez PA-C     sodium chloride (PF) 0.9% PF flush 3 mL, 3 mL, Intracatheter, q1 min prn, Priyanka Vázquez PA-C     vancomycin (VANCOCIN) 1,250 mg in sodium chloride 0.9 % 250 mL intermittent infusion, 1,250 mg, Intravenous, Q12H, Priyanka Vázquez PA-C, 1,250 mg at 05/21/22 0427      Assessment / Plan:   56 year old female s/p right thigh and abdomen I&D on 5/20/2022 by Dr. Katz    Plan: Ortho Primary  Monitoring: Per unit protocol  Activity: Up with assist  Weight bearing status: WBAT in operative extremity  Range of motion: As tolerated  Antibiotics: Vanc and Cefipime per ID recs and pending cultures  Diet: Begin with clear fluids and progress diet as tolerated.  DVT prophylaxis: Mechanical + ASA  Bracing/Splinting: None  Elevation: Elevate operative extremity to keep at level of heart as much as possible.  Wound Care: VAC change on floor Monday 5/23/22  Drain: None  Pain management: PO pain control  X-rays: PACU  Physical Therapy: ROM, ADL's.  Labs: Trend Hgb on POD #1.  Consults: PT + Med Mng  Follow-up: Clinic with Dr. Katz in 2 weeks  Disposition: Pending progress with therapies, pain control on orals, and medical stability, anticipate discharge to home on POD #3-4    Rick Stroud DO  Adult Reconstruction Fellow  Dept Orthopaedic Surgery, [facility]

## 2022-05-21 NOTE — PROGRESS NOTES
05/21/22 0818   Quick Adds   Type of Visit Initial Occupational Therapy Evaluation   Living Environment   People in Home spouse   Current Living Arrangements house   Home Accessibility no concerns   Living Environment Comments Pt lives with spouse, pt will likely stay at dtrs or have dtrs come stay with her, 1 is an RN. Has walk in shower   Self-Care   Usual Activity Tolerance excellent   Current Activity Tolerance moderate   Equipment Currently Used at Home walker, standard   Fall history within last six months no   Activity/Exercise/Self-Care Comment Pt reports being fully IND with all ADLs and home care prior to surgery. Pt planning to stay with daughter at discharge where daughter is available to assist all day as she does not work. Pt has no prior use of ADs but has SPC and walker available at the house. No history of falls.   General Information   Onset of Illness/Injury or Date of Surgery 05/20/22   Referring Physician Richard Katz MD   Patient/Family Therapy Goal Statement (OT) Not endorsed   Additional Occupational Profile Info/Pertinent History of Current Problem Ophelia Wolf is a very pleasant  56 year-old  female admitted on 5/20/2022. Patient underwent irrigation and debridement of her right abdomen and thigh (surgical documentation pending) on 5/20/2022   Existing Precautions/Restrictions fall   Right Lower Extremity (Weight-bearing Status) weight-bearing as tolerated (WBAT)   Cognitive Status Examination   Orientation Status orientation to person, place and time   Visual Perception   Visual Impairment/Limitations WFL   Sensory   Sensory Quick Adds No deficits were identified   Pain Assessment   Patient Currently in Pain Yes, see Vital Sign flowsheet   Integumentary/Edema   Integumentary/Edema Comments Post op swelling   Posture   Posture protracted shoulders;forward head position   Range of Motion Comprehensive   General Range of Motion bilateral upper extremity ROM WFL   Strength  Comprehensive (MMT)   Comment, General Manual Muscle Testing (MMT) Assessment Pt is generally deconditioned   Muscle Tone Assessment   Muscle Tone Quick Adds No deficits were identified   Coordination   Upper Extremity Coordination No deficits were identified   Bed Mobility   Bed Mobility supine-sit;sit-supine   Supine-Sit [place] (Bed Mobility) verbal cues;supervision;nonverbal cues (demo/gesture)   Sit-Supine [place] (Bed Mobility) supervision;verbal cues;nonverbal cues (demo/gesture)   Assistive Device (Bed Mobility) bed rails;leg    Transfers   Transfers sit-stand transfer;toilet transfer   Sit-Stand Transfer   Sit-Stand [place] (Transfers) supervision;verbal cues;nonverbal cues (demo/gesture)   Assistive Device (Sit-Stand Transfers) walker, front-wheeled   Toilet Transfer   Type (Toilet Transfer) sit-stand;stand-sit   [place] Level (Toilet Transfer) contact guard;nonverbal cues (demo/gesture);verbal cues   Assistive Device (Toilet Transfer) walker, front-wheeled;grab bars/safety frame   Activities of Daily Living   BADL Assessment/Intervention lower body dressing   Lower Body Dressing Assessment/Training   Position (Lower Body Dressing) edge of bed sitting   Assistive Devices (Lower Body Dressing) reacher;sock-aid   [place] Level (Lower Body Dressing) supervision;verbal cues;nonverbal cues (demo/gesture)   Clinical Impression   Criteria for Skilled Therapeutic Interventions Met (OT) Yes, treatment indicated   OT Diagnosis Decreased ADL functioning   OT Problem List-Impairments impacting ADL activity tolerance impaired;balance;range of motion (ROM);strength;pain;post-surgical precautions   Assessment of Occupational Performance 3-5 Performance Deficits   Identified Performance Deficits dressing, toileting, bathing   Planned Therapy Interventions (OT) ADL retraining;balance training;bed mobility training;strengthening;transfer training;home program guidelines   Clinical Decision  Making Complexity (OT) low complexity   Risk & Benefits of therapy have been explained evaluation/treatment results reviewed;care plan/treatment goals reviewed;risks/benefits reviewed;current/potential barriers reviewed;participants voiced agreement with care plan;participants included;patient;spouse/significant other   OT Discharge Planning   OT Discharge Recommendation (DC Rec) home with assist   OT Rationale for DC Rec Patient has assist drom daughters at home and has a good set up. Writer anticipates pt can safely dc home with assist for ADLs and/or AE.   Total Evaluation Time (Minutes)   Total Evaluation Time (Minutes) 10   OT Goals   Therapy Frequency (OT) One time eval and treatment   OT Predicted Duration/Target Date for Goal Attainment 05/21/22   OT Goals Lower Body Dressing;Bed Mobility;Toilet Transfer/Toileting   OT: Lower Body Dressing Supervision/stand-by assist;using adaptive equipment;within precautions;Goal Met   OT: Bed Mobility Supervision/stand-by assist;supine to/from sitting;within precautions;Goal Met   OT: Toilet Transfer/Toileting Supervision/stand-by assist;toilet transfer;cleaning and garment management;using adaptive equipment;within precautions;Goal Met

## 2022-05-21 NOTE — PLAN OF CARE
Patient is A&Ox4, VSS on RA, able to make needs known, using call light appropriately, amb to bathroom with A1 with walker, slept between care.  Patient experiencing pain during night rated 6/10-8/10 oxy 10 mg given and effective.  Continues on IV antibiotics, tolerating well.  Wound vac remains in place, no drainage out of wound vac this shift.  Patient had c/o migraine at 0100 one time dose of imatrex given and effective.  Continue with POC.

## 2022-05-22 LAB
ALBUMIN SERPL-MCNC: 2.8 G/DL (ref 3.4–5)
ALP SERPL-CCNC: 70 U/L (ref 40–150)
ALT SERPL W P-5'-P-CCNC: 13 U/L (ref 0–50)
ANION GAP SERPL CALCULATED.3IONS-SCNC: 6 MMOL/L (ref 3–14)
AST SERPL W P-5'-P-CCNC: 9 U/L (ref 0–45)
BACTERIA TISS BX CULT: ABNORMAL
BILIRUB SERPL-MCNC: 0.5 MG/DL (ref 0.2–1.3)
BUN SERPL-MCNC: 10 MG/DL (ref 7–30)
CALCIUM SERPL-MCNC: 8.8 MG/DL (ref 8.5–10.1)
CHLORIDE BLD-SCNC: 106 MMOL/L (ref 94–109)
CO2 SERPL-SCNC: 31 MMOL/L (ref 20–32)
CREAT SERPL-MCNC: 0.6 MG/DL (ref 0.52–1.04)
GFR SERPL CREATININE-BSD FRML MDRD: >90 ML/MIN/1.73M2
GLUCOSE BLD-MCNC: 98 MG/DL (ref 70–99)
HGB BLD-MCNC: 11.2 G/DL (ref 11.7–15.7)
MAGNESIUM SERPL-MCNC: 2 MG/DL (ref 1.6–2.3)
POTASSIUM BLD-SCNC: 4.4 MMOL/L (ref 3.4–5.3)
PROT SERPL-MCNC: 5.6 G/DL (ref 6.8–8.8)
SODIUM SERPL-SCNC: 143 MMOL/L (ref 133–144)
VANCOMYCIN SERPL-MCNC: 33.9 MG/L

## 2022-05-22 PROCEDURE — 250N000013 HC RX MED GY IP 250 OP 250 PS 637: Performed by: STUDENT IN AN ORGANIZED HEALTH CARE EDUCATION/TRAINING PROGRAM

## 2022-05-22 PROCEDURE — 80202 ASSAY OF VANCOMYCIN: CPT | Performed by: ORTHOPAEDIC SURGERY

## 2022-05-22 PROCEDURE — 250N000013 HC RX MED GY IP 250 OP 250 PS 637: Performed by: INTERNAL MEDICINE

## 2022-05-22 PROCEDURE — 250N000013 HC RX MED GY IP 250 OP 250 PS 637: Performed by: ORTHOPAEDIC SURGERY

## 2022-05-22 PROCEDURE — 258N000003 HC RX IP 258 OP 636: Performed by: PHYSICIAN ASSISTANT

## 2022-05-22 PROCEDURE — 80053 COMPREHEN METABOLIC PANEL: CPT | Performed by: INTERNAL MEDICINE

## 2022-05-22 PROCEDURE — 83735 ASSAY OF MAGNESIUM: CPT | Performed by: INTERNAL MEDICINE

## 2022-05-22 PROCEDURE — 85018 HEMOGLOBIN: CPT | Performed by: PHYSICIAN ASSISTANT

## 2022-05-22 PROCEDURE — 250N000013 HC RX MED GY IP 250 OP 250 PS 637: Performed by: PHYSICIAN ASSISTANT

## 2022-05-22 PROCEDURE — 120N000002 HC R&B MED SURG/OB UMMC

## 2022-05-22 PROCEDURE — 999N000007 HC SITE CHECK

## 2022-05-22 PROCEDURE — 250N000011 HC RX IP 250 OP 636: Performed by: PHYSICIAN ASSISTANT

## 2022-05-22 PROCEDURE — 36415 COLL VENOUS BLD VENIPUNCTURE: CPT | Performed by: PHYSICIAN ASSISTANT

## 2022-05-22 PROCEDURE — 250N000011 HC RX IP 250 OP 636: Performed by: INTERNAL MEDICINE

## 2022-05-22 PROCEDURE — 99233 SBSQ HOSP IP/OBS HIGH 50: CPT | Mod: 24 | Performed by: STUDENT IN AN ORGANIZED HEALTH CARE EDUCATION/TRAINING PROGRAM

## 2022-05-22 PROCEDURE — 99232 SBSQ HOSP IP/OBS MODERATE 35: CPT | Performed by: INTERNAL MEDICINE

## 2022-05-22 RX ORDER — METRONIDAZOLE 500 MG/1
500 TABLET ORAL 3 TIMES DAILY
Status: DISCONTINUED | OUTPATIENT
Start: 2022-05-22 | End: 2022-05-24 | Stop reason: HOSPADM

## 2022-05-22 RX ORDER — VANCOMYCIN HYDROCHLORIDE 1 G/200ML
1000 INJECTION, SOLUTION INTRAVENOUS EVERY 12 HOURS
Status: DISCONTINUED | OUTPATIENT
Start: 2022-05-22 | End: 2022-05-22

## 2022-05-22 RX ORDER — SUMATRIPTAN 100 MG/1
100 TABLET, FILM COATED ORAL ONCE
Status: COMPLETED | OUTPATIENT
Start: 2022-05-22 | End: 2022-05-22

## 2022-05-22 RX ADMIN — CEFEPIME HYDROCHLORIDE 2 G: 2 INJECTION, POWDER, FOR SOLUTION INTRAVENOUS at 10:11

## 2022-05-22 RX ADMIN — METRONIDAZOLE 500 MG: 500 TABLET ORAL at 15:48

## 2022-05-22 RX ADMIN — ACETAMINOPHEN 325MG 975 MG: 325 TABLET ORAL at 04:04

## 2022-05-22 RX ADMIN — HYDROMORPHONE HYDROCHLORIDE 0.2 MG: 0.2 INJECTION, SOLUTION INTRAMUSCULAR; INTRAVENOUS; SUBCUTANEOUS at 09:15

## 2022-05-22 RX ADMIN — OXYCODONE HYDROCHLORIDE 10 MG: 10 TABLET ORAL at 08:07

## 2022-05-22 RX ADMIN — OXYCODONE HYDROCHLORIDE 10 MG: 10 TABLET ORAL at 21:17

## 2022-05-22 RX ADMIN — CEFEPIME HYDROCHLORIDE 2 G: 2 INJECTION, POWDER, FOR SOLUTION INTRAVENOUS at 00:01

## 2022-05-22 RX ADMIN — ACETAMINOPHEN 325MG 975 MG: 325 TABLET ORAL at 20:38

## 2022-05-22 RX ADMIN — OXYCODONE HYDROCHLORIDE 10 MG: 10 TABLET ORAL at 03:22

## 2022-05-22 RX ADMIN — VANCOMYCIN HYDROCHLORIDE 1250 MG: 1 INJECTION, POWDER, LYOPHILIZED, FOR SOLUTION INTRAVENOUS at 04:02

## 2022-05-22 RX ADMIN — ONDANSETRON 4 MG: 2 INJECTION INTRAMUSCULAR; INTRAVENOUS at 13:30

## 2022-05-22 RX ADMIN — POLYETHYLENE GLYCOL 3350 17 G: 17 POWDER, FOR SOLUTION ORAL at 08:07

## 2022-05-22 RX ADMIN — SENNOSIDES AND DOCUSATE SODIUM 1 TABLET: 50; 8.6 TABLET ORAL at 20:38

## 2022-05-22 RX ADMIN — OXYCODONE HYDROCHLORIDE 10 MG: 10 TABLET ORAL at 00:02

## 2022-05-22 RX ADMIN — OXYCODONE HYDROCHLORIDE 10 MG: 10 TABLET ORAL at 13:51

## 2022-05-22 RX ADMIN — HYDROMORPHONE HYDROCHLORIDE 0.2 MG: 0.2 INJECTION, SOLUTION INTRAMUSCULAR; INTRAVENOUS; SUBCUTANEOUS at 04:02

## 2022-05-22 RX ADMIN — SUMATRIPTAN SUCCINATE 100 MG: 100 TABLET, FILM COATED ORAL at 11:46

## 2022-05-22 RX ADMIN — SENNOSIDES AND DOCUSATE SODIUM 1 TABLET: 50; 8.6 TABLET ORAL at 08:07

## 2022-05-22 RX ADMIN — ASPIRIN 162 MG: 81 TABLET ORAL at 08:08

## 2022-05-22 RX ADMIN — ACETAMINOPHEN 325MG 975 MG: 325 TABLET ORAL at 13:51

## 2022-05-22 RX ADMIN — OXYCODONE HYDROCHLORIDE 5 MG: 5 TABLET ORAL at 17:53

## 2022-05-22 RX ADMIN — CEFEPIME HYDROCHLORIDE 2 G: 2 INJECTION, POWDER, FOR SOLUTION INTRAVENOUS at 17:57

## 2022-05-22 RX ADMIN — METRONIDAZOLE 500 MG: 500 TABLET ORAL at 20:38

## 2022-05-22 ASSESSMENT — ACTIVITIES OF DAILY LIVING (ADL)
ADLS_ACUITY_SCORE: 22

## 2022-05-22 NOTE — PROGRESS NOTES
"Orthopedic Surgery Progress Note    Subjective / Interval Events:   Patient resting in bed; no acute distress; pain controlled.  Denies chest pain, palpitations, SOB.      Objective:   Vital Signs Temp (24hrs), Av.9  F (36.6  C), Min:97.4  F (36.3  C), Max:98.6  F (37  C)    /70 (BP Location: Right arm, Patient Position: Supine)   Pulse 83   Temp 97.5  F (36.4  C) (Oral)   Resp 16   Ht 1.626 m (5' 4\")   Wt 73.2 kg (161 lb 6 oz)   LMP 2007 (Approximate)   SpO2 94%   BMI 27.70 kg/m      Physical Examination   General: no acute distress  CV: palpable pulses  Resp: Nonlabored breathing  RLE: dressing in tact; clean and dry, VAC in place with negative pressure and no leaks      5/5 Iliopsoas, quadricep, tibialis anterior, extensor houses longus, gastrocsoleus.     Sensation to light touch intact in the L2-S1 dermatome distribution.    Dorsalis pedis pulse palpated to be 2/4.    Tolerates range of motion in all major joints     No significant pain or limitation of range of motion.     Output by Drain (mL) 22 0700 - 22 1459 22 1500 - 22 2259 22 2300 - 22 0659 22 0700 - 22 1459 22 1500 - 22 2259 22 2300 - 22 0659 22 0700 - 22 1012   Negative Pressure Wound Therapy Leg Right     25         Labs (Last 24 hour):   Lab Results   Component Value Date    WBC 4.3 2022    WBC 10.5 2021    HGB 11.3 2022    HGB 15.2 2021    HCT 35.4 2022    HCT 43.8 2021    MCV 97 2022    MCV 93 2021     2022     2021    RDW 12.9 2022    RDW 12.4 2021     Lab Results   Component Value Date    BUN 9 2022    BUN 10 2021     2022     2021    CO2 30 2022    CO2 27 2021    ANIONGAP 3 2022    ANIONGAP 7 2021       All cultures:        Current Facility-Administered Medications:      [START ON 2022] " acetaminophen (TYLENOL) tablet 650 mg, 650 mg, Oral, Q4H PRN, Priyanka Vázquez PA-C     acetaminophen (TYLENOL) tablet 975 mg, 975 mg, Oral, Q8H, Priyanka Vázquez PA-C, 975 mg at 05/22/22 0404     aspirin EC tablet 162 mg, 162 mg, Oral, Daily, Rick Stroud MD, 162 mg at 05/22/22 0808     bisacodyl (DULCOLAX) Suppository 10 mg, 10 mg, Rectal, Daily PRN, Priyanka Vázquez PA-C     ceFEPIme (MAXIPIME) 2 g vial to attach to  ml bag for ADULTS or 50 ml bag for PEDS, 2 g, Intravenous, Q8H, Gayatri Olson MD, Last Rate: 200 mL/hr at 05/20/22 1853, 2 g at 05/22/22 0001     HYDROmorphone (DILAUDID) injection 0.2 mg, 0.2 mg, Intravenous, Q2H PRN, 0.2 mg at 05/22/22 0915 **OR** HYDROmorphone (DILAUDID) injection 0.4 mg, 0.4 mg, Intravenous, Q2H PRN, Priyanka Vázquez PA-C, 0.4 mg at 05/21/22 1641     HYDROmorphone (DILAUDID) injection 0.2 mg, 0.2 mg, Intravenous, Once PRN, Juan Chu DO     lactated ringers infusion, , Intravenous, Continuous, Priyanka Vázuqez PA-C, Last Rate: 75 mL/hr at 05/20/22 1619, Rate Verify at 05/20/22 1619     lidocaine (LMX4) cream, , Topical, Q1H PRN, Priyanka Vázquez PA-C     lidocaine 1 % 0.1-1 mL, 0.1-1 mL, Other, Q1H PRN, Priyanka Vázquez PA-C     magnesium hydroxide (MILK OF MAGNESIA) suspension 30 mL, 30 mL, Oral, Daily PRN, Priyanka Vázquez PA-C     naloxone (NARCAN) injection 0.2 mg, 0.2 mg, Intravenous, Q2 Min PRN **OR** naloxone (NARCAN) injection 0.4 mg, 0.4 mg, Intravenous, Q2 Min PRN **OR** naloxone (NARCAN) injection 0.2 mg, 0.2 mg, Intramuscular, Q2 Min PRN **OR** naloxone (NARCAN) injection 0.4 mg, 0.4 mg, Intramuscular, Q2 Min PRN, Richard Katz MD     ondansetron (ZOFRAN ODT) ODT tab 4 mg, 4 mg, Oral, Q6H PRN, 4 mg at 05/21/22 1430 **OR** ondansetron (ZOFRAN) injection 4 mg, 4 mg, Intravenous, Q6H PRN, Priyanka Vázquez PA-C, 4 mg at 05/21/22 1546     oxyCODONE (ROXICODONE) tablet 5 mg, 5 mg, Oral, Q3H PRN **OR** oxyCODONE IR (ROXICODONE) tablet 10 mg, 10 mg,  Oral, Q3H PRN, Faraz Perkins MD, 10 mg at 05/22/22 0807     polyethylene glycol (MIRALAX) Packet 17 g, 17 g, Oral, Daily, Priyanka Vázquez PA-C, 17 g at 05/22/22 0807     prochlorperazine (COMPAZINE) injection 10 mg, 10 mg, Intravenous, Q6H PRN, 10 mg at 05/21/22 1735 **OR** prochlorperazine (COMPAZINE) tablet 10 mg, 10 mg, Oral, Q6H PRN **OR** prochlorperazine (COMPAZINE) suppository 25 mg, 25 mg, Rectal, Q12H PRN, Lakisha Walker PA-C     senna-docusate (SENOKOT-S/PERICOLACE) 8.6-50 MG per tablet 1 tablet, 1 tablet, Oral, BID, Priyanka Vázquez PA-C, 1 tablet at 05/22/22 0807     sodium chloride (PF) 0.9% PF flush 3 mL, 3 mL, Intracatheter, Q8H, Priyanka Vázquez PA-C, 3 mL at 05/21/22 1018     sodium chloride (PF) 0.9% PF flush 3 mL, 3 mL, Intracatheter, q1 min prn, Priyanka Vázquez PA-C     vancomycin (VANCOCIN) 1000 mg in dextrose 5% 200 mL PREMIX, 1,000 mg, Intravenous, Q12H, Richard Katz MD      Assessment / Plan:   56 year old female s/p right thigh and abdomen I&D on 5/20/2022 by Dr. Katz    Plan: Ortho Primary  Monitoring: Per unit protocol  Activity: Up with assist  Weight bearing status: WBAT in operative extremity  Range of motion: As tolerated  Antibiotics: Vanc and Cefipime per ID recs and pending cultures  Diet: Begin with clear fluids and progress diet as tolerated.  DVT prophylaxis: Mechanical + ASA  Bracing/Splinting: None  Elevation: Elevate operative extremity to keep at level of heart as much as possible.  Wound Care: VAC change on floor Monday 5/23/22  Drain: None  Pain management: PO pain control  X-rays: PACU  Physical Therapy: ROM, ADL's.  Labs: Trend Hgb on POD #1.  Consults: PT + Med Mng  Follow-up: Clinic with Dr. Katz in 2 weeks  Disposition: Pending progress with therapies, pain control on orals, and medical stability, anticipate discharge to home on POD #3-4    Rick Stroud DO  Adult Reconstruction Fellow  Dept Orthopaedic Surgery, [facility]

## 2022-05-22 NOTE — PROGRESS NOTES
General Infectious Disease Service Progress Note - [facility]    Patient:  Ophelia Wolf, Date of birth 1965, Medical record number [id number]  Date of Admission: 5/20/2022  Date of Visit:  5/22/2022  Requesting Provider: Richard Katz         Assessment and Recommendations:   Problem List:    # Surgical site infection s/p debridement on 5/20/22;   - Cx with Klebsiella, Citrobacter, and Bacteroides    # R iliopsoas Schwannoma (12x8x6 cm) s/p excision from right retroperitoneum and anterior groin as well as right femoral nerve neuroplasty and common femoral artery dissection on 4/19/22 (Dr. 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 Katz). After this surgery the patient developed surgical site redness on 5/10/22 and on 5/18 it has progressed and has swelling. She did not have fever. Dr. Katz ordered a CT abdomen/pelvis on 5/19 and Sterling Abdi identified a deeper area of either hematoma or abscess. Patient was then admitted on 5/20/22 for I&D. He underwent the I&D (5/20). No op note in the system yet. Operative cultures sent and in process, but thus far showing Klebsiella, Citrobacter, and Bacteroides.     Recommendations:    -- Stop vancomycin  -- Start metronidazole 500mg PO TID  -- Continue cefepime 2 grams IV q 8h    -- We will continue to follow intra-operative cultures and adjust antibiotics accordingly; final regimen/duration pending forthcoming data and clinical course  -- Op note is still not in, but will be helpful for determining the extent/depth of infection      The  "General ID team will continue to follow this patient. Please feel free to call with any question. Dr. Lyon with be covering ID service next week starting on 5/23/22.     Jarad Mejia MD  Date of Service: 05/22/22  Pager: [phone]      Interval Hx: Afebrile. No new complaints. Pain controlled. No itches/rash/allergy to antibiotic therapy to date.       History of Present Illness:   Ophelia Wolf is a 56 year old male with PMHx of rotator cuff degeneration s/p right rotator cuff repair ([facility]) on 3/12/21, partial hysterectomy and R iliopsoas Schwannoma (12x8x6 cm) s/p excision from right retroperitoneum and anterior groin as well as right femoral nerve neuroplasty and common femoral artery dissection on 4/19/22 (Dr. Katz). After this surgery the patient developed surgical site redness on 5/10/22 and on 5/18 it has progressed and has swelling. She did not have fever. Dr. Katz ordered a CT abdomen/pelvis on 5/19 and Sterling Abdi identified a deeper area of either hematoma or abscess. Patient was then admitted on 5/20/22 for I&D. He underwent the I&D today (5/20). No op note in the system yet. Operative cultures sent and in process.            Physical Exam:   /70 (BP Location: Right arm, Patient Position: Supine)   Pulse 83   Temp 97.5  F (36.4  C) (Oral)   Resp 16   Ht 1.626 m (5' 4\")   Wt 73.2 kg (161 lb 6 oz)   LMP 06/16/2007 (Approximate)   SpO2 94%   BMI 27.70 kg/m         Exam:  GENERAL:  Overall tired, but alert and not in acute distress. Currently having a migraine.  HEAD: Normocephalic and atraumatic  ENT:  No hearing impairment, oral mucous membranes moist  EYES:  Eyes grossly normal to inspection, conjunctivae and sclerae normal   ABDOMEN:  Soft, nontender  EXT/MS/SKIN:  Surgical site on the right upper anterior thigh and riht lower abdominal quadrant has wound VAC in place. No surrounding erythema noted.  NEUROLOGIC:  Grossly nonfocal. Mentation intact and speech " normal  PSYCHIATRIC: Mood stable, mentation appears normal, affect normal         Laboratory Data:     Creatinine   Date Value Ref Range Status   05/22/2022 0.60 0.52 - 1.04 mg/dL Final   05/21/2022 0.60 0.52 - 1.04 mg/dL Final   05/20/2022 0.61 0.52 - 1.04 mg/dL Final   04/19/2022 0.66 0.52 - 1.04 mg/dL Final   04/06/2022 0.68 0.52 - 1.04 mg/dL Final   05/22/2021 0.57 0.52 - 1.04 mg/dL Final   12/18/2020 0.62 0.52 - 1.04 mg/dL Final   02/04/2015 0.4 mg/dL Final   08/19/2014 0.69 0.52 - 1.04 mg/dL Final   04/17/2008 0.79 0.60 - 1.30 mg/dL Final     WBC   Date Value Ref Range Status   05/22/2021 10.5 4.0 - 11.0 10e9/L Final   12/18/2020 4.5 4.0 - 11.0 10e9/L Final   08/19/2014 6.2 4.0 - 11.0 10e9/L Final   12/06/2011 8.7 4.0 - 11.0 10e9/L Final   04/08/2010 7.0 4.0 - 11.0 10e9/L Final     WBC Count   Date Value Ref Range Status   05/21/2022 4.3 4.0 - 11.0 10e3/uL Final   05/19/2022 4.9 4.0 - 11.0 10e3/uL Final   04/06/2022 5.1 4.0 - 11.0 10e3/uL Final     Hemoglobin   Date Value Ref Range Status   05/22/2022 11.2 (L) 11.7 - 15.7 g/dL Final   05/22/2021 15.2 11.7 - 15.7 g/dL Final     Platelet Count   Date Value Ref Range Status   05/21/2022 152 150 - 450 10e3/uL Final   05/22/2021 235 150 - 450 10e9/L Final     Lab Results   Component Value Date     05/22/2022    BUN 10 05/22/2022    CO2 31 05/22/2022     CRP Inflammation   Date Value Ref Range Status   05/06/2008 <3.0 0.0 - 8.0 mg/L Final

## 2022-05-22 NOTE — PLAN OF CARE
VS: VSS, LS clear. A/OX4.    O2: >90% RA   Output: Adequately voiding w/o difficulty.    Last BM: 05/19/22. Bowel sounds present.   Activity: WBAT.Ambulating to BR with standby assist   Up for meals? Yes   Skin: Intact except R groin surgical incision   Pain:  R groin surgical incision pain managed with Dilaudid, oxycodone PRN and tylenol. Pt c/o of migraine, Imitrex given and effective.   CMS: intact   Dressing: CDI   Diet: Regular.   LDA: PIV infusing. Wound Vac continuous at 125/hr.    Equipment: Iv pole   Plan: Continue with POC   Additional Info:

## 2022-05-22 NOTE — PROGRESS NOTES
[facility]    Medicine Progress Note - Hospitalist Service, GOLD TEAM 17    Date of Admission:  5/20/2022    Assessment & Plan        Ophelia Wolf is a very pleasant  56 year-old  female admitted on 5/20/22.  Patient underwent right thigh and abd I&D w/ wound vac placement on 5/20/2022.  Admitted to surgical zaragoza for postop care.  Hospitalist service consulted for postop medical management.       S/p right thigh and abd I&D w/ wound vac placement  S/p excision of 12 cm Schwannoma from right retroperitoneum and anterior groin (4/19/2022)  S/p right femoral nerve neuroplasty and common femoral artery dissection (4/19/2022)  ---   POD #2  ---   Intra-Op culture is growing Citrobacter 1+ Freundii complex, 1+ Klebsiella Veriicola/pneumoniae and GNB isolated in broth  ---   Continue IV vancomycin and IV cefepime  ---   Continue IVF hydrate  ---   postop pain management per primary team    DVT prophylaxis  ---    mg po daily  ---   PCD    Migraine HA  ---   Imitrex as needed    Pulmonary nodule  ---   Recommend close outpatient follow-up    Acute postop blood loss anemia  ---   Preop Hgb was 12.9 g on 5/19/22  ---   Hgb is 11.2 g today  ---   Pt denied CP, SOB, lightheadedness or dizziness  ---   No indications for transfusions.           Diet: Advance Diet as Tolerated: Regular Diet Adult    DVT Prophylaxis: Pneumatic Compression Devices  Farmer Catheter: Not present  Central Lines: None  Cardiac Monitoring: None  Code Status: Full Code    Disposition Plan   Home   Expected Discharge:  Per ortho        Meggan Vyas MD  Hospitalist Service, [facility] 17  [facility]  Securely message with the Vocera Web Console (learn more here)  Text page via [employer] Paging/Directory   Please see signed in provider for up to date coverage information      ________________________________________________________    Interval History    No complaints.  Uneventful night.  Postop pain under good control but migraine headache still present.  Patient is requesting Imitrex.    Data reviewed today: I reviewed all medications, new labs and imaging results over the last 24 hours.     Physical Exam   Vital Signs: Temp: 97.5  F (36.4  C) Temp src: Oral BP: 113/70 Pulse: 83   Resp: 16 SpO2: 94 % O2 Device: None (Room air)    Weight: 161 lbs 6.03 oz  General: aao x 3, NAD.  HEENT:  NC/AT, neck supple  CVS:  NL s 1 and s2, no m/r/g.  Lungs:  CTA B/L.   Abd:  Soft, + bs, wound vac noted  Ext:  No c/c.  Lymph:  No edema.  Neuro:  Nonfocal.  Musculoskeletal: No calf tenderness to palpation.    Skin:  No rash.  Psychiatry:  Mood and affect appropriate.      Data   Recent Labs   Lab 05/22/22  0646 05/21/22  0625 05/21/22  0618 05/20/22  1518 05/20/22  0856 05/19/22  1318   WBC  --  4.3  --   --   --  4.9   HGB 11.2* 11.3*  --   --   --  12.9   MCV  --  97  --   --   --  95   PLT  --  152  --   --   --  207     --  144  --   --   --    POTASSIUM 4.4  --  3.9  --   --   --    CHLORIDE 106  --  111*  --   --   --    CO2 31  --  30  --   --   --    BUN 10  --  9  --   --   --    CR 0.60  --  0.60 0.61  --   --    ANIONGAP 6  --  3  --   --   --    LYNNE 8.8  --  8.9  --   --   --    GLC 98  --  98  --  111*  --    ALBUMIN 2.8*  --  2.9*  --   --   --    PROTTOTAL 5.6*  --  6.1*  --   --   --    BILITOTAL 0.5  --  0.5  --   --   --    ALKPHOS 70  --  70  --   --   --    ALT 13  --  15  --   --   --    AST 9  --  11  --   --   --      Medications     lactated ringers 75 mL/hr at 05/20/22 1619       acetaminophen  975 mg Oral Q8H     aspirin  162 mg Oral Daily     ceFEPIme (MAXIPIME) IV  2 g Intravenous Q8H     polyethylene glycol  17 g Oral Daily     senna-docusate  1 tablet Oral BID     sodium chloride (PF)  3 mL Intracatheter Q8H     vancomycin  1,000 mg Intravenous Q12H

## 2022-05-22 NOTE — PHARMACY-VANCOMYCIN DOSING SERVICE
Pharmacy Vancomycin Note  Date of Service May 22, 2022  Patient's  1965   56 year old, female    Indication: Postoperative Infection  Day of Therapy: started on 22  Current vancomycin regimen:  1250 mg IV q12h  Current vancomycin monitoring method: AUC  Current vancomycin therapeutic monitoring goal: 400-600 mg*h/L    Insight[employer] Prediction of Current Vancomycin Regimen  Regimen: 1250 mg IV every 12 hours.  Start time: 09:07 on 2022  Exposure target: AUC24 (range)400-600 mg/L.hr   AUC24,ss: 616 mg/L.hr  Probability of AUC24 > 400: 97 %  Ctrough,ss: 17.9 mg/L  Probability of Ctrough,ss > 20: 38 %  Probability of nephrotoxicity (Lodise ANAI ): 14 %    Current estimated CrCl = Estimated Creatinine Clearance: 102.6 mL/min (based on SCr of 0.6 mg/dL).    Creatinine for last 3 days  2022:  3:18 PM Creatinine 0.61 mg/dL  2022:  6:18 AM Creatinine 0.60 mg/dL  2022:  6:46 AM Creatinine 0.60 mg/dL    Recent Vancomycin Levels (past 3 days)  2022:  6:46 AM Vancomycin 33.9 mg/L    Vancomycin IV Administrations (past 72 hours)                   vancomycin (VANCOCIN) 1,250 mg in sodium chloride 0.9 % 250 mL intermittent infusion (mg) 1,250 mg New Bag 22 0402     1,250 mg New Bag 22 1653     1,250 mg New Bag  0427     1,250 mg New Bag 22 1628                Nephrotoxins and other renal medications (From now, onward)    Start     Dose/Rate Route Frequency Ordered Stop    22 1630  vancomycin (VANCOCIN) 1,250 mg in sodium chloride 0.9 % 250 mL intermittent infusion         1,250 mg  over 90 Minutes Intravenous EVERY 12 HOURS 22 1229               Contrast Orders - past 72 hours (72h ago, onward)    None          Interpretation of levels and current regimen:  Vancomycin level is reflective of AUC greater than 600    Has serum creatinine changed greater than 50% in last 72 hours: No    Urine output:  unable to determine    Renal Function: Stable    Insight[employer]  Prediction of Planned New Vancomycin Regimen  Regimen: 1000 mg IV every 12 hours.  Start time: 09:07 on 05/22/2022  Exposure target: AUC24 (range)400-600 mg/L.hr   AUC24,ss: 495 mg/L.hr  Probability of AUC24 > 400: 82 %  Ctrough,ss: 14.4 mg/L  Probability of Ctrough,ss > 20: 18 %  Probability of nephrotoxicity (Lodise ANAI 2009): 10 %    Plan:  1. Decrease Dose to vancomycin 1000 mg IV every 12 hours.  2. Vancomycin monitoring method: AUC  3. Vancomycin therapeutic monitoring goal: 400-600 mg*h/L  4. Pharmacy will check vancomycin levels as appropriate in 1-3 Days.  5. Serum creatinine levels will be ordered daily for the first week of therapy and at least twice weekly for subsequent weeks.    Jin Rojas, [facility]

## 2022-05-23 ENCOUNTER — HOME INFUSION (PRE-WILLOW HOME INFUSION) (OUTPATIENT)
Dept: PHARMACY | Facility: CLINIC | Age: 57
End: 2022-05-23
Payer: COMMERCIAL

## 2022-05-23 LAB
ALBUMIN SERPL-MCNC: 2.7 G/DL (ref 3.4–5)
ALP SERPL-CCNC: 63 U/L (ref 40–150)
ALT SERPL W P-5'-P-CCNC: 11 U/L (ref 0–50)
ANION GAP SERPL CALCULATED.3IONS-SCNC: 3 MMOL/L (ref 3–14)
AST SERPL W P-5'-P-CCNC: 9 U/L (ref 0–45)
BACTERIA TISS BX CULT: ABNORMAL
BASOPHILS # BLD AUTO: 0 10E3/UL (ref 0–0.2)
BASOPHILS NFR BLD AUTO: 1 %
BILIRUB SERPL-MCNC: 0.4 MG/DL (ref 0.2–1.3)
BUN SERPL-MCNC: 9 MG/DL (ref 7–30)
CALCIUM SERPL-MCNC: 8.9 MG/DL (ref 8.5–10.1)
CHLORIDE BLD-SCNC: 108 MMOL/L (ref 94–109)
CO2 SERPL-SCNC: 33 MMOL/L (ref 20–32)
CREAT SERPL-MCNC: 0.56 MG/DL (ref 0.52–1.04)
CRP SERPL-MCNC: 12 MG/L (ref 0–8)
EOSINOPHIL # BLD AUTO: 0.3 10E3/UL (ref 0–0.7)
EOSINOPHIL NFR BLD AUTO: 8 %
ERYTHROCYTE [DISTWIDTH] IN BLOOD BY AUTOMATED COUNT: 12.6 % (ref 10–15)
GFR SERPL CREATININE-BSD FRML MDRD: >90 ML/MIN/1.73M2
GLUCOSE BLD-MCNC: 103 MG/DL (ref 70–99)
HCT VFR BLD AUTO: 33.7 % (ref 35–47)
HGB BLD-MCNC: 10.8 G/DL (ref 11.7–15.7)
IMM GRANULOCYTES # BLD: 0 10E3/UL
IMM GRANULOCYTES NFR BLD: 0 %
LYMPHOCYTES # BLD AUTO: 0.8 10E3/UL (ref 0.8–5.3)
LYMPHOCYTES NFR BLD AUTO: 19 %
MCH RBC QN AUTO: 30.9 PG (ref 26.5–33)
MCHC RBC AUTO-ENTMCNC: 32 G/DL (ref 31.5–36.5)
MCV RBC AUTO: 97 FL (ref 78–100)
MONOCYTES # BLD AUTO: 0.5 10E3/UL (ref 0–1.3)
MONOCYTES NFR BLD AUTO: 12 %
NEUTROPHILS # BLD AUTO: 2.6 10E3/UL (ref 1.6–8.3)
NEUTROPHILS NFR BLD AUTO: 60 %
NRBC # BLD AUTO: 0 10E3/UL
NRBC BLD AUTO-RTO: 0 /100
PLATELET # BLD AUTO: 166 10E3/UL (ref 150–450)
POTASSIUM BLD-SCNC: 4.1 MMOL/L (ref 3.4–5.3)
PROT SERPL-MCNC: 5.9 G/DL (ref 6.8–8.8)
RBC # BLD AUTO: 3.49 10E6/UL (ref 3.8–5.2)
SODIUM SERPL-SCNC: 144 MMOL/L (ref 133–144)
WBC # BLD AUTO: 4.3 10E3/UL (ref 4–11)

## 2022-05-23 PROCEDURE — 250N000013 HC RX MED GY IP 250 OP 250 PS 637: Performed by: ORTHOPAEDIC SURGERY

## 2022-05-23 PROCEDURE — 99233 SBSQ HOSP IP/OBS HIGH 50: CPT | Mod: 24 | Performed by: INTERNAL MEDICINE

## 2022-05-23 PROCEDURE — 80053 COMPREHEN METABOLIC PANEL: CPT | Performed by: INTERNAL MEDICINE

## 2022-05-23 PROCEDURE — 36415 COLL VENOUS BLD VENIPUNCTURE: CPT | Performed by: INTERNAL MEDICINE

## 2022-05-23 PROCEDURE — 120N000002 HC R&B MED SURG/OB UMMC

## 2022-05-23 PROCEDURE — 86140 C-REACTIVE PROTEIN: CPT | Performed by: INTERNAL MEDICINE

## 2022-05-23 PROCEDURE — 250N000013 HC RX MED GY IP 250 OP 250 PS 637: Performed by: INTERNAL MEDICINE

## 2022-05-23 PROCEDURE — 250N000013 HC RX MED GY IP 250 OP 250 PS 637: Performed by: PHYSICIAN ASSISTANT

## 2022-05-23 PROCEDURE — 250N000013 HC RX MED GY IP 250 OP 250 PS 637: Performed by: STUDENT IN AN ORGANIZED HEALTH CARE EDUCATION/TRAINING PROGRAM

## 2022-05-23 PROCEDURE — 99232 SBSQ HOSP IP/OBS MODERATE 35: CPT | Performed by: INTERNAL MEDICINE

## 2022-05-23 PROCEDURE — 250N000011 HC RX IP 250 OP 636: Performed by: PHYSICIAN ASSISTANT

## 2022-05-23 PROCEDURE — 250N000011 HC RX IP 250 OP 636: Performed by: INTERNAL MEDICINE

## 2022-05-23 PROCEDURE — 85025 COMPLETE CBC W/AUTO DIFF WBC: CPT | Performed by: INTERNAL MEDICINE

## 2022-05-23 RX ORDER — LEVOFLOXACIN 750 MG/1
750 TABLET, FILM COATED ORAL DAILY
Status: DISCONTINUED | OUTPATIENT
Start: 2022-05-23 | End: 2022-05-24 | Stop reason: HOSPADM

## 2022-05-23 RX ORDER — METRONIDAZOLE 500 MG/1
500 TABLET ORAL 3 TIMES DAILY
DISCHARGE
Start: 2022-05-23 | End: 2022-05-24

## 2022-05-23 RX ORDER — AMOXICILLIN 250 MG
1 CAPSULE ORAL 2 TIMES DAILY
Qty: 30 TABLET | Refills: 0 | Status: SHIPPED | OUTPATIENT
Start: 2022-05-23 | End: 2022-09-14

## 2022-05-23 RX ORDER — POLYETHYLENE GLYCOL 3350 17 G/17G
17 POWDER, FOR SOLUTION ORAL DAILY
Qty: 7 PACKET | Refills: 0 | Status: SHIPPED | OUTPATIENT
Start: 2022-05-24 | End: 2022-09-14

## 2022-05-23 RX ORDER — OXYCODONE HYDROCHLORIDE 5 MG/1
5 TABLET ORAL EVERY 4 HOURS PRN
Qty: 50 TABLET | Refills: 0 | Status: SHIPPED | OUTPATIENT
Start: 2022-05-23 | End: 2022-06-07

## 2022-05-23 RX ORDER — ACETAMINOPHEN 325 MG/1
650 TABLET ORAL EVERY 4 HOURS PRN
Qty: 60 TABLET | Refills: 0 | Status: SHIPPED | OUTPATIENT
Start: 2022-05-23

## 2022-05-23 RX ORDER — SUMATRIPTAN 100 MG/1
100 TABLET, FILM COATED ORAL ONCE
Status: COMPLETED | OUTPATIENT
Start: 2022-05-23 | End: 2022-05-23

## 2022-05-23 RX ADMIN — OXYCODONE HYDROCHLORIDE 10 MG: 10 TABLET ORAL at 17:53

## 2022-05-23 RX ADMIN — OXYCODONE HYDROCHLORIDE 10 MG: 10 TABLET ORAL at 21:04

## 2022-05-23 RX ADMIN — SENNOSIDES AND DOCUSATE SODIUM 1 TABLET: 50; 8.6 TABLET ORAL at 21:04

## 2022-05-23 RX ADMIN — ASPIRIN 162 MG: 81 TABLET ORAL at 07:50

## 2022-05-23 RX ADMIN — ACETAMINOPHEN 325MG 975 MG: 325 TABLET ORAL at 04:11

## 2022-05-23 RX ADMIN — OXYCODONE HYDROCHLORIDE 10 MG: 10 TABLET ORAL at 00:55

## 2022-05-23 RX ADMIN — LEVOFLOXACIN 750 MG: 750 TABLET, FILM COATED ORAL at 15:16

## 2022-05-23 RX ADMIN — OXYCODONE HYDROCHLORIDE 10 MG: 10 TABLET ORAL at 13:52

## 2022-05-23 RX ADMIN — OXYCODONE HYDROCHLORIDE 10 MG: 10 TABLET ORAL at 07:50

## 2022-05-23 RX ADMIN — CEFEPIME HYDROCHLORIDE 2 G: 2 INJECTION, POWDER, FOR SOLUTION INTRAVENOUS at 08:02

## 2022-05-23 RX ADMIN — OXYCODONE HYDROCHLORIDE 10 MG: 10 TABLET ORAL at 10:48

## 2022-05-23 RX ADMIN — ONDANSETRON 4 MG: 2 INJECTION INTRAMUSCULAR; INTRAVENOUS at 16:23

## 2022-05-23 RX ADMIN — METRONIDAZOLE 500 MG: 500 TABLET ORAL at 07:50

## 2022-05-23 RX ADMIN — SENNOSIDES AND DOCUSATE SODIUM 1 TABLET: 50; 8.6 TABLET ORAL at 07:50

## 2022-05-23 RX ADMIN — METRONIDAZOLE 500 MG: 500 TABLET ORAL at 13:52

## 2022-05-23 RX ADMIN — CEFEPIME HYDROCHLORIDE 2 G: 2 INJECTION, POWDER, FOR SOLUTION INTRAVENOUS at 01:10

## 2022-05-23 RX ADMIN — HYDROMORPHONE HYDROCHLORIDE 0.2 MG: 0.2 INJECTION, SOLUTION INTRAMUSCULAR; INTRAVENOUS; SUBCUTANEOUS at 18:12

## 2022-05-23 RX ADMIN — OXYCODONE HYDROCHLORIDE 10 MG: 10 TABLET ORAL at 04:11

## 2022-05-23 RX ADMIN — POLYETHYLENE GLYCOL 3350 17 G: 17 POWDER, FOR SOLUTION ORAL at 07:50

## 2022-05-23 RX ADMIN — SUMATRIPTAN SUCCINATE 100 MG: 100 TABLET, FILM COATED ORAL at 16:34

## 2022-05-23 RX ADMIN — METRONIDAZOLE 500 MG: 500 TABLET ORAL at 21:04

## 2022-05-23 ASSESSMENT — ACTIVITIES OF DAILY LIVING (ADL)
ADLS_ACUITY_SCORE: 22

## 2022-05-23 NOTE — OP NOTE
[facility] Orthopaedic Surgery  Operative Note            Procedure date 5/20/2022   Pre-operative diagnosis: Abscess [L02.91] Right femoral triangle groin and abdomen   Post-operative diagnosis same as pre-operative   Procedure: 1) aspiration, ultrasound-guided of right thigh  2) open debridement, excisional sharp, skin subcutaneous tissue and fascia of right thigh.  6 x 3 cm, superficial  3) right abdominal wall incision and drainage, separate incision, 3 inches, skin subcutaneous tissue debridement, sharp, excisional.  4) application of right thigh vacuum-assisted closure dressing   Surgeon: Richard Katz MD   Assistants(s): Rick Stroud DO   Anesthesia: General    Estimated blood loss: Minimal   Total IV fluids: (See anesthesia record)   Blood transfusion: (See anesthesia record)   Total urine output: (See anesthesia record)   Drains: None     Specimens:  ID Type Source Tests Collected by Time Destination   A : RIGHT THIGH ONE Tissue Leg, Right ANAEROBIC BACTERIAL CULTURE ROUTINE, AEROBIC BACTERIAL CULTURE ROUTINE Richard Katz MD 5/20/2022 11:15 AM    B : RIGHT ABDOMINAL WALL ONE Tissue Abdomen ANAEROBIC BACTERIAL CULTURE ROUTINE, AEROBIC BACTERIAL CULTURE ROUTINE Richard Katz MD 5/20/2022 11:15 AM    C : RIGHT THIGH TWO Tissue Leg, Right ANAEROBIC BACTERIAL CULTURE ROUTINE, AEROBIC BACTERIAL CULTURE ROUTINE Richard Katz MD 5/20/2022 11:45 AM    D : RIGHT THIGH THREE Tissue Leg, Right ANAEROBIC BACTERIAL CULTURE ROUTINE, AEROBIC BACTERIAL CULTURE ROUTINE Richard Katz MD 5/20/2022 11:46 AM        Implants:  * No implants in log *    Findings:   Skin slough with necrosis of right proximal thigh femoral triangle incision  No deep abscess in the right thigh identified by aspiration, ultrasound-guided.  Fat necrosis of right abdominal wall.      Procedure details:  Patient was placed on the operating table supine position.  After induction of  general anesthesia the right abdominal wall and right groin area prepped and draped.  Patient had a previous abdominal femoral incision made for extensive tumor excision.  She now presents with concern over possible abscess with dehiscence of the wound and skin breakdown.    After induction of general anesthesia the right abdominal wall was addressed first.  A exploration and incision was then performed.  I opened up the incision within the abdominal wall region.  The skin was quite indurated as well as a subcutaneous tissue.  Sutures were removed.  I then debrided the skin and the subcutaneous tissue sharply.  Cultures were obtained of this area as well.  The wound was then thoroughly irrigated.  It was not open beyond the fascial layer.  I then reclosed the wound using PDS subcutaneous sutures and vertical nylon mattress suture on the skin.    A separate incision was now made over the right thigh.  There is an area of wound dehiscence which was present and I debrided the skin and subcutaneous tissue.  The wound area measured 3 x 6 cm and extended from the skin down through the subcutaneous tissue to the fascia overlying the femoral triangle and vessels.    Due to concerns about possible abscess deeper, preoperative CT showed area of possible fluid collection.  Therefore I performed an intraoperative ultrasound examination.  Using 2D as well as color Doppler examination, the area was visualized in the femoral artery and vein were also visualized.  Under ultrasound guidance a 20-gauge spinal needle was then introduced deep to the femoral vessels.  Multiple aspirations were performed along the length of the wound.  The aspiration was carried out until the needle depth penetrated and abutted the femoral bone.  At no point was an abscess encountered or visualized on ultrasound.    I therefore then cultured the subcutaneous tissue and debrided it, using a scalpel and sharply excised the skin and subcutaneous tissues in  all areas of the necrotic.    Due to the compromised nature of the thigh wound, I elected to use a vacuum-assisted closure dressing.  Therefore the gray sponge was placed within the wound.  I then placed an incisional VAC over Adaptic dressing on the proximal part of the wound.  The entire wound cavity and abdominal wall were then linked and suction applied over the closed system.    Patient was taken recovery in satisfactory addition.    Postoperative plan:  We will initiate postoperative wound VAC dressing changes to be done 2-3 times per week in 3 days time.  Wound VAC dressings will continue for 2 weeks until follow-up in orthopedic clinic at which time delayed primary closure will be undertaken as allowable depending upon wound granulation tissue status.    Infectious disease consultation for antibiotic coverage and management.    Once culture results unable an antibiotic plan in place, patient may discharge and going home therapy for wound VAC changes until seen in the clinic in 2 weeks time for follow-up.    MD Ayan Lewis Family Professor  Oncology and Adult Reconstructive Surgery  Dept Orthopaedic Surgery, [facility]  [phone] office, [phone] pager  [email or web address]

## 2022-05-23 NOTE — PROGRESS NOTES
"Orthopaedic Surgery Progress Note     5/23/2022    E: No acute events overnight.    S: Pain well controlled, anticipating discharge in AM    O:   /70   Pulse 72   Temp 97.1  F (36.2  C)   Resp 16   Ht 1.626 m (5' 4\")   Wt 73.2 kg (161 lb 6 oz)   LMP 06/16/2007 (Approximate)   SpO2 95%   BMI 27.70 kg/m      Physical Examination   General: no acute distress  CV: palpable pulses  Resp: Nonlabored breathing  RLE: dressing in tact; clean and dry, VAC in place with negative pressure and no leaks       5/5 Iliopsoas, quadricep, tibialis anterior, extensor houses longus, gastrocsoleus.     Sensation to light touch intact in the L2-S1 dermatome distribution.    Dorsalis pedis pulse palpated to be 2/4.    Tolerates range of motion in all major joints     No significant pain or limitation of range of motion      Recent Labs   Lab 05/23/22  0556 05/22/22  0646 05/21/22  0618 05/20/22  1518 05/20/22  0856    143 144  --   --    POTASSIUM 4.1 4.4 3.9  --   --    CHLORIDE 108 106 111*  --   --    CO2 33* 31 30  --   --    BUN 9 10 9  --   --    CR 0.56 0.60 0.60 0.61  --    * 98 98  --  111*   MAG  --  2.0  --   --   --      Recent Labs   Lab 05/23/22  0556 05/22/22  0646 05/21/22  0625 05/19/22  1318   WBC 4.3  --  4.3 4.9   HGB 10.8* 11.2* 11.3* 12.9     --  152 207     No lab results found in last 7 days.     ASSESSMENT/PLAN:     56 year old female s/p right thigh and abdomen I&D on 5/20/2022 by Dr. Katz     Plan: Ortho Primary  Monitoring: Per unit protocol  Activity: Up with assist  Weight bearing status: WBAT in operative extremity  Range of motion: As tolerated  Antibiotics: Vanc and Cefipime per ID recs and pending cultures  Diet: Begin with clear fluids and progress diet as tolerated.  DVT prophylaxis: Mechanical + ASA  Bracing/Splinting: None  Elevation: Elevate operative extremity to keep at level of heart as much as possible.  Wound Care: VAC change on floor Monday 5/23/22; wound vac " changes q 3 days x 2 weeks.   Drain: None  Pain management: PO pain control  X-rays: PACU  Physical Therapy: ROM, ADL's.  Labs: Trend Hgb on POD #1.  Consults: PT + Med Mng + ID (spoke to ID (Dr Lyon) today regarding whether or not PICC line placement is needed.  Have asked to be paged directly regarding placement so we can get orders in today as she is planning for AM discharge tomorrow.   Follow-up: Clinic with Dr. Katz in 2 weeks  Disposition: Pending progress with therapies, pain control on orals, and medical stability, anticipate discharge to home on POD #3-        Future Appointments   Date Time Provider Department Center   6/6/2022  2:45 PM Richard Katz MD [facility]        ABHI Harris  Department of Orthopedic Surgery  [facility]  [phone]  For any questions regarding this patient please page me at the above number prior to contacting the ortho resident on call.

## 2022-05-23 NOTE — PROGRESS NOTES
Care Management Follow Up    Length of Stay (days): 3    Expected Discharge Date: 05/24/2022     Additional Information:  Referral sent to [facility] for skilled nursing visits for wound vac dressing changes M/W/F. RNCC will continue to follow patient.    [facility]  Phone [phone]  Fax [phone]    Clarice Aviles RN, BSN  Care Coordinator,  Ortho  Phone [phone]  Pager [phone]

## 2022-05-23 NOTE — PROGRESS NOTES
[facility]    Medicine Progress Note - Hospitalist Service, GOLD TEAM 17    Date of Admission:  5/20/2022    Assessment & Plan        Ophelia Wolf is a very pleasant  56 year-old  female admitted on 5/20/22.  Patient underwent right thigh and abd I&D w/ wound vac placement on 5/20/2022.  Admitted to surgical [facility] for postop care.  Hospitalist service consulted for postop medical management.       S/p right thigh and abd I&D w/ wound vac placement  S/p excision of 12 cm Schwannoma from right retroperitoneum and anterior groin (4/19/2022)  S/p right femoral nerve neuroplasty and common femoral artery dissection (4/19/2022)  ---   POD #3  ---   Wound VAC management per Ortho  ---   Intra-Op culture grew 1+ Citrobacter Freundii complex, 1+ Klebsiella Veriicola/pneumoniae and 1+ bacteroides fragilis   ---   S/p IV vancomycin 5/20 - 5/22/22  ---   Continue IV cefepime, started on 5/20/22  ---   Started on Flagyl 5/22/22 for treatment of bacteroides fragilis  ---   Continue IVF   ---   Postop pain management per primary team    DVT prophylaxis  ---    mg po daily  ---   PCD    Migraine HA  ---   Imitrex as needed    Pulmonary nodule  ---   Recommend close outpatient follow-up    Acute postop blood loss anemia  ---   Preop Hgb was 12.9 g on 5/19/22  ---   Hgb is 10.8 g today  ---   Pt denied CP, SOB, lightheadedness or dizziness  ---   No indications for transfusions.           Diet: Advance Diet as Tolerated: Regular Diet Adult    DVT Prophylaxis: Pneumatic Compression Devices  Farmer Catheter: Not present  Central Lines: None  Cardiac Monitoring: None  Code Status: Full Code    Disposition Plan   Home   Expected Discharge:  Per ortho        Meggan Vyas MD  Hospitalist Service, GOLD TEAM 17  [facility]  Securely message with the Vocera Web Console (learn more here)  Text page via [facility] Paging/Directory   Please  see signed in provider for up to date coverage information      ________________________________________________________    Interval History   No complaints.  Uneventful night.  Headache is better today.    Data reviewed today: I reviewed all medications, new labs and imaging results over the last 24 hours.     Physical Exam   Vital Signs: Temp: 97.1  F (36.2  C) Temp src: Oral BP: 112/70 Pulse: 72   Resp: 16 SpO2: 95 % O2 Device: None (Room air)    Weight: 161 lbs 6.03 oz  General: aao x 3, NAD.  HEENT:  NC/AT, neck supple  CVS:  NL s 1 and s2, no m/r/g.  Lungs:  CTA B/L.   Abd:  Soft, + bs, wound vac noted  Ext:  No c/c.  Lymph:  No edema.  Neuro:  Nonfocal.  Musculoskeletal: No calf tenderness to palpation.    Skin:  No rash.  Psychiatry:  Mood and affect appropriate.      Data   Recent Labs   Lab 05/23/22  0556 05/22/22  0646 05/21/22  0625 05/21/22  0618 05/20/22  0856 05/19/22  1318   WBC 4.3  --  4.3  --   --  4.9   HGB 10.8* 11.2* 11.3*  --   --  12.9   MCV 97  --  97  --   --  95     --  152  --   --  207    143  --  144  --   --    POTASSIUM 4.1 4.4  --  3.9  --   --    CHLORIDE 108 106  --  111*  --   --    CO2 33* 31  --  30  --   --    BUN 9 10  --  9  --   --    CR 0.56 0.60  --  0.60   < >  --    ANIONGAP 3 6  --  3  --   --    LYNNE 8.9 8.8  --  8.9  --   --    * 98  --  98   < >  --    ALBUMIN 2.7* 2.8*  --  2.9*   < >  --    PROTTOTAL 5.9* 5.6*  --  6.1*   < >  --    BILITOTAL 0.4 0.5  --  0.5   < >  --    ALKPHOS 63 70  --  70   < >  --    ALT 11 13  --  15   < >  --    AST 9 9  --  11   < >  --     < > = values in this interval not displayed.     Medications     lactated ringers 75 mL/hr at 05/20/22 1619       aspirin  162 mg Oral Daily     ceFEPIme (MAXIPIME) IV  2 g Intravenous Q8H     metroNIDAZOLE  500 mg Oral TID     polyethylene glycol  17 g Oral Daily     senna-docusate  1 tablet Oral BID     sodium chloride (PF)  3 mL Intracatheter Q8H

## 2022-05-23 NOTE — PROGRESS NOTES
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 Katz)    # Surgical site infection - Abscess Right femoral triangle groin and abdomen  - 5/20/22 - s/p aspiration, ultrasound-guided of right thigh, open debridement, excisional sharp, skin subcutaneous tissue and fascia of right thigh.  6 x 3 cm, superficial, right abdominal wall incision and drainage, separate incision, 3 inches, skin subcutaneous tissue debridement, sharp, excisional. Application of right thigh vacuum-assisted closure dressing  - 5/20/22- intraop findings : Skin slough with necrosis of right proximal thigh femoral triangle incision No deep abscess in the right thigh identified by aspiration, ultrasound-guided. Fat necrosis of right abdominal wall.    - CT abdomen /pelvis w wo contrast 4/202/2  - Subcutaneous hematoma containing foci of air within the right lower quadrant (series 9, image 679) measuring approximately 3.1 x 2.7  cm. Soft tissue thickening and edema throughout the proximal thigh and lateral buttock region on the right. While there is thickening of the  right iliopsoas (measuring 3.6 cm in thickness) secondary to postoperative hematoma, there is no clear evidence of active  extravasation     1. Postsurgical changes in the right groin and iliopsoas region for tumor resection, now with multifocal hematomas throughout the surgical  beds. There is a tiny focus of active extravasation in the right groin. No retroperitoneal hemorrhage.  2. Colonic diverticulosis without evidence of acute  diverticulitis.  3. 3 mm solid pulmonary nodule within the right middle lobe. Close attention on follow-up.    - redness and pus drainage first noted ~ 5/5/22 and cephalexin 500 mg po 4x/daily was prescribed on 5/5/22 x 14 days,  got progressively worse.   - intra-op Cx with Klebsiella variicola/pneumoniae, Citrobacter freundii, and Bacteroides fragilis    # Rotator cuff degeneration s/p right rotator cuff repair ([facility]) on 3/12/21    Discussion:  Ophelia Wolf is a 56 year old male with PMHx of rotator cuff degeneration s/p right rotator cuff repair ([facility]) on 3/12/21, partial hysterectomy and R iliopsoas Schwannoma (12x8x6 cm) s/p excision from right retroperitoneum and anterior groin as well as right femoral nerve neuroplasty and common femoral artery dissection on 4/19/22 (Dr. Katz). After this surgery the patient developed surgical site redness on 5/10/22 and on 5/18 it has progressed and has swelling. She did not have fever. Dr. Katz ordered a CT abdomen/pelvis on 5/19 and Sterling Abdi identified a deeper area of either hematoma or abscess. Patient was then admitted on 5/20/22 for I&D. He underwent the I&D (5/20). No op note in the system yet. Operative cultures sent and in process, but thus far showing Klebsiella, Citrobacter, and Bacteroides.     Recommendations:  - based on intra-op cultures - continue metronidazole 500mg PO TID (added on 5/22/22) and d/c Cefepime and start Levofloxacin 750 mg po daily   -  will continue to follow intra-operative cultures and adjust antibiotics accordingly; final regimen/duration pending forthcoming data and clinical course  - duration of antibiotics will be determined in outpatient clinic f/u.   - check baseline CRP     I will see her again tomorrow before discharge and arrange f/u with me in [facility] clinic on 6/3/22     Juan M Diaz MD,M.Med.Sc.  Infectious Diseases  Pager: [phone]     Interval Hx:   Afebrile. No new  "complaints. Pain controlled. No itches/rash/allergy to antibiotic therapy to date.no nausea, vomiting, diarrhea.   will be discharged tomorrow morning per Ortho.   lives in [place] with . Daughter who is a RN will be assisting with medical/wound care for the next few days     Antimicrobials:   Cefepime 5/20-present   metronidazole 5/22- present   Clindamycin 5/20   Vancomycin 5/20-5/22       History of Present Illness:   Ophelia Wolf is a 56 year old male with PMHx of rotator cuff degeneration s/p right rotator cuff repair ([facility]) on 3/12/21, partial hysterectomy and R iliopsoas Schwannoma (12x8x6 cm) s/p excision from right retroperitoneum and anterior groin as well as right femoral nerve neuroplasty and common femoral artery dissection on 4/19/22 (Dr. Katz). After this surgery the patient developed surgical site redness on 5/10/22 and on 5/18 it has progressed and has swelling. She did not have fever. Dr. Katz ordered a CT abdomen/pelvis on 5/19 and Sterling Abdi identified a deeper area of either hematoma or abscess. Patient was then admitted on 5/20/22 for I&D. He underwent the I&D today (5/20). No op note in the system yet. Operative cultures sent and in process.          Physical Exam:   /70   Pulse 72   Temp 97.1  F (36.2  C)   Resp 16   Ht 1.626 m (5' 4\")   Wt 73.2 kg (161 lb 6 oz)   LMP 06/16/2007 (Approximate)   SpO2 95%   BMI 27.70 kg/m       Exam:  GENERAL:  alert, oriented and not in acute distress.  HEAD: Normocephalic and atraumatic  ENT:  No hearing impairment, oral mucous membranes moist  EYES:  Eyes grossly normal to inspection, conjunctivae and sclerae normal   ABDOMEN:  Soft, nontender  EXT/MS/SKIN:  Surgical site on the right upper anterior thigh and right lower abdominal quadrant has wound VAC in place. No surrounding erythema noted. mild edema in right leg . nontender   NEUROLOGIC:  Grossly nonfocal. Mentation intact and speech normal  PSYCHIATRIC: Mood " stable, mentation appears normal, affect normal         Laboratory Data:     Creatinine   Date Value Ref Range Status   05/23/2022 0.56 0.52 - 1.04 mg/dL Final   05/22/2022 0.60 0.52 - 1.04 mg/dL Final   05/21/2022 0.60 0.52 - 1.04 mg/dL Final   05/20/2022 0.61 0.52 - 1.04 mg/dL Final   04/19/2022 0.66 0.52 - 1.04 mg/dL Final   05/22/2021 0.57 0.52 - 1.04 mg/dL Final   12/18/2020 0.62 0.52 - 1.04 mg/dL Final   02/04/2015 0.4 mg/dL Final   08/19/2014 0.69 0.52 - 1.04 mg/dL Final   04/17/2008 0.79 0.60 - 1.30 mg/dL Final     WBC   Date Value Ref Range Status   05/22/2021 10.5 4.0 - 11.0 10e9/L Final   12/18/2020 4.5 4.0 - 11.0 10e9/L Final   08/19/2014 6.2 4.0 - 11.0 10e9/L Final   12/06/2011 8.7 4.0 - 11.0 10e9/L Final   04/08/2010 7.0 4.0 - 11.0 10e9/L Final     WBC Count   Date Value Ref Range Status   05/23/2022 4.3 4.0 - 11.0 10e3/uL Final   05/21/2022 4.3 4.0 - 11.0 10e3/uL Final   05/19/2022 4.9 4.0 - 11.0 10e3/uL Final   04/06/2022 5.1 4.0 - 11.0 10e3/uL Final     Hemoglobin   Date Value Ref Range Status   05/23/2022 10.8 (L) 11.7 - 15.7 g/dL Final   05/22/2021 15.2 11.7 - 15.7 g/dL Final     Platelet Count   Date Value Ref Range Status   05/23/2022 166 150 - 450 10e3/uL Final   05/22/2021 235 150 - 450 10e9/L Final     Lab Results   Component Value Date     05/23/2022    BUN 9 05/23/2022    CO2 33 (H) 05/23/2022     CRP Inflammation   Date Value Ref Range Status   05/06/2008 <3.0 0.0 - 8.0 mg/L Final

## 2022-05-23 NOTE — PLAN OF CARE
"VS: /70   Pulse 72   Temp 97.1  F (36.2  C)   Resp 16   Ht 1.626 m (5' 4\")   Wt 73.2 kg (161 lb 6 oz)   LMP 06/16/2007 (Approximate)   SpO2 95%   BMI 27.70 kg/m     O2: >90% on room air. Denies SOB/N/V/chest pain    Output: Voiding spontaneously without difficulties    Last BM: 5/23/22. Bowel sounds active in all quad, passing flatus. Bowel medication given    Activity: SBA x1 with walker and gait belt.    Skin: Incision to right thigh and abdomen    Pain: Managed with oxycodone Q3hrs, and ice applied    CMS: Denies numbness and tingling    Dressing: Wound vac dressing changed by ortho this shift    Diet: Regular diet    LDA: PIV saline locked   Wound Vac    Equipment: Walker, IV pole/pump, PCDs, wound vac, personal belongings, call light within patient reach    Plan: To discharge home tomorrow on PO ABX and home care   Additional Info:           "

## 2022-05-23 NOTE — PROGRESS NOTES
Therapy: IV abx   Insurance: [employer]   Ded: $400  Met: $0     Co-Insurance: 95/5  Max Out of Pocket: $1700  Met: $1155.07    In reference to admission 5/20/22 to check IV abx coverage     Please contact Intake with any questions, [phone] or In Basket pool, [facility] ([phone]).

## 2022-05-23 NOTE — PLAN OF CARE
Goal Outcome Evaluation:    Neuro: A&Ox4, PERRL.     VS: hemodynamically stable    Pulmonary: room air, denies SOB. Clear lung sounds.     GI/: regular diet. Denies nausea. Voids spontaneously   Continue bowel regimen, LBM  5/19 per pt    Pain: scheduled tylenol and PRN oxycodone given for pain    Skin: wound vac in placed right thigh, no output.     Activity: x1 SBA w/ walker    Access: PIV x1

## 2022-05-24 VITALS
BODY MASS INDEX: 27.55 KG/M2 | SYSTOLIC BLOOD PRESSURE: 129 MMHG | RESPIRATION RATE: 16 BRPM | DIASTOLIC BLOOD PRESSURE: 72 MMHG | OXYGEN SATURATION: 96 % | WEIGHT: 161.38 LBS | TEMPERATURE: 97.5 F | HEIGHT: 64 IN | HEART RATE: 81 BPM

## 2022-05-24 LAB
ALBUMIN SERPL-MCNC: 2.8 G/DL (ref 3.4–5)
ALP SERPL-CCNC: 70 U/L (ref 40–150)
ALT SERPL W P-5'-P-CCNC: 11 U/L (ref 0–50)
ANION GAP SERPL CALCULATED.3IONS-SCNC: 3 MMOL/L (ref 3–14)
AST SERPL W P-5'-P-CCNC: 9 U/L (ref 0–45)
BASOPHILS # BLD AUTO: 0 10E3/UL (ref 0–0.2)
BASOPHILS NFR BLD AUTO: 1 %
BILIRUB SERPL-MCNC: 0.5 MG/DL (ref 0.2–1.3)
BUN SERPL-MCNC: 11 MG/DL (ref 7–30)
CALCIUM SERPL-MCNC: 9.2 MG/DL (ref 8.5–10.1)
CHLORIDE BLD-SCNC: 105 MMOL/L (ref 94–109)
CO2 SERPL-SCNC: 34 MMOL/L (ref 20–32)
CREAT SERPL-MCNC: 0.61 MG/DL (ref 0.52–1.04)
EOSINOPHIL # BLD AUTO: 0.3 10E3/UL (ref 0–0.7)
EOSINOPHIL NFR BLD AUTO: 8 %
ERYTHROCYTE [DISTWIDTH] IN BLOOD BY AUTOMATED COUNT: 12.6 % (ref 10–15)
GFR SERPL CREATININE-BSD FRML MDRD: >90 ML/MIN/1.73M2
GLUCOSE BLD-MCNC: 96 MG/DL (ref 70–99)
HCT VFR BLD AUTO: 35 % (ref 35–47)
HGB BLD-MCNC: 11.3 G/DL (ref 11.7–15.7)
IMM GRANULOCYTES # BLD: 0 10E3/UL
IMM GRANULOCYTES NFR BLD: 0 %
LYMPHOCYTES # BLD AUTO: 0.8 10E3/UL (ref 0.8–5.3)
LYMPHOCYTES NFR BLD AUTO: 18 %
MCH RBC QN AUTO: 30.6 PG (ref 26.5–33)
MCHC RBC AUTO-ENTMCNC: 32.3 G/DL (ref 31.5–36.5)
MCV RBC AUTO: 95 FL (ref 78–100)
MONOCYTES # BLD AUTO: 0.5 10E3/UL (ref 0–1.3)
MONOCYTES NFR BLD AUTO: 13 %
NEUTROPHILS # BLD AUTO: 2.4 10E3/UL (ref 1.6–8.3)
NEUTROPHILS NFR BLD AUTO: 60 %
NRBC # BLD AUTO: 0 10E3/UL
NRBC BLD AUTO-RTO: 0 /100
PLATELET # BLD AUTO: 173 10E3/UL (ref 150–450)
POTASSIUM BLD-SCNC: 4 MMOL/L (ref 3.4–5.3)
PROT SERPL-MCNC: 6.3 G/DL (ref 6.8–8.8)
RBC # BLD AUTO: 3.69 10E6/UL (ref 3.8–5.2)
SODIUM SERPL-SCNC: 142 MMOL/L (ref 133–144)
WBC # BLD AUTO: 4.1 10E3/UL (ref 4–11)

## 2022-05-24 PROCEDURE — 36415 COLL VENOUS BLD VENIPUNCTURE: CPT | Performed by: INTERNAL MEDICINE

## 2022-05-24 PROCEDURE — 250N000013 HC RX MED GY IP 250 OP 250 PS 637: Performed by: STUDENT IN AN ORGANIZED HEALTH CARE EDUCATION/TRAINING PROGRAM

## 2022-05-24 PROCEDURE — 99024 POSTOP FOLLOW-UP VISIT: CPT | Performed by: CLINICAL NURSE SPECIALIST

## 2022-05-24 PROCEDURE — 80053 COMPREHEN METABOLIC PANEL: CPT | Performed by: INTERNAL MEDICINE

## 2022-05-24 PROCEDURE — 250N000013 HC RX MED GY IP 250 OP 250 PS 637: Performed by: ORTHOPAEDIC SURGERY

## 2022-05-24 PROCEDURE — 250N000013 HC RX MED GY IP 250 OP 250 PS 637: Performed by: INTERNAL MEDICINE

## 2022-05-24 PROCEDURE — 250N000013 HC RX MED GY IP 250 OP 250 PS 637: Performed by: PHYSICIAN ASSISTANT

## 2022-05-24 PROCEDURE — 99233 SBSQ HOSP IP/OBS HIGH 50: CPT | Mod: 24 | Performed by: INTERNAL MEDICINE

## 2022-05-24 PROCEDURE — 85025 COMPLETE CBC W/AUTO DIFF WBC: CPT | Performed by: INTERNAL MEDICINE

## 2022-05-24 PROCEDURE — 99232 SBSQ HOSP IP/OBS MODERATE 35: CPT | Performed by: INTERNAL MEDICINE

## 2022-05-24 RX ORDER — METRONIDAZOLE 500 MG/1
500 TABLET ORAL 3 TIMES DAILY
Qty: 126 TABLET | Refills: 0 | Status: ON HOLD | OUTPATIENT
Start: 2022-05-24 | End: 2022-06-17

## 2022-05-24 RX ORDER — LEVOFLOXACIN 750 MG/1
750 TABLET, FILM COATED ORAL DAILY
Qty: 42 TABLET | Refills: 0 | Status: ON HOLD | OUTPATIENT
Start: 2022-05-24 | End: 2022-06-17

## 2022-05-24 RX ADMIN — ASPIRIN 162 MG: 81 TABLET ORAL at 07:58

## 2022-05-24 RX ADMIN — LEVOFLOXACIN 750 MG: 750 TABLET, FILM COATED ORAL at 07:58

## 2022-05-24 RX ADMIN — METRONIDAZOLE 500 MG: 500 TABLET ORAL at 07:58

## 2022-05-24 RX ADMIN — OXYCODONE HYDROCHLORIDE 5 MG: 5 TABLET ORAL at 07:09

## 2022-05-24 RX ADMIN — OXYCODONE HYDROCHLORIDE 10 MG: 10 TABLET ORAL at 03:14

## 2022-05-24 RX ADMIN — OXYCODONE HYDROCHLORIDE 5 MG: 5 TABLET ORAL at 10:21

## 2022-05-24 RX ADMIN — OXYCODONE HYDROCHLORIDE 5 MG: 5 TABLET ORAL at 10:57

## 2022-05-24 RX ADMIN — OXYCODONE HYDROCHLORIDE 5 MG: 5 TABLET ORAL at 00:00

## 2022-05-24 RX ADMIN — ACETAMINOPHEN 325MG 650 MG: 325 TABLET ORAL at 10:57

## 2022-05-24 RX ADMIN — SENNOSIDES AND DOCUSATE SODIUM 1 TABLET: 50; 8.6 TABLET ORAL at 07:58

## 2022-05-24 RX ADMIN — POLYETHYLENE GLYCOL 3350 17 G: 17 POWDER, FOR SOLUTION ORAL at 07:58

## 2022-05-24 ASSESSMENT — ACTIVITIES OF DAILY LIVING (ADL)
ADLS_ACUITY_SCORE: 22

## 2022-05-24 NOTE — DISCHARGE SUMMARY
[facility] Orthopaedic Surgery Discharge Summary    Ophelia Wolf MRN# [id number]   Age: 56 year old YOB: 1965       Date of Admission:  5/20/2022  Date of Discharge::  5/24/2022   Admitting Physician:  Dr Dwayne Katz  Discharge To:  Home   Primary Care Physician:      Susannah Ray          Admission Diagnoses:   Abscess [L02.91]  Status post surgery [Z98.890]         Discharge Diagnosis:   There are no discharge diagnoses documented for the most recent discharge.            Procedures Performed:     56 year old female s/p right thigh and abdomen I&D on 5/20/2022 by Dr. Katz         Consultations:   INTERNAL MEDICINE ADULT IP CONSULT FOR [facility]  INFECTIOUS DISEASE [facility] ADULT IP CONSULT  CARE MANAGEMENT / SOCIAL WORK IP CONSULT  PHYSICAL THERAPY ADULT IP CONSULT  OCCUPATIONAL THERAPY ADULT IP CONSULT          Brief History:             Ophelia Wolf is a very pleasant  56 year-old  female admitted on 5/20/22.  Patient underwent right thigh and abd I&D w/ wound vac placement on 5/20/2022.  Admitted to surgical [facility] for postop care.  Hospitalist service consulted for postop medical management.       S/p right thigh and abd I&D w/ wound vac placement  S/p excision of 12 cm Schwannoma from right retroperitoneum and anterior groin (4/19/2022)  S/p right femoral nerve neuroplasty and common femoral artery dissection (4/19/2022)  ---   POD #2  ---   Intra-Op culture is growing Citrobacter 1+ Freundii complex, 1+ Klebsiella Veriicola/pneumoniae and GNB isolated in broth  ---   Continue IV vancomycin and IV cefepime  ---   Continue IVF hydrate  ---   postop pain management per primary team         Hospital Course:   Patient did well post operatively.  Transitioned from iv medication to oral meds.  Tolerated diet, resumed normal bowel and bladder function.  Was seen by PT/OT prior to discharge.           Medications Prior to Admission:     Medications Prior to Admission    Medication Sig Dispense Refill Last Dose     Ascorbic Acid (VITAMIN C PO) Take 1 tablet by mouth daily   Past Week at Unknown time     Calcium Carbonate-Vitamin D (CALCIUM + D PO) Take 1 tablet by mouth daily   Past Week at Unknown time     famotidine (PEPCID) 20 MG tablet TAKE ONE TABLET BY MOUTH TWICE A DAY (Patient taking differently: Take 20 mg by mouth daily as needed) 180 tablet 0 Past Week at Unknown time     multivitamin, therapeutic (THERA-VIT) TABS tablet Take 1 tablet by mouth daily   Past Month at Unknown time     SUMAtriptan (IMITREX) 100 MG tablet TAKE 1 TABLET BY MOUTH AT ONSET OF MIGRAINE 18 tablet PRN 5/20/2022 at 0400     VITAMIN D PO Take 1 tablet by mouth daily   Past Month at Unknown time     [DISCONTINUED] acetaminophen (TYLENOL) 325 MG tablet Take 2 tablets (650 mg) by mouth every 4 hours as needed for other 60 tablet 0 Past Week at Unknown time     [DISCONTINUED] aspirin (ASA) 81 MG EC tablet Take 2 tablets (162 mg) by mouth daily 60 tablet 0 Past Month at Unknown time     [DISCONTINUED] cephALEXin (KEFLEX) 500 MG capsule Take 1 capsule (500 mg) by mouth 4 times daily for 14 days 56 capsule 0      [DISCONTINUED] HYDROcodone-acetaminophen (NORCO) 5-325 MG tablet TAKE ONE TABLET BY MOUTH EVERY 6 HOURS AS NEEDED FOR PAIN *CAUTION: OPIOID. RISK OF OVERDOSE AND ADDICTION. 6 tablet 0 Past Month at Unknown time     [DISCONTINUED] oxyCODONE (ROXICODONE) 5 MG tablet Take 1 tablet (5 mg) by mouth every 6 hours as needed for moderate to severe pain 20 tablet 0 5/19/2022 at 1800     [DISCONTINUED] polyethylene glycol (MIRALAX) 17 g packet Take 17 g by mouth daily 7 packet 0 Past Week at Unknown time     [DISCONTINUED] senna-docusate (SENOKOT-S/PERICOLACE) 8.6-50 MG tablet Take 1 tablet by mouth 2 times daily 30 tablet 0 Past Week at Unknown time            Discharge Medications:        Review of your medicines      START taking      Dose / Directions   levofloxacin 750 MG tablet  Commonly known as:  LEVAQUIN  Indication: Abscess, Infection Following Surgery      Dose: 750 mg  Take 1 tablet (750 mg) by mouth daily  Quantity: 42 tablet  Refills: 0     metroNIDAZOLE 500 MG tablet  Commonly known as: FLAGYL  Indication: Infection of the Skin and/or Soft Tissue      Dose: 500 mg  Take 1 tablet (500 mg) by mouth 3 times daily  Quantity: 126 tablet  Refills: 0        CONTINUE these medicines which may have CHANGED, or have new prescriptions. If we are uncertain of the size of tablets/capsules you have at home, strength may be listed as something that might have changed.      Dose / Directions   famotidine 20 MG tablet  Commonly known as: PEPCID  This may have changed:     when to take this    reasons to take this  Used for: Heartburn symptom      TAKE ONE TABLET BY MOUTH TWICE A DAY  Quantity: 180 tablet  Refills: 0     oxyCODONE 5 MG tablet  Commonly known as: ROXICODONE  This may have changed:     when to take this    reasons to take this      Dose: 5 mg  Take 1 tablet (5 mg) by mouth every 4 hours as needed  Quantity: 50 tablet  Refills: 0        CONTINUE these medicines which have NOT CHANGED      Dose / Directions   acetaminophen 325 MG tablet  Commonly known as: TYLENOL      Dose: 650 mg  Take 2 tablets (650 mg) by mouth every 4 hours as needed for other  Quantity: 60 tablet  Refills: 0     aspirin 81 MG EC tablet  Commonly known as: ASA      Dose: 162 mg  Take 2 tablets (162 mg) by mouth daily  Quantity: 60 tablet  Refills: 0     CALCIUM + D PO      Dose: 1 tablet  Take 1 tablet by mouth daily  Refills: 0     multivitamin, therapeutic Tabs tablet      Dose: 1 tablet  Take 1 tablet by mouth daily  Refills: 0     polyethylene glycol 17 g packet  Commonly known as: MIRALAX      Dose: 17 g  Take 17 g by mouth daily  Quantity: 7 packet  Refills: 0     senna-docusate 8.6-50 MG tablet  Commonly known as: SENOKOT-S/PERICOLACE      Dose: 1 tablet  Take 1 tablet by mouth 2 times daily  Quantity: 30 tablet  Refills: 0      SUMAtriptan 100 MG tablet  Commonly known as: IMITREX  Used for: Migraine with aura and without status migrainosus, not intractable      TAKE 1 TABLET BY MOUTH AT ONSET OF MIGRAINE  Quantity: 18 tablet  Refills: PRN     VITAMIN C PO      Dose: 1 tablet  Take 1 tablet by mouth daily  Refills: 0     VITAMIN D PO      Dose: 1 tablet  Take 1 tablet by mouth daily  Refills: 0        STOP taking    cephALEXin 500 MG capsule  Commonly known as: KEFLEX        HYDROcodone-acetaminophen 5-325 MG tablet  Commonly known as: NORCO              Where to get your medicines      These medications were sent to [facility] [place], [place] - [address]  [address] [facility] [address], [facility] [address]    Phone: [phone]     acetaminophen 325 MG tablet    aspirin 81 MG EC tablet    levofloxacin 750 MG tablet    metroNIDAZOLE 500 MG tablet    oxyCODONE 5 MG tablet    polyethylene glycol 17 g packet    senna-docusate 8.6-50 MG tablet                 Pending Results at Discharge:   None         Discharge Instructions:     Discharge Procedure Orders   Home Care Referral   Referral Priority: Routine: Next available opening Referral Type: Home Health Therapies & Aides   Number of Visits Requested: 1     Activity   Order Comments: Your activity upon discharge: activity as tolerated    Activity: Up with assist  Weight bearing status: WBAT in operative extremity     Order Specific Question Answer Comments   Is discharge order? Yes      When to contact your care team   Order Comments: Call Dr Katz if you have any of the following: temperature greater than 101.3  or less than 96.5,  increased shortness of breath, increased drainage, increased swelling, or increased pain.     Wound care and dressings   Order Comments: Instructions to care for your wound at home: ice to area for comfort, keep wound clean and dry, may get incision wet in shower but do not soak or scrub, and reinforce dressing as needed    Change the wound  vac every 3 days.  .     Adult [facility] Follow-up and recommended labs and tests   Order Comments: Follow up with Dr Katz in 2 weeks.  Clinic phone number is     Appointments on [facility] (with [facility] or [facility] provider or service). Call [phone] if you haven't heard regarding these appointments within 7 days of discharge.     Adult [facility] Follow-up and recommended labs and tests   Order Comments: Follow up with Dr Katz in 2 weeks for wound vac removal and incision site check.     Clinic phone number is     Appointments on [facility]/or [facility] (with [facility] or [facility] provider or service). Call [phone] if you haven't heard regarding these appointments within 7 days of discharge.     Reason for your hospital stay   Order Comments: 56 year old female s/p right thigh and abdomen I&D on 5/20/2022 by Dr. Katz     Activity   Order Comments: Your activity upon discharge: activity as tolerated    Activity: Up with assist  Weight bearing status: WBAT in operative extremity     Order Specific Question Answer Comments   Is discharge order? Yes      Cane DME   Order Comments: DME Documentation: Describe the reason for need to support medical necessity: Impaired gait status post hip surgery. I, the undersigned, certify that the above prescribed supplies are medically necessary for this patient and is both reasonable and necessary in reference to accepted standards of medical practice in the treatment of this patient's condition and is not prescribed as a convenience.     Order Specific Question Answer Comments   DME Provider: [place]    Cane Type: Single Tip    Reminder: Patient can typically get 1 every 5 years      Walker DME   Order Comments: : DME Documentation: Describe the reason for need to support medical necessity: Impaired gait status post hip surgery. I, the undersigned, certify that the above prescribed supplies are medically necessary for this  patient and is both reasonable and necessary in reference to accepted standards of medical practice in the treatment of this patient's condition and is not prescribed as a convenience.     Order Specific Question Answer Comments   DME Provider: [place]    Walker Type: Standard (2 Wheel)    Accessories: N/A      Diet   Order Comments: Follow this diet upon discharge: Orders Placed This Encounter      Advance Diet as Tolerated: Regular Diet Adult     Order Specific Question Answer Comments   Is discharge order? Yes      Diet   Order Comments: Follow this diet upon discharge: Orders Placed This Encounter      Advance Diet as Tolerated: Regular Diet Adult      Diet     Order Specific Question Answer Comments   Is discharge order? Yes      Future Appointments   Date Time Provider Department Center   6/6/2022  2:45 PM Richard Katz MD [facility]     Skilled Nursing Eval and Treat for:  Wound assessment and care   Complex aftercare    Wound vac dressing changes M/W/F for 2 weeks. Start of care 5/27/2022         Erika MOE CNS  Department of Orthopedic Surgery  [facility]  [phone]

## 2022-05-24 NOTE — PROGRESS NOTES
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 Katz)    # Surgical site infection - Abscess Right femoral triangle groin and abdomen  - 5/20/22 - s/p aspiration, ultrasound-guided of right thigh, open debridement, excisional sharp, skin subcutaneous tissue and fascia of right thigh.  6 x 3 cm, superficial, right abdominal wall incision and drainage, separate incision, 3 inches, skin subcutaneous tissue debridement, sharp, excisional. Application of right thigh vacuum-assisted closure dressing  - 5/20/22- intraop findings : Skin slough with necrosis of right proximal thigh femoral triangle incision No deep abscess in the right thigh identified by aspiration, ultrasound-guided. Fat necrosis of right abdominal wall.    - CT abdomen /pelvis w wo contrast 4/202/2  - Subcutaneous hematoma containing foci of air within the right lower quadrant (series 9, image 679) measuring approximately 3.1 x 2.7  cm. Soft tissue thickening and edema throughout the proximal thigh and lateral buttock region on the right. While there is thickening of the  right iliopsoas (measuring 3.6 cm in thickness) secondary to postoperative hematoma, there is no clear evidence of active  extravasation     1. Postsurgical changes in the right groin and iliopsoas region for tumor resection, now with multifocal hematomas throughout the surgical  beds. There is a tiny focus of active extravasation in the right groin. No retroperitoneal hemorrhage.  2. Colonic diverticulosis without evidence of acute  diverticulitis.  3. 3 mm solid pulmonary nodule within the right middle lobe. Close attention on follow-up.    - redness and pus drainage first noted ~ 5/5/22 and cephalexin 500 mg po 4x/daily was prescribed on 5/5/22 x 14 days,  got progressively worse.   - intra-op Cx with Klebsiella variicola/pneumoniae, Citrobacter freundii, and Bacteroides fragilis    # Rotator cuff degeneration s/p right rotator cuff repair ([facility]) on 3/12/21    Discussion:  Ophelia Wolf is a 56 year old male with PMHx of rotator cuff degeneration s/p right rotator cuff repair ([facility]) on 3/12/21, partial hysterectomy and R iliopsoas Schwannoma (12x8x6 cm) s/p excision from right retroperitoneum and anterior groin as well as right femoral nerve neuroplasty and common femoral artery dissection on 4/19/22 (Dr. Katz). After this surgery the patient developed surgical site redness on 5/10/22 and on 5/18 it has progressed and has swelling. She did not have fever. Dr. Katz ordered a CT abdomen/pelvis on 5/19 and Sterling Abdi identified a deeper area of either hematoma or abscess. Patient was then admitted on 5/20/22 for I&D. He underwent the I&D (5/20). No op note in the system yet. Operative cultures sent and in process, but thus far showing Klebsiella, Citrobacter, and Bacteroides.     Recommendations:  - based on intra-op cultures - continue metronidazole 500mg PO TID (added on 5/22/22) and continue Levofloxacin 750 mg PO daily   - duration :~ 14 days ( till June 5)   - video follow-up with me on June 1st at 9.30 am     Plan discussed with patient and RN     Juan M Diaz MD,M.Med.Sc.  Infectious Diseases  Pager: [phone]     Interval Hx:   Afebrile. No new complaints. Pain controlled. Tolerates Levofloxacin. Yesterday , she had nausea but this when she had migraine headache and before Levofloxacin was given. no diarrhea/ no nausea / vomiting. Has been ambulating     Antimicrobials:   Levofloxacin 5/23 -  "present   Cefepime 5/20-5/23  metronidazole 5/22- present   Clindamycin 5/20   Vancomycin 5/20-5/22       History of Present Illness:   Ophelia Wolf is a 56 year old male with PMHx of rotator cuff degeneration s/p right rotator cuff repair ([facility] on 3/12/21, partial hysterectomy and R iliopsoas Schwannoma (12x8x6 cm) s/p excision from right retroperitoneum and anterior groin as well as right femoral nerve neuroplasty and common femoral artery dissection on 4/19/22 (Dr. Katz). After this surgery the patient developed surgical site redness on 5/10/22 and on 5/18 it has progressed and has swelling. She did not have fever. Dr. Katz ordered a CT abdomen/pelvis on 5/19 and Sterling Abdi identified a deeper area of either hematoma or abscess. Patient was then admitted on 5/20/22 for I&D. He underwent the I&D today (5/20). No op note in the system yet. Operative cultures sent and in process.          Physical Exam:   /72   Pulse 81   Temp 97.5  F (36.4  C)   Resp 16   Ht 1.626 m (5' 4\")   Wt 73.2 kg (161 lb 6 oz)   LMP 06/16/2007 (Approximate)   SpO2 96%   BMI 27.70 kg/m       Exam:  GENERAL:  alert, oriented and not in acute distress.  HEAD: Normocephalic and atraumatic  ENT:  No hearing impairment, oral mucous membranes moist  EYES:  Eyes grossly normal to inspection, conjunctivae and sclerae normal   ABDOMEN:  Soft, nontender  EXT/MS/SKIN:  Surgical site on the right upper anterior thigh and right lower abdominal quadrant has wound VAC in place. No surrounding erythema noted. less edematous in right leg . nontender   NEUROLOGIC:  Grossly nonfocal. Mentation intact and speech normal  PSYCHIATRIC: Mood stable, mentation appears normal, affect normal         Laboratory Data:     Creatinine   Date Value Ref Range Status   05/24/2022 0.61 0.52 - 1.04 mg/dL Final   05/23/2022 0.56 0.52 - 1.04 mg/dL Final   05/22/2022 0.60 0.52 - 1.04 mg/dL Final   05/21/2022 0.60 0.52 - 1.04 mg/dL Final   05/20/2022 " 0.61 0.52 - 1.04 mg/dL Final   05/22/2021 0.57 0.52 - 1.04 mg/dL Final   12/18/2020 0.62 0.52 - 1.04 mg/dL Final   02/04/2015 0.4 mg/dL Final   08/19/2014 0.69 0.52 - 1.04 mg/dL Final   04/17/2008 0.79 0.60 - 1.30 mg/dL Final     WBC   Date Value Ref Range Status   05/22/2021 10.5 4.0 - 11.0 10e9/L Final   12/18/2020 4.5 4.0 - 11.0 10e9/L Final   08/19/2014 6.2 4.0 - 11.0 10e9/L Final   12/06/2011 8.7 4.0 - 11.0 10e9/L Final   04/08/2010 7.0 4.0 - 11.0 10e9/L Final     WBC Count   Date Value Ref Range Status   05/24/2022 4.1 4.0 - 11.0 10e3/uL Final   05/23/2022 4.3 4.0 - 11.0 10e3/uL Final   05/21/2022 4.3 4.0 - 11.0 10e3/uL Final   05/19/2022 4.9 4.0 - 11.0 10e3/uL Final   04/06/2022 5.1 4.0 - 11.0 10e3/uL Final     Hemoglobin   Date Value Ref Range Status   05/24/2022 11.3 (L) 11.7 - 15.7 g/dL Final   05/22/2021 15.2 11.7 - 15.7 g/dL Final     Platelet Count   Date Value Ref Range Status   05/24/2022 173 150 - 450 10e3/uL Final   05/22/2021 235 150 - 450 10e9/L Final     Lab Results   Component Value Date     05/24/2022    BUN 11 05/24/2022    CO2 34 (H) 05/24/2022     CRP Inflammation   Date Value Ref Range Status   05/23/2022 12.0 (H) 0.0 - 8.0 mg/L Final   05/06/2008 <3.0 0.0 - 8.0 mg/L Final

## 2022-05-24 NOTE — PLAN OF CARE
"VS: /72   Pulse 81   Temp 97.5  F (36.4  C)   Resp 16   Ht 1.626 m (5' 4\")   Wt 73.2 kg (161 lb 6 oz)   LMP 06/16/2007 (Approximate)   SpO2 96%   BMI 27.70 kg/m     O2: >90% on room air. Denies SOB/N/v/chest pain    Output: Voiding spontaneously without difficulties    Last BM: 5/24/22   Activity: WBAT. SBA x1 with walker and gait belt    Skin: Incision to right thigh and abdomen    Pain: Managed with PRN oxy and ice applied    CMS: Denies numbness and tingling. Alert and oriented x4   Dressing: CDI, scan out  to wound vac,   Diet: Regular diet    LDA: PIV saline locked   Equipment: IV pole/pump, walker, Wound vac, personal belongings, call light within patient reach    Plan: To discharge to home today    Additional Info:       Pt. discharged at 1120 via wheelchair to home. Pt. was accompanied by transport, and left with personal belongings. Prior to discharge, PIV was removed. Pt. received complete discharge paperwork and medications as filled by discharge pharmacy. Pt. was given times of last dose for all discharge medications in writing on discharge medication sheets.  Discharge teaching included stop light tool, medication, pain management, activity restrictions, dressing changes, and signs and symptoms of infection. Pt. to follow up with Dr. Katz in two weeks. Pt. had no further questions at the time of discharge and no unmet needs were identified.    "

## 2022-05-24 NOTE — PROGRESS NOTES
"Orthopaedic Surgery Progress Note     5/24/2022  E: No acute events overnight.    S: Pain well controlled.  Anticipating discharge to home    O:   /72   Pulse 81   Temp 97.5  F (36.4  C)   Resp 16   Ht 1.626 m (5' 4\")   Wt 73.2 kg (161 lb 6 oz)   LMP 06/16/2007 (Approximate)   SpO2 96%   BMI 27.70 kg/m      Exam:    Gen: NAD, A/O x 3  Resp: Comfortable, non-labored breathing  RLE:  -Wound dressed, c/d/i; wound vac in place.   -Sens: SILT dp/sp/s/s/t  -Motor: 5/5 EHL/FHL/TA/GSc  -Vasc: 2+ pulses, wwp, brisk cap refill    Recent Labs   Lab 05/24/22  0532 05/23/22  0556 05/22/22  0646 05/21/22  0618    144 143 144   POTASSIUM 4.0 4.1 4.4 3.9   CHLORIDE 105 108 106 111*   CO2 34* 33* 31 30   BUN 11 9 10 9   CR 0.61 0.56 0.60 0.60   GLC 96 103* 98 98   MAG  --   --  2.0  --      Recent Labs   Lab 05/24/22  0532 05/23/22  0556 05/22/22  0646 05/21/22  0625 05/19/22  1318   WBC 4.1 4.3  --  4.3 4.9   HGB 11.3* 10.8* 11.2* 11.3* 12.9    166  --  152 207     Recent Labs   Lab 05/23/22  0556   CRP 12.0*       ASSESSMENT/PLAN:      56 year old female s/p right thigh and abdomen I&D on 5/20/2022 by Dr. Katz     Plan: Ortho Primary  Monitoring: Per unit protocol  Activity: Up with assist  Weight bearing status: WBAT in operative extremity  Range of motion: As tolerated  Antibiotics: Vanc and Cefipime per ID recs and pending cultures  Diet: Begin with clear fluids and progress diet as tolerated.  DVT prophylaxis: Mechanical + ASA  Bracing/Splinting: None  Elevation: Elevate operative extremity to keep at level of heart as much as possible.  Wound Care: VAC change on floor Monday 5/23/22; wound vac changes q 3 days x 2 weeks.   Drain: None  Pain management: PO pain control  X-rays: PACU  Physical Therapy: ROM, ADL's.  Labs: Trend Hgb on POD #1.  Consults: PT + Med Mng + ID   Follow-up: Clinic with Dr. Katz in 2 weeks  Disposition: Pending progress with therapies, pain control on orals, and medical " stability, anticipate discharge to home on POD #3-     Future Appointments   Date Time Provider Department Center   6/6/2022  2:45 PM Richard Katz MD [facility]        Erika MOE [facility]  Department of Orthopedic Surgery  [facility]  [phone]    For any questions regarding this patient please page me at the above number prior to contacting the ortho resident on call.

## 2022-05-24 NOTE — PLAN OF CARE
Goal Outcome Evaluation:    Plan of Care Reviewed With: patient     Overall Patient Progress: improving    Outcome Evaluation: Patient continues with wound vac very little in place and pain managable with Oxy. Right leg swollen from the thigh down minimal will monitor.    Patient vital signs are at baseline: Yes  Patient able to ambulate as they were prior to admission or with assist devices provided by therapies during their stay:  Yes  Patient MUST void prior to discharge:  Yes  Patient able to tolerate oral intake:  Yes  Pain has adequate pain control using Oral analgesics:  Yes  Does patient have an identified :  Yes  Has goal D/C date and time been discussed with patient:  Yes         VS: Temp: 97.3  F (36.3  C) Temp src: Oral BP: 130/77 Pulse: 78   Resp: 16 SpO2: 97 % O2 Device: None (Room air)      O2: RA denies distress   Output: Voids in the bathroom well   Last BM: 5/23/22   Activity: Walker, gait belt, stand by 1   Up for meals? No   Skin: Right thigh and stomach surgery- wound vac is place   Pain: Right hip controled by Oxy with good results   CMS: WNL   Dressing: C/D/I   Diet: Reg   LDA: LFA S.L   Equipment: Walker, gait belt, patient belongings   Plan: Home with Home health.   Additional Info:

## 2022-05-24 NOTE — PROGRESS NOTES
SPIRITUAL HEALTH SERVICES  SPIRITUAL ASSESSMENT Progress Note  [facility] ([facility]) 5 A ORTHO R 0529 5/24/22      REFERRAL SOURCE: Self Referral    Met with Pt to check in after surgery to assess spiritual needs. Pt stated she could use a prayer today and might discharge home later. Prayed for Pt's ongoing healing and spiritual needs.    PLAN: No follow up at this time.    Rashel Murphy MA, MPA  Associate   Pager: [phone]

## 2022-05-24 NOTE — PROGRESS NOTES
[facility]    Medicine Progress Note - Hospitalist Service, GOLD TEAM 16    Date of Admission:  5/20/2022    Assessment & Plan        Ophelia Wolf is a very pleasant  56 year-old  female admitted on 5/20/22.  Patient underwent right thigh and abd I&D w/ wound vac placement on 5/20/2022.  Admitted to surgical [facility] for postop care.  Hospitalist service consulted for postop medical management.       S/p right thigh and abd I&D w/ wound vac placement  S/p excision of 12 cm Schwannoma from right retroperitoneum and anterior groin (4/19/2022)  S/p right femoral nerve neuroplasty and common femoral artery dissection (4/19/2022)  ---   POD #4  ---   Wound VAC management per Ortho  ---   Intra-Op culture grew 1+ Citrobacter Freundii complex, 1+ Klebsiella Veriicola/pneumoniae and 1+ bacteroides fragilis   ---   S/p IV vancomycin 5/20 - 5/22/22  ---   Continue IV cefepime, started on 5/20/22  ---   Started on Flagyl 5/22/22 for treatment of bacteroides fragilis  ---   Stop IVF   ---   Postop pain management per primary team    DVT prophylaxis  ---    mg po daily  ---   PCD    Migraine HA  ---   Imitrex as needed    Pulmonary nodule  ---   Recommend close outpatient follow-up    Acute postop blood loss anemia  ---   Preop Hgb was 12.9 g on 5/19/22  ---   Hgb is 11.3 g today  ---   Pt denied CP, SOB, lightheadedness or dizziness  ---   No indications for transfusions.           Diet: Advance Diet as Tolerated: Regular Diet Adult  Diet  Diet    DVT Prophylaxis: Pneumatic Compression Devices  Farmer Catheter: Not present  Central Lines: None  Cardiac Monitoring: None  Code Status: Full Code    Disposition Plan   Home   Expected Discharge:  Per ortho        Jagjit Segovia MD  Hospitalist Service, GOLD TEAM 16  [facility]  Securely message with the Vocera Web Console (learn more here)  Text page via [employer]  Paging/Directory   Please see signed in provider for up to date coverage information      ________________________________________________________    Interval History   No complaints.  Uneventful night.   Patient appears very uncomfortable  Eating and drinking well. Passing gas  No headache overnight      Data reviewed today: I reviewed all medications, new labs and imaging results over the last 24 hours.     Physical Exam   Vital Signs: Temp: 97.5  F (36.4  C) Temp src: Oral BP: 129/72 Pulse: 81   Resp: 16 SpO2: 96 % O2 Device: None (Room air)    Weight: 161 lbs 6.03 oz  General: aao x 3, NAD.  HEENT:  NC/AT, neck supple  CVS:  NL s 1 and s2, no m/r/g.  Lungs:  CTA B/L.   Abd:  Soft, + bs, wound vac noted  Ext:  No c/c.  Lymph:  No edema.  Neuro:  Nonfocal.  Musculoskeletal: No calf tenderness to palpation.    Skin:  No rash.  Psychiatry:  Mood and affect appropriate.      Data   Recent Labs   Lab 05/24/22  0532 05/23/22  0556 05/22/22  0646 05/21/22  0625   WBC 4.1 4.3  --  4.3   HGB 11.3* 10.8* 11.2* 11.3*   MCV 95 97  --  97    166  --  152    144 143  --    POTASSIUM 4.0 4.1 4.4  --    CHLORIDE 105 108 106  --    CO2 34* 33* 31  --    BUN 11 9 10  --    CR 0.61 0.56 0.60  --    ANIONGAP 3 3 6  --    LYNNE 9.2 8.9 8.8  --    GLC 96 103* 98  --    ALBUMIN 2.8* 2.7* 2.8*  --    PROTTOTAL 6.3* 5.9* 5.6*  --    BILITOTAL 0.5 0.4 0.5  --    ALKPHOS 70 63 70  --    ALT 11 11 13  --    AST 9 9 9  --      Medications     lactated ringers 75 mL/hr at 05/20/22 1619       aspirin  162 mg Oral Daily     levofloxacin  750 mg Oral Daily     metroNIDAZOLE  500 mg Oral TID     polyethylene glycol  17 g Oral Daily     senna-docusate  1 tablet Oral BID     sodium chloride (PF)  3 mL Intracatheter Q8H

## 2022-05-25 ENCOUNTER — MEDICAL CORRESPONDENCE (OUTPATIENT)
Dept: HEALTH INFORMATION MANAGEMENT | Facility: CLINIC | Age: 57
End: 2022-05-25
Payer: COMMERCIAL

## 2022-05-25 ENCOUNTER — PATIENT OUTREACH (OUTPATIENT)
Dept: FAMILY MEDICINE | Facility: OTHER | Age: 57
End: 2022-05-25
Payer: COMMERCIAL

## 2022-05-25 ENCOUNTER — MYC MEDICAL ADVICE (OUTPATIENT)
Dept: FAMILY MEDICINE | Facility: OTHER | Age: 57
End: 2022-05-25
Payer: COMMERCIAL

## 2022-05-25 ENCOUNTER — TELEPHONE (OUTPATIENT)
Dept: FAMILY MEDICINE | Facility: OTHER | Age: 57
End: 2022-05-25
Payer: COMMERCIAL

## 2022-05-25 DIAGNOSIS — G43.109 MIGRAINE WITH AURA AND WITHOUT STATUS MIGRAINOSUS, NOT INTRACTABLE: Primary | ICD-10-CM

## 2022-05-25 NOTE — TELEPHONE ENCOUNTER
Reason for Call:  Form, our goal is to have forms completed with 72 hours, however, some forms may require a visit or additional information.    Type of letter, form or note:  Home Health Certification    Who is the form from?: Home care    Where did the form come from: form was faxed in    What clinic location was the form placed at?: [facility] [phone]    Where the form was placed: [facility] Box/Folder    What number is listed as a contact on the form?: [phone]       Additional comments: Form marked Urgent for sign/date and return. Provider not starting at [facility] until next week..     Call taken on 5/25/2022 at 3:08 PM by Stephanie Velásquez

## 2022-05-25 NOTE — TELEPHONE ENCOUNTER
Reason for follow up call: Ophelia Wolf appeared on our list for being seen in and recenlty discharge from the Emergency Room/In Patient Hospital Admission.    Chief Complaint   Patient presents with     Hospital F/U     I&D       TCM Episode Yes 5/20/22-5/24/22    Encounter routed for Clinic Triage RN to call for follow up      Orquidea LUNAN, RN

## 2022-05-25 NOTE — TELEPHONE ENCOUNTER
See my chart message encounter from patient regarding hospital follow up.     Closing encounter.     Orquidea Ibanez BSN, RN

## 2022-05-26 ENCOUNTER — MYC MEDICAL ADVICE (OUTPATIENT)
Dept: ORTHOPEDICS | Facility: CLINIC | Age: 57
End: 2022-05-26
Payer: COMMERCIAL

## 2022-05-26 RX ORDER — ONDANSETRON 4 MG/1
4-8 TABLET, ORALLY DISINTEGRATING ORAL EVERY 8 HOURS PRN
Qty: 20 TABLET | Refills: 1 | Status: SHIPPED | OUTPATIENT
Start: 2022-05-26

## 2022-05-27 ENCOUNTER — MEDICAL CORRESPONDENCE (OUTPATIENT)
Dept: HEALTH INFORMATION MANAGEMENT | Facility: CLINIC | Age: 57
End: 2022-05-27

## 2022-05-27 LAB
BACTERIA TISS BX CULT: ABNORMAL
BACTERIA TISS BX CULT: NORMAL
BACTERIA TISS BX CULT: NORMAL

## 2022-05-27 NOTE — TELEPHONE ENCOUNTER
Everything looks good. Bleeding, granulation tissue is a good thing. No signs of infection.    -Leodan

## 2022-05-31 ENCOUNTER — MEDICAL CORRESPONDENCE (OUTPATIENT)
Dept: HEALTH INFORMATION MANAGEMENT | Facility: CLINIC | Age: 57
End: 2022-05-31
Payer: COMMERCIAL

## 2022-06-01 ENCOUNTER — VIRTUAL VISIT (OUTPATIENT)
Dept: INFECTIOUS DISEASES | Facility: CLINIC | Age: 57
End: 2022-06-01
Attending: INTERNAL MEDICINE
Payer: COMMERCIAL

## 2022-06-01 DIAGNOSIS — T81.49XA SURGICAL SITE INFECTION: Primary | ICD-10-CM

## 2022-06-01 PROCEDURE — 99214 OFFICE O/P EST MOD 30 MIN: CPT | Mod: GT | Performed by: INTERNAL MEDICINE

## 2022-06-01 NOTE — LETTER
6/1/2022       RE: Ophelia Wolf  [address]  [facility] [address]     Dear Colleague,    Thank you for referring your patient, Ophelia Wolf, to the [facility] [place] at [facility]. Please see a copy of my visit note below.    Ophelia is a 56 year old who is being evaluated via a billable video visit.      How would you like to obtain your AVS? MyChart  If the video visit is dropped, the invitation should be resent by: Text to cell phone: [phone]  Will anyone else be joining your video visit? No    Video-Visit Details  Video Start Time: 9.30 am   Video End Time: 9.50 am   Originating Location (pt. 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 Katz). After this surgery the patient developed surgical site redness on 5/10/22 and on 5/18 it has progressed and has swelling. She did not have fever. Dr. Katz ordered a CT abdomen/pelvis on 5/19 and Sterling Abdi identified a deeper area of either hematoma or abscess. Patient was then admitted on 5/20/22 for I&D.she underwent the I&D (5/20). Intraop cultures grew Klebsiella variicola, Citrobacter freundii complex, anaerobic cocci, and Bacteroides. She was discharged on 5/24/22 , on Levofloxacin 750 mg po daily and metronidazole 500 mg po q8hr.     She is tolerating both antibiotics well. No  fever, chills, diarrhea. has good appetite. Right leg is less swollen. no pain. photos in chart reviewed. No signifcant drainage in canister in the past 3 days.      Problem list and histories reviewed & adjusted, as indicated.  Additional history: as documented    PAST MEDICAL HISTORY:  Patient  has a past medical history of ALLERGIC RHINITIS NOS (08/08/2002), Arthritis of right acromioclavicular joint (02/11/2021), Cervical high risk HPV (human papillomavirus) test positive (10/20/2020), CLASS MIGRAIN W/O MENTN INTRACTABLE (12/15/2004), Dysmenorrhea (03/13/2007), Headache, Headache, Headache, History of rabies vaccination, Lesion of plantar nerve, Malignant hyperthermia, NONSPECIFIC MEDICAL HISTORY, Premenstrual tension syndrome, Tension headache, Tobacco use disorder, and UTERINE LEIOMYOMA NOS (04/26/2007).    She has no past medical history of Cancer (H), Cerebral infarction (H), Depressive disorder, Heart disease, or History of blood transfusion.    PAST SURGICAL HISTORY:  Patient  has a past surgical history that includes LIGATE FALLOPIAN TUBE,POSTPARTUM (1990); NONSPECIFIC PROCEDURE (1978); SUPRACERV ABD HYSTERECTOMY (06/28/2007); colonoscopy (04/14/10); Arthrotomy shoulder, rotator cuff repair, combined (Right, 3/12/2021); Excise mass groin (Right, 4/19/2022); Repair sciatic nerve (Right, 4/19/2022); Vascular repair lower extremity (N/A, 4/19/2022); and Irrigation And Debridement Groin (Right, 5/20/2022).    CURRENT MEDICATIONS:  Current Outpatient Medications   Medication Sig Dispense Refill     acetaminophen (TYLENOL) 325 MG tablet Take 2 tablets (650 mg) by mouth every 4 hours as needed for other 60 tablet 0     Ascorbic Acid (VITAMIN C PO) Take 1 tablet by mouth daily       aspirin (ASA) 81 MG EC tablet Take 2 tablets (162 mg) by mouth daily 60 tablet 0     Calcium Carbonate-Vitamin D (CALCIUM + D PO) Take 1 tablet by mouth daily       famotidine (PEPCID) 20 MG tablet TAKE ONE TABLET BY MOUTH TWICE A DAY  (Patient taking differently: Take 20 mg by mouth daily as needed) 180 tablet 0     levofloxacin (LEVAQUIN) 750 MG tablet Take 1 tablet (750 mg) by mouth daily 42 tablet 0     metroNIDAZOLE (FLAGYL) 500 MG tablet Take 1 tablet (500 mg) by mouth 3 times daily 126 tablet 0     multivitamin, therapeutic (THERA-VIT) TABS tablet Take 1 tablet by mouth daily       ondansetron (ZOFRAN ODT) 4 MG ODT tab Take 1-2 tablets (4-8 mg) by mouth every 8 hours as needed for nausea 20 tablet 1     oxyCODONE (ROXICODONE) 5 MG tablet Take 1 tablet (5 mg) by mouth every 4 hours as needed 50 tablet 0     polyethylene glycol (MIRALAX) 17 g packet Take 17 g by mouth daily 7 packet 0     senna-docusate (SENOKOT-S/PERICOLACE) 8.6-50 MG tablet Take 1 tablet by mouth 2 times daily 30 tablet 0     SUMAtriptan (IMITREX) 100 MG tablet TAKE 1 TABLET BY MOUTH AT ONSET OF MIGRAINE 18 tablet PRN     VITAMIN D PO Take 1 tablet by mouth daily       ALLERGY:  Allergies   Allergen Reactions     Amoxicillin Difficulty breathing     Anesthetic Ether      pseudocholinesterase deficiency     IMMUNIZATION HISTORY:  Immunization History   Administered Date(s) Administered     HepA-Adult 02/09/2017, 10/12/2017     Influenza (IIV3) PF 12/03/2003, 11/03/2010, 11/30/2011, 09/13/2012     Influenza Quad, Recombinant, pf(RIV4) (Flublok) 11/19/2019, 10/20/2020     Influenza Vaccine IM > 6 months Valent IIV4 (Alfuria,Fluzone) 09/22/2015, 01/25/2017, 10/12/2017     TD (ADULT, 7+) 05/12/1998, 10/04/2004     Tdap (Adacel,Boostrix) 07/22/2013     SOCIAL HISTORY:  Patient  reports that she quit smoking about 14 years ago. Her smoking use included cigarettes. She has a 13.50 pack-year smoking history. She has never used smokeless tobacco. She reports current alcohol use of about 3.3 standard drinks of alcohol per week. She reports that she does not use drugs.    FAMILY HISTORY:  Patient's family history includes Anesthesia Reaction in her brother, brother, and mother;  Arthritis in her maternal grandmother; Cancer in her maternal grandfather; Cerebrovascular Disease (age of onset: 40) in her maternal grandmother; Coronary Artery Disease (age of onset: 40) in her maternal grandmother; Diabetes in her maternal grandfather and maternal grandmother; Heart Disease in her maternal grandfather, maternal grandmother, and paternal grandmother; Hyperlipidemia in her father; Hypertension in her father; Neurologic Disorder in her mother; Osteoporosis in her maternal grandmother and mother; Prostate Cancer in her maternal grandfather; Thyroid Disease in her mother.    Labs reviewed in EPIC    OBJECTIVE:                                                    GENERAL: healthy, alert and no distress  EYES: Eyes grossly normal to inspection,and conjunctivae and sclerae normal  NECK: supple  RESP: breathing comfortably on room air   MS: no gross musculoskeletal defects noted, no edema  SKIN: right leg - wound vac in place, right leg mildly edematous , improving.    NEURO: Normal strength and tone, mentation intact and speech normal  PSYCH: mentation appears normal, affect normal     ASSESSMENT AND PLAN:                                                      # R iliopsoas Schwannoma (12x8x6 cm) s/p excision from right retroperitoneum and anterior groin as well as right femoral nerve neuroplasty and common femoral artery dissection on 4/19/22 (Dr. Katz)     # Surgical site infection - Abscess Right femoral triangle groin and abdomen  - 5/20/22 - s/p aspiration, ultrasound-guided of right thigh, open debridement, excisional sharp, skin subcutaneous tissue and fascia of right thigh.  6 x 3 cm, superficial, right abdominal wall incision and drainage, separate incision, 3 inches, skin subcutaneous tissue debridement, sharp, excisional. Application of right thigh vacuum-assisted closure dressing  - 5/20/22- intraop findings : Skin slough with necrosis of right proximal thigh femoral triangle incision No deep  abscess in the right thigh identified by aspiration, ultrasound-guided. Fat necrosis of right abdominal wall.     - CT abdomen /pelvis w wo contrast 4/202/2  - Subcutaneous hematoma containing foci of air within the right lower quadrant (series 9, image 679) measuring approximately 3.1 x 2.7  cm. Soft tissue thickening and edema throughout the proximal thigh and lateral buttock region on the right. While there is thickening of the  right iliopsoas (measuring 3.6 cm in thickness) secondary to postoperative hematoma, there is no clear evidence of active  extravasation      1. Postsurgical changes in the right groin and iliopsoas region for tumor resection, now with multifocal hematomas throughout the surgical  beds. There is a tiny focus of active extravasation in the right groin. No retroperitoneal hemorrhage.  2. Colonic diverticulosis without evidence of acute diverticulitis.  3. 3 mm solid pulmonary nodule within the right middle lobe. Close attention on follow-up.     - redness and pus drainage first noted ~ 5/5/22 and cephalexin 500 mg po 4x/daily was prescribed on 5/5/22 x 14 days,  got progressively worse.   - intra-op Cx with Klebsiella variicola/pneumoniae, Citrobacter freundii, and Bacteroides fragilis     # Rotator cuff degeneration s/p right rotator cuff repair ([facility]) on 3/12/21    Plan/recommendations  - based on intra-op cultures - continue metronidazole 500mg PO TID (added on 5/22/22) and continue Levofloxacin 750 mg PO daily   - duration :~ 14 -21 days  ( soft tissue/ surgical site infection)  - follow-up with me on 6/10 at 9 00 am     labs: CMP, CBC diff, CRP    Juan M Mirna Diaz MD, M.Med.Sc  Division of Infectious Diseases and International Medicine  [facility]      40 minutes was spent with patient , chart review, care coordination. and documentation

## 2022-06-01 NOTE — PROGRESS NOTES
Ophelia is a 56 year old who is being evaluated via a billable video visit.      How would you like to obtain your AVS? MyChart  If the video visit is dropped, the invitation should be resent by: Text to cell phone: [phone]  Will anyone else be joining your video visit? No    Video-Visit Details  Video Start Time: 9.30 am   Video End Time: 9.50 am   Originating Location (pt. Location): Home  Distant Location (provider location):  [facility] INFECTI[facility] DISE[facility]IN[facility] [place]   Platform used for Video Visit: [facility]     INFECTIOUS DISEASES PROGRESS NOTE    Infectious Diseases Clinic     SUBJECTIVE:                                                      Ophelia Wolf is a 56 year old male with PMHx of rotator cuff degeneration s/p right rotator cuff repair ([facility]) on 3/12/21, partial hysterectomy and R iliopsoas Schwannoma (12x8x6 cm) s/p excision from right retroperitoneum and anterior groin as well as right femoral nerve neuroplasty and common femoral artery dissection on 4/19/22 (Dr. Katz). After this surgery the patient developed surgical site redness on 5/10/22 and on 5/18 it has progressed and has swelling. She did not have fever. Dr. Katz ordered a CT abdomen/pelvis on 5/19 and Sterling Abdi identified a deeper area of either hematoma or abscess. Patient was then admitted on 5/20/22 for I&D.she underwent the I&D (5/20). Intraop cultures grew Klebsiella variicola, Citrobacter freundii complex, anaerobic cocci, and Bacteroides. She was discharged on 5/24/22 , on Levofloxacin 750 mg po daily and metronidazole 500 mg po q8hr.     She is tolerating both antibiotics well. No fever, chills, diarrhea. has good appetite. Right leg is less swollen. no pain. photos in chart reviewed. No signifcant drainage in canister in the past 3 days.      Problem list and histories reviewed & adjusted, as indicated.  Additional history: as documented    PAST MEDICAL HISTORY:  Patient  has a past medical history of  ALLERGIC RHINITIS NOS (08/08/2002), Arthritis of right acromioclavicular joint (02/11/2021), Cervical high risk HPV (human papillomavirus) test positive (10/20/2020), CLASS MIGRAIN W/O MENTN INTRACTABLE (12/15/2004), Dysmenorrhea (03/13/2007), Headache, Headache, Headache, History of rabies vaccination, Lesion of plantar nerve, Malignant hyperthermia, NONSPECIFIC MEDICAL HISTORY, Premenstrual tension syndrome, Tension headache, Tobacco use disorder, and UTERINE LEIOMYOMA NOS (04/26/2007).    She has no past medical history of Cancer (H), Cerebral infarction (H), Depressive disorder, Heart disease, or History of blood transfusion.    PAST SURGICAL HISTORY:  Patient  has a past surgical history that includes LIGATE FALLOPIAN TUBE,POSTPARTUM (1990); NONSPECIFIC PROCEDURE (1978); SUPRACERV ABD HYSTERECTOMY (06/28/2007); colonoscopy (04/14/10); Arthrotomy shoulder, rotator cuff repair, combined (Right, 3/12/2021); Excise mass groin (Right, 4/19/2022); Repair sciatic nerve (Right, 4/19/2022); Vascular repair lower extremity (N/A, 4/19/2022); and Irrigation And Debridement Groin (Right, 5/20/2022).    CURRENT MEDICATIONS:  Current Outpatient Medications   Medication Sig Dispense Refill     acetaminophen (TYLENOL) 325 MG tablet Take 2 tablets (650 mg) by mouth every 4 hours as needed for other 60 tablet 0     Ascorbic Acid (VITAMIN C PO) Take 1 tablet by mouth daily       aspirin (ASA) 81 MG EC tablet Take 2 tablets (162 mg) by mouth daily 60 tablet 0     Calcium Carbonate-Vitamin D (CALCIUM + D PO) Take 1 tablet by mouth daily       famotidine (PEPCID) 20 MG tablet TAKE ONE TABLET BY MOUTH TWICE A DAY (Patient taking differently: Take 20 mg by mouth daily as needed) 180 tablet 0     levofloxacin (LEVAQUIN) 750 MG tablet Take 1 tablet (750 mg) by mouth daily 42 tablet 0     metroNIDAZOLE (FLAGYL) 500 MG tablet Take 1 tablet (500 mg) by mouth 3 times daily 126 tablet 0     multivitamin, therapeutic (THERA-VIT) TABS tablet  Take 1 tablet by mouth daily       ondansetron (ZOFRAN ODT) 4 MG ODT tab Take 1-2 tablets (4-8 mg) by mouth every 8 hours as needed for nausea 20 tablet 1     oxyCODONE (ROXICODONE) 5 MG tablet Take 1 tablet (5 mg) by mouth every 4 hours as needed 50 tablet 0     polyethylene glycol (MIRALAX) 17 g packet Take 17 g by mouth daily 7 packet 0     senna-docusate (SENOKOT-S/PERICOLACE) 8.6-50 MG tablet Take 1 tablet by mouth 2 times daily 30 tablet 0     SUMAtriptan (IMITREX) 100 MG tablet TAKE 1 TABLET BY MOUTH AT ONSET OF MIGRAINE 18 tablet PRN     VITAMIN D PO Take 1 tablet by mouth daily       ALLERGY:  Allergies   Allergen Reactions     Amoxicillin Difficulty breathing     Anesthetic Ether      pseudocholinesterase deficiency     IMMUNIZATION HISTORY:  Immunization History   Administered Date(s) Administered     HepA-Adult 02/09/2017, 10/12/2017     Influenza (IIV3) PF 12/03/2003, 11/03/2010, 11/30/2011, 09/13/2012     Influenza Quad, Recombinant, pf(RIV4) (Flublok) 11/19/2019, 10/20/2020     Influenza Vaccine IM > 6 months Valent IIV4 (Alfuria,Fluzone) 09/22/2015, 01/25/2017, 10/12/2017     TD (ADULT, 7+) 05/12/1998, 10/04/2004     Tdap (Adacel,Boostrix) 07/22/2013     SOCIAL HISTORY:  Patient  reports that she quit smoking about 14 years ago. Her smoking use included cigarettes. She has a 13.50 pack-year smoking history. She has never used smokeless tobacco. She reports current alcohol use of about 3.3 standard drinks of alcohol per week. She reports that she does not use drugs.    FAMILY HISTORY:  Patient's family history includes Anesthesia Reaction in her brother, brother, and mother; Arthritis in her maternal grandmother; Cancer in her maternal grandfather; Cerebrovascular Disease (age of onset: 40) in her maternal grandmother; Coronary Artery Disease (age of onset: 40) in her maternal grandmother; Diabetes in her maternal grandfather and maternal grandmother; Heart Disease in her maternal grandfather,  maternal grandmother, and paternal grandmother; Hyperlipidemia in her father; Hypertension in her father; Neurologic Disorder in her mother; Osteoporosis in her maternal grandmother and mother; Prostate Cancer in her maternal grandfather; Thyroid Disease in her mother.    Labs reviewed in EPIC    OBJECTIVE:                                                    GENERAL: healthy, alert and no distress  EYES: Eyes grossly normal to inspection,and conjunctivae and sclerae normal  NECK: supple  RESP: breathing comfortably on room air   MS: no gross musculoskeletal defects noted, no edema  SKIN: right leg - wound vac in place, right leg mildly edematous , improving.    NEURO: Normal strength and tone, mentation intact and speech normal  PSYCH: mentation appears normal, affect normal     ASSESSMENT AND PLAN:                                                      # R iliopsoas Schwannoma (12x8x6 cm) s/p excision from right retroperitoneum and anterior groin as well as right femoral nerve neuroplasty and common femoral artery dissection on 4/19/22 (Dr. Katz)     # Surgical site infection - Abscess Right femoral triangle groin and abdomen  - 5/20/22 - s/p aspiration, ultrasound-guided of right thigh, open debridement, excisional sharp, skin subcutaneous tissue and fascia of right thigh.  6 x 3 cm, superficial, right abdominal wall incision and drainage, separate incision, 3 inches, skin subcutaneous tissue debridement, sharp, excisional. Application of right thigh vacuum-assisted closure dressing  - 5/20/22- intraop findings : Skin slough with necrosis of right proximal thigh femoral triangle incision No deep abscess in the right thigh identified by aspiration, ultrasound-guided. Fat necrosis of right abdominal wall.     - CT abdomen /pelvis w wo contrast 4/202/2  - Subcutaneous hematoma containing foci of air within the right lower quadrant (series 9, image 679) measuring approximately 3.1 x 2.7  cm. Soft tissue thickening and  edema throughout the proximal thigh and lateral buttock region on the right. While there is thickening of the  right iliopsoas (measuring 3.6 cm in thickness) secondary to postoperative hematoma, there is no clear evidence of active  extravasation      1. Postsurgical changes in the right groin and iliopsoas region for tumor resection, now with multifocal hematomas throughout the surgical  beds. There is a tiny focus of active extravasation in the right groin. No retroperitoneal hemorrhage.  2. Colonic diverticulosis without evidence of acute diverticulitis.  3. 3 mm solid pulmonary nodule within the right middle lobe. Close attention on follow-up.     - redness and pus drainage first noted ~ 5/5/22 and cephalexin 500 mg po 4x/daily was prescribed on 5/5/22 x 14 days,  got progressively worse.   - intra-op Cx with Klebsiella variicola/pneumoniae, Citrobacter freundii, and Bacteroides fragilis     # Rotator cuff degeneration s/p right rotator cuff repair ([facility]) on 3/12/21    Plan/recommendations  - based on intra-op cultures - continue metronidazole 500mg PO TID (added on 5/22/22) and continue Levofloxacin 750 mg PO daily   - duration :~ 14 -21 days  ( soft tissue/ surgical site infection)  - follow-up with me on 6/10 at 9 00 am     labs: CMP, CBC diff, CRP    Juan M Mirna Diaz MD, M.Med.Sc  Division of Infectious Diseases and International Medicine  [facility]      40 minutes was spent with patient , chart review, care coordination. and documentation

## 2022-06-02 ENCOUNTER — LAB (OUTPATIENT)
Dept: LAB | Facility: OTHER | Age: 57
End: 2022-06-02
Payer: COMMERCIAL

## 2022-06-02 DIAGNOSIS — T81.49XA SURGICAL SITE INFECTION: ICD-10-CM

## 2022-06-02 LAB
ALBUMIN SERPL-MCNC: 3.6 G/DL (ref 3.4–5)
ALP SERPL-CCNC: 75 U/L (ref 40–150)
ALT SERPL W P-5'-P-CCNC: 42 U/L (ref 0–50)
ANION GAP SERPL CALCULATED.3IONS-SCNC: 5 MMOL/L (ref 3–14)
AST SERPL W P-5'-P-CCNC: 19 U/L (ref 0–45)
BASOPHILS # BLD AUTO: 0 10E3/UL (ref 0–0.2)
BASOPHILS NFR BLD AUTO: 0 %
BILIRUB SERPL-MCNC: 0.3 MG/DL (ref 0.2–1.3)
BUN SERPL-MCNC: 9 MG/DL (ref 7–30)
CALCIUM SERPL-MCNC: 9.2 MG/DL (ref 8.5–10.1)
CHLORIDE BLD-SCNC: 104 MMOL/L (ref 94–109)
CO2 SERPL-SCNC: 31 MMOL/L (ref 20–32)
CREAT SERPL-MCNC: 0.58 MG/DL (ref 0.52–1.04)
CRP SERPL-MCNC: <2.9 MG/L (ref 0–8)
EOSINOPHIL # BLD AUTO: 0.1 10E3/UL (ref 0–0.7)
EOSINOPHIL NFR BLD AUTO: 2 %
ERYTHROCYTE [DISTWIDTH] IN BLOOD BY AUTOMATED COUNT: 13.2 % (ref 10–15)
GFR SERPL CREATININE-BSD FRML MDRD: >90 ML/MIN/1.73M2
GLUCOSE BLD-MCNC: 107 MG/DL (ref 70–99)
HCT VFR BLD AUTO: 41 % (ref 35–47)
HGB BLD-MCNC: 13.3 G/DL (ref 11.7–15.7)
LYMPHOCYTES # BLD AUTO: 0.8 10E3/UL (ref 0.8–5.3)
LYMPHOCYTES NFR BLD AUTO: 14 %
MCH RBC QN AUTO: 30.6 PG (ref 26.5–33)
MCHC RBC AUTO-ENTMCNC: 32.4 G/DL (ref 31.5–36.5)
MCV RBC AUTO: 95 FL (ref 78–100)
MONOCYTES # BLD AUTO: 0.7 10E3/UL (ref 0–1.3)
MONOCYTES NFR BLD AUTO: 13 %
NEUTROPHILS # BLD AUTO: 4.1 10E3/UL (ref 1.6–8.3)
NEUTROPHILS NFR BLD AUTO: 71 %
PLATELET # BLD AUTO: 282 10E3/UL (ref 150–450)
POTASSIUM BLD-SCNC: 3.7 MMOL/L (ref 3.4–5.3)
PROT SERPL-MCNC: 7.2 G/DL (ref 6.8–8.8)
RBC # BLD AUTO: 4.34 10E6/UL (ref 3.8–5.2)
SODIUM SERPL-SCNC: 140 MMOL/L (ref 133–144)
WBC # BLD AUTO: 5.8 10E3/UL (ref 4–11)

## 2022-06-02 PROCEDURE — 85025 COMPLETE CBC W/AUTO DIFF WBC: CPT

## 2022-06-02 PROCEDURE — 36415 COLL VENOUS BLD VENIPUNCTURE: CPT

## 2022-06-02 PROCEDURE — 86140 C-REACTIVE PROTEIN: CPT

## 2022-06-02 PROCEDURE — 80053 COMPREHEN METABOLIC PANEL: CPT

## 2022-06-06 ENCOUNTER — OFFICE VISIT (OUTPATIENT)
Dept: ORTHOPEDICS | Facility: CLINIC | Age: 57
End: 2022-06-06
Payer: COMMERCIAL

## 2022-06-06 VITALS — WEIGHT: 161 LBS | HEIGHT: 64 IN | BODY MASS INDEX: 27.49 KG/M2

## 2022-06-06 DIAGNOSIS — L08.9 WOUND INFECTION: Primary | ICD-10-CM

## 2022-06-06 DIAGNOSIS — T14.8XXA WOUND INFECTION: Primary | ICD-10-CM

## 2022-06-06 PROCEDURE — 99024 POSTOP FOLLOW-UP VISIT: CPT | Performed by: PHYSICIAN ASSISTANT

## 2022-06-06 RX ORDER — POLYETHYLENE GLYCOL 3350 17 G/17G
POWDER, FOR SOLUTION ORAL
COMMUNITY
Start: 2022-05-23 | End: 2022-09-14

## 2022-06-06 NOTE — LETTER
6/6/2022         RE: Ophelia Wolf  [address]  [facility] [address]        Dear Colleague,    Thank you for referring your patient, Ophelia Wolf, to the [facility] [place]. Please see a copy of my visit note below.        [facility], Orthopaedic Oncology Surgery Consultation  by Richard Katz M.D.    Ophelia Wolf MRN# [id number]    YOB: 1965     Requesting physician: Susannah Hou     Background history:  DX:  1. Rotator cuff degeneration.  Right shoulder  2. 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 R femoral neuropraxia    TREATMENTS:  1. 3/12/2021 Right rotator cuff repair, [facility]  2. Partial Hysterectomy  3. 3/7/2022, Core needle biopsy , US guided, ([place]) [facility]  4. 4/19/2022, excision of 12cm schwannoma from right retroperitoneum and anterior groin.  Right femoral nerve neuroplasty and common femoral artery dissection.  ([place]) [facility]  5. 5/20/2022, irrigation and debridement of right abdomen and thigh dehisced wound with wound VAC application, Dr. Katz     HPI:  Patient is 2-week status post irrigation debridement of right thigh and abdomen wound with wound VAC application.  She states that overall she is doing well and believes the incision is healing appropriately.  Home care changes the wound VAC dressings every 3 days.  She is currently taking metronidazole 500 mg 3 times daily and levofloxacin 750 mg 1 tab daily per infectious disease recommendations.  She has no concerns for infections.  She believes that the wound VAC is working properly and has minimal discharge in the canister.  States she has some tightness and swelling around the wound.  Denies any fever, malaise, erythema or bruising.    Physical exam:  Patient is awake alert no acute distress today.  The wound VAC dressing was removed today for exam.  The proximal portion of the wound on the abdomen is healing well with no  discharge or erythema.  The larger open wound over the thigh measures 6.5 cm x 2 cm today.  Granulation tissue formation noted with no erythema, ecchymosis or discharge.  Diffuse swelling noted in the thigh.           Impression:  56-year-old female with history of schwannoma excision from right retroperitoneal and anterior groin with subsequent wound dehiscence and infection who underwent I&D and wound VAC placement with Dr. Katz.  Healing appropriately with no signs of infection    Plan:  After examined the patient today I explained that the wound is healing well with no signs of infection but I believe we should keep the wound VAC in place for 1 more week.  The proximal portion of the wound is healing well so the sutures removed today.  She can do daily dressing changes with Prim[place] on the proximal portion of the wound.  The wound VAC dressing was placed over the larger dehiscence area on the distal end of the incision.  Home care will continue to change the wound VAC every 3 days.  She should continue her antibiotics for infection.  She can weight-bear as tolerated but should avoid strenuous activity.  All questions were answered today and the patient will follow up in 1 week.  If the wound continues to show signs of improvement we will likely perform a delayed primary closure with local and sutures in the office.    Leodan Hooker PA-C  6/6/2022 4:34 PM  Physician Assistant   Oncology and Adult Reconstructive Surgery  Dept Orthopaedic Surgery, [facility]    Attending MD (Dr. Richard Katz) Attestation :  This patient was seen and evaluated by me including a history, exam, and interpretation of all imaging and/or lab data.  I formulated the treatment plan along with either a physician's assistant (FAN) or training physician (resident/fellow), who also saw the patient. That individual or a scribe has documented the visit in the attached note which I approve.    Richard Katz MD  [facility]  Professor  Oncology and Adult Reconstructive Surgery  Dept Orthopaedic Surgery, [facility]  [phone] office, [phone] pager  [email or web address]          Total combined visit time and work time before and after clinic visit = 40 min

## 2022-06-09 ENCOUNTER — TELEPHONE (OUTPATIENT)
Dept: FAMILY MEDICINE | Facility: OTHER | Age: 57
End: 2022-06-09
Payer: COMMERCIAL

## 2022-06-09 NOTE — CONFIDENTIAL NOTE
Reason for Call:  Form, our goal is to have forms completed with 72 hours, however, some forms may require a visit or additional information.    Type of letter, form or note:  Home Health Certification    Who is the form from?: Home care    Where did the form come from: form was faxed in    What clinic location was the form placed at?: [facility] [phone]    Where the form was placed: TC Box/Folder    What number is listed as a contact on the form?: [phone]       Additional comments: Fax wound care to:  [phone]    Call taken on 6/9/2022 at 12:47 PM by Eloina Ratliff

## 2022-06-10 ENCOUNTER — VIRTUAL VISIT (OUTPATIENT)
Dept: INFECTIOUS DISEASES | Facility: CLINIC | Age: 57
End: 2022-06-10
Payer: COMMERCIAL

## 2022-06-10 DIAGNOSIS — T81.49XA SURGICAL SITE INFECTION: Primary | ICD-10-CM

## 2022-06-10 PROCEDURE — 99213 OFFICE O/P EST LOW 20 MIN: CPT | Mod: GT | Performed by: INTERNAL MEDICINE

## 2022-06-10 NOTE — PROGRESS NOTES
Ophelia is a 56 year old who is being evaluated via a billable video visit.      How would you like to obtain your AVS? MyChart  If the video visit is dropped, the invitation should be resent by: Text to cell phone: [phone]  Will anyone else be joining your video visit? No    Video-Visit Details  Video Start Time:8.50 am  Video End Time: 8.58 am   Originating Location (pt. Location): Home  Distant Location (provider location):  [facility]   Platform used for Video Visit: Sage Jacinto is a 56 year old who is being evaluated via a billable video visit.      How would you like to obtain your AVS? MyChart  If the video visit is dropped, the invitation should be resent by: Text to cell phone: [phone]  Will anyone else be joining your video visit? No      INFECTIOUS DISEASES PROGRESS NOTE    Infectious Diseases Clinic     SUBJECTIVE:                                                      Ophelia Wolf is a 56 year old male with PMHx of rotator cuff degeneration s/p right rotator cuff repair ([facility]) on 3/12/21, partial hysterectomy and R iliopsoas Schwannoma (12x8x6 cm) s/p excision from right retroperitoneum and anterior groin as well as right femoral nerve neuroplasty and common femoral artery dissection on 4/19/22 (Dr. Katz). After this surgery the patient developed surgical site redness on 5/10/22 and on 5/18 it has progressed and has swelling. She did not have fever. Dr. Katz ordered a CT abdomen/pelvis on 5/19 and Sterling Abdi identified a deeper area of either hematoma or abscess. Patient was then admitted on 5/20/22 for I&D.she underwent the I&D (5/20). Intraop cultures grew Klebsiella variicola, Citrobacter freundii complex, anaerobic cocci, and Bacteroides. She was discharged on 5/24/22 , on Levofloxacin 750 mg po daily and metronidazole 500 mg po q8hr.     She is tolerating both antibiotics well. No fever, chills,has good appetite. soft stools. no abodminal pain. Right  leg is less swollen. no pain. photos in chart reviewed. No signifcant drainage in canister in the past 2 weeks     She saw Orthopedic recently and is on 3rd week of antibiotics (levofloxacin/ metronidazole) She will see Dr Katz on next Monday for delayed primary closure in office.       Problem list and histories reviewed & adjusted, as indicated.  Additional history: as documented    PAST MEDICAL HISTORY:  Patient  has a past medical history of ALLERGIC RHINITIS NOS (08/08/2002), Arthritis of right acromioclavicular joint (02/11/2021), Cervical high risk HPV (human papillomavirus) test positive (10/20/2020), CLASS MIGRAIN W/O MENTN INTRACTABLE (12/15/2004), Dysmenorrhea (03/13/2007), Headache, Headache, Headache, History of rabies vaccination, Lesion of plantar nerve, Malignant hyperthermia, NONSPECIFIC MEDICAL HISTORY, Premenstrual tension syndrome, Tension headache, Tobacco use disorder, and UTERINE LEIOMYOMA NOS (04/26/2007).    She has no past medical history of Cancer (H), Cerebral infarction (H), Depressive disorder, Heart disease, or History of blood transfusion.    PAST SURGICAL HISTORY:  Patient  has a past surgical history that includes LIGATE FALLOPIAN TUBE,POSTPARTUM (1990); NONSPECIFIC PROCEDURE (1978); SUPRACERV ABD HYSTERECTOMY (06/28/2007); colonoscopy (04/14/10); Arthrotomy shoulder, rotator cuff repair, combined (Right, 3/12/2021); Excise mass groin (Right, 4/19/2022); Repair sciatic nerve (Right, 4/19/2022); Vascular repair lower extremity (N/A, 4/19/2022); and Irrigation And Debridement Groin (Right, 5/20/2022).    CURRENT MEDICATIONS:  Current Outpatient Medications   Medication Sig Dispense Refill     acetaminophen (TYLENOL) 325 MG tablet Take 2 tablets (650 mg) by mouth every 4 hours as needed for other 60 tablet 0     Ascorbic Acid (VITAMIN C PO) Take 1 tablet by mouth daily       aspirin (ASA) 81 MG EC tablet Take 2 tablets (162 mg) by mouth daily 60 tablet 0     Calcium Carbonate-Vitamin  D (CALCIUM + D PO) Take 1 tablet by mouth daily       famotidine (PEPCID) 20 MG tablet TAKE ONE TABLET BY MOUTH TWICE A DAY (Patient taking differently: Take 20 mg by mouth daily as needed) 180 tablet 0     metroNIDAZOLE (FLAGYL) 500 MG tablet Take 1 tablet (500 mg) by mouth 3 times daily 126 tablet 0     multivitamin, therapeutic (THERA-VIT) TABS tablet Take 1 tablet by mouth daily       ondansetron (ZOFRAN ODT) 4 MG ODT tab Take 1-2 tablets (4-8 mg) by mouth every 8 hours as needed for nausea 20 tablet 1     oxyCODONE (ROXICODONE) 5 MG tablet Take 1 tablet (5 mg) by mouth every 6 hours as needed 40 tablet 0     polyethylene glycol (MIRALAX) 17 g packet Take 17 g by mouth daily 7 packet 0     senna-docusate (SENOKOT-S/PERICOLACE) 8.6-50 MG tablet Take 1 tablet by mouth 2 times daily 30 tablet 0     SUMAtriptan (IMITREX) 100 MG tablet TAKE 1 TABLET BY MOUTH AT ONSET OF MIGRAINE 18 tablet PRN     VITAMIN D PO Take 1 tablet by mouth daily       levofloxacin (LEVAQUIN) 750 MG tablet Take 1 tablet (750 mg) by mouth daily 42 tablet 0     polyethylene glycol (MIRALAX) 17 GM/Dose powder  (Patient not taking: Reported on 6/10/2022)       ALLERGY:  Allergies   Allergen Reactions     Amoxicillin Difficulty breathing     Anesthetic Ether      pseudocholinesterase deficiency     IMMUNIZATION HISTORY:  Immunization History   Administered Date(s) Administered     HepA-Adult 02/09/2017, 10/12/2017     Influenza (IIV3) PF 12/03/2003, 11/03/2010, 11/30/2011, 09/13/2012     Influenza Quad, Recombinant, pf(RIV4) (Flublok) 11/19/2019, 10/20/2020     Influenza Vaccine IM > 6 months Valent IIV4 (Alfuria,Fluzone) 09/22/2015, 01/25/2017, 10/12/2017     TD (ADULT, 7+) 05/12/1998, 10/04/2004     Tdap (Adacel,Boostrix) 07/22/2013     SOCIAL HISTORY:  Patient  reports that she quit smoking about 14 years ago. Her smoking use included cigarettes. She has a 13.50 pack-year smoking history. She has never used smokeless tobacco. She reports  current alcohol use of about 3.3 standard drinks of alcohol per week. She reports that she does not use drugs.    FAMILY HISTORY:  Patient's family history includes Anesthesia Reaction in her brother, brother, and mother; Arthritis in her maternal grandmother; Cancer in her maternal grandfather; Cerebrovascular Disease (age of onset: 40) in her maternal grandmother; Coronary Artery Disease (age of onset: 40) in her maternal grandmother; Diabetes in her maternal grandfather and maternal grandmother; Heart Disease in her maternal grandfather, maternal grandmother, and paternal grandmother; Hyperlipidemia in her father; Hypertension in her father; Neurologic Disorder in her mother; Osteoporosis in her maternal grandmother and mother; Prostate Cancer in her maternal grandfather; Thyroid Disease in her mother.    Labs reviewed in EPIC    OBJECTIVE:                                                    GENERAL: healthy, alert and no distress  EYES: Eyes grossly normal to inspection,and conjunctivae and sclerae normal  NECK: supple  RESP: breathing comfortably on room air   MS: no gross musculoskeletal defects noted, no edema  SKIN: right leg - wound vac in place,   NEURO: Normal strength and tone, mentation intact and speech normal  PSYCH: mentation appears normal, affect normal     ASSESSMENT AND PLAN:                                                      # R iliopsoas Schwannoma (12x8x6 cm) s/p excision from right retroperitoneum and anterior groin as well as right femoral nerve neuroplasty and common femoral artery dissection on 4/19/22 (Dr. Katz)     # Surgical site infection - Abscess Right femoral triangle groin and abdomen  - 5/20/22 - s/p aspiration, ultrasound-guided of right thigh, open debridement, excisional sharp, skin subcutaneous tissue and fascia of right thigh.  6 x 3 cm, superficial, right abdominal wall incision and drainage, separate incision, 3 inches, skin subcutaneous tissue debridement, sharp,  excisional. Application of right thigh vacuum-assisted closure dressing  - 5/20/22- intraop findings : Skin slough with necrosis of right proximal thigh femoral triangle incision No deep abscess in the right thigh identified by aspiration, ultrasound-guided. Fat necrosis of right abdominal wall.     - CT abdomen /pelvis w wo contrast 4/202/2  - Subcutaneous hematoma containing foci of air within the right lower quadrant (series 9, image 679) measuring approximately 3.1 x 2.7  cm. Soft tissue thickening and edema throughout the proximal thigh and lateral buttock region on the right. While there is thickening of the  right iliopsoas (measuring 3.6 cm in thickness) secondary to postoperative hematoma, there is no clear evidence of active  extravasation      1. Postsurgical changes in the right groin and iliopsoas region for tumor resection, now with multifocal hematomas throughout the surgical  beds. There is a tiny focus of active extravasation in the right groin. No retroperitoneal hemorrhage.  2. Colonic diverticulosis without evidence of acute diverticulitis.  3. 3 mm solid pulmonary nodule within the right middle lobe. Close attention on follow-up.     - redness and pus drainage first noted ~ 5/5/22 and cephalexin 500 mg po 4x/daily was prescribed on 5/5/22 x 14 days,  got progressively worse.   - intra-op Cx with Klebsiella variicola/pneumoniae, Citrobacter freundii, and Bacteroides fragilis     # Rotator cuff degeneration s/p right rotator cuff repair ([facility]) on 3/12/21    Plan/recommendations  - wound is improving   -  continue levo/metronidazole until 1 day post primary closure surgery next week   - patient will notify if she has diarrhea   - follow-up with ID PRN   - discussed recent labs       Juan M Diaz MD, M.Med.Sc  Division of Infectious Diseases and International Medicine  [facility]      8 minutes was spent with patient , 5 min chart review, care coordination.  and documentation  : total 13 min

## 2022-06-13 ENCOUNTER — TELEPHONE (OUTPATIENT)
Dept: ORTHOPEDICS | Facility: CLINIC | Age: 57
End: 2022-06-13

## 2022-06-13 ENCOUNTER — OFFICE VISIT (OUTPATIENT)
Dept: ORTHOPEDICS | Facility: CLINIC | Age: 57
End: 2022-06-13
Payer: COMMERCIAL

## 2022-06-13 ENCOUNTER — PREP FOR PROCEDURE (OUTPATIENT)
Dept: ORTHOPEDICS | Facility: CLINIC | Age: 57
End: 2022-06-13

## 2022-06-13 DIAGNOSIS — T81.49XA SURGICAL SITE INFECTION: Primary | ICD-10-CM

## 2022-06-13 DIAGNOSIS — T14.8XXA WOUND INFECTION: Primary | ICD-10-CM

## 2022-06-13 DIAGNOSIS — L08.9 WOUND INFECTION: Primary | ICD-10-CM

## 2022-06-13 PROCEDURE — 99024 POSTOP FOLLOW-UP VISIT: CPT | Performed by: PHYSICIAN ASSISTANT

## 2022-06-13 NOTE — NURSING NOTE
Chief Complaint   Patient presents with     Surgical Followup     I&D Right Thigh and Abdomen DOS 5/20/22 // wound closure        56 year old  1965    LMP 06/16/2007 (Approximate)       Date/Surgery/Surgeon/Hospital:  Past Surgical History:   Procedure Laterality Date     ARTHROTOMY SHOULDER, ROTATOR CUFF REPAIR, COMBINED Right 3/12/2021    Procedure: ARTHROTOMY, SHOULDER (anatoly marmolejo), WITH ROTATOR CUFF REPAIR open;  Surgeon: César Talavera MD;  Location: [facility] OR     COLONOSCOPY  04/14/10     EXCISE MASS GROIN Right 4/19/2022    Procedure: Excision of Large Schwannoma of Right Retroperitoneum and Groin;  Surgeon: Richard Katz MD;  Location: [facility] OR     IRRIGATION AND DEBRIDEMENT GROIN Right 5/20/2022    Procedure: Irrigation and debridement Right thigh and abdomen;  Surgeon: Richard Katz MD;  Location: [facility] OR     REPAIR SCIATIC NERVE Right 4/19/2022    Procedure: Decompression Neuroplasty of Right Femoral Nerve;  Surgeon: Richard Katz MD;  Location: [facility] OR     VASCULAR REPAIR LOWER EXTREMITY N/A 4/19/2022    Procedure: Vascular Dissection of Iliac and Common Femoral Artery and Vein;  Surgeon: Richard Katz MD;  Location: [facility] OR     ZZC LIGATE FALLOPIAN TUBE,POSTPARTUM  1990    Tubal Ligation     ZZC NONSPECIFIC PROCEDURE  1978    jaw surgery     ZZC SUPRACERV ABD HYSTERECTOMY  06/28/2007           Pain Assessment  Patient Currently in Pain: Yes  0-10 Pain Scale: 3        [facility] [place] - [id number] - [place], [place] - [address] [facility] [address] [facility] [id number] - [place], [place] - [address] [facility] [id number] - [place], [place] - [address] [id number] - [place], [place] - [address] [facility] [address] [facility] [address]  Allergies   Allergen Reactions     Amoxicillin Difficulty breathing     Anesthetic Ether      pseudocholinesterase deficiency           Current Outpatient Medications   Medication     acetaminophen  (TYLENOL) 325 MG tablet     Ascorbic Acid (VITAMIN C PO)     aspirin (ASA) 81 MG EC tablet     Calcium Carbonate-Vitamin D (CALCIUM + D PO)     famotidine (PEPCID) 20 MG tablet     levofloxacin (LEVAQUIN) 750 MG tablet     metroNIDAZOLE (FLAGYL) 500 MG tablet     multivitamin, therapeutic (THERA-VIT) TABS tablet     ondansetron (ZOFRAN ODT) 4 MG ODT tab     oxyCODONE (ROXICODONE) 5 MG tablet     senna-docusate (SENOKOT-S/PERICOLACE) 8.6-50 MG tablet     SUMAtriptan (IMITREX) 100 MG tablet     VITAMIN D PO     polyethylene glycol (MIRALAX) 17 g packet     polyethylene glycol (MIRALAX) 17 GM/Dose powder     No current facility-administered medications for this visit.             Questionnaires:    HOOS Hip Dysfunction & Osteoarthritis Outcome Questionnaire    No flowsheet data found.           KOOS Knee Survey Assessment    No flowsheet data found.           Promis 10 Assessment    PROMIS 10 6/13/2022   In general, would you say your health is: Very good   In general, would you say your quality of life is: Excellent   In general, how would you rate your physical health? Very good   In general, how would you rate your mental health, including your mood and your ability to think? Excellent   In general, how would you rate your satisfaction with your social activities and relationships? Excellent   In general, please rate how well you carry out your usual social activities and roles Excellent   To what extent are you able to carry out your everyday physical activities such as walking, climbing stairs, carrying groceries, or moving a chair? A little   How often have you been bothered by emotional problems such as feeling anxious, depressed or irritable? Rarely   How would you rate your fatigue on average? Moderate   How would you rate your pain on average?   0 = No Pain  to  10 = Worst Imaginable Pain 2   In general, would you say your health is: 4   In general, would you say your quality of life is: 5   In general, how  would you rate your physical health? 4   In general, how would you rate your mental health, including your mood and your ability to think? 5   In general, how would you rate your satisfaction with your social activities and relationships? 5   In general, please rate how well you carry out your usual social activities and roles. (This includes activities at home, at work and in your community, and responsibilities as a parent, child, spouse, employee, friend, etc.) 5   To what extent are you able to carry out your everyday physical activities such as walking, climbing stairs, carrying groceries, or moving a chair? 2   In the past 7 days, how often have you been bothered by emotional problems such as feeling anxious, depressed, or irritable? 2   In the past 7 days, how would you rate your fatigue on average? 3   In the past 7 days, how would you rate your pain on average, where 0 means no pain, and 10 means worst imaginable pain? 2   Global Mental Health Score 19   Global Physical Health Score 13   PROMIS TOTAL - SUBSCORES 32   Some recent data might be hidden

## 2022-06-13 NOTE — CONFIDENTIAL NOTE
"-Called patient re: Covid test.    -Per Eloina, surgery scheduler, she stated: \"If she's a same day surgery, she can do an at home test 2 days before and just bring in a picture of the negative result.\"    -This information was relayed to the patient who voiced an understanding.    -Patient will proceed as directed.    Mary Maddox RN  6/13/2022 3:38 PM     "

## 2022-06-13 NOTE — PROGRESS NOTES
[facility], Orthopaedic Oncology Surgery Consultation  by Richard Katz M.D.    Ophelia Wolf MRN# [id number]    YOB: 1965     Requesting physician: Susannah Hou     Background history:  DX:  1. Rotator cuff degeneration.  Right shoulder  2. 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 R femoral neuropraxia    TREATMENTS:  1. 3/12/2021 Right rotator cuff repair, [facility]  2. Partial Hysterectomy  3. 3/7/2022, Core needle biopsy , US guided, ([facility]) [facility]  4. 4/19/2022, excision of 12cm schwannoma from right retroperitoneum and anterior groin.  Right femoral nerve neuroplasty and common femoral artery dissection.  ([facility]) [facility]  5. 5/20/2022, irrigation and debridement of right abdomen and thigh dehisced wound with wound VAC application, Dr. Katz     HPI:  Patient is 3-week status post irrigation debridement of right thigh and abdomen wound with wound VAC application.  She states that overall she is doing well and believes the incision is healing appropriately.  Home care changes the wound VAC dressings every 3 days.  She is currently taking metronidazole 500 mg 3 times daily and levofloxacin 750 mg 1 tab daily per infectious disease recommendations.  She has no concerns for infections.  She believes that the wound VAC is working properly and has minimal discharge in the canister.    Denies any fever, malaise, erythema or bruising.    Physical exam:  Patient is awake alert no acute distress today.  The wound VAC dressing was removed today for exam.  The proximal portion of the wound on the abdomen is healing well with no discharge or erythema.  The larger open wound over the thigh measures 6.5 cm x 2 cm once again today today.  Progression of granulation tissue formation noted with no erythema, ecchymosis or discharge.  Diffuse swelling noted in the thigh.     6/13/22 6/6/22        Impression:  56-year-old female with history of  schwannoma excision from right retroperitoneal and anterior groin with subsequent wound dehiscence and infection who underwent I&D and wound VAC placement with Dr. Katz.  Healing appropriately with no signs of infection    Plan:  After examining the patient I explained there are no signs of infection and the wound continues to heal well but I would recommend delayed primary closure be done intraoperatively.  Therefore, the wound VAC was placed once again.  She will continue to take her antibiotics as prescribed.  We will plan for delayed primary closure surgery this Friday.  She continues continue to weight-bear as tolerated.  Should avoid getting area wet.  All questions were answered and the patient was agreed with the plan    Proposed surgery:  Delayed primary closure right thigh wound  20 minutes  Tier 3  Outpatient    Leodan Hooker PA-C  6/6/2022 4:34 PM  Physician Assistant   Oncology and Adult Reconstructive Surgery  Dept Orthopaedic Surgery, [facility]     Attending MD (Dr. Richard Katz) Attestation :  This patient was seen and evaluated by me including a history, exam, and interpretation of all imaging and/or lab data.  I formulated the treatment plan along with either a physician's assistant (FAN) or training physician (resident/fellow), who also saw the patient. That individual or a scribe has documented the visit in the attached note which I approve.    MD Ayan Lewis  Oncology and Adult Reconstructive Surgery  Dept Orthopaedic Surgery, [facility]  [phone] office, [phone] pager  [email or web address]        Total combined visit time and work time before and after clinic visit = 30 min

## 2022-06-13 NOTE — LETTER
6/13/2022         RE: Ophelia Wolf  [address]  [place] [facility] [facility] [address]        Dear Colleague,    Thank you for referring your patient, Ophelia Wolf, to the [facility] [place]. Please see a copy of my visit note below.        [facility], Orthopaedic Oncology Surgery Consultation  by Richard Katz M.D.    Ophelia Wolf MRN# [id number]    YOB: 1965     Requesting physician: Susannah Hou     Background history:  DX:  1. Rotator cuff degeneration.  Right shoulder  2. 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 R femoral neuropraxia    TREATMENTS:  1. 3/12/2021 Right rotator cuff repair, [facility]  2. Partial Hysterectomy  3. 3/7/2022, Core needle biopsy , US guided, ([facility]) [facility]  4. 4/19/2022, excision of 12cm schwannoma from right retroperitoneum and anterior groin.  Right femoral nerve neuroplasty and common femoral artery dissection.  ([facility]) [facility]  5. 5/20/2022, irrigation and debridement of right abdomen and thigh dehisced wound with wound VAC application, Dr. Katz     HPI:  Patient is 3-week status post irrigation debridement of right thigh and abdomen wound with wound VAC application.  She states that overall she is doing well and believes the incision is healing appropriately.  Home care changes the wound VAC dressings every 3 days.  She is currently taking metronidazole 500 mg 3 times daily and levofloxacin 750 mg 1 tab daily per infectious disease recommendations.  She has no concerns for infections.  She believes that the wound VAC is working properly and has minimal discharge in the canister.    Denies any fever, malaise, erythema or bruising.    Physical exam:  Patient is awake alert no acute distress today.  The wound VAC dressing was removed today for exam.  The proximal portion of the wound on the abdomen is healing well with no discharge or erythema.  The larger open wound over the  thigh measures 6.5 cm x 2 cm once again today today.  Progression of granulation tissue formation noted with no erythema, ecchymosis or discharge.  Diffuse swelling noted in the thigh.     6/13/22 6/6/22        Impression:  56-year-old female with history of schwannoma excision from right retroperitoneal and anterior groin with subsequent wound dehiscence and infection who underwent I&D and wound VAC placement with Dr. Katz.  Healing appropriately with no signs of infection    Plan:  After examining the patient I explained there are no signs of infection and the wound continues to heal well but I would recommend delayed primary closure be done intraoperatively.  Therefore, the wound VAC was placed once again.  She will continue to take her antibiotics as prescribed.  We will plan for delayed primary closure surgery this Friday.  She continues continue to weight-bear as tolerated.  Should avoid getting area wet.  All questions were answered and the patient was agreed with the plan    Proposed surgery:  Delayed primary closure right thigh wound  20 minutes  Tier 3  Outpatient    Leodan Hooker PA-C  6/6/2022 4:34 PM  Physician Assistant   Oncology and Adult Reconstructive Surgery  Dept Orthopaedic Surgery, [facility]     Attending MD (Dr. Richard Katz) Attestation :  This patient was seen and evaluated by me including a history, exam, and interpretation of all imaging and/or lab data.  I formulated the treatment plan along with either a physician's assistant (FAN) or training physician (resident/fellow), who also saw the patient. That individual or a scribe has documented the visit in the attached note which I approve.    MD Ayan Lewis  Oncology and Adult Reconstructive Surgery  Dept Orthopaedic Surgery, [facility]  [phone] office, [phone] pager  [email or web address]        Total combined visit time and work time before and after clinic visit = 30  min

## 2022-06-13 NOTE — H&P (VIEW-ONLY)
[facility], Orthopaedic Oncology Surgery Consultation  by Richard Katz M.D.    Ophelia Wolf MRN# [id number]    YOB: 1965     Requesting physician: Susannah Hou     Background history:  DX:  1. Rotator cuff degeneration.  Right shoulder  2. 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 R femoral neuropraxia    TREATMENTS:  1. 3/12/2021 Right rotator cuff repair, [facility]  2. Partial Hysterectomy  3. 3/7/2022, Core needle biopsy , US guided, ([facility]  4. 4/19/2022, excision of 12cm schwannoma from right retroperitoneum and anterior groin.  Right femoral nerve neuroplasty and common femoral artery dissection.  ([facility]  5. 5/20/2022, irrigation and debridement of right abdomen and thigh dehisced wound with wound VAC application, Dr. Katz     HPI:  Patient is 3-week status post irrigation debridement of right thigh and abdomen wound with wound VAC application.  She states that overall she is doing well and believes the incision is healing appropriately.  Home care changes the wound VAC dressings every 3 days.  She is currently taking metronidazole 500 mg 3 times daily and levofloxacin 750 mg 1 tab daily per infectious disease recommendations.  She has no concerns for infections.  She believes that the wound VAC is working properly and has minimal discharge in the canister.    Denies any fever, malaise, erythema or bruising.    Physical exam:  Patient is awake alert no acute distress today.  The wound VAC dressing was removed today for exam.  The proximal portion of the wound on the abdomen is healing well with no discharge or erythema.  The larger open wound over the thigh measures 6.5 cm x 2 cm once again today today.  Progression of granulation tissue formation noted with no erythema, ecchymosis or discharge.  Diffuse swelling noted in the thigh.     6/13/22 6/6/22        Impression:  56-year-old female with history of  schwannoma excision from right retroperitoneal and anterior groin with subsequent wound dehiscence and infection who underwent I&D and wound VAC placement with Dr. Katz.  Healing appropriately with no signs of infection    Plan:  After examining the patient I explained there are no signs of infection and the wound continues to heal well but I would recommend delayed primary closure be done intraoperatively.  Therefore, the wound VAC was placed once again.  She will continue to take her antibiotics as prescribed.  We will plan for delayed primary closure surgery this Friday.  She continues continue to weight-bear as tolerated.  Should avoid getting area wet.  All questions were answered and the patient was agreed with the plan    Proposed surgery:  Delayed primary closure right thigh wound  20 minutes  Tier 3  Outpatient    Leodan Hooker PA-C  6/6/2022 4:34 PM  Physician Assistant   Oncology and Adult Reconstructive Surgery  Dept Orthopaedic Surgery, [facility]     Attending MD (Dr. Richard Katz) Attestation :  This patient was seen and evaluated by me including a history, exam, and interpretation of all imaging and/or lab data.  I formulated the treatment plan along with either a physician's assistant (FAN) or training physician (resident/fellow), who also saw the patient. That individual or a scribe has documented the visit in the attached note which I approve.    MD Ayan Lewis  Oncology and Adult Reconstructive Surgery  Dept Orthopaedic Surgery, [facility]  [phone] office, [phone] pager  [email or web address]        Total combined visit time and work time before and after clinic visit = 30 min

## 2022-06-13 NOTE — TELEPHONE ENCOUNTER
-Spoke to the patient over the phone today and did pre-op teaching over the phone:    -Because she just had surgery with Dr. Katz on 5/20/22, the information was briefly reviewed:    Teaching Flowsheet   Relevant Diagnosis: Pre-Op Teaching   Teaching Topic: Delayed primary closure right thigh wound  20 minutes  Tier 3  Outpatient     Person(s) involved in teaching:   Patient     Motivation Level:  Asks Questions: Yes  Eager to Learn: Yes  Cooperative: Yes  Receptive (willing/able to accept information): Yes  Any cultural factors/[employer] beliefs that may influence understanding or compliance? No  Comments:      Patient demonstrates understanding of the following:  Reason for the appointment, diagnosis and treatment plan: Yes  Knowledge of proper use of medications and conditions for which they are ordered (with special attention to potential side effects or drug interactions): Yes  Which situations necessitate calling provider and whom to contact: Yes  What has the patient tried?    Has your symptoms improved?       Teaching Concerns Addressed:   Comments:      Proper use and care of surgical scrub.  (Patient has some left over from previous surgery).  (medical equip, care aids, etc.): Yes  Nutritional needs and diet plan: Yes  Pain management techniques: Yes  Wound Care: Yes  How and/when to access community resources: Yes     Instructional Materials Used/Given: Patient has surgical scrub and packet of information from previous surgery.    -In addition to this information, we also discussed:    -important phone numbers  -map/location  -medications to avoid before Friday  -showering (sponge bath) instructions, using the pre-op scrub, & avoiding the wound VAC area, keeping that area dry.  -stop light tool  -who will be driving her home/ staying with her for 24 hours: her daughter, Dmitry    -A note was also sent to [facility] to help the patient schedule a Covid test.    -FAN Alcocer reviewed her chart and said a repeat  pre-op H & P was not needed as her pre-op from 5/20/22 is still valid/ within 30 days.    Mary Maddox RN  6/13/2022 3:09 PM          Time spent with patient: 15 minutes.

## 2022-06-14 DIAGNOSIS — Z53.9 DIAGNOSIS NOT YET DEFINED: Primary | ICD-10-CM

## 2022-06-14 PROCEDURE — G0180 MD CERTIFICATION HHA PATIENT: HCPCS | Performed by: FAMILY MEDICINE

## 2022-06-16 ENCOUNTER — ANESTHESIA EVENT (OUTPATIENT)
Dept: SURGERY | Facility: CLINIC | Age: 57
End: 2022-06-16
Payer: COMMERCIAL

## 2022-06-16 ENCOUNTER — TELEPHONE (OUTPATIENT)
Dept: FAMILY MEDICINE | Facility: OTHER | Age: 57
End: 2022-06-16
Payer: COMMERCIAL

## 2022-06-16 NOTE — TELEPHONE ENCOUNTER
Reason for Call:  Form, our goal is to have forms completed with 72 hours, however, some forms may require a visit or additional information.    Type of letter, form or note:  Home Health Certification    Who is the form from?: Home care    Where did the form come from: form was faxed in    What clinic location was the form placed at?: [facility] [phone]    Where the form was placed:     What number is listed as a contact on the form?: FAX  [phone]       Additional comments: PLEASE FAXED SIGNED ORDER     Call taken on 6/16/2022 at 9:05 AM by Gabby Ortiz

## 2022-06-16 NOTE — ANESTHESIA PREPROCEDURE EVALUATION
Anesthesia Pre-Procedure Evaluation    Patient: Ophelia Wolf   MRN: [id number] : 1965        Procedure : Procedure(s):  Delayed primary closure Right thigh wound          Past Medical History:   Diagnosis Date     ALLERGIC RHINITIS NOS 2002     Arthritis of right acromioclavicular joint 2021     Cervical high risk HPV (human papillomavirus) test positive 10/20/2020     CLASS MIGRAIN W/O MENTN INTRACTABLE 12/15/2004     Dysmenorrhea 2007     Headache      Headache      Headache      History of rabies vaccination      Lesion of plantar nerve     Buckley's neuroma/metatarsalgia     Malignant hyperthermia      NONSPECIFIC MEDICAL HISTORY     pseudocholinesterase deficiency     Premenstrual tension syndrome      Tension headache      Tobacco use disorder      UTERINE LEIOMYOMA NOS 2007      Past Surgical History:   Procedure Laterality Date     ARTHROTOMY SHOULDER, ROTATOR CUFF REPAIR, COMBINED Right 3/12/2021    Procedure: ARTHROTOMY, SHOULDER (anatoly marmolejo), WITH ROTATOR CUFF REPAIR open;  Surgeon: César Talavera MD;  Location: [facility] OR     COLONOSCOPY  04/14/10     EXCISE MASS GROIN Right 2022    Procedure: Excision of Large Schwannoma of Right Retroperitoneum and Groin;  Surgeon: Richard Katz MD;  Location: [facility] OR     IRRIGATION AND DEBRIDEMENT GROIN Right 2022    Procedure: Irrigation and debridement Right thigh and abdomen;  Surgeon: Richard Katz MD;  Location: [facility] OR     REPAIR SCIATIC NERVE Right 2022    Procedure: Decompression Neuroplasty of Right Femoral Nerve;  Surgeon: Richard Katz MD;  Location: [facility] OR     VASCULAR REPAIR LOWER EXTREMITY N/A 2022    Procedure: Vascular Dissection of Iliac and Common Femoral Artery and Vein;  Surgeon: Richard Katz MD;  Location: [facility] OR     ZZC LIGATE FALLOPIAN TUBE,POSTPARTUM      Tubal Ligation     ZZC NONSPECIFIC PROCEDURE      jaw surgery     ZZC SUPRACERV ABD HYSTERECTOMY  2007       Allergies   Allergen Reactions     Amoxicillin Difficulty breathing     Anesthetic Ether      pseudocholinesterase deficiency      Social History     Tobacco Use     Smoking status: Former Smoker     Packs/day: 0.50     Years: 27.00     Pack years: 13.50     Types: Cigarettes     Quit date: 2007     Years since quittin.5     Smokeless tobacco: Never Used     Tobacco comment: 3-4 cig/day   Substance Use Topics     Alcohol use: Yes     Alcohol/week: 3.3 standard drinks     Comment: weekends      Wt Readings from Last 1 Encounters:   22 73 kg (161 lb)        Anesthesia Evaluation   Pt has had prior anesthetic. Type: General and Regional.    History of anesthetic complications   Pseudocholinestraease deficiency.    ROS/MED HX  ENT/Pulmonary:       Neurologic:     (+) migraines,     Cardiovascular:  - neg cardiovascular ROS     METS/Exercise Tolerance:     Hematologic:  - neg hematologic  ROS     Musculoskeletal:  - neg musculoskeletal ROS     GI/Hepatic:       Renal/Genitourinary:       Endo:       Psychiatric/Substance Use:       Infectious Disease:  - neg infectious disease ROS     Malignancy:  - neg malignancy ROS     Other:            Physical Exam    Airway        Mallampati: II   TM distance: > 3 FB   Neck ROM: full   Mouth opening: > 3 cm    Respiratory Devices and Support         Dental  no notable dental history         Cardiovascular   cardiovascular exam normal          Pulmonary   pulmonary exam normal                OUTSIDE LABS:  CBC:   Lab Results   Component Value Date    WBC 5.8 2022    WBC 4.1 2022    HGB 13.3 2022    HGB 11.3 (L) 2022    HCT 41.0 2022    HCT 35.0 2022     2022     2022     BMP:   Lab Results   Component Value Date     2022     2022    POTASSIUM 3.7 2022    POTASSIUM 4.0 2022    CHLORIDE 104 2022    CHLORIDE 105 2022    CO2 31 2022    CO2 34 (H)  05/24/2022    BUN 9 06/02/2022    BUN 11 05/24/2022    CR 0.58 06/02/2022    CR 0.61 05/24/2022     (H) 06/02/2022    GLC 96 05/24/2022     COAGS: No results found for: PTT, INR, FIBR  POC:   Lab Results   Component Value Date    HCG Negative 06/28/2007    HCGS Negative 05/22/2021     HEPATIC:   Lab Results   Component Value Date    ALBUMIN 3.6 06/02/2022    PROTTOTAL 7.2 06/02/2022    ALT 42 06/02/2022    AST 19 06/02/2022    ALKPHOS 75 06/02/2022    BILITOTAL 0.3 06/02/2022     OTHER:   Lab Results   Component Value Date    LACT 1.4 04/20/2022    A1C 4.8 01/27/2003    LYNNE 9.2 06/02/2022    MAG 2.0 05/22/2022    TSH 1.03 02/04/2015    T4 0.9 01/27/2003    CRP <2.9 06/02/2022    SED 11 12/06/2011       Anesthesia Plan    ASA Status:  2      Anesthesia Type: General.     - Airway: LMA      Maintenance: Balanced.        Consents    Anesthesia Plan(s) and associated risks, benefits, and realistic alternatives discussed. Questions answered and patient/representative(s) expressed understanding.    - Discussed:     - Discussed with:  Patient      - Extended Intubation/Ventilatory Support Discussed: No.      - Patient is DNR/DNI Status: No    Use of blood products discussed: No .     Postoperative Care       PONV prophylaxis: Ondansetron (or other 5HT-3)     Comments:                Nisreen Blue MD

## 2022-06-17 ENCOUNTER — ANESTHESIA (OUTPATIENT)
Dept: SURGERY | Facility: CLINIC | Age: 57
End: 2022-06-17
Payer: COMMERCIAL

## 2022-06-17 ENCOUNTER — HOSPITAL ENCOUNTER (OUTPATIENT)
Facility: CLINIC | Age: 57
Discharge: HOME | End: 2022-06-17
Attending: ORTHOPAEDIC SURGERY | Admitting: ORTHOPAEDIC SURGERY
Payer: COMMERCIAL

## 2022-06-17 VITALS
WEIGHT: 156.53 LBS | TEMPERATURE: 97.7 F | OXYGEN SATURATION: 96 % | SYSTOLIC BLOOD PRESSURE: 107 MMHG | HEIGHT: 64 IN | HEART RATE: 81 BPM | RESPIRATION RATE: 14 BRPM | BODY MASS INDEX: 26.72 KG/M2 | DIASTOLIC BLOOD PRESSURE: 70 MMHG

## 2022-06-17 DIAGNOSIS — Z98.890 STATUS POST SURGERY: Primary | ICD-10-CM

## 2022-06-17 LAB — GLUCOSE BLDC GLUCOMTR-MCNC: 103 MG/DL (ref 70–99)

## 2022-06-17 PROCEDURE — 250N000011 HC RX IP 250 OP 636: Performed by: NURSE ANESTHETIST, CERTIFIED REGISTERED

## 2022-06-17 PROCEDURE — 250N000009 HC RX 250: Performed by: NURSE ANESTHETIST, CERTIFIED REGISTERED

## 2022-06-17 PROCEDURE — 250N000013 HC RX MED GY IP 250 OP 250 PS 637: Performed by: PHYSICIAN ASSISTANT

## 2022-06-17 PROCEDURE — 250N000011 HC RX IP 250 OP 636: Performed by: ANESTHESIOLOGY

## 2022-06-17 PROCEDURE — 370N000017 HC ANESTHESIA TECHNICAL FEE, PER MIN: Performed by: ORTHOPAEDIC SURGERY

## 2022-06-17 PROCEDURE — 11042 DBRDMT SUBQ TIS 1ST 20SQCM/<: CPT | Mod: 78 | Performed by: ORTHOPAEDIC SURGERY

## 2022-06-17 PROCEDURE — 272N000001 HC OR GENERAL SUPPLY STERILE: Performed by: ORTHOPAEDIC SURGERY

## 2022-06-17 PROCEDURE — 258N000003 HC RX IP 258 OP 636: Performed by: NURSE ANESTHETIST, CERTIFIED REGISTERED

## 2022-06-17 PROCEDURE — 710N000009 HC RECOVERY PHASE 1, LEVEL 1, PER MIN: Performed by: ORTHOPAEDIC SURGERY

## 2022-06-17 PROCEDURE — 13160 SEC CLSR SURG WND/DEHSN XTN: CPT | Mod: 78 | Performed by: ORTHOPAEDIC SURGERY

## 2022-06-17 PROCEDURE — 97605 NEG PRS WND THER DME<=50SQCM: CPT | Mod: 78 | Performed by: ORTHOPAEDIC SURGERY

## 2022-06-17 PROCEDURE — 250N000025 HC SEVOFLURANE, PER MIN: Performed by: ORTHOPAEDIC SURGERY

## 2022-06-17 PROCEDURE — 82962 GLUCOSE BLOOD TEST: CPT

## 2022-06-17 PROCEDURE — 250N000011 HC RX IP 250 OP 636: Performed by: PHYSICIAN ASSISTANT

## 2022-06-17 PROCEDURE — 999N000141 HC STATISTIC PRE-PROCEDURE NURSING ASSESSMENT: Performed by: ORTHOPAEDIC SURGERY

## 2022-06-17 PROCEDURE — 710N000012 HC RECOVERY PHASE 2, PER MINUTE: Performed by: ORTHOPAEDIC SURGERY

## 2022-06-17 PROCEDURE — 250N000013 HC RX MED GY IP 250 OP 250 PS 637: Performed by: ANESTHESIOLOGY

## 2022-06-17 PROCEDURE — 360N000075 HC SURGERY LEVEL 2, PER MIN: Performed by: ORTHOPAEDIC SURGERY

## 2022-06-17 RX ORDER — CLINDAMYCIN PHOSPHATE 900 MG/50ML
900 INJECTION, SOLUTION INTRAVENOUS
Status: COMPLETED | OUTPATIENT
Start: 2022-06-17 | End: 2022-06-17

## 2022-06-17 RX ORDER — HYDROCODONE BITARTRATE AND ACETAMINOPHEN 5; 325 MG/1; MG/1
1 TABLET ORAL EVERY 6 HOURS PRN
Qty: 6 TABLET | Refills: 0 | Status: SHIPPED | OUTPATIENT
Start: 2022-06-17 | End: 2022-06-18

## 2022-06-17 RX ORDER — ONDANSETRON 2 MG/ML
4 INJECTION INTRAMUSCULAR; INTRAVENOUS EVERY 30 MIN PRN
Status: DISCONTINUED | OUTPATIENT
Start: 2022-06-17 | End: 2022-06-17 | Stop reason: HOSPADM

## 2022-06-17 RX ORDER — FENTANYL CITRATE 50 UG/ML
25 INJECTION, SOLUTION INTRAMUSCULAR; INTRAVENOUS EVERY 5 MIN PRN
Status: DISCONTINUED | OUTPATIENT
Start: 2022-06-17 | End: 2022-06-17 | Stop reason: HOSPADM

## 2022-06-17 RX ORDER — SODIUM CHLORIDE, SODIUM LACTATE, POTASSIUM CHLORIDE, CALCIUM CHLORIDE 600; 310; 30; 20 MG/100ML; MG/100ML; MG/100ML; MG/100ML
INJECTION, SOLUTION INTRAVENOUS CONTINUOUS
Status: DISCONTINUED | OUTPATIENT
Start: 2022-06-17 | End: 2022-06-17 | Stop reason: HOSPADM

## 2022-06-17 RX ORDER — METRONIDAZOLE 500 MG/1
500 TABLET ORAL 3 TIMES DAILY
Qty: 42 TABLET | Refills: 0 | Status: SHIPPED | OUTPATIENT
Start: 2022-06-17 | End: 2022-06-17

## 2022-06-17 RX ORDER — ACETAMINOPHEN 325 MG/1
650 TABLET ORAL
Status: DISCONTINUED | OUTPATIENT
Start: 2022-06-17 | End: 2022-06-17 | Stop reason: HOSPADM

## 2022-06-17 RX ORDER — LEVOFLOXACIN 750 MG/1
750 TABLET, FILM COATED ORAL DAILY
Qty: 14 TABLET | Refills: 0 | Status: SHIPPED | OUTPATIENT
Start: 2022-06-17 | End: 2022-09-14

## 2022-06-17 RX ORDER — HYDROCODONE BITARTRATE AND ACETAMINOPHEN 5; 325 MG/1; MG/1
1 TABLET ORAL
Status: DISCONTINUED | OUTPATIENT
Start: 2022-06-17 | End: 2022-06-17 | Stop reason: HOSPADM

## 2022-06-17 RX ORDER — PROPOFOL 10 MG/ML
INJECTION, EMULSION INTRAVENOUS PRN
Status: DISCONTINUED | OUTPATIENT
Start: 2022-06-17 | End: 2022-06-17

## 2022-06-17 RX ORDER — METRONIDAZOLE 500 MG/1
500 TABLET ORAL 3 TIMES DAILY
Qty: 42 TABLET | Refills: 0 | Status: SHIPPED | OUTPATIENT
Start: 2022-06-17 | End: 2022-09-14

## 2022-06-17 RX ORDER — CLINDAMYCIN PHOSPHATE 900 MG/50ML
900 INJECTION, SOLUTION INTRAVENOUS SEE ADMIN INSTRUCTIONS
Status: DISCONTINUED | OUTPATIENT
Start: 2022-06-17 | End: 2022-06-17 | Stop reason: HOSPADM

## 2022-06-17 RX ORDER — FENTANYL CITRATE-0.9 % NACL/PF 10 MCG/ML
PLASTIC BAG, INJECTION (ML) INTRAVENOUS PRN
Status: DISCONTINUED | OUTPATIENT
Start: 2022-06-17 | End: 2022-06-17

## 2022-06-17 RX ORDER — SODIUM CHLORIDE, SODIUM LACTATE, POTASSIUM CHLORIDE, CALCIUM CHLORIDE 600; 310; 30; 20 MG/100ML; MG/100ML; MG/100ML; MG/100ML
INJECTION, SOLUTION INTRAVENOUS CONTINUOUS PRN
Status: DISCONTINUED | OUTPATIENT
Start: 2022-06-17 | End: 2022-06-17

## 2022-06-17 RX ORDER — FENTANYL CITRATE 50 UG/ML
25 INJECTION, SOLUTION INTRAMUSCULAR; INTRAVENOUS
Status: DISCONTINUED | OUTPATIENT
Start: 2022-06-17 | End: 2022-06-17 | Stop reason: HOSPADM

## 2022-06-17 RX ORDER — AMOXICILLIN 250 MG
1-2 CAPSULE ORAL 2 TIMES DAILY PRN
Qty: 15 TABLET | Refills: 0 | Status: SHIPPED | OUTPATIENT
Start: 2022-06-17 | End: 2022-09-14

## 2022-06-17 RX ORDER — ONDANSETRON 2 MG/ML
INJECTION INTRAMUSCULAR; INTRAVENOUS PRN
Status: DISCONTINUED | OUTPATIENT
Start: 2022-06-17 | End: 2022-06-17

## 2022-06-17 RX ORDER — LIDOCAINE HYDROCHLORIDE 20 MG/ML
INJECTION, SOLUTION INFILTRATION; PERINEURAL PRN
Status: DISCONTINUED | OUTPATIENT
Start: 2022-06-17 | End: 2022-06-17

## 2022-06-17 RX ORDER — MEPERIDINE HYDROCHLORIDE 25 MG/ML
12.5 INJECTION INTRAMUSCULAR; INTRAVENOUS; SUBCUTANEOUS
Status: DISCONTINUED | OUTPATIENT
Start: 2022-06-17 | End: 2022-06-17 | Stop reason: HOSPADM

## 2022-06-17 RX ORDER — ONDANSETRON 4 MG/1
4 TABLET, ORALLY DISINTEGRATING ORAL EVERY 30 MIN PRN
Status: DISCONTINUED | OUTPATIENT
Start: 2022-06-17 | End: 2022-06-17 | Stop reason: HOSPADM

## 2022-06-17 RX ORDER — HYDROMORPHONE HYDROCHLORIDE 1 MG/ML
0.2 INJECTION, SOLUTION INTRAMUSCULAR; INTRAVENOUS; SUBCUTANEOUS EVERY 5 MIN PRN
Status: DISCONTINUED | OUTPATIENT
Start: 2022-06-17 | End: 2022-06-17 | Stop reason: HOSPADM

## 2022-06-17 RX ORDER — FENTANYL CITRATE 50 UG/ML
INJECTION, SOLUTION INTRAMUSCULAR; INTRAVENOUS PRN
Status: DISCONTINUED | OUTPATIENT
Start: 2022-06-17 | End: 2022-06-17

## 2022-06-17 RX ORDER — OXYCODONE HYDROCHLORIDE 5 MG/1
5 TABLET ORAL EVERY 4 HOURS PRN
Status: DISCONTINUED | OUTPATIENT
Start: 2022-06-17 | End: 2022-06-17 | Stop reason: HOSPADM

## 2022-06-17 RX ORDER — DEXAMETHASONE SODIUM PHOSPHATE 4 MG/ML
INJECTION, SOLUTION INTRA-ARTICULAR; INTRALESIONAL; INTRAMUSCULAR; INTRAVENOUS; SOFT TISSUE PRN
Status: DISCONTINUED | OUTPATIENT
Start: 2022-06-17 | End: 2022-06-17

## 2022-06-17 RX ADMIN — Medication 100 MCG: at 07:53

## 2022-06-17 RX ADMIN — OXYCODONE HYDROCHLORIDE 5 MG: 5 TABLET ORAL at 08:46

## 2022-06-17 RX ADMIN — FENTANYL CITRATE 50 MCG: 50 INJECTION, SOLUTION INTRAMUSCULAR; INTRAVENOUS at 07:19

## 2022-06-17 RX ADMIN — MIDAZOLAM 2 MG: 1 INJECTION INTRAMUSCULAR; INTRAVENOUS at 07:19

## 2022-06-17 RX ADMIN — CLINDAMYCIN PHOSPHATE 900 MG: 900 INJECTION, SOLUTION INTRAVENOUS at 07:10

## 2022-06-17 RX ADMIN — PROPOFOL 150 MG: 10 INJECTION, EMULSION INTRAVENOUS at 07:19

## 2022-06-17 RX ADMIN — Medication 100 MCG: at 07:35

## 2022-06-17 RX ADMIN — ACETAMINOPHEN 650 MG: 325 TABLET ORAL at 08:46

## 2022-06-17 RX ADMIN — FENTANYL CITRATE 25 MCG: 50 INJECTION, SOLUTION INTRAMUSCULAR; INTRAVENOUS at 08:43

## 2022-06-17 RX ADMIN — LIDOCAINE HYDROCHLORIDE 60 MG: 20 INJECTION, SOLUTION INFILTRATION; PERINEURAL at 07:19

## 2022-06-17 RX ADMIN — SODIUM CHLORIDE, POTASSIUM CHLORIDE, SODIUM LACTATE AND CALCIUM CHLORIDE: 600; 310; 30; 20 INJECTION, SOLUTION INTRAVENOUS at 07:21

## 2022-06-17 RX ADMIN — ONDANSETRON 4 MG: 2 INJECTION INTRAMUSCULAR; INTRAVENOUS at 07:39

## 2022-06-17 RX ADMIN — FENTANYL CITRATE 25 MCG: 50 INJECTION, SOLUTION INTRAMUSCULAR; INTRAVENOUS at 08:36

## 2022-06-17 RX ADMIN — Medication 100 MCG: at 07:45

## 2022-06-17 RX ADMIN — DEXAMETHASONE SODIUM PHOSPHATE 4 MG: 4 INJECTION, SOLUTION INTRAMUSCULAR; INTRAVENOUS at 07:32

## 2022-06-17 NOTE — INTERVAL H&P NOTE
"I have reviewed the surgical (or preoperative) H&P that is linked to this encounter, and examined the patient. There are no significant changes    Clinical Conditions Present on Arrival:  Clinically Significant Risk Factors Present on Admission                    # Platelet Defect: home medication list includes an antiplatelet medication  # Overweight: Estimated body mass index is 26.85 kg/m  as calculated from the following:    Height as of this encounter: 1.626 m (5' 4.02\").    Weight as of this encounter: 71 kg (156 lb 8.4 oz).       "

## 2022-06-17 NOTE — OR NURSING
"0915 Dr. Sterling md to bedside to see pt and talked with family regarding procedure, wound vac, reinforcement and antibiotics. MD to assure that PA would enter orders for abx.  0930 Priyanka Vázquez to bedside to talk with patient. Patient to inform PA that \" infectious disease told me to stop both antibioitcs on Wednesday, 2 days ago\". Priyanka SIMMONS to have clarifying conversation and request that patient continue taking Levaquin and Flagyl as prescribed for 2 more weeks until f/u appointment with Sterling. Patient to report to PA that she still has plenty of antibiotics at home to last full two weeks even with flagyl being 3/day. Pt instructed to call clinic of and before she runs out of antibiotics or if experiencing diarrhea prior to completion of regimine. Priyanka SIMMONS to call pharmace to cancel refill at this time. Patient and daughter also instructed in safe dosing of Tylenol with home Norco prescribed and meds given here in pacu. All questions answered.   "

## 2022-06-17 NOTE — ANESTHESIA POSTPROCEDURE EVALUATION
Patient: Ophelia Wolf    Procedure: Procedure(s):  Delayed primary closure Right thigh wound       Anesthesia Type:  General    Note:  Disposition: Outpatient   Postop Pain Control: Uneventful            Sign Out: Well controlled pain   PONV: No   Neuro/Psych: Uneventful            Sign Out: Acceptable/Baseline neuro status   Airway/Respiratory: Uneventful            Sign Out: Acceptable/Baseline resp. status   CV/Hemodynamics: Uneventful            Sign Out: Acceptable CV status; No obvious hypovolemia; No obvious fluid overload   Other NRE:    DID A NON-ROUTINE EVENT OCCUR?            Last vitals:  Vitals Value Taken Time   /93 06/17/22 0845   Temp 36.6  C (97.9  F) 06/17/22 0807   Pulse 94 06/17/22 0845   Resp 15 06/17/22 0845   SpO2 97 % 06/17/22 0845   Vitals shown include unvalidated device data.    Electronically Signed By: Nisreen Blue MD  June 17, 2022  5:50 PM

## 2022-06-17 NOTE — ANESTHESIA CARE TRANSFER NOTE
Patient: Ophelia Wolf    Procedure: Procedure(s):  Delayed primary closure Right thigh wound       Diagnosis: Surgical site infection [T81.49XA]  Diagnosis Additional Information: No value filed.    Anesthesia Type:   General     Note:    Oropharynx: oropharynx clear of all foreign objects  Level of Consciousness: drowsy  Oxygen Supplementation: face mask    Independent Airway: airway patency satisfactory and stable  Dentition: dentition unchanged  Vital Signs Stable: post-procedure vital signs reviewed and stable  Report to RN Given: handoff report given  Patient transferred to: PACU    Handoff Report: Identifed the Patient, Identified the Reponsible Provider, Reviewed the pertinent medical history, Discussed the surgical course, Reviewed Intra-OP anesthesia mangement and issues during anesthesia, Set expectations for post-procedure period and Allowed opportunity for questions and acknowledgement of understanding      Vitals:  Vitals Value Taken Time   /82 06/17/22 0807   Temp     Pulse 79 06/17/22 0811   Resp     SpO2 100 % 06/17/22 0811   Vitals shown include unvalidated device data.    Electronically Signed By: ABHI Bear CRNA  June 17, 2022  8:11 AM

## 2022-06-17 NOTE — DISCHARGE INSTRUCTIONS
Same-Day Surgery   Adult Discharge Orders & Instructions     For 24 hours after surgery:  Get plenty of rest.  A responsible adult must stay with you for at least 24 hours after you leave the hospital.   Pain medication can slow your reflexes. Do not drive or use heavy equipment.  If you have weakness or tingling, don't drive or use heavy equipment until this feeling goes away.  Mixing alcohol and pain medication can cause dizziness and slow your breathing. It can even be fatal. Do not drink alcohol while taking pain medication.  Avoid strenuous or risky activities.  Ask for help when climbing stairs.   You may feel lightheaded.  If so, sit for a few minutes before standing.  Have someone help you get up.   If you have nausea (feel sick to your stomach), drink only clear liquids such as apple juice, ginger ale, broth or 7-Up.  Rest may also help.  Be sure to drink enough fluids.  Move to a regular diet as you feel able. Take pain medications with a small amount of solid food, such as toast or crackers, to avoid nausea.   A slight fever is normal. Call the doctor if your fever is over 100 F (37.7 C) (taken under the tongue) or lasts longer than 24 hours.  You may have a dry mouth, muscle aches, trouble sleeping or a sore throat.  These symptoms should go away after 24 hours.  Do not make important or legal decisions.   Pain Management:      1. Take pain medication (if prescribed) for pain as directed by your physician.        2. WARNING: If the pain medication you have been prescribed contains Tylenol  (acetaminophen), DO NOT take additional doses of Tylenol (acetaminophen).     Call your doctor for any of the followin.  Signs of infection (fever, growing tenderness at the surgery site, severe pain, a large amount of drainage or bleeding, foul-smelling drainage, redness, swelling).    2.  It has been over 8 to 10 hours since surgery and you are still not able to urinate (pee).    3.  Headache for over 24  hours.    4.  Numbness, tingling or weakness the day after surgery (if you had spinal anesthesia).  To contact a doctor, call ______Dr. CATHERINE Katz, Orthopedic, [phone] _______________________________ or:  '   [phone] and ask for the Resident On Call for:          ______orthopedics____________________________________ (answered 24 hours a day)  '   Emergency Department:  [facility]: [phone]  [facility]: [phone]               Rev. 10/2014      Tylenol 650mg given at 8:46 am, may take again after 2:46 pm. Do not take more than 4 gm in 24 hours!   Oxycodone given at 08:46 am, may take after 2:46 pm

## 2022-06-17 NOTE — BRIEF OP NOTE
Orthopaedic Surgery Brief Op-Note     Patient: Ophelia Wolf  : 1965  Date of Service: 2022 8:07 AM    Preoperative Diagnosis: Surgical site infection [T81.49XA]     Postoperative Diagnosis: same    Procedure: Procedure(s):  Delayed primary closure Right thigh wound    Surgeon: Dr. Katz    Assistants:  Priyanka Baez PA-C    Anesthesia: General     Estimated Blood Loss: 5 cc    Specimen: none       Complications: none    Condition: stable    Findings: please see dictated operative note    Plan:    WBAT    ADAT    Pain control with orals prn    Bowel prophylaxis with senna-docusate    DVT prophylaxis: mechanical only    Provena incisional vac to remain in place for 14 days.     Follow-up in clinic in 14 days for incision check.      Disposition: patient may be discharged with  once appropriate discharge criteria have been met      PRIYANKA BAEZ PA-C  2022 8:07 AM  Orthopaedic Surgery    Thank you for allowing me to participate in this patient's care.   [facility]  Securely message with the Vocera Web Console (learn more here)  Text page via [employer] Paging/Directory    
John

## 2022-06-17 NOTE — ANESTHESIA POSTPROCEDURE EVALUATION
Patient: Ophelia Wolf    Procedure: Procedure(s):  Delayed primary closure Right thigh wound       Anesthesia Type:  General    Note:  Disposition: Inpatient   Postop Pain Control: Uneventful            Sign Out: Well controlled pain   PONV: No   Neuro/Psych: Uneventful            Sign Out: Acceptable/Baseline neuro status   Airway/Respiratory: Uneventful            Sign Out: Acceptable/Baseline resp. status   CV/Hemodynamics: Uneventful            Sign Out: Acceptable CV status; No obvious hypovolemia; No obvious fluid overload   Other NRE:    DID A NON-ROUTINE EVENT OCCUR?     Event details/Postop Comments:  Patient developed a HR to 118-120 in PACU. Gave him 500 mls as a fluid bolus of LR.           Last vitals:  Vitals Value Taken Time   /93 06/17/22 0845   Temp 36.6  C (97.9  F) 06/17/22 0807   Pulse 94 06/17/22 0845   Resp 15 06/17/22 0845   SpO2 97 % 06/17/22 0845   Vitals shown include unvalidated device data.    Electronically Signed By: Nisreen Blue MD  June 17, 2022  5:46 PM

## 2022-06-17 NOTE — ANESTHESIA PROCEDURE NOTES
Airway       Patient location during procedure: OR  Staff -        CRNA: Shruti Madrid APRN CRNA       Performed By: CRNA  Consent for Airway        Urgency: elective  Indications and Patient Condition       Indications for airway management: theresa-procedural       Induction type:intravenous       Mask difficulty assessment: 1 - vent by mask    Final Airway Details       Final airway type: supraglottic airway    Supraglottic Airway Details        Type: LMA       Brand: Air-Q       LMA size: 3.5    Post intubation assessment        Placement verified by: capnometry, equal breath sounds and chest rise        Number of attempts at approach: 1       Secured with: silk tape       Ease of procedure: easy       Dentition: Intact and Unchanged

## 2022-06-30 ENCOUNTER — MYC MEDICAL ADVICE (OUTPATIENT)
Dept: ORTHOPEDICS | Facility: CLINIC | Age: 57
End: 2022-06-30

## 2022-06-30 ENCOUNTER — OFFICE VISIT (OUTPATIENT)
Dept: ORTHOPEDICS | Facility: CLINIC | Age: 57
End: 2022-06-30
Payer: COMMERCIAL

## 2022-06-30 DIAGNOSIS — D36.10: Primary | ICD-10-CM

## 2022-06-30 PROCEDURE — 99024 POSTOP FOLLOW-UP VISIT: CPT | Performed by: ORTHOPAEDIC SURGERY

## 2022-06-30 NOTE — PROGRESS NOTES
[facility], Orthopaedic Oncology Surgery Consultation  by Richard Katz M.D.    Ophelia Wolf MRN# [id number]    YOB: 1965     Requesting physician: Susannah Hou     Background history:  DX:  1. Rotator cuff degeneration.  Right shoulder  2. 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 R femoral neuropraxia    TREATMENTS:  1. 3/12/2021 Right rotator cuff repair, [facility]  2. Partial Hysterectomy  3. 3/7/2022, Core needle biopsy , US guided, ([facility]  4. 4/19/2022, excision of 12cm schwannoma from right retroperitoneum and anterior groin.  Right femoral nerve neuroplasty and common femoral artery dissection.  ([facility]  5. 5/20/2022, irrigation and debridement of right abdomen and thigh dehisced wound with wound VAC application, Dr. Katz   6. 6/17/2022, delayed primary wound closure of right thigh ([facility])    Ophelia seen for recheck of her right thigh wound.  Sutures removed.  Prevena dressing removed.  Healing is excellent.  Dry, no drainage.    Plan:  1. Activities as tolerated.  2. No dressing needed.  3. Follow-up in October 2022 with MRI examination of right pelvis before hand for surveillance of her schwannoma.  If clear, no further follow-up necessary.    MD Ayan Lewis Family Professor  Oncology and Adult Reconstructive Surgery  Dept Orthopaedic Surgery, [facility]  [phone] office, [phone] pager  [email or web address]    Total combined visit time and work time before and after clinic visit = 20 min

## 2022-06-30 NOTE — LETTER
6/30/2022         RE: Ophelia Wolf  [address]  [facility] [address]        Dear Colleague,    Thank you for referring your patient, Ophelia Wolf, to the [facility] [place]. Please see a copy of my visit note below.        [facility], Orthopaedic Oncology Surgery Consultation  by Richard Katz M.D.    Ophelia Wolf MRN# [id number]    YOB: 1965     Requesting physician: Susannah Hou     Background history:  DX:  1. Rotator cuff degeneration.  Right shoulder  2. 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 R femoral neuropraxia    TREATMENTS:  1. 3/12/2021 Right rotator cuff repair, [facility]  2. Partial Hysterectomy  3. 3/7/2022, Core needle biopsy , US guided, ([facility]  4. 4/19/2022, excision of 12cm schwannoma from right retroperitoneum and anterior groin.  Right femoral nerve neuroplasty and common femoral artery dissection.  ([facility]  5. 5/20/2022, irrigation and debridement of right abdomen and thigh dehisced wound with wound VAC application, Dr. Katz   6. 6/17/2022, delayed primary wound closure of right thigh ([place])    Ophelia seen for recheck of her right thigh wound.  Sutures removed.  Prevena dressing removed.  Healing is excellent.  Dry, no drainage.    Plan:  1. Activities as tolerated.  2. No dressing needed.  3. Follow-up in October 2022 with MRI examination of right pelvis before hand for surveillance of her schwannoma.  If clear, no further follow-up necessary.    MD Ayan Lewis Family Professor  Oncology and Adult Reconstructive Surgery  Dept Orthopaedic Surgery, [facility]  [phone] office, [phone] pager  [email or web address]    Total combined visit time and work time before and after clinic visit = 20 min

## 2022-07-01 ENCOUNTER — MYC REFILL (OUTPATIENT)
Dept: FAMILY MEDICINE | Facility: CLINIC | Age: 57
End: 2022-07-01

## 2022-07-01 DIAGNOSIS — Z98.890 STATUS POST SURGERY: ICD-10-CM

## 2022-07-01 RX ORDER — HYDROCODONE BITARTRATE AND ACETAMINOPHEN 5; 325 MG/1; MG/1
1 TABLET ORAL EVERY 8 HOURS PRN
Qty: 10 TABLET | Refills: 0 | Status: SHIPPED | OUTPATIENT
Start: 2022-07-01 | End: 2022-08-11

## 2022-07-01 RX ORDER — OXYCODONE HYDROCHLORIDE 5 MG/1
5 TABLET ORAL EVERY 8 HOURS PRN
Qty: 30 TABLET | Refills: 0 | Status: CANCELLED | OUTPATIENT
Start: 2022-07-01

## 2022-07-01 NOTE — TELEPHONE ENCOUNTER
Spoke with the patient.  Discussed she was provided with Norco following her most recent surgery and as such, I would like to continue with this as opposed to the oxycodone which is a stronger narcotic.  She states she actually prefers Norco to oxycodone.  A small amount was sent to her pharmacy and she will use this judiciously.      Priyanka Vázquez PA-C  7/1/2022 1:29 PM  [facility]  Orthopaedic Surgery

## 2022-07-20 ENCOUNTER — MYC MEDICAL ADVICE (OUTPATIENT)
Dept: ORTHOPEDICS | Facility: CLINIC | Age: 57
End: 2022-07-20

## 2022-07-20 DIAGNOSIS — R60.0 EDEMA LEG: Primary | ICD-10-CM

## 2022-07-21 ENCOUNTER — MYC MEDICAL ADVICE (OUTPATIENT)
Dept: FAMILY MEDICINE | Facility: CLINIC | Age: 57
End: 2022-07-21

## 2022-07-21 NOTE — TELEPHONE ENCOUNTER
I spoke with the patient who was concerned about the swelling in her right lower extremity.  She denies any fever, malaise, redness, drainage or wound dehiscence.  I explained that given her multiple surgeries and delayed primary closure as well as excision of a large 12 cm schwannoma I would expect there to be swelling.  I recommend we do physical therapy for lymphedema and knee range of motion.  I also recommended she wear thigh-high compression stockings for edema management.  I explained our concern would be she develops any signs of infection including redness, fever, malaise, wound dehiscence or discharge.  She will call if any of the above symptoms present themselves.  All questions were answered and the patient was agreement the plan.    Kourtney Alcantara ATC faxed compression stocking orders to [facility] medical supply store in [facility]    Leodan Hooker PA-C  7/21/2022 2:32 PM  Physician Assistant   Oncology and Adult Reconstructive Surgery  Dept Orthopaedic Surgery, [facility]

## 2022-07-21 NOTE — TELEPHONE ENCOUNTER
She gets 6 norco per 3 months for migraine HEADACHE to keep her out of ER, other pain prescription  Are for procedure  Susannah Ray PA-C

## 2022-07-21 NOTE — TELEPHONE ENCOUNTER
Patient Quality Outreach    Patient is due for the following:   Colon Cancer Screening -  FIT and Colonoscopy  Cervical Cancer Screening - PAP Needed  Physical  - due now  Immunizations  -  Covid and Zoster  Chronic Pain- add F11.9 to problem list-if added will need CSA, PHQ9 and EDISON    NEXT STEPS:   Schedule a yearly physical    Type of outreach:    Sent [employer] message.      Questions for provider review:    Pt is on pain registry due to 3 or more opioid prescriptions in the last 12 months-please add F11.9 to problem list if appropriate to trigger HM items for follow up-looks like this was due to injury/procedure and not chronic. No need to route back to the RG PANEL MANAGEMENT pool-do not close as we are still trying to reach pt.       Albertina Barnes, [facility]  Chart routed to Care Team and provider.

## 2022-07-22 DIAGNOSIS — Z98.890 STATUS POST SURGERY: ICD-10-CM

## 2022-07-25 RX ORDER — HYDROCODONE BITARTRATE AND ACETAMINOPHEN 5; 325 MG/1; MG/1
1 TABLET ORAL EVERY 8 HOURS PRN
Qty: 10 TABLET | Refills: 0 | OUTPATIENT
Start: 2022-07-25

## 2022-07-25 NOTE — TELEPHONE ENCOUNTER
Pending Prescriptions:                       Disp   Refills    HYDROcodone-acetaminophen (NORCO) 5-325 MG*10 tab*0        Sig: Take 1 tablet by mouth every 8 hours as needed for           moderate to severe pain        Routing refill request to provider for review/approval because:  Drug not on the [facility] refill protocol

## 2022-07-28 ENCOUNTER — MEDICAL CORRESPONDENCE (OUTPATIENT)
Dept: ORTHOPEDICS | Facility: CLINIC | Age: 57
End: 2022-07-28

## 2022-07-28 DIAGNOSIS — Z98.890 STATUS POST SURGERY: ICD-10-CM

## 2022-07-28 NOTE — TELEPHONE ENCOUNTER
Pt has read [employer]*hart-will close.    Albertina Barnes CMA ([facility])

## 2022-07-29 NOTE — TELEPHONE ENCOUNTER
Routing refill request to provider for review/approval because:  Not on protocol      HYDROcodone-acetaminophen (NORCO) 5-325 MG tablet 10 tablet 0 7/1/2022  No   Sig - Route: Take 1 tablet by mouth every 8 hours as needed for moderate to severe pain - Oral   Sent to pharmacy as: HYDROcodone-Acetaminophen 5-325 MG Oral Tablet (NORCO)   Class: E-Prescribe   Earliest Fill Date: 7/1/2022   Order: [id number]   E-Prescribing Status: Receipt confirmed by pharmacy (7/1/2022  1:28 PM CDT)

## 2022-08-01 RX ORDER — HYDROCODONE BITARTRATE AND ACETAMINOPHEN 5; 325 MG/1; MG/1
1 TABLET ORAL EVERY 8 HOURS PRN
Qty: 10 TABLET | Refills: 0 | OUTPATIENT
Start: 2022-08-01

## 2022-08-05 ENCOUNTER — THERAPY VISIT (OUTPATIENT)
Dept: PHYSICAL THERAPY | Facility: CLINIC | Age: 57
End: 2022-08-05
Payer: COMMERCIAL

## 2022-08-05 DIAGNOSIS — M25.551 HIP PAIN, RIGHT: ICD-10-CM

## 2022-08-05 DIAGNOSIS — Z98.890 STATUS POST HIP SURGERY: ICD-10-CM

## 2022-08-05 DIAGNOSIS — R26.9 ABNORMAL GAIT: ICD-10-CM

## 2022-08-05 PROCEDURE — 97110 THERAPEUTIC EXERCISES: CPT | Mod: GP | Performed by: PHYSICAL THERAPIST

## 2022-08-05 PROCEDURE — 97140 MANUAL THERAPY 1/> REGIONS: CPT | Mod: GP | Performed by: PHYSICAL THERAPIST

## 2022-08-05 PROCEDURE — 97161 PT EVAL LOW COMPLEX 20 MIN: CPT | Mod: GP | Performed by: PHYSICAL THERAPIST

## 2022-08-05 ASSESSMENT — ACTIVITIES OF DAILY LIVING (ADL)
HOS_ADL_HIGHEST_POTENTIAL_SCORE: 68
WALKING_UP_STEEP_HILLS: MODERATE DIFFICULTY
SITTING_FOR_15_MINUTES: NO DIFFICULTY AT ALL
WALKING_APPROXIMATELY_10_MINUTES: NO DIFFICULTY AT ALL
WALKING_INITIALLY: NO DIFFICULTY AT ALL
STEPPING_UP_AND_DOWN_CURBS: MODERATE DIFFICULTY
GETTING_INTO_AND_OUT_OF_A_BATHTUB: MODERATE DIFFICULTY
HOS_ADL_SCORE(%): 60.29
HOS_ADL_COUNT: 17
TWISTING/PIVOTING_ON_INVOLVED_LEG: MODERATE DIFFICULTY
RECREATIONAL_ACTIVITIES: UNABLE TO DO
STANDING_FOR_15_MINUTES: SLIGHT DIFFICULTY
HEAVY_WORK: MODERATE DIFFICULTY
LIGHT_TO_MODERATE_WORK: SLIGHT DIFFICULTY
DEEP_SQUATTING: EXTREME DIFFICULTY
PUTTING_ON_SOCKS_AND_SHOES: MODERATE DIFFICULTY
WALKING_15_MINUTES_OR_GREATER: NO DIFFICULTY AT ALL
GOING_DOWN_1_FLIGHT_OF_STAIRS: MODERATE DIFFICULTY
HOS_ADL_ITEM_SCORE_TOTAL: 41
ROLLING_OVER_IN_BED: SLIGHT DIFFICULTY
HOW_WOULD_YOU_RATE_YOUR_CURRENT_LEVEL_OF_FUNCTION_DURING_YOUR_USUAL_ACTIVITIES_OF_DAILY_LIVING_FROM_0_TO_100_WITH_100_BEING_YOUR_LEVEL_OF_FUNCTION_PRIOR_TO_YOUR_HIP_PROBLEM_AND_0_BEING_THE_INABILITY_TO_PERFORM_ANY_OF_YOUR_USUAL_DAILY_ACTIVITIES?: 50
GOING_UP_1_FLIGHT_OF_STAIRS: MODERATE DIFFICULTY
GETTING_INTO_AND_OUT_OF_AN_AVERAGE_CAR: SLIGHT DIFFICULTY
WALKING_DOWN_STEEP_HILLS: MODERATE DIFFICULTY

## 2022-08-05 NOTE — PROGRESS NOTES
Physical Therapy Initial Evaluation  Subjective:  The history is provided by the patient. No  was used.   Patient Health History  Ophelia Wolf being seen for PT after surgeries.     Problem began: 4/19/2022.   Problem occurred: Happen to find a tumor.   Removed tumor in April   Pain is reported as 0/10 on pain scale.  General health as reported by patient is good.  Pertinent medical history includes: none.   Red flags:  None as reported by patient.  Medical allergies: none.   Surgeries include:  Other. Other surgery history details: Partial hysterectomy.    Current medications:  None.    Current occupation is .   Primary job tasks include:  Computer work.                  Therapist Generated HPI Evaluation  Problem details: She had an incidental finding of her R hip when she was dealing with some abdominal issues.  She had a repeat MRI on the R thigh and found a tumor.  The doctor noticed that the tumor was growing various blood vessels and nerves.  S/p R iliopsoas Schwannoma, 12 x 8 x 6, with large cystic changes.  She then got infected and had to have another surgery 3 weeks later to clean it out with a wound vac.  She had home care for a couple of weeks.  It took ~2 months for the incision to heal properly.  She is still using a compression stocking since last week.  Prior to using the compression sock, it felt like her incision was going to rip with walking.  She will continue to get swelling if not using compression sock.  She returned to work July 5th.  She still struggles with lifting R leg to get up stairs.  She is able to walk for a couple of miles if wearing compression sock.  Sleep is slowly improving since surgery.  Still waking 2x/night.  Still using ice for edema control.  Gets fatigued quickly..         Type of problem:  Right hip.    This is a chronic condition.  Occurance: after surgery.  Where condition occurred: for unknown reasons.  Patient reports  pain:  Groin.  Pain quality: tearing feel of scar tissue. and is intermittent.  Pain radiates to:  Thigh.   Since onset symptoms are gradually improving.  Associated symptoms:  Edema, loss of strength, tingling and numbness (numbness of ant thigh). Symptoms are exacerbated by ascending stairs, certain positions, bending/squatting, descending stairs and lying on extremity  and relieved by rest and bracing/immobilizing.      Restrictions due to condition include:  Working in normal job without restrictions.  Barriers include:  Stairs.                        Objective:  System    Physical Exam    General     [facility]  Ophelia oWlf , : 1965, MRN: [id number]    Physical Therapy Objective Findings  Subjective information, goals, clinical impression, daily documentation and other information found in EPISODES tab.  Hip Evaluation  Objective:  Posture: mild ant pelvic  tilt  Gait: R hip turned into ER, poor strength with hip flex in swing phase R  Lumbar Range of Motion:  Flexion                                               90%                                                                                                                             Extension 50%   Right Side Bending 75%   Left Side Bending 75%   Repeated extension-standing    Repeated flexion- standing      Pelvic screen:                                                                  Positive                                            Negative                                            Standing Forward Bend  x   Gillet(March)  x   Supine to sit     Sacroiliac provocation test     Pubic symphysis provocation            -resisted hip add at 45     Other:       Functional Screen: sit<>stand from chair without pain     Range of motion:                                        Right AROM            Right PROM            Left AROM              Left PROM                Hip flex  110  wnl   Hip abd  45  wnl   Hip add       Hip ER  40  wnl   Hip IR  40  wnl    Hip ext       Manual Muscle Testing  (graded 0-5, measured at 0 degrees unless otherwise noted):                                                                Right                                                  Left                                                       Hip flex  3+ 4+   Hip abd 4- 4   Hip ext 4- 4-   Hip IR 4 5   Hip ER 4- 5   Knee flex 5 5   Knee ext 4 5   Other:     +=mild pain, ++=mod pain, +++=severe pain  Special Tests:                                                                                                    Positive                               Negative                         Femoral Nerve Stretch Test X  Femoral n tension    Gluteal Tendopathy Test            -pain in 20 add/abd ->glute med            -pain in 20 add/not 20 abd->bursitis  x   Resisted hip adduction            -0 degrees hip flex -> add longus            -90 degrees hip flex -> pectineus Mild pain adductor longus    Straight Leg Raise  x   Scour  x   YUDITH     FADIR  x   Sign of Buttock  x   Other:       Flexibility:                                                                    Right                                                   Left                                                      Juan Carlos Test Mod tightness normal   Obers     Hamstring SLR 70 SLR 70   piriformis normal Normal   Prone Quad 80 deg 120 deg   Other:       Palpation: significant hypomobile incision R groin    Assessment/Plan:    Patient is a 57 year old female with right side hip complaints.    Patient has the following significant findings with corresponding treatment plan.                Diagnosis 1:  S/p R iliopsoas Schwannoma removal with subsequent R thigh/hip weakness    Pain -  hot/cold therapy, manual therapy, self management, education and home program  Decreased ROM/flexibility - manual therapy, therapeutic exercise, therapeutic activity and home program  Decreased strength - therapeutic exercise, therapeutic activities and  home program  Decreased function - therapeutic activities and home program    Therapy Evaluation Codes:   1) History comprised of:   Personal factors that impact the plan of care:      Age and Time since onset of symptoms.    Comorbidity factors that impact the plan of care are:      None.     Medications impacting care: None.  2) Examination of Body Systems comprised of:   Body structures and functions that impact the plan of care:      Hip.   Activity limitations that impact the plan of care are:      Driving, Dressing, Sports, Squatting/kneeling, Stairs, Walking and Sleeping.  3) Clinical presentation characteristics are:   Stable/Uncomplicated.  4) Decision-Making    Low complexity using standardized patient assessment instrument and/or measureable assessment of functional outcome.  Cumulative Therapy Evaluation is: Low complexity.    Previous and current functional limitations:  (See Goal Flow Sheet for this information)    Short term and Long term goals: (See Goal Flow Sheet for this information)     Communication ability:  Patient appears to be able to clearly communicate and understand verbal and written communication and follow directions correctly.  Treatment Explanation - The following has been discussed with the patient:   RX ordered/plan of care  Anticipated outcomes  Possible risks and side effects  This patient would benefit from PT intervention to resume normal activities.   Rehab potential is good.    Frequency:  1 X week, once daily  Duration:  for 10 weeks  Discharge Plan:  Achieve all LTG.  Independent in home treatment program.  Reach maximal therapeutic benefit.    Please refer to the daily flowsheet for treatment today, total treatment time and time spent performing 1:1 timed codes.     Marc Feliciano,PT, DPT, OCS

## 2022-08-08 DIAGNOSIS — G43.109 MIGRAINE WITH AURA AND WITHOUT STATUS MIGRAINOSUS, NOT INTRACTABLE: Primary | ICD-10-CM

## 2022-08-08 DIAGNOSIS — Z98.890 STATUS POST SURGERY: ICD-10-CM

## 2022-08-09 NOTE — TELEPHONE ENCOUNTER
Pending Prescriptions:                       Disp   Refills    HYDROcodone-acetaminophen (NORCO) 5-325 MG*10 tab*0        Sig: Take 1 tablet by mouth every 8 hours as needed for           moderate to severe pain    Routing refill request to provider for review/approval because:  Drug not on the [facility] refill protocol     Requested Prescriptions   Pending Prescriptions Disp Refills    HYDROcodone-acetaminophen (NORCO) 5-325 MG tablet 10 tablet 0     Sig: Take 1 tablet by mouth every 8 hours as needed for moderate to severe pain        There is no refill protocol information for this order

## 2022-08-10 ENCOUNTER — THERAPY VISIT (OUTPATIENT)
Dept: PHYSICAL THERAPY | Facility: CLINIC | Age: 57
End: 2022-08-10
Payer: COMMERCIAL

## 2022-08-10 DIAGNOSIS — R26.9 ABNORMAL GAIT: ICD-10-CM

## 2022-08-10 DIAGNOSIS — Z98.890 STATUS POST HIP SURGERY: ICD-10-CM

## 2022-08-10 DIAGNOSIS — M25.551 HIP PAIN, RIGHT: Primary | ICD-10-CM

## 2022-08-10 PROCEDURE — 97140 MANUAL THERAPY 1/> REGIONS: CPT | Mod: GP | Performed by: PHYSICAL THERAPY ASSISTANT

## 2022-08-10 PROCEDURE — 97110 THERAPEUTIC EXERCISES: CPT | Mod: GP | Performed by: PHYSICAL THERAPY ASSISTANT

## 2022-08-10 NOTE — TELEPHONE ENCOUNTER
Please call patient, if she requesting this refill for her migraine headaches or for pain after surgery?  If it is for postop pain she needs to get this from her surgeon if it is for her migraines I can provide this refill  Susannah Ray PA-C

## 2022-08-11 RX ORDER — HYDROCODONE BITARTRATE AND ACETAMINOPHEN 5; 325 MG/1; MG/1
1 TABLET ORAL EVERY 8 HOURS PRN
Qty: 6 TABLET | Refills: 0 | Status: SHIPPED | OUTPATIENT
Start: 2022-08-11 | End: 2022-11-15

## 2022-08-12 ENCOUNTER — THERAPY VISIT (OUTPATIENT)
Dept: PHYSICAL THERAPY | Facility: CLINIC | Age: 57
End: 2022-08-12
Payer: COMMERCIAL

## 2022-08-12 DIAGNOSIS — M25.551 HIP PAIN, RIGHT: Primary | ICD-10-CM

## 2022-08-12 DIAGNOSIS — Z98.890 STATUS POST HIP SURGERY: ICD-10-CM

## 2022-08-12 DIAGNOSIS — R26.9 ABNORMAL GAIT: ICD-10-CM

## 2022-08-12 PROCEDURE — 97110 THERAPEUTIC EXERCISES: CPT | Mod: GP | Performed by: PHYSICAL THERAPY ASSISTANT

## 2022-08-12 PROCEDURE — 97140 MANUAL THERAPY 1/> REGIONS: CPT | Mod: GP | Performed by: PHYSICAL THERAPY ASSISTANT

## 2022-08-17 ENCOUNTER — THERAPY VISIT (OUTPATIENT)
Dept: PHYSICAL THERAPY | Facility: CLINIC | Age: 57
End: 2022-08-17
Payer: COMMERCIAL

## 2022-08-17 DIAGNOSIS — M25.551 HIP PAIN, RIGHT: Primary | ICD-10-CM

## 2022-08-17 DIAGNOSIS — R26.9 ABNORMAL GAIT: ICD-10-CM

## 2022-08-17 DIAGNOSIS — Z98.890 STATUS POST HIP SURGERY: ICD-10-CM

## 2022-08-17 PROCEDURE — 97140 MANUAL THERAPY 1/> REGIONS: CPT | Mod: GP | Performed by: PHYSICAL THERAPY ASSISTANT

## 2022-08-17 PROCEDURE — 97110 THERAPEUTIC EXERCISES: CPT | Mod: GP | Performed by: PHYSICAL THERAPY ASSISTANT

## 2022-08-18 DIAGNOSIS — G43.109 MIGRAINE WITH AURA AND WITHOUT STATUS MIGRAINOSUS, NOT INTRACTABLE: ICD-10-CM

## 2022-08-18 RX ORDER — HYDROCODONE BITARTRATE AND ACETAMINOPHEN 5; 325 MG/1; MG/1
TABLET ORAL
Qty: 6 TABLET | Refills: 0 | OUTPATIENT
Start: 2022-08-18

## 2022-08-18 NOTE — TELEPHONE ENCOUNTER
Pending Prescriptions:                       Disp   Refills    HYDROcodone-acetaminophen (NORCO) 5-325 MG*6 tabl*0        Sig: TAKE ONE TABLET BY MOUTH EVERY 8 HOURS AS NEEDED FOR           MODERATE TO SEVERE PAIN *CAUTION: OPIOID. RISK OF           OVERDOSE AND ADDICTION.        Routing refill request to provider for review/approval because:  Drug not on the [facility] refill protocol

## 2022-08-24 ENCOUNTER — THERAPY VISIT (OUTPATIENT)
Dept: PHYSICAL THERAPY | Facility: CLINIC | Age: 57
End: 2022-08-24
Payer: COMMERCIAL

## 2022-08-24 DIAGNOSIS — Z98.890 STATUS POST HIP SURGERY: ICD-10-CM

## 2022-08-24 DIAGNOSIS — M25.551 HIP PAIN, RIGHT: Primary | ICD-10-CM

## 2022-08-24 DIAGNOSIS — R26.9 ABNORMAL GAIT: ICD-10-CM

## 2022-08-24 PROCEDURE — 97110 THERAPEUTIC EXERCISES: CPT | Mod: GP | Performed by: PHYSICAL THERAPY ASSISTANT

## 2022-08-24 PROCEDURE — 97140 MANUAL THERAPY 1/> REGIONS: CPT | Mod: GP | Performed by: PHYSICAL THERAPY ASSISTANT

## 2022-08-26 ENCOUNTER — THERAPY VISIT (OUTPATIENT)
Dept: PHYSICAL THERAPY | Facility: CLINIC | Age: 57
End: 2022-08-26
Payer: COMMERCIAL

## 2022-08-26 DIAGNOSIS — M25.551 HIP PAIN, RIGHT: Primary | ICD-10-CM

## 2022-08-26 DIAGNOSIS — Z98.890 STATUS POST HIP SURGERY: ICD-10-CM

## 2022-08-26 DIAGNOSIS — R26.9 ABNORMAL GAIT: ICD-10-CM

## 2022-08-26 PROCEDURE — 97110 THERAPEUTIC EXERCISES: CPT | Mod: GP | Performed by: PHYSICAL THERAPY ASSISTANT

## 2022-08-26 PROCEDURE — 97140 MANUAL THERAPY 1/> REGIONS: CPT | Mod: GP | Performed by: PHYSICAL THERAPY ASSISTANT

## 2022-08-29 ENCOUNTER — THERAPY VISIT (OUTPATIENT)
Dept: PHYSICAL THERAPY | Facility: CLINIC | Age: 57
End: 2022-08-29
Payer: COMMERCIAL

## 2022-08-29 DIAGNOSIS — R26.9 ABNORMAL GAIT: ICD-10-CM

## 2022-08-29 DIAGNOSIS — Z98.890 STATUS POST HIP SURGERY: ICD-10-CM

## 2022-08-29 DIAGNOSIS — M25.551 HIP PAIN, RIGHT: Primary | ICD-10-CM

## 2022-08-29 PROCEDURE — 97110 THERAPEUTIC EXERCISES: CPT | Mod: GP | Performed by: PHYSICAL THERAPIST

## 2022-08-29 PROCEDURE — 97530 THERAPEUTIC ACTIVITIES: CPT | Mod: GP | Performed by: PHYSICAL THERAPIST

## 2022-08-29 PROCEDURE — 97140 MANUAL THERAPY 1/> REGIONS: CPT | Mod: GP | Performed by: PHYSICAL THERAPIST

## 2022-09-02 ENCOUNTER — THERAPY VISIT (OUTPATIENT)
Dept: PHYSICAL THERAPY | Facility: CLINIC | Age: 57
End: 2022-09-02
Payer: COMMERCIAL

## 2022-09-02 DIAGNOSIS — R26.9 ABNORMAL GAIT: ICD-10-CM

## 2022-09-02 DIAGNOSIS — Z98.890 STATUS POST HIP SURGERY: ICD-10-CM

## 2022-09-02 DIAGNOSIS — M25.551 HIP PAIN, RIGHT: Primary | ICD-10-CM

## 2022-09-02 PROCEDURE — 97140 MANUAL THERAPY 1/> REGIONS: CPT | Mod: GP | Performed by: PHYSICAL THERAPIST

## 2022-09-02 PROCEDURE — 97110 THERAPEUTIC EXERCISES: CPT | Mod: GP | Performed by: PHYSICAL THERAPIST

## 2022-09-09 ENCOUNTER — THERAPY VISIT (OUTPATIENT)
Dept: PHYSICAL THERAPY | Facility: CLINIC | Age: 57
End: 2022-09-09
Payer: COMMERCIAL

## 2022-09-09 DIAGNOSIS — Z98.890 STATUS POST HIP SURGERY: ICD-10-CM

## 2022-09-09 DIAGNOSIS — M25.551 HIP PAIN, RIGHT: Primary | ICD-10-CM

## 2022-09-09 DIAGNOSIS — R26.9 ABNORMAL GAIT: ICD-10-CM

## 2022-09-09 PROCEDURE — 97110 THERAPEUTIC EXERCISES: CPT | Mod: GP | Performed by: PHYSICAL THERAPY ASSISTANT

## 2022-09-09 PROCEDURE — 97140 MANUAL THERAPY 1/> REGIONS: CPT | Mod: GP | Performed by: PHYSICAL THERAPY ASSISTANT

## 2022-09-13 ENCOUNTER — THERAPY VISIT (OUTPATIENT)
Dept: PHYSICAL THERAPY | Facility: CLINIC | Age: 57
End: 2022-09-13
Payer: COMMERCIAL

## 2022-09-13 DIAGNOSIS — R26.9 ABNORMAL GAIT: ICD-10-CM

## 2022-09-13 DIAGNOSIS — Z98.890 STATUS POST HIP SURGERY: ICD-10-CM

## 2022-09-13 DIAGNOSIS — M25.551 HIP PAIN, RIGHT: Primary | ICD-10-CM

## 2022-09-13 DIAGNOSIS — G43.109 MIGRAINE WITH AURA AND WITHOUT STATUS MIGRAINOSUS, NOT INTRACTABLE: ICD-10-CM

## 2022-09-13 PROCEDURE — 97140 MANUAL THERAPY 1/> REGIONS: CPT | Mod: GP | Performed by: PHYSICAL THERAPIST

## 2022-09-13 PROCEDURE — 97110 THERAPEUTIC EXERCISES: CPT | Mod: GP | Performed by: PHYSICAL THERAPIST

## 2022-09-14 RX ORDER — HYDROCODONE BITARTRATE AND ACETAMINOPHEN 5; 325 MG/1; MG/1
1 TABLET ORAL EVERY 8 HOURS PRN
Qty: 6 TABLET | Refills: 0 | OUTPATIENT
Start: 2022-09-14

## 2022-09-16 ENCOUNTER — THERAPY VISIT (OUTPATIENT)
Dept: PHYSICAL THERAPY | Facility: CLINIC | Age: 57
End: 2022-09-16
Payer: COMMERCIAL

## 2022-09-16 DIAGNOSIS — M25.551 HIP PAIN, RIGHT: Primary | ICD-10-CM

## 2022-09-16 DIAGNOSIS — Z98.890 STATUS POST HIP SURGERY: ICD-10-CM

## 2022-09-16 DIAGNOSIS — R26.9 ABNORMAL GAIT: ICD-10-CM

## 2022-09-16 PROCEDURE — 97110 THERAPEUTIC EXERCISES: CPT | Mod: GP | Performed by: PHYSICAL THERAPIST

## 2022-09-16 PROCEDURE — 97140 MANUAL THERAPY 1/> REGIONS: CPT | Mod: GP | Performed by: PHYSICAL THERAPIST

## 2022-09-20 ENCOUNTER — THERAPY VISIT (OUTPATIENT)
Dept: PHYSICAL THERAPY | Facility: CLINIC | Age: 57
End: 2022-09-20
Payer: COMMERCIAL

## 2022-09-20 DIAGNOSIS — Z98.890 STATUS POST HIP SURGERY: ICD-10-CM

## 2022-09-20 DIAGNOSIS — R26.9 ABNORMAL GAIT: ICD-10-CM

## 2022-09-20 DIAGNOSIS — M25.551 HIP PAIN, RIGHT: Primary | ICD-10-CM

## 2022-09-20 PROCEDURE — 97110 THERAPEUTIC EXERCISES: CPT | Mod: GP | Performed by: PHYSICAL THERAPIST

## 2022-09-20 PROCEDURE — 97140 MANUAL THERAPY 1/> REGIONS: CPT | Mod: GP | Performed by: PHYSICAL THERAPIST

## 2022-09-22 ENCOUNTER — THERAPY VISIT (OUTPATIENT)
Dept: PHYSICAL THERAPY | Facility: CLINIC | Age: 57
End: 2022-09-22
Payer: COMMERCIAL

## 2022-09-22 DIAGNOSIS — M25.551 HIP PAIN, RIGHT: Primary | ICD-10-CM

## 2022-09-22 DIAGNOSIS — Z98.890 STATUS POST HIP SURGERY: ICD-10-CM

## 2022-09-22 DIAGNOSIS — R26.9 ABNORMAL GAIT: ICD-10-CM

## 2022-09-22 PROCEDURE — 97530 THERAPEUTIC ACTIVITIES: CPT | Mod: GP | Performed by: PHYSICAL THERAPIST

## 2022-09-22 PROCEDURE — 97110 THERAPEUTIC EXERCISES: CPT | Mod: 59 | Performed by: PHYSICAL THERAPIST

## 2022-09-22 PROCEDURE — 97140 MANUAL THERAPY 1/> REGIONS: CPT | Mod: 59 | Performed by: PHYSICAL THERAPIST

## 2022-09-27 ENCOUNTER — THERAPY VISIT (OUTPATIENT)
Dept: PHYSICAL THERAPY | Facility: CLINIC | Age: 57
End: 2022-09-27
Payer: COMMERCIAL

## 2022-09-27 DIAGNOSIS — Z98.890 STATUS POST HIP SURGERY: ICD-10-CM

## 2022-09-27 DIAGNOSIS — M25.551 HIP PAIN, RIGHT: Primary | ICD-10-CM

## 2022-09-27 DIAGNOSIS — R26.9 ABNORMAL GAIT: ICD-10-CM

## 2022-09-27 PROCEDURE — 97140 MANUAL THERAPY 1/> REGIONS: CPT | Mod: GP | Performed by: PHYSICAL THERAPIST

## 2022-09-27 PROCEDURE — 97110 THERAPEUTIC EXERCISES: CPT | Mod: GP | Performed by: PHYSICAL THERAPIST

## 2022-09-29 ENCOUNTER — THERAPY VISIT (OUTPATIENT)
Dept: PHYSICAL THERAPY | Facility: CLINIC | Age: 57
End: 2022-09-29
Payer: COMMERCIAL

## 2022-09-29 DIAGNOSIS — M25.551 HIP PAIN, RIGHT: Primary | ICD-10-CM

## 2022-09-29 DIAGNOSIS — Z98.890 STATUS POST HIP SURGERY: ICD-10-CM

## 2022-09-29 DIAGNOSIS — R26.9 ABNORMAL GAIT: ICD-10-CM

## 2022-09-29 PROCEDURE — 97110 THERAPEUTIC EXERCISES: CPT | Mod: GP | Performed by: PHYSICAL THERAPY ASSISTANT

## 2022-09-29 PROCEDURE — 97140 MANUAL THERAPY 1/> REGIONS: CPT | Mod: GP | Performed by: PHYSICAL THERAPY ASSISTANT

## 2022-10-03 ENCOUNTER — THERAPY VISIT (OUTPATIENT)
Dept: PHYSICAL THERAPY | Facility: CLINIC | Age: 57
End: 2022-10-03
Payer: COMMERCIAL

## 2022-10-03 DIAGNOSIS — R26.9 ABNORMAL GAIT: ICD-10-CM

## 2022-10-03 DIAGNOSIS — Z98.890 STATUS POST HIP SURGERY: ICD-10-CM

## 2022-10-03 DIAGNOSIS — M25.551 HIP PAIN, RIGHT: Primary | ICD-10-CM

## 2022-10-03 PROCEDURE — 97530 THERAPEUTIC ACTIVITIES: CPT | Mod: GP | Performed by: PHYSICAL THERAPIST

## 2022-10-03 PROCEDURE — 97110 THERAPEUTIC EXERCISES: CPT | Mod: GP | Performed by: PHYSICAL THERAPIST

## 2022-10-04 DIAGNOSIS — R12 HEARTBURN SYMPTOM: ICD-10-CM

## 2022-10-06 RX ORDER — FAMOTIDINE 20 MG/1
20 TABLET, FILM COATED ORAL 2 TIMES DAILY PRN
Qty: 60 TABLET | Refills: 11 | Status: SHIPPED | OUTPATIENT
Start: 2022-10-06 | End: 2023-11-20

## 2022-10-06 NOTE — TELEPHONE ENCOUNTER
"Pending Prescriptions:                       Disp   Refills    famotidine (PEPCID) 20 MG tablet [Pharmacy*180 ta*0        Sig: TAKE ONE TABLET BY MOUTH TWICE A DAY    Routing refill request to provider for review/approval because:  A break in medication  Requested Prescriptions   Pending Prescriptions Disp Refills    famotidine (PEPCID) 20 MG tablet [Pharmacy Med Name: FAMOTIDINE 20MG TABS] 180 tablet 0     Sig: TAKE ONE TABLET BY MOUTH TWICE A DAY        H2 Blockers Protocol Passed - 10/4/2022  4:31 PM        Passed - Patient is age 12 or older        Passed - Recent (12 mo) or future (30 days) visit within the authorizing provider's specialty     Patient has had an office visit with the authorizing provider or a provider within the authorizing providers department within the previous 12 mos or has a future within next 30 days. See \"Patient Info\" tab in inbasket, or \"Choose Columns\" in Meds & Orders section of the refill encounter.              Passed - Medication is active on med list                    "

## 2022-10-09 ENCOUNTER — HEALTH MAINTENANCE LETTER (OUTPATIENT)
Age: 57
End: 2022-10-09

## 2022-10-11 ENCOUNTER — THERAPY VISIT (OUTPATIENT)
Dept: PHYSICAL THERAPY | Facility: CLINIC | Age: 57
End: 2022-10-11
Payer: COMMERCIAL

## 2022-10-11 DIAGNOSIS — Z98.890 STATUS POST HIP SURGERY: ICD-10-CM

## 2022-10-11 DIAGNOSIS — R26.9 ABNORMAL GAIT: ICD-10-CM

## 2022-10-11 DIAGNOSIS — M25.551 HIP PAIN, RIGHT: Primary | ICD-10-CM

## 2022-10-11 PROCEDURE — 97110 THERAPEUTIC EXERCISES: CPT | Mod: GP | Performed by: PHYSICAL THERAPIST

## 2022-10-11 PROCEDURE — 97530 THERAPEUTIC ACTIVITIES: CPT | Mod: GP | Performed by: PHYSICAL THERAPIST

## 2022-10-11 ASSESSMENT — ACTIVITIES OF DAILY LIVING (ADL)
STANDING_FOR_15_MINUTES: SLIGHT DIFFICULTY
GETTING_INTO_AND_OUT_OF_AN_AVERAGE_CAR: SLIGHT DIFFICULTY
LIGHT_TO_MODERATE_WORK: SLIGHT DIFFICULTY
WALKING_INITIALLY: NO DIFFICULTY AT ALL
GOING_DOWN_1_FLIGHT_OF_STAIRS: SLIGHT DIFFICULTY
RECREATIONAL_ACTIVITIES: EXTREME DIFFICULTY
GOING_UP_1_FLIGHT_OF_STAIRS: SLIGHT DIFFICULTY
HOS_ADL_SCORE(%): 67.65
HOS_ADL_COUNT: 17
DEEP_SQUATTING: EXTREME DIFFICULTY
SITTING_FOR_15_MINUTES: NO DIFFICULTY AT ALL
WALKING_DOWN_STEEP_HILLS: MODERATE DIFFICULTY
WALKING_UP_STEEP_HILLS: SLIGHT DIFFICULTY
TWISTING/PIVOTING_ON_INVOLVED_LEG: MODERATE DIFFICULTY
STEPPING_UP_AND_DOWN_CURBS: SLIGHT DIFFICULTY
WALKING_APPROXIMATELY_10_MINUTES: NO DIFFICULTY AT ALL
HEAVY_WORK: MODERATE DIFFICULTY
WALKING_15_MINUTES_OR_GREATER: NO DIFFICULTY AT ALL
HOS_ADL_ITEM_SCORE_TOTAL: 46
PUTTING_ON_SOCKS_AND_SHOES: MODERATE DIFFICULTY
HOS_ADL_HIGHEST_POTENTIAL_SCORE: 68
GETTING_INTO_AND_OUT_OF_A_BATHTUB: MODERATE DIFFICULTY
ROLLING_OVER_IN_BED: SLIGHT DIFFICULTY

## 2022-10-11 NOTE — PROGRESS NOTES
"Subjective:  The history is provided by the patient. No  was used.     Physical Exam                    Objective:  System    Physical Exam    General     ROS    Assessment/Plan:    PROGRESS  REPORT    Progress reporting period is from 8/5/2022 to 10/11/22.       SUBJECTIVE  Subjective: Pain in the knee is getting better, stairs are easier. She is wearing her gel bandages and reports that her scar is flattening out.    Current Pain level: 0/10.      Initial Pain level: 9/10.   Changes in function:  Yes (See Goal flowsheet attached for changes in current functional level)  Adverse reaction to treatment or activity: None, activity - Stairs, activities at work     OBJECTIVE  Changes noted in objective findings:  Yes,   Objective: Great eccentric control at 7\" step, w/ 8\" step control decreases with the last several half inch.     ASSESSMENT/PLAN  Updated problem list and treatment plan: Diagnosis 1:  S/P R. illiopsoas schanoma excision  Pain -  manual therapy, splint/taping/bracing/orthotics, self management, education, directional preference exercise and home program  Decreased ROM/flexibility - manual therapy, therapeutic exercise, therapeutic activity and home program  Decreased strength - therapeutic exercise, therapeutic activities and home program  Impaired muscle performance - neuro re-education and home program  STG/LTGs have been met or progress has been made towards goals:  Yes (See Goal flow sheet completed today.)  Assessment of Progress: The patient's condition is improving.  Self Management Plans:  Patient has been instructed in a home treatment program.  I have re-evaluated this patient and find that the nature, scope, duration and intensity of the therapy is appropriate for the medical condition of the patient.  Ophelia continues to require the following intervention to meet STG and LTG's:  PT    Recommendations:  This patient would benefit from continued therapy.     Frequency:  1 X " week, once daily  Duration:  for 10 weeks        Please refer to the daily flowsheet for treatment today, total treatment time and time spent performing 1:1 timed codes.    Wilfred Ribeiro, SPT Marc Feliciano, PT, OCS

## 2022-10-13 ENCOUNTER — OFFICE VISIT (OUTPATIENT)
Dept: ORTHOPEDICS | Facility: CLINIC | Age: 57
End: 2022-10-13
Payer: COMMERCIAL

## 2022-10-13 ENCOUNTER — ANCILLARY PROCEDURE (OUTPATIENT)
Dept: MRI IMAGING | Facility: CLINIC | Age: 57
End: 2022-10-13
Attending: ORTHOPAEDIC SURGERY
Payer: COMMERCIAL

## 2022-10-13 DIAGNOSIS — D36.10: Primary | ICD-10-CM

## 2022-10-13 DIAGNOSIS — D36.10: ICD-10-CM

## 2022-10-13 PROCEDURE — 72197 MRI PELVIS W/O & W/DYE: CPT | Performed by: RADIOLOGY

## 2022-10-13 PROCEDURE — 99213 OFFICE O/P EST LOW 20 MIN: CPT | Performed by: ORTHOPAEDIC SURGERY

## 2022-10-13 PROCEDURE — A9585 GADOBUTROL INJECTION: HCPCS | Performed by: RADIOLOGY

## 2022-10-13 RX ORDER — TRAMADOL HYDROCHLORIDE 50 MG/1
50 TABLET ORAL
Qty: 10 TABLET | Refills: 1 | Status: SHIPPED | OUTPATIENT
Start: 2022-10-13 | End: 2022-10-23

## 2022-10-13 RX ORDER — GADOBUTROL 604.72 MG/ML
7.5 INJECTION INTRAVENOUS ONCE
Status: COMPLETED | OUTPATIENT
Start: 2022-10-13 | End: 2022-10-13

## 2022-10-13 RX ORDER — TRIAMCINOLONE ACETONIDE 0.1 %
PASTE (GRAM) DENTAL
COMMUNITY
Start: 2022-10-06

## 2022-10-13 RX ADMIN — GADOBUTROL 7.5 ML: 604.72 INJECTION INTRAVENOUS at 12:21

## 2022-10-13 NOTE — NURSING NOTE
Chief Complaint   Patient presents with     RECHECK     6 month follow up. Right thigh wound closure. DOS: 06/17/2022. MRI today.       57 year old  1965    LMP 06/16/2007 (Approximate)     Past Surgical History:   Procedure Laterality Date     ARTHROTOMY SHOULDER, ROTATOR CUFF REPAIR, COMBINED Right 3/12/2021    Procedure: ARTHROTOMY, SHOULDER (anatoly marmolejo), WITH ROTATOR CUFF REPAIR open;  Surgeon: César Talavera MD;  Location: [facility] OR     CLOSE SECONDARY WOUND LOWER EXTREMITY Right 6/17/2022    Procedure: Delayed primary closure Right thigh wound;  Surgeon: Richard Katz MD;  Location: [facility] OR     COLONOSCOPY  04/14/10     EXCISE MASS GROIN Right 4/19/2022    Procedure: Excision of Large Schwannoma of Right Retroperitoneum and Groin;  Surgeon: Richard Katz MD;  Location: [facility] OR     IRRIGATION AND DEBRIDEMENT GROIN Right 5/20/2022    Procedure: Irrigation and debridement Right thigh and abdomen;  Surgeon: Richard Katz MD;  [facility] OR     REPAIR SCIATIC NERVE Right 4/19/2022    Procedure: Decompression Neuroplasty of Right Femoral Nerve;  Surgeon: Richard Katz MD;  [facility] OR     VASCULAR REPAIR LOWER EXTREMITY N/A 4/19/2022    Procedure: Vascular Dissection of Iliac and Common Femoral Artery and Vein;  Surgeon: Richard Katz MD;  Location: [facility] OR     ZZC LIGATE FALLOPIAN TUBE,POSTPARTUM  1990    Tubal Ligation     ZZC NONSPECIFIC PROCEDURE  1978    jaw surgery     ZZC SUPRACERV ABD HYSTERECTOMY  06/28/2007              Pain Assessment  Patient Currently in Pain: Sky             [facility] [place], [place] - [id number] - [place], [place] - [address]   [id number] [place] [facility] [address] [id number] [place] [facility] [address] [id number] [place], [place] - [address]  [facility]        Allergies   Allergen Reactions      Amoxicillin Difficulty breathing     Anesthetic Ether      pseudocholinesterase deficiency           Current Outpatient Medications   Medication     Ascorbic Acid (VITAMIN C PO)     Calcium Carbonate-Vitamin D (CALCIUM + D PO)     famotidine (PEPCID) 20 MG tablet     HYDROcodone-acetaminophen (NORCO) 5-325 MG tablet     multivitamin, therapeutic (THERA-VIT) TABS tablet     ondansetron (ZOFRAN ODT) 4 MG ODT tab     SUMAtriptan (IMITREX) 100 MG tablet     VITAMIN D PO     acetaminophen (TYLENOL) 325 MG tablet     oxyCODONE (ROXICODONE) 5 MG tablet     triamcinolone (KENALOG) 0.1 % paste     No current facility-administered medications for this visit.             Questionnaires:    Promis 10 Assessment    PROMIS 10 10/13/2022   In general, would you say your health is: Good   In general, would you say your quality of life is: Very good   In general, how would you rate your physical health? Good   In general, how would you rate your mental health, including your mood and your ability to think? Very good   In general, how would you rate your satisfaction with your social activities and relationships? Very good   In general, please rate how well you carry out your usual social activities and roles Good   To what extent are you able to carry out your everyday physical activities such as walking, climbing stairs, carrying groceries, or moving a chair? A little   How often have you been bothered by emotional problems such as feeling anxious, depressed or irritable? Rarely   How would you rate your fatigue on average? Moderate   How would you rate your pain on average?   0 = No Pain  to  10 = Worst Imaginable Pain 3   In general, would you say your health is: 3   In general, would you say your quality of life is: 4   In general, how would you rate your physical health? 3   In general, how would you rate your mental health, including your mood and your ability to think? 4   In general, how would you rate your satisfaction with  your social activities and relationships? 4   In general, please rate how well you carry out your usual social activities and roles. (This includes activities at home, at work and in your community, and responsibilities as a parent, child, spouse, employee, friend, etc.) 3   To what extent are you able to carry out your everyday physical activities such as walking, climbing stairs, carrying groceries, or moving a chair? 2   In the past 7 days, how often have you been bothered by emotional problems such as feeling anxious, depressed, or irritable? 2   In the past 7 days, how would you rate your fatigue on average? 3   In the past 7 days, how would you rate your pain on average, where 0 means no pain, and 10 means worst imaginable pain? 3   Global Mental Health Score 16   Global Physical Health Score 12   PROMIS TOTAL - SUBSCORES 28   Some recent data might be hidden

## 2022-10-13 NOTE — DISCHARGE INSTRUCTIONS
MRI Contrast Discharge Instructions    The IV contrast you received today will pass out of your body in your  urine. This will happen in the next 24 hours. You will not feel this process.  Your urine will not change color.    Drink at least 4 extra glasses of water or juice today (unless your doctor  has restricted your fluids). This reduces the stress on your kidneys.  You may take your regular medicines.    If you are on dialysis: It is best to have dialysis today.    If you have a reaction: Most reactions happen right away. If you have  any new symptoms after leaving the hospital (such as hives or swelling),  call your hospital at the correct number below. Or call your family doctor.  If you have breathing distress or wheezing, call 911.    Special instructions: ***    I have read and understand the above information.    Signature:______________________________________ Date:___________    Staff:__________________________________________ Date:___________     Time:__________    [facility] Departments:    ___[place]: [phone]  ___[facility]: [phone]  ___[place]: [phone] ___[facility]: [phone]  ___[facility]: [phone]  ___[facility]: [phone]  ___[phone]  ___[facility]: [phone]  ___[phone]

## 2022-10-13 NOTE — PROGRESS NOTES
[facility], Orthopaedic Oncology Surgery Consultation  by Richard Katz M.D.     Ophelia Wolf MRN# [id number]     YOB: 1965      Requesting physician: Susannah Hou      Background history:  DX:  1. Rotator cuff degeneration.  Right shoulder  2. 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 R femoral neuropraxia     TREATMENTS:  1. 3/12/2021 Right rotator cuff repair, [facility]  2. Partial Hysterectomy  3. 3/7/2022, Core needle biopsy , US guided, ([facility]  4. 4/19/2022, excision of 12cm schwannoma from right retroperitoneum and anterior groin.  Right femoral nerve neuroplasty and common femoral artery dissection.  ([facility]  5. 5/20/2022, irrigation and debridement of right abdomen and thigh dehisced wound with wound VAC application, Dr. Katz   6. 6/17/2022, delayed primary wound closure of right thigh ([facility])     Plan:  1. Return at 1 yr post op for surveillance check and MRI of R abdomen and thigh.  2. Continue with exercises   3. Tramadol 50 mg qpm PRN , refell x 1.  4. Continue lymphedema stocking.    Richard Katz MD  [facility] Family Professor  Oncology and Adult Reconstructive Surgery  Dept Orthopaedic Surgery, [facility]  [phone] office, [phone] pager  [email or web address]    Total combined visit time and work time before and after clinic visit = 20 min

## 2022-10-13 NOTE — LETTER
10/13/2022         RE: Ophelia Wolf  [address]  [place] [address]        Dear Colleague,    Thank you for referring your patient, Ophelia Wolf, to the [facility] [place]. Please see a copy of my visit note below.       [facility], Orthopaedic Oncology Surgery Consultation  by Richard Katz M.D.     Ophelia Wolf MRN# [id number]     YOB: 1965      Requesting physician: Susannah Hou      Background history:  DX:  1. Rotator cuff degeneration.  Right shoulder  2. 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 R femoral neuropraxia     TREATMENTS:  1. 3/12/2021 Right rotator cuff repair, [facility]  2. Partial Hysterectomy  3. 3/7/2022, Core needle biopsy , US guided, ([facility]  4. 4/19/2022, excision of 12cm schwannoma from right retroperitoneum and anterior groin.  Right femoral nerve neuroplasty and common femoral artery dissection.  [facility]  5. 5/20/2022, irrigation and debridement of right abdomen and thigh dehisced wound with wound VAC application, Dr. Katz   6. 6/17/2022, delayed primary wound closure of right thigh ([facility])     Plan:  1. Return at 1 yr post op for surveillance check and MRI of R abdomen and thigh.  2. Continue with exercises   3. Tramadol 50 mg qpm PRN , refell x 1.  4. Continue lymphedema stocking.    MD Ayan Lewis Family Professor  Oncology and Adult Reconstructive Surgery  Dept Orthopaedic Surgery, [facility]  [phone] office, [phone] pager  [email or web address]    Total combined visit time and work time before and after clinic visit = 20 min

## 2022-10-19 ENCOUNTER — THERAPY VISIT (OUTPATIENT)
Dept: PHYSICAL THERAPY | Facility: CLINIC | Age: 57
End: 2022-10-19
Payer: COMMERCIAL

## 2022-10-19 DIAGNOSIS — Z98.890 STATUS POST HIP SURGERY: ICD-10-CM

## 2022-10-19 DIAGNOSIS — M25.551 HIP PAIN, RIGHT: Primary | ICD-10-CM

## 2022-10-19 DIAGNOSIS — R26.9 ABNORMAL GAIT: ICD-10-CM

## 2022-10-19 PROCEDURE — 97110 THERAPEUTIC EXERCISES: CPT | Mod: GP | Performed by: PHYSICAL THERAPY ASSISTANT

## 2022-10-19 PROCEDURE — 97140 MANUAL THERAPY 1/> REGIONS: CPT | Mod: GP | Performed by: PHYSICAL THERAPY ASSISTANT

## 2022-10-24 ENCOUNTER — THERAPY VISIT (OUTPATIENT)
Dept: PHYSICAL THERAPY | Facility: CLINIC | Age: 57
End: 2022-10-24
Payer: COMMERCIAL

## 2022-10-24 DIAGNOSIS — M25.551 HIP PAIN, RIGHT: Primary | ICD-10-CM

## 2022-10-24 DIAGNOSIS — R26.9 ABNORMAL GAIT: ICD-10-CM

## 2022-10-24 DIAGNOSIS — Z98.890 STATUS POST HIP SURGERY: ICD-10-CM

## 2022-10-24 PROCEDURE — 97110 THERAPEUTIC EXERCISES: CPT | Mod: GP | Performed by: PHYSICAL THERAPIST

## 2022-10-24 PROCEDURE — 97140 MANUAL THERAPY 1/> REGIONS: CPT | Mod: GP | Performed by: PHYSICAL THERAPIST

## 2022-11-01 ENCOUNTER — THERAPY VISIT (OUTPATIENT)
Dept: PHYSICAL THERAPY | Facility: CLINIC | Age: 57
End: 2022-11-01
Payer: COMMERCIAL

## 2022-11-01 DIAGNOSIS — M25.551 HIP PAIN, RIGHT: Primary | ICD-10-CM

## 2022-11-01 DIAGNOSIS — Z98.890 STATUS POST HIP SURGERY: ICD-10-CM

## 2022-11-01 DIAGNOSIS — R26.9 ABNORMAL GAIT: ICD-10-CM

## 2022-11-01 PROCEDURE — 97140 MANUAL THERAPY 1/> REGIONS: CPT | Mod: GP | Performed by: PHYSICAL THERAPIST

## 2022-11-01 PROCEDURE — 97530 THERAPEUTIC ACTIVITIES: CPT | Mod: GP | Performed by: PHYSICAL THERAPIST

## 2022-11-01 PROCEDURE — 97110 THERAPEUTIC EXERCISES: CPT | Mod: GP | Performed by: PHYSICAL THERAPIST

## 2022-11-08 ENCOUNTER — THERAPY VISIT (OUTPATIENT)
Dept: PHYSICAL THERAPY | Facility: CLINIC | Age: 57
End: 2022-11-08
Payer: COMMERCIAL

## 2022-11-08 DIAGNOSIS — M25.551 HIP PAIN, RIGHT: Primary | ICD-10-CM

## 2022-11-08 DIAGNOSIS — R26.9 ABNORMAL GAIT: ICD-10-CM

## 2022-11-08 DIAGNOSIS — Z98.890 STATUS POST HIP SURGERY: ICD-10-CM

## 2022-11-08 PROCEDURE — 97110 THERAPEUTIC EXERCISES: CPT | Mod: GP | Performed by: PHYSICAL THERAPIST

## 2022-11-08 PROCEDURE — 97140 MANUAL THERAPY 1/> REGIONS: CPT | Mod: GP | Performed by: PHYSICAL THERAPIST

## 2022-11-08 PROCEDURE — 97530 THERAPEUTIC ACTIVITIES: CPT | Mod: GP | Performed by: PHYSICAL THERAPIST

## 2022-11-15 ENCOUNTER — THERAPY VISIT (OUTPATIENT)
Dept: PHYSICAL THERAPY | Facility: CLINIC | Age: 57
End: 2022-11-15
Payer: COMMERCIAL

## 2022-11-15 DIAGNOSIS — Z98.890 STATUS POST HIP SURGERY: ICD-10-CM

## 2022-11-15 DIAGNOSIS — G43.109 MIGRAINE WITH AURA AND WITHOUT STATUS MIGRAINOSUS, NOT INTRACTABLE: ICD-10-CM

## 2022-11-15 DIAGNOSIS — R26.9 ABNORMAL GAIT: ICD-10-CM

## 2022-11-15 DIAGNOSIS — M25.551 HIP PAIN, RIGHT: Primary | ICD-10-CM

## 2022-11-15 PROCEDURE — 97140 MANUAL THERAPY 1/> REGIONS: CPT | Mod: GP | Performed by: PHYSICAL THERAPIST

## 2022-11-15 PROCEDURE — 97110 THERAPEUTIC EXERCISES: CPT | Mod: GP | Performed by: PHYSICAL THERAPIST

## 2022-11-15 PROCEDURE — 97530 THERAPEUTIC ACTIVITIES: CPT | Mod: GP | Performed by: PHYSICAL THERAPIST

## 2022-11-15 RX ORDER — HYDROCODONE BITARTRATE AND ACETAMINOPHEN 5; 325 MG/1; MG/1
TABLET ORAL
Qty: 6 TABLET | Refills: 0 | Status: SHIPPED | OUTPATIENT
Start: 2022-11-15 | End: 2023-02-01

## 2022-12-20 ENCOUNTER — OFFICE VISIT (OUTPATIENT)
Dept: FAMILY MEDICINE | Facility: CLINIC | Age: 57
End: 2022-12-20
Payer: COMMERCIAL

## 2022-12-20 VITALS
BODY MASS INDEX: 27.83 KG/M2 | HEART RATE: 87 BPM | WEIGHT: 163 LBS | DIASTOLIC BLOOD PRESSURE: 72 MMHG | OXYGEN SATURATION: 98 % | SYSTOLIC BLOOD PRESSURE: 104 MMHG | TEMPERATURE: 97.7 F | RESPIRATION RATE: 16 BRPM | HEIGHT: 64 IN

## 2022-12-20 DIAGNOSIS — R12 HEARTBURN SYMPTOM: ICD-10-CM

## 2022-12-20 DIAGNOSIS — Z12.4 CERVICAL CANCER SCREENING: ICD-10-CM

## 2022-12-20 DIAGNOSIS — Z12.31 ENCOUNTER FOR SCREENING MAMMOGRAM FOR BREAST CANCER: ICD-10-CM

## 2022-12-20 DIAGNOSIS — Z12.31 VISIT FOR SCREENING MAMMOGRAM: ICD-10-CM

## 2022-12-20 DIAGNOSIS — G43.109 MIGRAINE WITH AURA AND WITHOUT STATUS MIGRAINOSUS, NOT INTRACTABLE: Primary | ICD-10-CM

## 2022-12-20 DIAGNOSIS — Z12.11 SCREEN FOR COLON CANCER: ICD-10-CM

## 2022-12-20 PROCEDURE — 99214 OFFICE O/P EST MOD 30 MIN: CPT | Performed by: PHYSICIAN ASSISTANT

## 2022-12-20 RX ORDER — HYDROCODONE BITARTRATE AND ACETAMINOPHEN 5; 325 MG/1; MG/1
TABLET ORAL
Qty: 6 TABLET | Refills: 0 | Status: CANCELLED | OUTPATIENT
Start: 2022-12-20

## 2022-12-20 RX ORDER — SUMATRIPTAN 100 MG/1
TABLET, FILM COATED ORAL
Qty: 18 TABLET | Status: SHIPPED | OUTPATIENT
Start: 2022-12-20 | End: 2023-01-16

## 2022-12-20 ASSESSMENT — ANXIETY QUESTIONNAIRES
8. IF YOU CHECKED OFF ANY PROBLEMS, HOW DIFFICULT HAVE THESE MADE IT FOR YOU TO DO YOUR WORK, TAKE CARE OF THINGS AT HOME, OR GET ALONG WITH OTHER PEOPLE?: NOT DIFFICULT AT ALL
3. WORRYING TOO MUCH ABOUT DIFFERENT THINGS: NOT AT ALL
1. FEELING NERVOUS, ANXIOUS, OR ON EDGE: NOT AT ALL
GAD7 TOTAL SCORE: 0
6. BECOMING EASILY ANNOYED OR IRRITABLE: NOT AT ALL
GAD7 TOTAL SCORE: 0
5. BEING SO RESTLESS THAT IT IS HARD TO SIT STILL: NOT AT ALL
2. NOT BEING ABLE TO STOP OR CONTROL WORRYING: NOT AT ALL
GAD7 TOTAL SCORE: 0
7. FEELING AFRAID AS IF SOMETHING AWFUL MIGHT HAPPEN: NOT AT ALL
IF YOU CHECKED OFF ANY PROBLEMS ON THIS QUESTIONNAIRE, HOW DIFFICULT HAVE THESE PROBLEMS MADE IT FOR YOU TO DO YOUR WORK, TAKE CARE OF THINGS AT HOME, OR GET ALONG WITH OTHER PEOPLE: NOT DIFFICULT AT ALL
4. TROUBLE RELAXING: NOT AT ALL
7. FEELING AFRAID AS IF SOMETHING AWFUL MIGHT HAPPEN: NOT AT ALL

## 2022-12-20 ASSESSMENT — PAIN SCALES - GENERAL: PAINLEVEL: NO PAIN (0)

## 2022-12-20 NOTE — LETTER
Opioid / Opioid Plus Controlled Substance Agreement    This is an agreement between you and your provider about the safe and appropriate use of controlled substance/opioids prescribed by your care team. Controlled substances are medicines that can cause physical and mental dependence (abuse).    There are strict laws about having and using these medicines. We here at [facility] are committing to working with you in your efforts to get better. To support you in this work, we ll help you schedule regular office appointments for medicine refills. If we must cancel or change your appointment for any reason, we ll make sure you have enough medicine to last until your next appointment.     As a Provider, I will:    Listen carefully to your concerns and treat you with respect.     Recommend a treatment plan that I believe is in your best interest. This plan may involve therapies other than opioid pain medication.     Talk with you often about the possible benefits, and the risk of harm of any medicine that we prescribe for you.     Provide a plan on how to taper (discontinue or go off) using this medicine if the decision is made to stop its use.    As a Patient, I understand that opioid(s):     Are a controlled substance prescribed by my care team to help me function or work and manage my condition(s).     Are strong medicines and can cause serious side effects such as:    Drowsiness, which can seriously affect my driving ability    A lower breathing rate, enough to cause death    Harm to my thinking ability     Depression     Abuse of and addiction to this medicine    Need to be taken exactly as prescribed. Combining opioids with certain medicines or chemicals (such as illegal drugs, sedatives, sleeping pills, and benzodiazepines) can be dangerous or even fatal. If I stop opioids suddenly, I may have severe withdrawal symptoms.    Do not work for all types of pain nor for all patients. If they re not helpful, I may  be asked to stop them.        The risks, benefits and side effects of these medicine(s) were explained to me. I agree that:  1. I will take part in other treatments as advised by my care team. This may be psychiatry or counseling, physical therapy, behavioral therapy, group treatment or a referral to a specialist.     2. I will keep all my appointments. I understand that this is part of the monitoring of opioids. My care team may require an office visit for EVERY opioid/controlled substance refill. If I miss appointments or don t follow instructions, my care team may stop my medicine.    3. I will take my medicines as prescribed. I will not change the dose or schedule unless my care team tells me to. There will be no refills if I run out early.     4. I may be asked to come to the clinic and complete a urine drug test or complete a pill count at any time. If I don t give a urine sample or participate in a pill count, the care team may stop my medicine.    5. I will only receive prescriptions from this clinic for chronic pain. If I am treated by another provider for acute pain issues, I will tell them that I am taking opioid pain medication for chronic pain and that I have a treatment agreement with this provider. I will inform my [facility] care team within one business day if I am given a prescription for any pain medication by another healthcare provider. My [facility] care team can contact other providers and pharmacists about my use of any medicines.    6. It is up to me to make sure that I don t run out of my medicines on weekends or holidays. If my care team is willing to refill my opioid prescription without a visit, I must request refills only during office hours. Refills may take up to 3 business days to process. I will use one pharmacy to fill all my opioid and other controlled substance prescriptions. I will notify the clinic about any changes to my insurance or medication  availability.    7. I am responsible for my prescriptions. If the medicine/prescription is lost, stolen or destroyed, it will not be replaced. I also agree not to share controlled substance medicines with anyone.    8. I am aware I should not use any illegal or recreational drugs. I agree not to drink alcohol unless my care team says I can.       9. If I enroll in the [place] Medical Cannabis program, I will tell my care team prior to my next refill.     10. I will tell my care team right away if I become pregnant, have a new medical problem treated outside of my regular clinic, or have a change in my medications.    11. I understand that this medicine can affect my thinking, judgment and reaction time. Alcohol and drugs affect the brain and body, which can affect the safety of my driving. Being under the influence of alcohol or drugs can affect my decision-making, behaviors, personal safety, and the safety of others. Driving while impaired (DWI) can occur if a person is driving, operating, or in physical control of a car, motorcycle, boat, snowmobile, ATV, motorbike, off-road vehicle, or any other motor vehicle ([place] Statute 169A.20). I understand the risk if I choose to drive or operate any vehicle or machinery.    I understand that if I do not follow any of the conditions above, my prescriptions or treatment may be stopped or changed.          Opioids  What You Need to Know    What are opioids?   Opioids are pain medicines that must be prescribed by a doctor. They are also known as narcotics.     Examples are:   1. morphine (MS Contin, Jamia)  2. oxycodone (Oxycontin)  3. oxycodone and acetaminophen (Percocet)  4. hydrocodone and acetaminophen (Vicodin, Norco)   5. fentanyl patch (Duragesic)   6. hydromorphone (Dilaudid)   7. methadone  8. codeine (Tylenol #3)     What do opioids do well?   Opioids are best for severe short-term pain such as after a surgery or injury. They may work well for cancer pain. They may  help some people with long-lasting (chronic) pain.     What do opioids NOT do well?   Opioids never get rid of pain entirely, and they don t work well for most patients with chronic pain. Opioids don t reduce swelling, one of the causes of pain.                                    Other ways to manage chronic pain and improve function include:       Treat the health problem that may be causing pain    Anti-inflammation medicines, which reduce swelling and tenderness, such as ibuprofen (Advil, Motrin) or naproxen (Aleve)    Acetaminophen (Tylenol)    Antidepressants and anti-seizure medicines, especially for nerve pain    Topical treatments such as patches or creams    Injections or nerve blocks    Chiropractic or osteopathic treatment    Acupuncture, massage, deep breathing, meditation, visual imagery, aromatherapy    Use heat or ice at the pain site    Physical therapy     Exercise    Stop smoking    Take part in therapy       Risks and side effects     Talk to your doctor before you start or decide to keep taking opioids. Possible side effects include:      Lowering your breathing rate enough to cause death    Overdose, including death, especially if taking higher than prescribed doses    Worse depression symptoms; less pleasure in things you usually enjoy    Feeling tired or sluggish    Slower thoughts or cloudy thinking    Being more sensitive to pain over time; pain is harder to control    Trouble sleeping or restless sleep    Changes in hormone levels (for example, less testosterone)    Changes in sex drive or ability to have sex    Constipation    Unsafe driving    Itching and sweating    Dizziness    Nausea, throwing up and dry mouth    What else should I know about opioids?    Opioids may lead to dependence, tolerance, or addiction.      Dependence means that if you stop or reduce the medicine too quickly, you will have withdrawal symptoms. These include loose poop (diarrhea), jitters, flu-like symptoms,  nervousness and tremors. Dependence is not the same as addiction.                       Tolerance means needing higher doses over time to get the same effect. This may increase the chance of serious side effects.      Addiction is when people improperly use a substance that harms their body, their mind or their relations with others. Use of opiates can cause a relapse of addiction if you have a history of drug or alcohol abuse.      People who have used opioids for a long time may have a lower quality of life, worse depression, higher levels of pain and more visits to doctors.    You can overdose on opioids. Take these steps to lower your risk of overdose:    1. Recognize the signs:  Signs of overdose include decrease or loss of consciousness (blackout), slowed breathing, trouble waking up and blue lips. If someone is worried about overdose, they should call 911.    2. Talk to your doctor about Narcan (naloxone).   If you are at risk for overdose, you may be given a prescription for Narcan. This medicine very quickly reverses the effects of opioids.   If you overdose, a friend or family member can give you Narcan while waiting for the ambulance. They need to know the signs of overdose and how to give Narcan.     3. Don't use alcohol or street drugs.   Taking them with opioids can cause death.    4. Do not take any of these medicines unless your doctor says it s OK. Taking these with opioids can cause death:    Benzodiazepines, such as lorazepam (Ativan), alprazolam (Xanax) or diazepam (Valium)    Muscle relaxers, such as cyclobenzaprine (Flexeril)    Sleeping pills like zolpidem (Ambien)     Other opioids      How to keep you and other people safe while taking opioids:    1. Never share your opioids with others.  Opioid medicines are regulated by the Drug Enforcement Agency (JH[employer]). Selling or sharing medications is a criminal act.    2. Be sure to store opioids in a secure place, locked up if possible. Young children  can easily swallow them and overdose.    3. When you are traveling with your medicines, keep them in the original bottles. If you use a pill box, be sure you also carry a copy of your medicine list from your clinic or pharmacy.    4. Safe disposal of opioids    Most pharmacies have places to get rid of medicine, called disposal kiosks. Medicine disposal options are also available in every [facility]. Search your county and  medication disposal  to find more options. You can find more details at:  [email or web address]     I agree that my provider, clinic care team, and pharmacy may work with any city, state or federal law enforcement agency that investigates the misuse, sale, or other diversion of my controlled medicine. I will allow my provider to discuss my care with, or share a copy of, this agreement with any other treating provider, pharmacy or emergency room where I receive care.    I have read this agreement and have asked questions about anything I did not understand.    _______________________________________________________  Patient Signature - Ophelia Wolf _____________________                   Date     _______________________________________________________  Provider Signature - Susannah Ray PA-C   _____________________                   Date     _______________________________________________________  Witness Signature (required if provider not present while patient signing)   _____________________                   Date

## 2022-12-20 NOTE — PROGRESS NOTES
"  Assessment & Plan   She has declined COVID and influenza vaccinations.  She will consider shingles vaccination at pharmacy  Migraine with aura and without status migrainosus, not intractable  Stable, continue current meds.  We have signed a controlled substance agreements.  She gets 6 Norco to treat only the worst headaches that do not resolve with sumatriptan, she gets 6 Norco every 3 months.  The purpose of the Norco is to keep her out of the emergency room.  She also has Zofran to use for nausea  - SUMAtriptan (IMITREX) 100 MG tablet; TAKE 1 TABLET BY MOUTH AT ONSET OF MIGRAINE Strength: 100 mg    Screen for colon cancer  To be called to schedule  - Colonoscopy Screening  Referral; Future    Cervical cancer screening  Patient was scheduled for face-to-face visit within 3 to 4 months for a physical    Visit for screening mammogram  To be called to schedule  - MA SCREENING DIGITAL BILAT - Future  (s+30); Future    Encounter for screening mammogram for breast cancer    - MA SCREENING DIGITAL BILAT - Future  (s+30); Future    Heartburn-as needed use of famotidine, she is doing dietary modifications which has helped dramatically.     BMI:   Estimated body mass index is 27.98 kg/m  as calculated from the following:    Height as of this encounter: 1.626 m (5' 4\").    Weight as of this encounter: 73.9 kg (163 lb).   Weight management plan: Discussed healthy diet and exercise guidelines        No follow-ups on file.   Follow-up Visit   Expected date:  Mar 20, 2023 (Approximate)      Follow Up Appointment Details:     Follow-up with whom?: Me    Follow-Up for what?: Adult Preventive    How?: In Person                    Susannah Ray PA-C  [facility] BEVERLY Jacinto is a 57 year old, presenting for the following health issues:  Migraine      Pain    History of Present Illness       Headaches:   Since the patient's last clinic visit, headaches are: no change  The patient is getting " "headaches:  Weekly  She is able to do normal daily activities when she has a migraine.  The patient is taking the following rescue/relief medications:  Naproxyn (Aleve), Tylenol and sumatriptan (Imitrex)   Patient states \"I get total relief\" from the rescue/relief medications.   The patient is taking the following medications to prevent migraines:  Other  In the past 4 weeks, the patient has gone to an Urgent Care or Emergency Room 0 times times due to headaches.    She eats 0-1 servings of fruits and vegetables daily.She consumes 0 sweetened beverage(s) daily.She exercises with enough effort to increase her heart rate 9 or less minutes per day.  She exercises with enough effort to increase her heart rate 3 or less days per week.   She is taking medications regularly.    Today's PHQ-9         PHQ-9 Total Score: 0    PHQ-9 Q9 Thoughts of better off dead/self-harm past 2 weeks :   Not at all    How difficult have these problems made it for you to do your work, take care of things at home, or get along with other people: Not difficult at all  Today's EDISON-7 Score: 0        She reports her migraine headaches are stable.  She is going to focus on improving her health this year.  She is going to start an exercise routine and get stronger.  Patient had a mass removed had a difficult, complicated recovery.  She is nearly done with physical therapy.  During the 1 year of recovery she has been fairly inactive.  She has been doing counseling both for herself and for her marriage.  She states she really feels that she is in a good place mentally and is focusing on taking care of herself.    Her heartburn has been well controlled, she uses famotidine infrequently.  She really finds that she just needs to avoid certain foods.  Long as she avoids the food she does well.    Review of Systems   Constitutional, HEENT, cardiovascular, pulmonary, gi and gu systems are negative, except as otherwise noted.      Objective    /72 (BP " "Location: Left arm, Patient Position: Chair, Cuff Size: Adult Regular)   Pulse 87   Temp 97.7  F (36.5  C) (Temporal)   Resp 16   Ht 1.626 m (5' 4\")   Wt 73.9 kg (163 lb)   LMP 06/16/2007 (Approximate)   SpO2 98%   Breastfeeding No   BMI 27.98 kg/m    Body mass index is 27.98 kg/m .  Physical Exam   GENERAL: healthy, alert and no distress  EYES: Eyes grossly normal to inspection, PERRL and conjunctivae and sclerae normal  NECK: no adenopathy, no asymmetry, masses, or scars and thyroid normal to palpation  RESP: lungs clear to auscultation - no rales, rhonchi or wheezes  CV: regular rate and rhythm, normal S1 S2, no S3 or S4, no murmur, click or rub, no peripheral edema and peripheral pulses strong  ABDOMEN: soft, nontender, no hepatosplenomegaly, no masses and bowel sounds normal  MS: no gross musculoskeletal defects noted, no edema  NEURO: Normal strength and tone, mentation intact and speech normal  PSYCH: mentation appears normal, affect normal/bright    Admission on 06/17/2022, Discharged on 06/17/2022   Component Date Value Ref Range Status     GLUCOSE BY METER POCT 06/17/2022 103 (H)  70 - 99 mg/dL Final     CBC and CMP are up-to-date as of June "

## 2022-12-21 ENCOUNTER — HOSPITAL ENCOUNTER (OUTPATIENT)
Facility: CLINIC | Age: 57
End: 2022-12-21
Attending: SPECIALIST | Admitting: SPECIALIST
Payer: COMMERCIAL

## 2022-12-21 ENCOUNTER — TELEPHONE (OUTPATIENT)
Dept: GASTROENTEROLOGY | Facility: CLINIC | Age: 57
End: 2022-12-21

## 2022-12-21 NOTE — TELEPHONE ENCOUNTER
Screening Questions  BLUE  KIND OF PREP RED  LOCATION [review exclusion criteria] GREEN  SEDATION TYPE        Y Are you active on mychart?       Susannah Ray PA-C   Ordering/Referring Provider?        HP What type of coverage do you have?      N Have you had a positive covid test in the last 14 days?     27.9 1. BMI  [BMI 40+ - review exclusion criteria]    Y  2. Are you able to give consent for your medical care? [IF NO,RN REVIEW]        Y  3. Are you taking any prescription pain medications on a routine schedule?       HYDROCODONE 3a. EXTENDED PREP What kind of prescription?     N 4. Do you have any chemical dependencies such as alcohol, street drugs, or methadone?    N 5. Do you have any history of post-traumatic stress syndrome, severe anxiety or history of psychosis?      **If yes 3- 5 , please schedule with MAC sedation.**          IF YES TO ANY 6 - 10 - HOSPITAL SETTING ONLY.     N 6.   Do you need assistance transferring?     N 7.   Have you had a heart or lung transplant?    N 8.   Are you currently on dialysis?   N 9.   Do you use daily home oxygen?   N 10. Do you take nitroglycerin?   10a.  If yes, how often?     11. [FEMALES]  N Are you currently pregnant?    11a.  If yes, how many weeks? [ Greater than 12 weeks, OR NEEDED]    N 12. Do you have Pulmonary Hypertension? *NEED PAC APPT AT [facility]*     N 13. [review exclusion criteria]  Do you have any implantable devices in your body (pacemaker, defib, LVAD)?    N 14. In the past 6 months, have you had any heart related issues including cardiomyopathy or heart attack?     14a.  If yes, did it require cardiac stenting if so when?     N 15. Have you had a stroke or Transient ischemic attack (TIA - aka  mini stroke ) within 6 months?      N 16. Do you have mod to severe Obstructive Sleep Apnea?  [Hospital only - Ok at [facility]]    N 17. Do you have SEVERE AND UNCONTROLLED asthma? *NEED PAC APPT AT [facility]*     N 18. Are you currently taking any blood  "thinners?     18a. If yes, inform patient to \"follow up w/ ordering provider for bridging instructions.\"    N 19. Do you take the medication Phentermine?    19a. If yes, \"Hold for 7 days before procedure.  Please consult your prescribing provider if you have questions about holding this medication.\"     N  20. Do you have chronic kidney disease?      N  21. Do you have a diagnosis of diabetes?     N  22. On a regular basis do you go 3-5 days between bowel movements?      23. Preferred LOCAL Pharmacy for Pre Prescription    [ LIST ONLY ONE PHARMACY]        [id number] - [place], [place] - [address] [place] [address] [address]      - CLOSING REMINDERS -    Informed patient they will need an adult    Cannot take any type of public or medical transportation alone    Conscious Sedation- Needs  for 6 hours after the procedure       MAC/General-Needs  for 24 hours after procedure    Pre-Procedure Covid test to be completed [[facility] PCR Testing Required]    Confirmed Nurse will call to complete assessment       - SCHEDULING DETAILS -  NO Hospital Setting Required? If yes, what is the exclusion?:    MARIA FERNANDA  Surgeon    3/3  Date of Procedure  Lower Endoscopy [Colonoscopy]  Type of Procedure Scheduled  [facility] If you answer yes to questions #1, #3, #22 ([place] and CF pts) PAIN MEDS - Which Colonoscopy Prep was Sent?    MAC, PAIN MEDS Sedation Type     N PAC / Pre-op Required                 "

## 2022-12-29 ENCOUNTER — THERAPY VISIT (OUTPATIENT)
Dept: PHYSICAL THERAPY | Facility: CLINIC | Age: 57
End: 2022-12-29
Payer: COMMERCIAL

## 2022-12-29 DIAGNOSIS — R26.9 ABNORMAL GAIT: ICD-10-CM

## 2022-12-29 DIAGNOSIS — Z98.890 STATUS POST HIP SURGERY: ICD-10-CM

## 2022-12-29 DIAGNOSIS — M25.551 HIP PAIN, RIGHT: Primary | ICD-10-CM

## 2022-12-29 PROCEDURE — 97110 THERAPEUTIC EXERCISES: CPT | Mod: 59 | Performed by: PHYSICAL THERAPIST

## 2022-12-29 PROCEDURE — 97140 MANUAL THERAPY 1/> REGIONS: CPT | Mod: 59 | Performed by: PHYSICAL THERAPIST

## 2022-12-29 PROCEDURE — 97530 THERAPEUTIC ACTIVITIES: CPT | Mod: GP | Performed by: PHYSICAL THERAPIST

## 2022-12-29 ASSESSMENT — ACTIVITIES OF DAILY LIVING (ADL)
WALKING_DOWN_STEEP_HILLS: SLIGHT DIFFICULTY
HOS_ADL_SCORE(%): 73.53
STANDING_FOR_15_MINUTES: NO DIFFICULTY AT ALL
GETTING_INTO_AND_OUT_OF_AN_AVERAGE_CAR: SLIGHT DIFFICULTY
STEPPING_UP_AND_DOWN_CURBS: SLIGHT DIFFICULTY
HEAVY_WORK: MODERATE DIFFICULTY
GOING_DOWN_1_FLIGHT_OF_STAIRS: SLIGHT DIFFICULTY
HOS_ADL_HIGHEST_POTENTIAL_SCORE: 68
WALKING_15_MINUTES_OR_GREATER: NO DIFFICULTY AT ALL
SITTING_FOR_15_MINUTES: NO DIFFICULTY AT ALL
GOING_UP_1_FLIGHT_OF_STAIRS: SLIGHT DIFFICULTY
HOW_WOULD_YOU_RATE_YOUR_CURRENT_LEVEL_OF_FUNCTION_DURING_YOUR_USUAL_ACTIVITIES_OF_DAILY_LIVING_FROM_0_TO_100_WITH_100_BEING_YOUR_LEVEL_OF_FUNCTION_PRIOR_TO_YOUR_HIP_PROBLEM_AND_0_BEING_THE_INABILITY_TO_PERFORM_ANY_OF_YOUR_USUAL_DAILY_ACTIVITIES?: 65
DEEP_SQUATTING: EXTREME DIFFICULTY
GETTING_INTO_AND_OUT_OF_A_BATHTUB: MODERATE DIFFICULTY
ROLLING_OVER_IN_BED: SLIGHT DIFFICULTY
TWISTING/PIVOTING_ON_INVOLVED_LEG: SLIGHT DIFFICULTY
WALKING_APPROXIMATELY_10_MINUTES: NO DIFFICULTY AT ALL
RECREATIONAL_ACTIVITIES: MODERATE DIFFICULTY
WALKING_UP_STEEP_HILLS: SLIGHT DIFFICULTY
WALKING_INITIALLY: NO DIFFICULTY AT ALL
HOS_ADL_COUNT: 17
LIGHT_TO_MODERATE_WORK: SLIGHT DIFFICULTY
HOS_ADL_ITEM_SCORE_TOTAL: 50
PUTTING_ON_SOCKS_AND_SHOES: SLIGHT DIFFICULTY

## 2022-12-29 NOTE — PROGRESS NOTES
"Subjective:  HPI  Physical Exam                    Objective:  System    Physical Exam    General     ROS    Assessment/Plan:    DISCHARGE REPORT    Progress reporting period is from 11/15/22 to 12/29/22.       SUBJECTIVE  Subjective changes noted by patient:  she was out of town for several weeks, she did a lot of walking on vacation, she didn't have pain, she did get really fatigued in her R leg with all of the walking and stairs, she felt like she would be feel like she was dragging her R leg with trying to move quickly or prolonged activity    Current Pain level: 0/10.     Previous pain level was  1/10 Initial Pain level: 9/10.   Changes in function:  Yes (See Goal flowsheet attached for changes in current functional level)  Adverse reaction to treatment or activity: None    OBJECTIVE  Changes noted in objective findings:    Objective: MMT: hip flex 4-/5, knee ext 4+/5, knee fle 5/5, mod scar restriction in mid incision, 8\" ant step up x 10 difficulty 2/10, good eccentric control down 6\" step     ASSESSMENT/PLAN  Updated problem list and treatment plan: Diagnosis 1:  S/P R. illiopsoas schanoma excision      Decreased strength - home program  Decreased function - home program  STG/LTGs have been met or progress has been made towards goals:  Yes (See Goal flow sheet completed today.)  Assessment of Progress: The patient's condition is improving.  Patient is meeting short term goals and is progressing towards long term goals.  Self Management Plans:  Patient is independent in a home treatment program.  Patient is independent in self management of symptoms.  I have re-evaluated this patient and find that the nature, scope, duration and intensity of the therapy is appropriate for the medical condition of the patient.  Ophelia continues to require the following intervention to meet STG and LTG's:  PT intervention is no longer required to meet STG/LTG.    Recommendations:  This patient is ready to be discharged from " therapy and continue their home treatment program.    Please refer to the daily flowsheet for treatment today, total treatment time and time spent performing 1:1 timed codes.      Marc Feliciano,PT, DPT, OCS

## 2022-12-29 NOTE — PROGRESS NOTES
Subjective:  HPI  Physical Exam                    Objective:  System    Physical Exam    General     ROS    Assessment/Plan:    PROGRESS  REPORT    Progress reporting period is from 10/11/22 to 11/15/22.       SUBJECTIVE  Subjective changes noted by patient:  No change, in pain this week. hip felt a little different with the weather change, got sore faster on walk.  Current Pain level: 1/10.     Previous pain level was  0/10 Initial Pain level: 9/10.   Changes in function:  Yes (See Goal flowsheet attached for changes in current functional level)  Adverse reaction to treatment or activity: None    OBJECTIVE  Changes noted in objective findings:    Objective: Able to ascend <> descend 8 inch step with good control, fatigued quickly after 20 reps.     ASSESSMENT/PLAN  Updated problem list and treatment plan: Diagnosis 1:  S/P R. illiopsoas schanoma excision      Pain -  manual therapy, self management, education and home program  Decreased strength - therapeutic exercise, therapeutic activities and home program  Decreased function - therapeutic activities and home program  STG/LTGs have been met or progress has been made towards goals:  Yes (See Goal flow sheet completed today.)  Assessment of Progress: The patient's condition is improving.  Self Management Plans:  Patient has been instructed in a home treatment program.  Patient  has been instructed in self management of symptoms.  I have re-evaluated this patient and find that the nature, scope, duration and intensity of the therapy is appropriate for the medical condition of the patient.  Ophelia continues to require the following intervention to meet STG and LTG's:  PT    Recommendations:  This patient would benefit from continued therapy.     Frequency:  1 X week, once daily  Duration:  for 6 weeks    Pt will be out of town for 3 weeks, will continue with PT upon return.          Please refer to the daily flowsheet for treatment today, total treatment time and time  spent performing 1:1 timed codes.    Marc Feliciano,PT, DPT, OCS

## 2023-01-15 DIAGNOSIS — G43.109 MIGRAINE WITH AURA AND WITHOUT STATUS MIGRAINOSUS, NOT INTRACTABLE: ICD-10-CM

## 2023-01-16 RX ORDER — SUMATRIPTAN 100 MG/1
TABLET, FILM COATED ORAL
Qty: 18 TABLET | Refills: 1 | Status: SHIPPED | OUTPATIENT
Start: 2023-01-16 | End: 2023-03-08

## 2023-01-16 NOTE — TELEPHONE ENCOUNTER
"Pending Prescriptions:                       Disp   Refills    SUMAtriptan (IMITREX) 100 MG tablet [Pharm*18 tab*0        Sig: TAKE 1 TABLET BY MOUTH AT ONSET OF MIGRAINE        Routing refill request to provider for review/approval because:  Serotonin Agonists Failed    Rerun Protocol (1/15/2023 9:02 PM)    Serotonin Agonist request needs review.  Please review patient's record. If patient has had 8 or more treatments in the past month, please forward to provider.    Blood pressure under 140/90 in past 12 months      BP Readings from Last 3 Encounters:   12/20/22 104/72   06/17/22 107/70   05/24/22 129/72           Recent (12 mo) or future (30 days) visit within the authorizing provider's specialty  Patient has had an office visit with the authorizing provider or a provider within the authorizing providers department within the previous 12 mos or has a future within next 30 days. See \"Patient Info\" tab in inbasket, or \"Choose Columns\" in Meds & Orders section of the refill encounter.         Medication is active on med list      Patient is age 18 or older      No active pregnancy on record      No positive pregnancy test in past 12 months          "

## 2023-01-26 ENCOUNTER — TELEPHONE (OUTPATIENT)
Dept: GASTROENTEROLOGY | Facility: CLINIC | Age: 58
End: 2023-01-26
Payer: COMMERCIAL

## 2023-01-26 NOTE — TELEPHONE ENCOUNTER
Caller: Ophelia Wolf  Reason for Reschedule/Cancellation (please be detailed, any staff messages or encounters to note?): schedule conflict      Prior to reschedule please review:    Ordering Provider: Susannah Ray    Sedation per order: moderate    Does patient have any ASC Exclusions, please identify?: no      Notes on Cancelled Procedure:    Procedure:Lower Endoscopy [Colonoscopy]     Date: 03/03/23    Location:[facility]; [address] [place] [address]    Surgeon: Amy        Rescheduled: Yes    Procedure: Lower Endoscopy [Colonoscopy]     Date: 06/09/23    Location:[facility]; [address] [place] [address]    Surgeon: Amy    Sedation Level Scheduled  MAC,  Reason for Sedation Level per location    Prep/Instructions updated and sent: yes

## 2023-02-01 ENCOUNTER — VIRTUAL VISIT (OUTPATIENT)
Dept: FAMILY MEDICINE | Facility: CLINIC | Age: 58
End: 2023-02-01
Payer: COMMERCIAL

## 2023-02-01 ENCOUNTER — MYC MEDICAL ADVICE (OUTPATIENT)
Dept: FAMILY MEDICINE | Facility: CLINIC | Age: 58
End: 2023-02-01

## 2023-02-01 DIAGNOSIS — G43.109 MIGRAINE WITH AURA AND WITHOUT STATUS MIGRAINOSUS, NOT INTRACTABLE: ICD-10-CM

## 2023-02-01 DIAGNOSIS — Z12.11 SCREEN FOR COLON CANCER: ICD-10-CM

## 2023-02-01 DIAGNOSIS — J01.20 ACUTE NON-RECURRENT ETHMOIDAL SINUSITIS: Primary | ICD-10-CM

## 2023-02-01 PROCEDURE — 99213 OFFICE O/P EST LOW 20 MIN: CPT | Mod: GT | Performed by: PHYSICIAN ASSISTANT

## 2023-02-01 RX ORDER — HYDROCODONE BITARTRATE AND ACETAMINOPHEN 5; 325 MG/1; MG/1
TABLET ORAL
Qty: 6 TABLET | Refills: 0 | Status: SHIPPED | OUTPATIENT
Start: 2023-02-01 | End: 2023-04-25

## 2023-02-01 RX ORDER — AZITHROMYCIN 250 MG/1
TABLET, FILM COATED ORAL
Qty: 6 TABLET | Refills: 0 | Status: SHIPPED | OUTPATIENT
Start: 2023-02-01 | End: 2023-02-06

## 2023-02-01 NOTE — TELEPHONE ENCOUNTER
Pt messaging today about sinus problems that have been ongoing for the last few weeks. Pt is requesting a prescription for a possible sinus infection.  Pt offered both an evisit or a virtual visit with her provider.  Pt will do the virtual visit.

## 2023-02-01 NOTE — PROGRESS NOTES
Ophelia is a 57 year old who is being evaluated via a billable video visit.      How would you like to obtain your AVS? MyChart  If the video visit is dropped, the invitation should be resent by: Text to cell phone: [phone]  Will anyone else be joining your video visit? No          Assessment & Plan     Migraine with aura and without status migrainosus, not intractable  Patient gets a prescription every 3 months, since she will be traveling out of the [place] I will refill now, she only takes 2 pills with her to try to prevent  - HYDROcodone-acetaminophen (NORCO) 5-325 MG tablet; TAKE ONE TABLET BY MOUTH EVERY 8 HOURS AS NEEDED FOR MODERATE TO SEVERE PAIN *CAUTION: OPIOID. RISK OF OVERDOSE AND ADDICTION. Strength: 5-325 mg    Screen for colon cancer  She has a scheduled for March    Acute non-recurrent ethmoidal sinusitis  Patient does best with this med, she will continue with Thornton pot and follow-up as needed  - azithromycin (ZITHROMAX) 250 MG tablet; Take 2 tablets (500 mg) by mouth daily for 1 day, THEN 1 tablet (250 mg) daily for 4 days.        No follow-ups on file.    Susannah Ray PA-C  [facility] BEVERLY Jacinto is a 57 year old, presenting for the following health issues:  Sinus Problem      History of Present Illness       Reason for visit:  Sinus infections    She eats 2-3 servings of fruits and vegetables daily.She consumes 0 sweetened beverage(s) daily.She exercises with enough effort to increase her heart rate 20 to 29 minutes per day.  She exercises with enough effort to increase her heart rate 3 or less days per week.   She is taking medications regularly.       Pt has chronic sinus infections . Uses a netti pot which does help  She is going to [place] in 2 weeks and would like to try to clear this infection up prior to going.      Acute Illness  Acute illness concerns: sinus infection   Onset/Duration: 3 weeks ago   Symptoms:  Fever: No  Chills/Sweats: No  Headache  (location?): YES, light sensitivity her sinus HEADACHE may trigger the migraines for her  Sinus Pressure: YES, around nose right in the center  Conjunctivitis:  No  Ear Pain: no  Rhinorrhea: No  Congestion: YES  Sore Throat: No  Cough: no  Wheeze: No  Decreased Appetite: No  Nausea: No  Vomiting: No  Diarrhea: No  Dysuria/Freq.: No  Dysuria or Hematuria: No  Fatigue/Achiness: YES  Sick/Strep Exposure: No  Therapies tried and outcome: saline wash helps with netti pots    Her migraines have been stable, she just requires a refill of pain meds last filled in November    Review of Systems   As documented above       Objective           Vitals:  No vitals were obtained today due to virtual visit.    Physical Exam   GENERAL: Healthy, alert and no distress  EYES: Eyes grossly normal to inspection.  No discharge or erythema, or obvious scleral/conjunctival abnormalities.  RESP: No audible wheeze, cough, or visible cyanosis.  No visible retractions or increased work of breathing.    SKIN: Visible skin clear. No significant rash, abnormal pigmentation or lesions.  NEURO: Cranial nerves grossly intact.  Mentation and speech appropriate for age.  PSYCH: Mentation appears normal, affect normal/bright, judgement and insight intact, normal speech and appearance well-groomed.    Admission on 06/17/2022, Discharged on 06/17/2022   Component Date Value Ref Range Status     GLUCOSE BY METER POCT 06/17/2022 103 (H)  70 - 99 mg/dL Final               Video-Visit Details    Type of service:  Video Visit     Originating Location (pt. Location): Home    Distant Location (provider location):  On-site  Platform used for Video Visit: Sage

## 2023-02-12 ENCOUNTER — HEALTH MAINTENANCE LETTER (OUTPATIENT)
Age: 58
End: 2023-02-12

## 2023-02-16 ENCOUNTER — ANCILLARY PROCEDURE (OUTPATIENT)
Dept: MAMMOGRAPHY | Facility: OTHER | Age: 58
End: 2023-02-16
Attending: PHYSICIAN ASSISTANT
Payer: COMMERCIAL

## 2023-02-16 DIAGNOSIS — Z12.31 ENCOUNTER FOR SCREENING MAMMOGRAM FOR BREAST CANCER: ICD-10-CM

## 2023-02-16 DIAGNOSIS — Z12.31 VISIT FOR SCREENING MAMMOGRAM: ICD-10-CM

## 2023-02-16 PROCEDURE — 77067 SCR MAMMO BI INCL CAD: CPT | Mod: TC | Performed by: RADIOLOGY

## 2023-03-05 DIAGNOSIS — G43.109 MIGRAINE WITH AURA AND WITHOUT STATUS MIGRAINOSUS, NOT INTRACTABLE: ICD-10-CM

## 2023-03-08 RX ORDER — SUMATRIPTAN 100 MG/1
TABLET, FILM COATED ORAL
Qty: 18 TABLET | Refills: 1 | Status: SHIPPED | OUTPATIENT
Start: 2023-03-08 | End: 2023-04-21

## 2023-03-16 ENCOUNTER — VIRTUAL VISIT (OUTPATIENT)
Dept: FAMILY MEDICINE | Facility: CLINIC | Age: 58
End: 2023-03-16
Payer: COMMERCIAL

## 2023-03-16 DIAGNOSIS — J01.90 ACUTE NON-RECURRENT SINUSITIS, UNSPECIFIED LOCATION: Primary | ICD-10-CM

## 2023-03-16 DIAGNOSIS — Z12.11 SCREEN FOR COLON CANCER: ICD-10-CM

## 2023-03-16 PROCEDURE — 99213 OFFICE O/P EST LOW 20 MIN: CPT | Mod: 93 | Performed by: PHYSICIAN ASSISTANT

## 2023-03-16 RX ORDER — DOXYCYCLINE 100 MG/1
100 CAPSULE ORAL 2 TIMES DAILY
Qty: 20 CAPSULE | Refills: 0 | Status: SHIPPED | OUTPATIENT
Start: 2023-03-16 | End: 2023-03-17 | Stop reason: SINTOL

## 2023-03-16 NOTE — PROGRESS NOTES
Ophelia is a 57 year old who is being evaluated via a billable telephone visit.      What phone number would you like to be contacted at? [phone]    How would you like to obtain your AVS? [place]       Distant Location (provider location):  Off-site    Assessment & Plan     Screen for colon cancer  She already has this scheduled    Acute non-recurrent sinusitis, unspecified location  Use over-the-counter meds, nasal sprays and Interior pot for comfort, rest and follow-up in the office if not improving  - doxycycline hyclate (VIBRAMYCIN) 100 MG capsule; Take 1 capsule (100 mg) by mouth 2 times daily                 No follow-ups on file.    [facility] BEVERLY Jacinto is a 57 year old, presenting for the following health issues:  Sinus Problem      HPI     Acute Illness  Acute illness concerns: sinus  Onset/Duration: 1 week  Symptoms:  Fever: unsure  Chills/Sweats: YES  Headache (location?): YES  Sinus Pressure: YES- frontal  Conjunctivitis:  No  Ear Pain: YES- pressure  Rhinorrhea: No  Congestion: YES  Sore Throat: YES, related to nasal drip  Cough: YES-productive of yellow sputum, SOB  Wheeze: No  Decreased Appetite: YES  Nausea: No  Vomiting: No  Diarrhea: No  Dysuria/Freq.: No  Dysuria or Hematuria: No  Fatigue/Achiness: No  Sick/Strep Exposure: YES- co-workers sick  Therapies tried and outcome: OTC cold medication  Took a covid test which was negative      Review of Systems   As documented above       Objective    Vitals - Patient Reported  Pain Score: Extreme Pain (8)  Pain Loc: Face      Vitals:  No vitals were obtained today due to virtual visit.    Physical Exam   alert, no distress, fatigued and congested  PSYCH: Alert and oriented times 3; coherent speech, normal   rate and volume, able to articulate logical thoughts, able   to abstract reason, no tangential thoughts, no hallucinations   or delusions  Her affect is pleasant  RESP: No cough, no audible  wheezing, able to talk in full sentences  Remainder of exam unable to be completed due to telephone visits                Phone call duration: 6 minutes

## 2023-03-17 ENCOUNTER — TELEPHONE (OUTPATIENT)
Dept: FAMILY MEDICINE | Facility: CLINIC | Age: 58
End: 2023-03-17
Payer: COMMERCIAL

## 2023-03-17 DIAGNOSIS — J01.90 ACUTE NON-RECURRENT SINUSITIS, UNSPECIFIED LOCATION: Primary | ICD-10-CM

## 2023-03-17 RX ORDER — AZITHROMYCIN 250 MG/1
TABLET, FILM COATED ORAL
Qty: 6 TABLET | Refills: 0 | Status: SHIPPED | OUTPATIENT
Start: 2023-03-17 | End: 2023-03-22

## 2023-03-17 NOTE — TELEPHONE ENCOUNTER
Please call pt- please have her stop the med if she has not already done so and I will call in a new prescription  For her for zithromax to her pharmacy.  I will add doxycyline to her allergy list though it is not an allergy it is an intolerance so we do not give it to you again.  Susannah Ray PA-C

## 2023-03-17 NOTE — TELEPHONE ENCOUNTER
Medication Question or Refill    What medication are you calling about (include dose and sig)?: doxycycline hyclate (VIBRAMYCIN) 100 mg caps    Preferred Pharmacy:   [id number] - [place], [place] - [address]  [address]  [address]  [facility] [address]  Phone: [phone] Fax: [phone]    Controlled Substance Agreement on file:   CSA -- Patient Level:     [Media Unavailable] Controlled Substance Agreement - Opioid - Scan on 12/20/2022 12:40 PM: CSA 12.20.2022       Who prescribed the medication?: Cory    Do you need a refill? No    When did you use the medication last? yesterday    Patient offered an appointment? No    Do you have any questions or concerns?  Yes: Patient calling asking for a new medication for her sinus issues. She said the medication she was given after visit yesterday 3/16 is giving her a huge migraine so she did not take it today.      Could we send this information to you in [facility] or would you prefer to receive a phone call?:   Patient would prefer a phone call   Okay to leave a detailed message?: Yes at Home number on file [phone] (home)

## 2023-03-17 NOTE — TELEPHONE ENCOUNTER
Mail box full unable to leave message.  Will attempt to call again later.    Anita Sneed RN  [facility] ~ Registered Nurse  Clinic Triage ~ [facility]  March 17, 2023

## 2023-03-20 ENCOUNTER — TELEPHONE (OUTPATIENT)
Dept: FAMILY MEDICINE | Facility: CLINIC | Age: 58
End: 2023-03-20
Payer: COMMERCIAL

## 2023-03-20 NOTE — TELEPHONE ENCOUNTER
Spoke with Ophelia and let her know that the Doxycycline was added as an allergy.  She has been taking the Zpack and she says she is starting to feel better.  Still slightly congested yet but seeing a change for the better per Ophelia    Closing encounter.    Anita Sneed RN  [facility] ~ Registered Nurse  Clinic Triage ~ [facility]  March 20, 2023

## 2023-03-28 ENCOUNTER — TELEPHONE (OUTPATIENT)
Dept: ORTHOPEDICS | Facility: CLINIC | Age: 58
End: 2023-03-28
Payer: COMMERCIAL

## 2023-03-28 DIAGNOSIS — D36.10: Primary | ICD-10-CM

## 2023-03-28 NOTE — TELEPHONE ENCOUNTER
[facility] Call Center    Phone Message    May a detailed message be left on voicemail: no     Reason for Call: Other: Requesting MRI referral/order for her appt on 05/18    Action Taken: Message routed to:  [facility]    Travel Screening: Not Applicable

## 2023-03-28 NOTE — TELEPHONE ENCOUNTER
I left a message asking for the patient to call us back. I was planning on letting them know that I put the order in for the MRI and they can call imaging scheduling at [phone] to get that scheduled prior to their appointment.    Trav Gomez, EMT

## 2023-04-19 DIAGNOSIS — G43.109 MIGRAINE WITH AURA AND WITHOUT STATUS MIGRAINOSUS, NOT INTRACTABLE: ICD-10-CM

## 2023-04-20 NOTE — TELEPHONE ENCOUNTER
Pending Prescriptions:                       Disp   Refills    SUMAtriptan (IMITREX) 100 MG tablet [Pharm*18 tab*1        Sig: TAKE 1 TABLET BY MOUTH AT ONSET OF MIGRAINE      Routing refill request to provider for review/approval because:  More than 9 treatments in one month    Cheri Mccollum RN on 4/20/2023 at 4:53 PM

## 2023-04-21 RX ORDER — SUMATRIPTAN 100 MG/1
TABLET, FILM COATED ORAL
Qty: 18 TABLET | Refills: 1 | Status: SHIPPED | OUTPATIENT
Start: 2023-04-21 | End: 2023-06-07

## 2023-04-25 DIAGNOSIS — G43.109 MIGRAINE WITH AURA AND WITHOUT STATUS MIGRAINOSUS, NOT INTRACTABLE: ICD-10-CM

## 2023-04-25 RX ORDER — HYDROCODONE BITARTRATE AND ACETAMINOPHEN 5; 325 MG/1; MG/1
TABLET ORAL
Qty: 6 TABLET | Refills: 0 | Status: SHIPPED | OUTPATIENT
Start: 2023-05-01 | End: 2023-07-31

## 2023-05-12 ENCOUNTER — TELEPHONE (OUTPATIENT)
Dept: GASTROENTEROLOGY | Facility: CLINIC | Age: 58
End: 2023-05-12
Payer: COMMERCIAL

## 2023-05-12 NOTE — TELEPHONE ENCOUNTER
Caller: Ophelia Wolf  Reason for Reschedule/Cancellation (please be detailed, any staff messages or encounters to note?): PT RESCHEDULED      Prior to reschedule please review:    Ordering Provider:HORACIO FOUNTAIN    Sedation per order:MODERATE    Does patient have any ASC Exclusions, please identify?: NO      Notes on Cancelled Procedure:    Procedure:Lower Endoscopy [Colonoscopy]     Date: 6/9/23    Location:[facility]; [address] [place] [address]    Surgeon: MARIA FERNANDA        Rescheduled: Yes    Procedure: Lower Endoscopy [Colonoscopy]     Date: 8/4/23    Location:[facility]; [address] [place] [address]    Surgeon: MARIA FERNANDA    Sedation Level Scheduled  MAC,  Reason for Sedation Level PER LOCATION    Prep/Instructions updated and sent: YES/ADDISON

## 2023-05-18 ENCOUNTER — ANCILLARY PROCEDURE (OUTPATIENT)
Dept: MRI IMAGING | Facility: CLINIC | Age: 58
End: 2023-05-18
Attending: ORTHOPAEDIC SURGERY
Payer: COMMERCIAL

## 2023-05-18 ENCOUNTER — OFFICE VISIT (OUTPATIENT)
Dept: ORTHOPEDICS | Facility: CLINIC | Age: 58
End: 2023-05-18
Payer: COMMERCIAL

## 2023-05-18 DIAGNOSIS — D36.13 SCHWANNOMA OF NERVE OF LOWER EXTREMITY: Primary | ICD-10-CM

## 2023-05-18 DIAGNOSIS — D36.10: ICD-10-CM

## 2023-05-18 PROCEDURE — 72197 MRI PELVIS W/O & W/DYE: CPT | Mod: GC | Performed by: RADIOLOGY

## 2023-05-18 PROCEDURE — A9585 GADOBUTROL INJECTION: HCPCS | Performed by: RADIOLOGY

## 2023-05-18 PROCEDURE — 99213 OFFICE O/P EST LOW 20 MIN: CPT | Performed by: ORTHOPAEDIC SURGERY

## 2023-05-18 RX ORDER — GADOBUTROL 604.72 MG/ML
7.5 INJECTION INTRAVENOUS ONCE
Status: COMPLETED | OUTPATIENT
Start: 2023-05-18 | End: 2023-05-18

## 2023-05-18 RX ADMIN — GADOBUTROL 7.5 ML: 604.72 INJECTION INTRAVENOUS at 09:12

## 2023-05-18 NOTE — LETTER
5/18/2023         RE: Ophelia Wolf  [address]  [facility] [address]        Dear Colleague,    Thank you for referring your patient, Ophelia Wolf, to the [facility] [place]. Please see a copy of my visit note below.       [facility], Orthopaedic Oncology Surgery Consultation  by Richard Katz M.D.     Ophelia Wolf MRN# [id number]     YOB: 1965      Requesting physician: Susannah Hou      Background history:  DX:  Rotator cuff degeneration.  Right shoulder  R iliopsoas Schwannoma, 12 x 8 x 6, with large cystic changes  R femoral neuropraxia     TREATMENTS:  3/12/2021 Right rotator cuff repair, [facility]  Partial Hysterectomy  3/7/2022, Core needle biopsy , US guided, ([place]) [facility]  4/19/2022, excision of 12cm schwannoma from right retroperitoneum and anterior groin.  Right femoral nerve neuroplasty and common femoral artery dissection.  ([facility]) [facility]  5/20/2022, irrigation and debridement of right abdomen and thigh dehisced wound with wound VAC application, Dr. Katz   6/17/2022, delayed primary wound closure of right thigh ([place])         Ophelia returns for recheck after undergoing excision of her 12 cm schwannoma involving the right groin and femoral nerve region 1 year ago.  She is doing extremely well is ambulating independently.  She has no limp.  She notes some tightness of her right knee when squatting but otherwise is able to perform all regular activities.  She also notes her stamina in the right quadriceps has decreased compared to the left side.  She also has some dysesthesias in the anterior thigh.  Nonetheless, she is able to perform all regular daily activities.  She is working full-time.  She does not wear lymphedema stocking anymore.    Examination:  Full range of motion of the right hip.  Full active extension of the knee with further flexion 120 degrees.  5/5 strength of the right quadriceps.  Some  dysesthesias to light touch over the anterior thigh.  No lymphedema present in the lower extremity or ankle region.  Incision is well-healed.  No evidence of active infection.  No erythema or warmth or swelling.    MRI examination today demonstrates no evidence of tumor recurrence.    Impression:  Excellent outcome after excision of extremely large schwannoma involving the femoral nerve and groin region.    Plan:  I do not think further follow-up surveillance visits are necessary given her diagnosis and lack of any evidence of recurrence on the current MRI study 1 year post resection.    She will continue working on her quadricep strengthening and stamina.  Have suggested using bicycle.    Return on a as needed basis.      MD Ayan Lewis Family Professor  Oncology and Adult Reconstructive Surgery  Dept Orthopaedic Surgery, [facility]  [phone] office, [phone] pager  [email or web address]    Total combined visit time and work time before and after clinic visit = 20 min

## 2023-05-18 NOTE — PROGRESS NOTES
[facility], Orthopaedic Oncology Surgery Consultation  by Richard Katz M.D.     Ophelia Wolf MRN# [id number]     YOB: 1965      Requesting physician: Susannah Hou      Background history:  DX:  1. Rotator cuff degeneration.  Right shoulder  2. 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 R femoral neuropraxia     TREATMENTS:  1. 3/12/2021 Right rotator cuff repair, [facility]  2. Partial Hysterectomy  3. 3/7/2022, Core needle biopsy , US guided, ([facility]  4. 4/19/2022, excision of 12cm schwannoma from right retroperitoneum and anterior groin.  Right femoral nerve neuroplasty and common femoral artery dissection.  ([facility]  5. 5/20/2022, irrigation and debridement of right abdomen and thigh dehisced wound with wound VAC application, Dr. Katz   6. 6/17/2022, delayed primary wound closure of right thigh ([facility])         Ophelia returns for recheck after undergoing excision of her 12 cm schwannoma involving the right groin and femoral nerve region 1 year ago.  She is doing extremely well is ambulating independently.  She has no limp.  She notes some tightness of her right knee when squatting but otherwise is able to perform all regular activities.  She also notes her stamina in the right quadriceps has decreased compared to the left side.  She also has some dysesthesias in the anterior thigh.  Nonetheless, she is able to perform all regular daily activities.  She is working full-time.  She does not wear lymphedema stocking anymore.    Examination:  Full range of motion of the right hip.  Full active extension of the knee with further flexion 120 degrees.  5/5 strength of the right quadriceps.  Some dysesthesias to light touch over the anterior thigh.  No lymphedema present in the lower extremity or ankle region.  Incision is well-healed.  No evidence of active infection.  No erythema or warmth or swelling.    MRI examination today  demonstrates no evidence of tumor recurrence.    Impression:  Excellent outcome after excision of extremely large schwannoma involving the femoral nerve and groin region.    Plan:  1. I do not think further follow-up surveillance visits are necessary given her diagnosis and lack of any evidence of recurrence on the current MRI study 1 year post resection.    2. She will continue working on her quadricep strengthening and stamina.  Have suggested using bicycle.    3. Return on a as needed basis.      Richard Katz MD  [facility] Family Professor  Oncology and Adult Reconstructive Surgery  Dept Orthopaedic Surgery, [facility]  [phone] office, [phone] pager  [email or web address]    Total combined visit time and work time before and after clinic visit = 20 min

## 2023-05-18 NOTE — NURSING NOTE
Chief Complaint   Patient presents with     RECHECK     MRI results. 11 Months post op from delayed primary closure of right thigh wound. DOS: 06/17/2022.       57 year old  1965    LMP 06/16/2007 (Approximate)     Past Surgical History:   Procedure Laterality Date     ARTHROTOMY SHOULDER, ROTATOR CUFF REPAIR, COMBINED Right 3/12/2021    Procedure: ARTHROTOMY, SHOULDER (anatoly marmolejo), WITH ROTATOR CUFF REPAIR open;  Surgeon: César Talavera MD;  [facility]     CLOSE SECONDARY WOUND LOWER EXTREMITY Right 6/17/2022    Procedure: Delayed primary closure Right thigh wound;  Surgeon: Richard Katz MD;  [facility]     COLONOSCOPY  04/14/10     EXCISE MASS GROIN Right 4/19/2022    Procedure: Excision of Large Schwannoma of Right Retroperitoneum and Groin;  Surgeon: Richard Katz MD;  [facility]     IRRIGATION AND DEBRIDEMENT GROIN Right 5/20/2022    Procedure: Irrigation and debridement Right thigh and abdomen;  Surgeon: Richard Katz MD;  [facility]     REPAIR SCIATIC NERVE Right 4/19/2022    Procedure: Decompression Neuroplasty of Right Femoral Nerve;  Surgeon: Richard Katz MD;  [facility]     VASCULAR REPAIR LOWER EXTREMITY N/A 4/19/2022    Procedure: Vascular Dissection of Iliac and Common Femoral Artery and Vein;  Surgeon: Richard Katz MD;  [facility]     ZZC LIGATE FALLOPIAN TUBE,POSTPARTUM  1990    Tubal Ligation     ZZC NONSPECIFIC PROCEDURE  1978    jaw surgery     ZZC SUPRACERV ABD HYSTERECTOMY  06/28/2007                 Pain Assessment  Patient Currently in Pain: Yes  0-10 Pain Scale: 5  Primary Pain Location: Other (Comment) (Mouth)                              [facility] [place] - [id number] - [place], [place] - [address] [facility] [address] [facility]        Allergies   Allergen Reactions     Amoxicillin Difficulty breathing     Anesthetic Ether      pseudocholinesterase deficiency      Doxycycline Headache           Current Outpatient Medications   Medication     acetaminophen (TYLENOL) 325 MG tablet     Ascorbic Acid (VITAMIN C PO)     Calcium Carbonate-Vitamin D (CALCIUM + D PO)     famotidine (PEPCID) 20 MG tablet     HYDROcodone-acetaminophen (NORCO) 5-325 MG tablet     multivitamin, therapeutic (THERA-VIT) TABS tablet     SUMAtriptan (IMITREX) 100 MG tablet     VITAMIN D PO     ondansetron (ZOFRAN ODT) 4 MG ODT tab     triamcinolone (KENALOG) 0.1 % paste     No current facility-administered medications for this visit.             Questionnaires:    HOOS Hip Dysfunction & Osteoarthritis Outcome Questionnaire         View : No data to display.                       KOOS Knee Survey Assessment         View : No data to display.                       Promis 10 Assessment        10/13/2022    12:35 PM   PROMIS 10   In general, would you say your health is: Good   In general, would you say your quality of life is: Very good   In general, how would you rate your physical health? Good   In general, how would you rate your mental health, including your mood and your ability to think? Very good   In general, how would you rate your satisfaction with your social activities and relationships? Very good   In general, please rate how well you carry out your usual social activities and roles Good   To what extent are you able to carry out your everyday physical activities such as walking, climbing stairs, carrying groceries, or moving a chair? A little   In the past 7 days, how often have you been bothered by emotional problems such as feeling anxious, depressed, or irritable? Rarely   In the past 7 days, how would you rate your fatigue on average? Moderate   In the past 7 days, how would you rate your pain on average, where 0 means no pain, and 10 means worst imaginable pain? 3   In general, would you say your health is: 3   In general, would you say your quality of life is: 4   In general, how would you  rate your physical health? 3   In general, how would you rate your mental health, including your mood and your ability to think? 4   In general, how would you rate your satisfaction with your social activities and relationships? 4   In general, please rate how well you carry out your usual social activities and roles. (This includes activities at home, at work and in your community, and responsibilities as a parent, child, spouse, employee, friend, etc.) 3   To what extent are you able to carry out your everyday physical activities such as walking, climbing stairs, carrying groceries, or moving a chair? 2   In the past 7 days, how often have you been bothered by emotional problems such as feeling anxious, depressed, or irritable? 2   In the past 7 days, how would you rate your fatigue on average? 3   In the past 7 days, how would you rate your pain on average, where 0 means no pain, and 10 means worst imaginable pain? 3   Global Mental Health Score 16   Global Physical Health Score 12   PROMIS TOTAL - SUBSCORES 28              [facility] Knee Questionnaire         View : No data to display.

## 2023-06-07 DIAGNOSIS — G43.109 MIGRAINE WITH AURA AND WITHOUT STATUS MIGRAINOSUS, NOT INTRACTABLE: ICD-10-CM

## 2023-06-07 RX ORDER — SUMATRIPTAN 100 MG/1
TABLET, FILM COATED ORAL
Qty: 18 TABLET | Refills: 1 | Status: SHIPPED | OUTPATIENT
Start: 2023-06-07 | End: 2023-07-20

## 2023-06-07 NOTE — TELEPHONE ENCOUNTER
"Pending Prescriptions:                       Disp   Refills    SUMAtriptan (IMITREX) 100 MG tablet [Pharm*18 tab*1        Sig: TAKE 1 TABLET BY MOUTH AT ONSET OF MIGRAINE        Routing refill request to provider for review/approval because:    Serotonin Agonists Failed    Rerun Protocol (6/7/2023 6:56 AM)    Serotonin Agonist request needs review.    Please review patient's record. If patient has had 8 or more treatments in the past month, please forward to provider.    Blood pressure under 140/90 in past 12 months        BP Readings from Last 3 Encounters:   12/20/22 104/72   06/17/22 107/70   05/24/22 129/72           Recent (12 mo) or future (30 days) visit within the authorizing provider's specialty    Patient has had an office visit with the authorizing provider or a provider within the authorizing providers department within the previous 12 mos or has a future within next 30 days. See \"Patient Info\" tab in inbasket, or \"Choose Columns\" in Meds & Orders section of the refill encounter.         Medication is active on med list      Patient is age 18 or older      No active pregnancy on record      No positive pregnancy test in past 12 months        "

## 2023-07-18 DIAGNOSIS — G43.109 MIGRAINE WITH AURA AND WITHOUT STATUS MIGRAINOSUS, NOT INTRACTABLE: ICD-10-CM

## 2023-07-18 NOTE — TELEPHONE ENCOUNTER
"Pending Prescriptions:                       Disp   Refills    SUMAtriptan (IMITREX) 100 MG tablet [Pharm*18 tab*1        Sig: TAKE 1 TABLET BY MOUTH AT ONSET OF MIGRAINE    Routing refill request to provider for review/approval because:  Refill of #18 with 1 refill sent 6/7/2023    Requested Prescriptions   Pending Prescriptions Disp Refills    SUMAtriptan (IMITREX) 100 MG tablet [Pharmacy Med Name: SUMATRIPTAN SUCCINATE 100MG TABS] 18 tablet 1     Sig: TAKE 1 TABLET BY MOUTH AT ONSET OF MIGRAINE       Serotonin Agonists Failed - 7/18/2023  9:35 AM        Failed - Serotonin Agonist request needs review.     Please review patient's record. If patient has had 8 or more treatments in the past month, please forward to provider.          Passed - Blood pressure under 140/90 in past 12 months     BP Readings from Last 3 Encounters:   12/20/22 104/72   06/17/22 107/70   05/24/22 129/72                 Passed - Recent (12 mo) or future (30 days) visit within the authorizing provider's specialty     Patient has had an office visit with the authorizing provider or a provider within the authorizing providers department within the previous 12 mos or has a future within next 30 days. See \"Patient Info\" tab in inbasket, or \"Choose Columns\" in Meds & Orders section of the refill encounter.              Passed - Medication is active on med list        Passed - Patient is age 18 or older        Passed - No active pregnancy on record        Passed - No positive pregnancy test in past 12 months            "

## 2023-07-20 RX ORDER — SUMATRIPTAN 100 MG/1
TABLET, FILM COATED ORAL
Qty: 18 TABLET | Refills: 1 | Status: SHIPPED | OUTPATIENT
Start: 2023-07-20 | End: 2023-08-29

## 2023-07-31 ENCOUNTER — MYC MEDICAL ADVICE (OUTPATIENT)
Dept: FAMILY MEDICINE | Facility: CLINIC | Age: 58
End: 2023-07-31
Payer: COMMERCIAL

## 2023-07-31 DIAGNOSIS — G43.109 MIGRAINE WITH AURA AND WITHOUT STATUS MIGRAINOSUS, NOT INTRACTABLE: ICD-10-CM

## 2023-07-31 RX ORDER — HYDROCODONE BITARTRATE AND ACETAMINOPHEN 5; 325 MG/1; MG/1
TABLET ORAL
Qty: 6 TABLET | Refills: 0 | Status: SHIPPED | OUTPATIENT
Start: 2023-07-31 | End: 2023-11-02

## 2023-08-01 ENCOUNTER — TELEPHONE (OUTPATIENT)
Dept: GASTROENTEROLOGY | Facility: CLINIC | Age: 58
End: 2023-08-01
Payer: COMMERCIAL

## 2023-08-01 NOTE — TELEPHONE ENCOUNTER
Caller: Ophelia  Reason for Reschedule/Cancellation (please be detailed, any staff messages or encounters to note?): Patient's daughter moving      Prior to reschedule please review:  Ordering Provider: [place]  Sedation per order: CS  Does patient have any ASC Exclusions, please identify?: N      Notes on Cancelled Procedure:  Procedure: Lower Endoscopy [Colonoscopy]   Date: 8/4/23  Location: [facility]; [address] [place] [address] , [place] [address]  Surgeon: Amy      Rescheduled: No-she will call back to reschedule

## 2023-08-29 DIAGNOSIS — G43.109 MIGRAINE WITH AURA AND WITHOUT STATUS MIGRAINOSUS, NOT INTRACTABLE: ICD-10-CM

## 2023-08-29 RX ORDER — SUMATRIPTAN 100 MG/1
TABLET, FILM COATED ORAL
Qty: 18 TABLET | Refills: 1 | Status: SHIPPED | OUTPATIENT
Start: 2023-08-29 | End: 2023-10-18

## 2023-09-27 ENCOUNTER — HOSPITAL ENCOUNTER (OUTPATIENT)
Facility: CLINIC | Age: 58
End: 2023-09-27
Attending: INTERNAL MEDICINE | Admitting: INTERNAL MEDICINE
Payer: COMMERCIAL

## 2023-09-27 ENCOUNTER — TELEPHONE (OUTPATIENT)
Dept: GASTROENTEROLOGY | Facility: CLINIC | Age: 58
End: 2023-09-27
Payer: COMMERCIAL

## 2023-09-27 NOTE — TELEPHONE ENCOUNTER
Caller:   Reason for Reschedule/Cancellation (please be detailed, any staff messages or encounters to note?): CALLING BACK TO RESCHEDULE      Prior to reschedule please review:  Ordering Provider:     HORACIO FOUNTAIN     Sedation per order: CS  Does patient have any ASC Exclusions, please identify?:       Notes on Cancelled Procedure:  Procedure: Lower Endoscopy [Colonoscopy]   Date: 8/4  Location: [facility]; [address] [place] [address]  Surgeon: MARIA FERNANDA      Rescheduled: Yes  Procedure: Lower Endoscopy [Colonoscopy]   Date: 11/15  Location: [facility]; [address] [place] [address]  Surgeon: LONG  Sedation Level Scheduled  MAC,  Reason for Sedation Level PH  Prep/Instructions updated and sent: Y       Send In - basket message to Panc - Sunny Pool if EUS  procedure is canceled or rescheduled: [ N/A, YES or NO]

## 2023-10-18 DIAGNOSIS — G43.109 MIGRAINE WITH AURA AND WITHOUT STATUS MIGRAINOSUS, NOT INTRACTABLE: ICD-10-CM

## 2023-10-18 RX ORDER — SUMATRIPTAN 100 MG/1
TABLET, FILM COATED ORAL
Qty: 18 TABLET | Refills: 0 | Status: SHIPPED | OUTPATIENT
Start: 2023-10-18 | End: 2023-11-13

## 2023-10-20 NOTE — TELEPHONE ENCOUNTER
Message read on 10/19/2023  8:58 AM by Ophelia Wolf; closing.    Albertina Barnes CMA ([facility])

## 2023-11-02 ENCOUNTER — MYC REFILL (OUTPATIENT)
Dept: FAMILY MEDICINE | Facility: CLINIC | Age: 58
End: 2023-11-02
Payer: COMMERCIAL

## 2023-11-02 DIAGNOSIS — G43.109 MIGRAINE WITH AURA AND WITHOUT STATUS MIGRAINOSUS, NOT INTRACTABLE: ICD-10-CM

## 2023-11-02 RX ORDER — HYDROCODONE BITARTRATE AND ACETAMINOPHEN 5; 325 MG/1; MG/1
TABLET ORAL
Qty: 6 TABLET | Refills: 0 | Status: SHIPPED | OUTPATIENT
Start: 2023-11-02 | End: 2024-02-01

## 2023-11-09 DIAGNOSIS — G43.109 MIGRAINE WITH AURA AND WITHOUT STATUS MIGRAINOSUS, NOT INTRACTABLE: ICD-10-CM

## 2023-11-09 NOTE — TELEPHONE ENCOUNTER
Please call patient, I just refilled her sumatriptan for 18 pills 3 weeks ago.  Has she taken all of these already?  If so she needs ov to discuss  Susannah Ray PA-C

## 2023-11-13 ENCOUNTER — TELEPHONE (OUTPATIENT)
Dept: GASTROENTEROLOGY | Facility: CLINIC | Age: 58
End: 2023-11-13

## 2023-11-13 RX ORDER — SUMATRIPTAN 100 MG/1
TABLET, FILM COATED ORAL
Qty: 18 TABLET | Refills: 0 | Status: SHIPPED | OUTPATIENT
Start: 2023-11-13 | End: 2023-12-20

## 2023-11-13 NOTE — TELEPHONE ENCOUNTER
Caller: Writer to patient  Reason for Reschedule/Cancellation (please be detailed, any staff messages or encounters to note?): Schedule conflict      Prior to reschedule please review:  Ordering Provider: Susannah Ray PA-C   Sedation per order: Moderate  Does patient have any ASC Exclusions, please identify?: No      Notes on Cancelled Procedure:  Procedure: Lower Endoscopy [Colonoscopy]   Date: 11/15/2023  Location: [facility]; [address] [place] [address] , [place] [address]  Surgeon: Long      Rescheduled: No, LVM to call back to r/s.

## 2023-11-13 NOTE — TELEPHONE ENCOUNTER
----- Message from Oxana Zepeda RN sent at 11/10/2023  5:30 PM CST -----  Regarding: Cancellation  Pt stated needs to cancel her 11/15 appt due to other obligations she can not work around.  She has GI scheduling no.to call to reschedule.  Oxana SOUZA

## 2023-11-20 DIAGNOSIS — R12 HEARTBURN SYMPTOM: ICD-10-CM

## 2023-11-20 RX ORDER — FAMOTIDINE 20 MG/1
TABLET, FILM COATED ORAL
Qty: 60 TABLET | Refills: 2 | Status: SHIPPED | OUTPATIENT
Start: 2023-11-20

## 2023-11-22 ENCOUNTER — MYC MEDICAL ADVICE (OUTPATIENT)
Dept: FAMILY MEDICINE | Facility: CLINIC | Age: 58
End: 2023-11-22
Payer: COMMERCIAL

## 2023-11-22 ENCOUNTER — MYC REFILL (OUTPATIENT)
Dept: FAMILY MEDICINE | Facility: CLINIC | Age: 58
End: 2023-11-22
Payer: COMMERCIAL

## 2023-11-22 DIAGNOSIS — G43.109 MIGRAINE WITH AURA AND WITHOUT STATUS MIGRAINOSUS, NOT INTRACTABLE: ICD-10-CM

## 2023-11-22 RX ORDER — SUMATRIPTAN 100 MG/1
TABLET, FILM COATED ORAL
Qty: 18 TABLET | Refills: 0 | OUTPATIENT
Start: 2023-11-22

## 2023-12-13 ENCOUNTER — OFFICE VISIT (OUTPATIENT)
Dept: OBGYN | Facility: OTHER | Age: 58
End: 2023-12-13
Payer: COMMERCIAL

## 2023-12-13 VITALS — WEIGHT: 157 LBS | BODY MASS INDEX: 26.95 KG/M2 | SYSTOLIC BLOOD PRESSURE: 155 MMHG | DIASTOLIC BLOOD PRESSURE: 90 MMHG

## 2023-12-13 DIAGNOSIS — Z96.0 PRESENCE OF PESSARY: ICD-10-CM

## 2023-12-13 DIAGNOSIS — N81.4 UTERINE PROLAPSE: Primary | ICD-10-CM

## 2023-12-13 PROCEDURE — A4561 PESSARY RUBBER, ANY TYPE: HCPCS | Performed by: OBSTETRICS & GYNECOLOGY

## 2023-12-13 PROCEDURE — 57160 INSERT PESSARY/OTHER DEVICE: CPT | Performed by: OBSTETRICS & GYNECOLOGY

## 2023-12-13 NOTE — PROGRESS NOTES
Subjective  58 year old non-pregnant female presents today complaining of vaginal prolapse.  I have seen this patient in the past in 2021 for the same thing.  Patient was fitted for a pessary back then and was doing well with it.  She has not been dealing with her vaginal prolapse lately given the fact that she had had a significant surgical history with a removal of a very large 12 cm schwannoma from her thigh and has put everything else aside.  Now that her leg is healed, she is ready to focus again on the vaginal prolapse.  She is not wanting to have surgery at this time.  She feels the pessary worked well for her when she had it in place.  She did have an incident of the size 3 falling out, therefore we did increase to a size 4.  Patient tolerated that for a long period of time, however it did start to fall out towards the end.  She is wanting to try a size larger.  No problems urinating.  She is somewhat sexually active, however admits to the prolapse being a problem.  She is having normal bowel movements.      I personally reviewed the pathology report from April which showed a 12 cm schwannoma.      ROS: 10 point ROS neg other than the symptoms noted above in the HPI.  Past Medical History:   Diagnosis Date     ALLERGIC RHINITIS NOS 08/08/2002     Arthritis of right acromioclavicular joint 02/11/2021     Cervical high risk HPV (human papillomavirus) test positive 10/20/2020     CLASS MIGRAIN W/O MENTN INTRACTABLE 12/15/2004     Dysmenorrhea 03/13/2007     Headache      Headache      Headache      History of rabies vaccination      Lesion of plantar nerve     Buckley's neuroma/metatarsalgia     Malignant hyperthermia      NONSPECIFIC MEDICAL HISTORY     pseudocholinesterase deficiency     Premenstrual tension syndrome      Tension headache      Tobacco use disorder      UTERINE LEIOMYOMA NOS 04/26/2007     Past Surgical History:   Procedure Laterality Date     ARTHROTOMY SHOULDER, ROTATOR CUFF REPAIR, COMBINED  Right 3/12/2021    Procedure: ARTHROTOMY, SHOULDER (anatoly marmolejo), WITH ROTATOR CUFF REPAIR open;  Surgeon: César Talavera MD;  [facility]: [facility]     CLOSE SECONDARY WOUND LOWER EXTREMITY Right 6/17/2022    Procedure: Delayed primary closure Right thigh wound;  Surgeon: Richard Katz MD;  [facility]     COLONOSCOPY  04/14/10     EXCISE MASS GROIN Right 4/19/2022    Procedure: Excision of Large Schwannoma of Right Retroperitoneum and Groin;  Surgeon: Richard Katz MD;  [facility]     IRRIGATION AND DEBRIDEMENT GROIN Right 5/20/2022    Procedure: Irrigation and debridement Right thigh and abdomen;  Surgeon: Richard Katz MD;  [facility]     REPAIR SCIATIC NERVE Right 4/19/2022    Procedure: Decompression Neuroplasty of Right Femoral Nerve;  Surgeon: Richard Katz MD;  [facility]     VASCULAR REPAIR LOWER EXTREMITY N/A 4/19/2022    Procedure: Vascular Dissection of Iliac and Common Femoral Artery and Vein;  Surgeon: Richard Katz MD;  [facility]     ZZC LIGATE FALLOPIAN TUBE,POSTPARTUM  1990    Tubal Ligation     ZZC NONSPECIFIC PROCEDURE  1978    jaw surgery     ZZC SUPRACERV ABD HYSTERECTOMY  06/28/2007     Family History   Problem Relation Age of Onset     Neurologic Disorder Mother         Meniere's disease     Anesthesia Reaction Mother         And myself     Osteoporosis Mother      Thyroid Disease Mother         hypo     Heart Disease Maternal Grandmother         stroke     Arthritis Maternal Grandmother         osteoporosis     Diabetes Maternal Grandmother         Type II diabetes     Coronary Artery Disease Maternal Grandmother 40     Cerebrovascular Disease Maternal Grandmother 40     Osteoporosis Maternal Grandmother      Heart Disease Maternal Grandfather         heart problems, s/p CAB     Cancer Maternal Grandfather         prostate      Diabetes Maternal Grandfather         Type II diabetes     Prostate Cancer Maternal Grandfather      Hypertension Father       Hyperlipidemia Father      Heart Disease Paternal Grandmother      Anesthesia Reaction Brother      Anesthesia Reaction Brother      Social History     Tobacco Use     Smoking status: Former     Packs/day: 0.50     Years: 27.00     Additional pack years: 0.00     Total pack years: 13.50     Types: Cigarettes     Quit date: 2007     Years since quittin.0     Passive exposure: Never     Smokeless tobacco: Never     Tobacco comments:     3-4 cig/day   Substance Use Topics     Alcohol use: Yes     Alcohol/week: 3.3 standard drinks of alcohol     Comment: weekends         Objective  Vitals: BP (!) 155/90 (BP Location: Right arm, Cuff Size: Adult Regular)   Wt 71.2 kg (157 lb)   LMP 2007 (Approximate)   BMI 26.95 kg/m    BMI= Body mass index is 26.95 kg/m .    General appearance=well developed, well-nourished female  Gait=normal  Psych=mood is stable, alert and oriented x3  PELVIC:    External genitalia: normal without lesions or masses  Urethral meatus: no lesions or prolapse noted, normal size  Urethra: no masses, non tender  Bladder: non tender, no fullness  Vagina: normal mucosa and rugae, no discharge.  Cervix: normal without lesion, no cervical motion tenderness, healthy, multiparous  Uterus: small, mobile, nontender, grade 3/4 prolapse  Adnexa: non tender, without masses  Rectal: deffered  Ext=no clubbing or cyanosis, no swelling      Pessary fitting    The appropriate size pessary (5) was fitted with the desired support result.  Pt was very comfortable with the device in place.  Pt was allowed to work thru multiple position changes and to make sure she was able to void prior to leaving.  With the placement being successful, she will follow up with us in 1-2 weeks. If at any time she has pain, severe bleeding, or is unable to void she is to seek immediate medical attention.  All questions answered.       Assessment  1.)  Vaginal prolapse  2.)  History of 12cm schwannoma removal  3.)   HTN      Plan  1.)  Pessary fitting=size 5 given/placed for patient   2.)  Follow-up in a few weeks  3.)  Follow-up with FP      Chronic condition progressively getting worse and pathology reviewed by different provider  Nursing notes read and reviewed    Lazara Mora DO

## 2023-12-13 NOTE — PATIENT INSTRUCTIONS
If you have labs or imaging done, the results will automatically release in mascotsecret without an interpretation.  Your health care professional will review those results and send an interpretation with recommendations as soon as possible, but this may be 1-3 business days.    If you have any questions regarding your visit, please contact your care team.     [employer] [facility]: [phone]  [facility] CLINIC HOURS TELEPHONE NUMBER     Lazara DO Ashlee Mora - Certified Medical Assistant    Sylvie Zamora -   Francesca -     Monday- [place]  8:00 a.m - 5:00 p.m    Tuesday- [place]  7:00 a.m - 5:00 p.m    Friday- [place]  9:00 a.m - 5:00 p.m.    Typical Surgery Day: Thursday [facility]  [address]  [place] [address]  Appointment [phone]  Fax: [phone]   Imaging Scheduling- [phone]    [facility]  [address] [place] [address] [place] [address]  [phone]    [address] [address]  Phone: [phone]  Fax: [phone]   Imaging Scheduling- [phone]       **Surgeries** Our Surgery Schedulers will contact you to schedule. If you do not receive a call within 3 business days, please call [phone].    Urgent Care locations:  [facility] Monday-Friday  10 am - 8 pm  Saturday and    9 am - 5 pm  Monday-Friday   10 am - 8 pm  Saturday and    9 am - 5 pm   [phone]     If you need a medication refill, please contact your pharmacy. Please allow 3 business days for your refill to be completed.  As always, Thank you for trusting us with your healthcare needs!    see additional instructions from your care team below

## 2023-12-13 NOTE — TELEPHONE ENCOUNTER
Rescheduled: Yes  Procedure: Lower Endoscopy [Colonoscopy]   Date: 1/31  Location: [facility]; [address] , [place] [address]  Surgeon: Long  Sedation Level Scheduled  mac,  Reason for Sedation Level on block  Prep/Instructions updated and sent: y       Send In - basket message to Panc - Sunny Pool if EUS  procedure is canceled or rescheduled: [ N/A, YES or NO] N/a

## 2023-12-13 NOTE — PROGRESS NOTES
Subjective  58 year old non-pregnant female presents today to discuss her vaginal prolapse.  She hasn't tried a pessary in over a year.  The size 4 was falling out.  The size 4 fell out.  No problems urinating.  Normal bowel movements.  Patient is somewhat sexually active.        I personally reviewed the ultrasound and the findings were ***.    I also reviewed notes from previous office visits by ***.    ROS: 10 point ROS neg other than the symptoms noted above in the HPI.  Past Medical History:   Diagnosis Date    ALLERGIC RHINITIS NOS 08/08/2002    Arthritis of right acromioclavicular joint 02/11/2021    Cervical high risk HPV (human papillomavirus) test positive 10/20/2020    CLASS MIGRAIN W/O MENTN INTRACTABLE 12/15/2004    Dysmenorrhea 03/13/2007    Headache     Headache     Headache     History of rabies vaccination     Lesion of plantar nerve     Buckley's neuroma/metatarsalgia    Malignant hyperthermia     NONSPECIFIC MEDICAL HISTORY     pseudocholinesterase deficiency    Premenstrual tension syndrome     Tension headache     Tobacco use disorder     UTERINE LEIOMYOMA NOS 04/26/2007     Past Surgical History:   Procedure Laterality Date    ARTHROTOMY SHOULDER, ROTATOR CUFF REPAIR, COMBINED Right 3/12/2021    Procedure: ARTHROTOMY, SHOULDER ([facility]), WITH ROTATOR CUFF REPAIR open;  Surgeon: César Talavera MD;  [facility]    CLOSE SECONDARY WOUND LOWER EXTREMITY Right 6/17/2022    Procedure: Delayed primary closure Right thigh wound;  Surgeon: Richard Katz MD;  [facility]    COLONOSCOPY  04/14/10    EXCISE MASS GROIN Right 4/19/2022    Procedure: Excision of Large Schwannoma of Right Retroperitoneum and Groin;  Surgeon: Richard Katz MD;  [facility]    IRRIGATION AND DEBRIDEMENT GROIN Right 5/20/2022    Procedure: Irrigation and debridement Right thigh and abdomen;  Surgeon: Richard Katz MD;  [facility]    REPAIR SCIATIC NERVE Right 4/19/2022    Procedure:  Decompression Neuroplasty of Right Femoral Nerve;  Surgeon: Richard Katz MD;  [facility]    VASCULAR REPAIR LOWER EXTREMITY N/A 2022    Procedure: Vascular Dissection of Iliac and Common Femoral Artery and Vein;  Surgeon: Richard Katz MD;  Location: [facility] OR    [facility] LIGATE FALLOPIAN TUBE,POSTPARTUM      Tubal Ligation    Z NONSPECIFIC PROCEDURE      jaw surgery    Z SUPRACERV ABD HYSTERECTOMY  2007     Family History   Problem Relation Age of Onset    Neurologic Disorder Mother         Meniere's disease    Anesthesia Reaction Mother         And myself    Osteoporosis Mother     Thyroid Disease Mother         hypo    Heart Disease Maternal Grandmother         stroke    Arthritis Maternal Grandmother         osteoporosis    Diabetes Maternal Grandmother         Type II diabetes    Coronary Artery Disease Maternal Grandmother 40    Cerebrovascular Disease Maternal Grandmother 40    Osteoporosis Maternal Grandmother     Heart Disease Maternal Grandfather         heart problems, s/p CAB    Cancer Maternal Grandfather         prostate     Diabetes Maternal Grandfather         Type II diabetes    Prostate Cancer Maternal Grandfather     Hypertension Father     Hyperlipidemia Father     Heart Disease Paternal Grandmother     Anesthesia Reaction Brother     Anesthesia Reaction Brother      Social History     Tobacco Use    Smoking status: Former     Packs/day: 0.50     Years: 27.00     Additional pack years: 0.00     Total pack years: 13.50     Types: Cigarettes     Quit date: 2007     Years since quittin.0     Passive exposure: Never    Smokeless tobacco: Never    Tobacco comments:     3-4 cig/day   Substance Use Topics    Alcohol use: Yes     Alcohol/week: 3.3 standard drinks of alcohol     Comment: weekends         Objective  Vitals: BP (!) 155/90 (BP Location: Right arm, Cuff Size: Adult Regular)   Wt 71.2 kg (157 lb)   LMP 2007 (Approximate)   BMI 26.95 kg/m    BMI=  Body mass index is 26.95 kg/m .    General appearance=well developed, well-nourished female  Gait=normal  Psych=mood is stable, alert and oriented x3  Abd=soft, Nontender/nondistended, no masses, no signs of hernias, no evidence of hepatosplenomegaly  PELVIC:    External genitalia: normal without lesions or masses  Urethral meatus: no lesions or prolapse noted, normal size  Urethra: no masses, non tender  Bladder: non tender, no fullness  Vagina: normal mucosa and rugae, no discharge.  Cervix: normal without lesion, no cervical motion tenderness, {CERVIX:3553}  Uterus: small, mobile, nontender.  Adnexa: non tender, without masses  Rectal: deffered  Ext=no clubbing or cyanosis, no swelling    Assessment    Plan      One undiagnosed new problem with uncertain prognosis and interpretation of ultrasound findings ordered by a different provider.  Acute, uncomplicated illness or injury and interpretation of ultrasound findings ordered by a different provider.  Nursing notes read and reviewed    Lazara Mora DO

## 2023-12-20 ENCOUNTER — OFFICE VISIT (OUTPATIENT)
Dept: FAMILY MEDICINE | Facility: CLINIC | Age: 58
End: 2023-12-20
Payer: COMMERCIAL

## 2023-12-20 VITALS
SYSTOLIC BLOOD PRESSURE: 150 MMHG | HEART RATE: 109 BPM | BODY MASS INDEX: 27.82 KG/M2 | RESPIRATION RATE: 16 BRPM | TEMPERATURE: 98 F | WEIGHT: 157 LBS | OXYGEN SATURATION: 99 % | DIASTOLIC BLOOD PRESSURE: 80 MMHG | HEIGHT: 63 IN

## 2023-12-20 DIAGNOSIS — Z13.0 SCREENING FOR DEFICIENCY ANEMIA: ICD-10-CM

## 2023-12-20 DIAGNOSIS — Z12.11 SCREEN FOR COLON CANCER: ICD-10-CM

## 2023-12-20 DIAGNOSIS — Z12.31 VISIT FOR SCREENING MAMMOGRAM: ICD-10-CM

## 2023-12-20 DIAGNOSIS — Z23 NEED FOR VACCINATION: ICD-10-CM

## 2023-12-20 DIAGNOSIS — R87.810 CERVICAL HIGH RISK HPV (HUMAN PAPILLOMAVIRUS) TEST POSITIVE: ICD-10-CM

## 2023-12-20 DIAGNOSIS — Z00.00 ROUTINE HISTORY AND PHYSICAL EXAMINATION OF ADULT: Primary | ICD-10-CM

## 2023-12-20 DIAGNOSIS — Z13.6 CARDIOVASCULAR SCREENING; LDL GOAL LESS THAN 160: ICD-10-CM

## 2023-12-20 DIAGNOSIS — Z13.1 SCREENING FOR DIABETES MELLITUS: ICD-10-CM

## 2023-12-20 DIAGNOSIS — Z12.4 CERVICAL CANCER SCREENING: ICD-10-CM

## 2023-12-20 DIAGNOSIS — G43.109 MIGRAINE WITH AURA AND WITHOUT STATUS MIGRAINOSUS, NOT INTRACTABLE: ICD-10-CM

## 2023-12-20 PROCEDURE — 99213 OFFICE O/P EST LOW 20 MIN: CPT | Mod: 25 | Performed by: PHYSICIAN ASSISTANT

## 2023-12-20 PROCEDURE — 87624 HPV HI-RISK TYP POOLED RSLT: CPT | Performed by: PHYSICIAN ASSISTANT

## 2023-12-20 PROCEDURE — 90471 IMMUNIZATION ADMIN: CPT | Performed by: PHYSICIAN ASSISTANT

## 2023-12-20 PROCEDURE — G0481 DRUG TEST DEF 8-14 CLASSES: HCPCS | Performed by: PHYSICIAN ASSISTANT

## 2023-12-20 PROCEDURE — 90715 TDAP VACCINE 7 YRS/> IM: CPT | Performed by: PHYSICIAN ASSISTANT

## 2023-12-20 PROCEDURE — 99396 PREV VISIT EST AGE 40-64: CPT | Mod: 25 | Performed by: PHYSICIAN ASSISTANT

## 2023-12-20 PROCEDURE — 88175 CYTOPATH C/V AUTO FLUID REDO: CPT | Performed by: PHYSICIAN ASSISTANT

## 2023-12-20 RX ORDER — SUMATRIPTAN 100 MG/1
TABLET, FILM COATED ORAL
Qty: 18 TABLET | Refills: 3 | Status: SHIPPED | OUTPATIENT
Start: 2023-12-20 | End: 2024-04-22

## 2023-12-20 ASSESSMENT — ENCOUNTER SYMPTOMS
HEARTBURN: 0
CHILLS: 0
HEMATURIA: 0
CONSTIPATION: 0
DIZZINESS: 0
BREAST MASS: 0
DIARRHEA: 0
SORE THROAT: 0
MYALGIAS: 0
EYE PAIN: 0
ABDOMINAL PAIN: 0
NAUSEA: 0
FEVER: 0
PALPITATIONS: 0
SHORTNESS OF BREATH: 0
COUGH: 0
DYSURIA: 0
ARTHRALGIAS: 0
NERVOUS/ANXIOUS: 0
HEMATOCHEZIA: 0
FREQUENCY: 0
PARESTHESIAS: 0
HEADACHES: 1
JOINT SWELLING: 0
WEAKNESS: 0

## 2023-12-20 ASSESSMENT — PATIENT HEALTH QUESTIONNAIRE - PHQ9
10. IF YOU CHECKED OFF ANY PROBLEMS, HOW DIFFICULT HAVE THESE PROBLEMS MADE IT FOR YOU TO DO YOUR WORK, TAKE CARE OF THINGS AT HOME, OR GET ALONG WITH OTHER PEOPLE: NOT DIFFICULT AT ALL
SUM OF ALL RESPONSES TO PHQ QUESTIONS 1-9: 1
SUM OF ALL RESPONSES TO PHQ QUESTIONS 1-9: 1

## 2023-12-20 ASSESSMENT — ANXIETY QUESTIONNAIRES
6. BECOMING EASILY ANNOYED OR IRRITABLE: NOT AT ALL
5. BEING SO RESTLESS THAT IT IS HARD TO SIT STILL: NOT AT ALL
GAD7 TOTAL SCORE: 0
4. TROUBLE RELAXING: NOT AT ALL
GAD7 TOTAL SCORE: 0
7. FEELING AFRAID AS IF SOMETHING AWFUL MIGHT HAPPEN: NOT AT ALL
IF YOU CHECKED OFF ANY PROBLEMS ON THIS QUESTIONNAIRE, HOW DIFFICULT HAVE THESE PROBLEMS MADE IT FOR YOU TO DO YOUR WORK, TAKE CARE OF THINGS AT HOME, OR GET ALONG WITH OTHER PEOPLE: NOT DIFFICULT AT ALL
3. WORRYING TOO MUCH ABOUT DIFFERENT THINGS: NOT AT ALL
2. NOT BEING ABLE TO STOP OR CONTROL WORRYING: NOT AT ALL
1. FEELING NERVOUS, ANXIOUS, OR ON EDGE: NOT AT ALL

## 2023-12-20 NOTE — PROGRESS NOTES
SUBJECTIVE:   Ophelia is a 58 year old, presenting for the following:  Physical        12/20/2023     4:07 PM   Additional Questions   Roomed by Maria Esther MARION CMA   Accompanied by Self         12/20/2023     4:07 PM   Patient Reported Additional Medications   Patient reports taking the following new medications n/a       Healthy Habits:     Getting at least 3 servings of Calcium per day:  Yes    Bi-annual eye exam:  Yes    Dental care twice a year:  Yes    Sleep apnea or symptoms of sleep apnea:  None    Diet:  Low salt, Low fat/cholesterol and Gluten-free/reduced    Frequency of exercise:  1 day/week    Duration of exercise:  Less than 15 minutes    Taking medications regularly:  Yes    Medication side effects:  None    Additional concerns today:  No      Today's PHQ-9 Score:       12/20/2023     8:14 AM   PHQ-9 SCORE   PHQ-9 Total Score MyChart 1 (Minimal depression)   PHQ-9 Total Score 1         Migraine   Since your last clinic visit, how have your headaches changed?  No change  How often are you getting headaches or migraines?  It is too difficult to say she may have several in a short timeframe and then none for several weeks to months  Are you able to do normal daily activities when you have a migraine? Yes if they are typical headaches if she has migraines and she catches them right away with use of her Imitrex she is able to do normal activities  Are you taking rescue/relief medications? (Select all that apply) sumatriptan (Imitrex)  How helpful is your rescue/relief medication?  I get total relief if she takes it early enough  Are you taking any medications to prevent migraines? (Select all that apply)  No  In the past 4 weeks, how often have you gone to urgent care or the emergency room because of your headaches?  0  We do have her on narcotic contract we give her 6 Norco to last 3 months.  At times if she catches her headache too late nothing gets them to go away other than an ER visit.  We have found that  rare occasional use of Norco keeps her out of the emergency room.  At    Social History     Tobacco Use    Smoking status: Former     Packs/day: 0.50     Years: 27.00     Additional pack years: 0.00     Total pack years: 13.50     Types: Cigarettes     Quit date: 2007     Years since quittin.0     Passive exposure: Never    Smokeless tobacco: Never    Tobacco comments:     3-4 cig/day   Substance Use Topics    Alcohol use: Yes     Alcohol/week: 3.3 standard drinks of alcohol     Comment: weekends             2023     8:19 AM   Alcohol Use   Prescreen: >3 drinks/day or >7 drinks/week? Yes   AUDIT SCORE  5         2023     8:19 AM   AUDIT - Alcohol Use Disorders Identification Test - Reproduced from the World Health Organization Audit 2001 (Second Edition)   1.  How often do you have a drink containing alcohol? 2 to 3 times a week   2.  How many drinks containing alcohol do you have on a typical day when you are drinking? 3 or 4   3.  How often do you have five or more drinks on one occasion? Less than monthly   4.  How often during the last year have you found that you were not able to stop drinking once you had started? Never   5.  How often during the last year have you failed to do what was normally expected of you because of drinking? Never   6.  How often during the last year have you needed a first drink in the morning to get yourself going after a heavy drinking session? Never   7.  How often during the last year have you had a feeling of guilt or remorse after drinking? Never   8.  How often during the last year have you been unable to remember what happened the night before because of your drinking? Never   9.  Have you or someone else been injured because of your drinking? No   10. Has a relative, friend, doctor or other health care worker been concerned about your drinking or suggested you cut down? No   TOTAL SCORE 5     Reviewed orders with patient.  Reviewed health maintenance and  updated orders accordingly - Yes  Labs reviewed in EPIC  BP Readings from Last 3 Encounters:   12/20/23 (!) 150/80   12/13/23 (!) 155/90   12/20/22 104/72    Wt Readings from Last 3 Encounters:   12/20/23 71.2 kg (157 lb)   12/13/23 71.2 kg (157 lb)   12/20/22 73.9 kg (163 lb)                  Patient Active Problem List   Diagnosis    Headache    Premenstrual tension syndrome    Allergic rhinitis    Migraine with aura    Constipation    Leiomyoma of uterus    pseudocholinesterase deficiency    Disturbance of skin sensation    CARDIOVASCULAR SCREENING; LDL GOAL LESS THAN 160    Abdominal bloating    Chronic abdominal pain    Heartburn symptom    Cervical high risk HPV (human papillomavirus) test positive    Traumatic complete tear of right rotator cuff    Arthritis of right acromioclavicular joint    Traumatic complete tear of right rotator cuff, initial encounter    Pseudocholinesterase deficiency    S/P complete repair of rotator cuff    Uterine prolapse    Presence of pessary    Schwannoma of nerve of lower extremity    Status post surgery     Past Surgical History:   Procedure Laterality Date    ARTHROTOMY SHOULDER, ROTATOR CUFF REPAIR, COMBINED Right 03/12/2021    Procedure: ARTHROTOMY, SHOULDER ([facility]), WITH ROTATOR CUFF REPAIR open;  Surgeon: César Talavera MD;  [facility]    BIOPSY  3/7/22    CLOSE SECONDARY WOUND LOWER EXTREMITY Right 06/17/2022    Procedure: Delayed primary closure Right thigh wound;  Surgeon: Richard Katz MD;  [facility]    COLONOSCOPY  04/14/2010    EXCISE MASS GROIN Right 04/19/2022    Procedure: Excision of Large Schwannoma of Right Retroperitoneum and Groin;  Surgeon: Richard Katz MD;  [facility]    IRRIGATION AND DEBRIDEMENT GROIN Right 05/20/2022    Procedure: Irrigation and debridement Right thigh and abdomen;  Surgeon: Richard Katz MD;  [facility]    REPAIR SCIATIC NERVE Right 04/19/2022    Procedure: Decompression Neuroplasty of Right  Femoral Nerve;  Surgeon: Richard Katz MD;  [facility]    VASCULAR REPAIR LOWER EXTREMITY N/A 2022    Procedure: Vascular Dissection of Iliac and Common Femoral Artery and Vein;  Surgeon: Richard Katz MD;  [facility]    ZC LIGATE FALLOPIAN TUBE,POSTPARTUM      Tubal Ligation    ZZC NONSPECIFIC PROCEDURE      jaw surgery    ZZC SUPRACERV ABD HYSTERECTOMY  2007       Social History     Tobacco Use    Smoking status: Former     Packs/day: 0.50     Years: 27.00     Additional pack years: 0.00     Total pack years: 13.50     Types: Cigarettes     Quit date: 2007     Years since quittin.0     Passive exposure: Never    Smokeless tobacco: Never    Tobacco comments:     3-4 cig/day   Substance Use Topics    Alcohol use: Yes     Alcohol/week: 3.3 standard drinks of alcohol     Comment: weekends     Family History   Problem Relation Age of Onset    Neurologic Disorder Mother         Meniere's disease    Anesthesia Reaction Mother         And myself    Osteoporosis Mother     Thyroid Disease Mother         hypo    Heart Disease Maternal Grandmother         stroke    Arthritis Maternal Grandmother         osteoporosis    Diabetes Maternal Grandmother         Type II diabetes    Coronary Artery Disease Maternal Grandmother 40    Cerebrovascular Disease Maternal Grandmother 40    Osteoporosis Maternal Grandmother     Heart Disease Maternal Grandfather         heart problems, s/p CAB    Cancer Maternal Grandfather         prostate     Diabetes Maternal Grandfather         Type II diabetes    Prostate Cancer Maternal Grandfather     Hypertension Father     Hyperlipidemia Father     Heart Disease Paternal Grandmother     Anesthesia Reaction Brother     Anesthesia Reaction Brother          Current Outpatient Medications   Medication Sig Dispense Refill    acetaminophen (TYLENOL) 325 MG tablet Take 2 tablets (650 mg) by mouth every 4 hours as needed for other 60 tablet 0    Ascorbic  Acid (VITAMIN C PO) Take 1 tablet by mouth daily      bisacodyl (DULCOLAX) 5 MG EC tablet 2 days prior to procedure, take 2 tablets at 4 pm. 1 day prior to procedure, take 2 tablets at 4 pm. For additional instructions refer to your colonoscopy prep instructions. 4 tablet 0    bisacodyl (DULCOLAX) 5 MG EC tablet 2 days prior to procedure, take 2 tablets at 4 pm. 1 day prior to procedure, take 2 tablets at 4 pm. For additional instructions refer to your colonoscopy prep instructions. 4 tablet 0    Calcium Carbonate-Vitamin D (CALCIUM + D PO) Take 1 tablet by mouth daily      famotidine (PEPCID) 20 MG tablet TAKE ONE TABLET BY MOUTH TWICE A DAY AS NEEDED FOR HEARTBURN 60 tablet 2    HYDROcodone-acetaminophen (NORCO) 5-325 MG tablet TAKE ONE TABLET BY MOUTH EVERY 8 HOURS AS NEEDED FOR MODERATE TO SEVERE PAIN *CAUTION: OPIOID. RISK OF OVERDOSE AND ADDICTION. 6 tablet 0    multivitamin, therapeutic (THERA-VIT) TABS tablet Take 1 tablet by mouth daily      ondansetron (ZOFRAN ODT) 4 MG ODT tab Take 1-2 tablets (4-8 mg) by mouth every 8 hours as needed for nausea 20 tablet 1    SUMAtriptan (IMITREX) 100 MG tablet TAKE 1 TABLET BY MOUTH AT ONSET OF MIGRAINE 18 tablet 3    triamcinolone (KENALOG) 0.1 % paste       VITAMIN D PO Take 1 tablet by mouth daily      polyethylene glycol (GOLYTELY) 236 g suspension 2 days prior at 5pm, mix and drink half of a jug of Golytely. Drink an 8 oz. glass of Golytely every 15 minutes until half of the jug is gone. Place remainder of Golytely in the refrigerator. 1 day prior at 5 pm, drink the 2nd half of a jug of Golytely bowel prep. 6 hours before your check-in time, drink an 8 oz. glass of Golytely every 15 minutes until half of the 2nd jug of Golytely is gone. Discard remainder of second jug. (Patient not taking: Reported on 12/20/2023) 8000 mL 0    polyethylene glycol (GOLYTELY) 236 g suspension 2 days prior at 5pm, mix and drink half of a jug of Golytely. Drink an 8 oz. glass of  Golytely every 15 minutes until half of the jug is gone. Place remainder of Golytely in the refrigerator. 1 day prior at 5 pm, drink the 2nd half of a jug of Golytely bowel prep. 6 hours before your check-in time, drink an 8 oz. glass of Golytely every 15 minutes until half of the 2nd jug of Golytely is gone. Discard remainder of second jug. (Patient not taking: Reported on 2023) 8000 mL 0     Allergies   Allergen Reactions    Amoxicillin Difficulty breathing    Anesthetic Ether      pseudocholinesterase deficiency    Doxycycline Headache       Breast Cancer Screenin/4/2022     9:46 AM   Breast CA Risk Assessment (FHS-7)   Do you have a family history of breast, colon, or ovarian cancer? No / Unknown         Mammogram Screening: Recommended mammography every 1-2 years with patient discussion and risk factor consideration  Pertinent mammograms are reviewed under the imaging tab.    History of abnormal Pap smear: YES - updated in Problem List and Health Maintenance accordingly  Last 3 Pap and HPV Results:       Latest Ref Rng & Units 10/20/2020     7:49 AM 10/20/2020     7:43 AM 2015     9:25 AM   PAP / HPV   PAP (Historical)   NIL     HPV 16 DNA NEG^Negative Negative   Negative    HPV 18 DNA NEG^Negative Positive   Negative    Other HR HPV NEG^Negative Negative   Negative          Latest Ref Rng & Units 10/20/2020     7:49 AM 10/20/2020     7:43 AM 2015     9:25 AM   PAP / HPV   PAP (Historical)   NIL     HPV 16 DNA NEG^Negative Negative   Negative    HPV 18 DNA NEG^Negative Positive   Negative    Other HR HPV NEG^Negative Negative   Negative      Reviewed and updated as needed this visit by clinical staff   Tobacco  Allergies  Meds              Reviewed and updated as needed this visit by Provider     Meds                 Review of Systems   Constitutional:  Negative for chills and fever.   HENT:  Negative for congestion, ear pain, hearing loss and sore throat.    Eyes:  Negative for  "pain and visual disturbance.   Respiratory:  Negative for cough and shortness of breath.    Cardiovascular:  Negative for chest pain, palpitations and peripheral edema.   Gastrointestinal:  Negative for abdominal pain, constipation, diarrhea, heartburn, hematochezia and nausea.   Breasts:  Negative for tenderness, breast mass and discharge.   Genitourinary:  Negative for dysuria, frequency, genital sores, hematuria, pelvic pain, urgency, vaginal bleeding and vaginal discharge.   Musculoskeletal:  Negative for arthralgias, joint swelling and myalgias.   Skin:  Negative for rash.   Neurological:  Positive for headaches. Negative for dizziness, weakness and paresthesias.   Psychiatric/Behavioral:  Negative for mood changes. The patient is not nervous/anxious.      History of migraines as above  Also has a history of reflux, if she avoids red sauce she really does not need her famotidine routinely.     OBJECTIVE:   BP (!) 150/80   Pulse 109   Temp 98  F (36.7  C) (Temporal)   Resp 16   Ht 1.6 m (5' 3\")   Wt 71.2 kg (157 lb)   LMP 06/16/2007 (Approximate)   SpO2 99%   BMI 27.81 kg/m    Physical Exam  GENERAL APPEARANCE: healthy, alert and no distress  EYES: Eyes grossly normal to inspection, PERRL and conjunctivae and sclerae normal  HENT: ear canals and TM's normal, nose and mouth without ulcers or lesions, oropharynx clear and oral mucous membranes moist  NECK: no adenopathy, no asymmetry, masses, or scars and thyroid normal to palpation  RESP: lungs clear to auscultation - no rales, rhonchi or wheezes  BREAST: normal without masses, tenderness or nipple discharge and no palpable axillary masses or adenopathy  CV: regular rate and rhythm, normal S1 S2, no S3 or S4, no murmur, click or rub, no peripheral edema and peripheral pulses strong  ABDOMEN: soft, nontender, no hepatosplenomegaly, no masses and bowel sounds normal   (female): normal female external genitalia, normal urethral meatus, vaginal mucosal " atrophy noted, normal cervix, adnexae, and uterus without masses or abnormal discharge  MS: no musculoskeletal defects are noted and gait is age appropriate without ataxia  SKIN: no suspicious lesions or rashes  NEURO: Normal strength and tone, sensory exam grossly normal, mentation intact and speech normal  PSYCH: mentation appears normal and affect normal/bright    Diagnostic Test Results:  Labs reviewed in Epic  Results for orders placed or performed in visit on 12/20/23 (from the past 24 hour(s))   [id number] - Urine Drug Confirmation Panel (Comprehensive)    Narrative    The following orders were created for panel order [id number] - Urine Drug Confirmation Panel (Comprehensive).  Procedure                               Abnormality         Status                     ---------                               -----------         ------                     Urine Drug Confirmation ...[[id number]]                      In process                 Urine Creatinine for Marin...[[id number]]                      In process                   Please view results for these tests on the individual orders.       ASSESSMENT/PLAN:   (Z00.00) Routine history and physical examination of adult  (primary encounter diagnosis)  Comment: Patient has colonoscopy scheduled for January  Plan: Updating Pap today    (Z12.11) Screen for colon cancer  Comment:   Plan: Already scheduled for January    (Z12.4) Cervical cancer screening  Comment: Pending although she needs a diagnostic Pap due to previous positive HPV  Plan:     (Z13.6) CARDIOVASCULAR SCREENING; LDL GOAL LESS THAN 160  Comment:   Plan: Lipid panel reflex to direct LDL Fasting            (Z23) Need for vaccination  Comment:   Plan: TDAP 10-64Y (ADACEL,BOOSTRIX)        Updating declined the rest of her vaccines to include COVID, flu and shingles    (R87.810) Cervical high risk HPV (human papillomavirus) test positive  Comment: Pending  Plan: Pap diagnostic with HPV            (Z13.0) Screening  "for deficiency anemia  Comment:   Plan: CBC with platelets            (Z13.1) Screening for diabetes mellitus  Comment:   Plan: Comprehensive metabolic panel            (G43.109) Migraine with aura and without status migrainosus, not intractable  Comment: Stable, she may continue to have her 6 Norco for 90 days to keep her out of the ER, she uses this if she has failure from Imitrex  Plan: [id number] - Urine Drug Confirmation Panel         (Comprehensive), SUMAtriptan (IMITREX) 100 MG         tablet            (Z12.31) Visit for screening mammogram  Comment:   Plan: *MA Screening Digital Bilateral                  COUNSELING:  Reviewed preventive health counseling, as reflected in patient instructions      BMI:   Estimated body mass index is 27.81 kg/m  as calculated from the following:    Height as of this encounter: 1.6 m (5' 3\").    Weight as of this encounter: 71.2 kg (157 lb).   Weight management plan: Discussed healthy diet and exercise guidelines      She reports that she quit smoking about 16 years ago. Her smoking use included cigarettes. She has a 13.5 pack-year smoking history. She has never been exposed to tobacco smoke. She has never used smokeless tobacco.          Ssuannah Ray PA-C  [facility]  "

## 2023-12-20 NOTE — NURSING NOTE
Prior to immunization administration, verified patients identity using patient s name and date of birth. Please see Immunization Activity for additional information.     Screening Questionnaire for Adult Immunization    Are you sick today?   No   Do you have allergies to medications, food, a vaccine component or latex?   No   Have you ever had a serious reaction after receiving a vaccination?   No   Do you have a long-term health problem with heart, lung, kidney, or metabolic disease (e.g., diabetes), asthma, a blood disorder, no spleen, complement component deficiency, a cochlear implant, or a spinal fluid leak?  Are you on long-term aspirin therapy?   No   Do you have cancer, leukemia, HIV/AIDS, or any other immune system problem?   No   Do you have a parent, brother, or sister with an immune system problem?   No   In the past 3 months, have you taken medications that affect  your immune system, such as prednisone, other steroids, or anticancer drugs; drugs for the treatment of rheumatoid arthritis, Crohn s disease, or psoriasis; or have you had radiation treatments?   No   Have you had a seizure, or a brain or other nervous system problem?   No   During the past year, have you received a transfusion of blood or blood    products, or been given immune (gamma) globulin or antiviral drug?   No   For women: Are you pregnant or is there a chance you could become       pregnant during the next month?   No   Have you received any vaccinations in the past 4 weeks?   No     Immunization questionnaire answers were all negative.      Patient instructed to remain in clinic for 15 minutes afterwards, and to report any adverse reactions.     Screening performed by Maria Esther Ann MA on 12/20/2023 at 5:04 PM.

## 2023-12-20 NOTE — LETTER

## 2023-12-21 LAB — CREAT UR-MCNC: 45 MG/DL

## 2023-12-22 ENCOUNTER — TELEPHONE (OUTPATIENT)
Dept: OBGYN | Facility: CLINIC | Age: 58
End: 2023-12-22
Payer: COMMERCIAL

## 2023-12-22 NOTE — TELEPHONE ENCOUNTER
C/O mild vaginal spotting yesterday.  No spotting or bleeding today.     Had PAP on 12/20/2023 with FP provider.     Also reports being seen on 12/13/2023 for pessary fit & increased pessary size. 2 days later began to experience vaginal discomfort rates 4/10. Patient then took pessary out which relieved the discomfort.    Denies fever, vaginal discharge, foul odor or any new or worsening symptoms seen yearly physical and pap on 12/20/2023.     Advised ok to continue to monitor symptoms.  If any new or worsening symptoms please call FNA line after hours, on week-ends and holidays.   ED vaginal bleeding precautions given.    Follow up Appointment for pessary check advised & offered to schedule appt. Scheduled for soonest available 2/06/2023 with Dr. Mora.  Patient will also call scheduling in the mornings after 1/05/2024 to check for sooner appointments.    Ingrid LAMBERT RN

## 2023-12-27 LAB
BKR LAB AP GYN ADEQUACY: NORMAL
BKR LAB AP GYN INTERPRETATION: NORMAL
BKR LAB AP HPV REFLEX: NORMAL
BKR LAB AP PREVIOUS ABNL DX: NORMAL
BKR LAB AP PREVIOUS ABNORMAL: NORMAL
PATH REPORT.COMMENTS IMP SPEC: NORMAL
PATH REPORT.COMMENTS IMP SPEC: NORMAL
PATH REPORT.RELEVANT HX SPEC: NORMAL

## 2023-12-28 LAB
HUMAN PAPILLOMA VIRUS 16 DNA: NEGATIVE
HUMAN PAPILLOMA VIRUS 18 DNA: NEGATIVE
HUMAN PAPILLOMA VIRUS FINAL DIAGNOSIS: NORMAL
HUMAN PAPILLOMA VIRUS OTHER HR: NEGATIVE

## 2024-01-30 NOTE — H&P
[facility] Anesthesia Pre-op History and Physical    Ophelia Wolf MRN# [id number]   Age: 58 year old YOB: 1965      Date of Surgery: 1/31/2024 Location [facility]      Date of Exam 1/31/2024 Facility (In hospital)       Home clinic: [facility]  Primary care provider: Susannah Ray         Chief Complaint and/or Reason for Procedure:   No chief complaint on file.  Colonoscopy  Exam 2010       Active problem list:     Patient Active Problem List    Diagnosis Date Noted    Status post surgery 05/20/2022     Priority: Medium    Schwannoma of nerve of lower extremity 04/19/2022     Priority: Medium    Presence of pessary 10/17/2021     Priority: Medium    Uterine prolapse 08/13/2021     Priority: Medium    S/P complete repair of rotator cuff 03/25/2021     Priority: Medium    Pseudocholinesterase deficiency 03/08/2021     Priority: Medium    Traumatic complete tear of right rotator cuff 02/11/2021     Priority: Medium    Arthritis of right acromioclavicular joint 02/11/2021     Priority: Medium    Traumatic complete tear of right rotator cuff, initial encounter 02/11/2021     Priority: Medium     Added automatically from request for surgery [id number]      Cervical high risk HPV (human papillomavirus) test positive 10/20/2020     Priority: Medium     2004, 2007 NIL paps.  2007 Supracervical hysterectomy  2015 NIL pap, Neg HPV  10/20/20 NIL pap, + HR HPV 18. Plan colp due bef 1/20/21.  6/7/21 Lost to follow-up for pap tracking  8/13/21 Wister bx: no KASSIE. Plan: cotest in 1 yr.   12/20/23 NIL Pap, Neg HR HPV. Plan: pending provider.         Heartburn symptom 09/13/2012     Priority: Medium    Abdominal bloating 12/06/2011     Priority: Medium    Chronic abdominal pain 12/06/2011     Priority: Medium    CARDIOVASCULAR SCREENING; LDL GOAL LESS THAN 160 10/31/2010     Priority: Medium    Disturbance of skin sensation 05/06/2008     Priority: Medium     pseudocholinesterase deficiency      Priority: Medium     pseudocholinesterase deficiency      Leiomyoma of uterus 04/26/2007     Priority: Medium     Problem list name updated by automated process. Provider to review      Constipation 03/13/2007     Priority: Medium     Problem list name updated by automated process. Provider to review      Migraine with aura 12/15/2004     Priority: Medium         Patient is followed by [facility] for ongoing prescription of narcotic pain medicine.  Med: hydrocodone acetaminophen 5/325 .   Maximum use per month: 30 per 3 months, changed plans-- 4 for 60 -90 days August 11, 2016   Expected duration: chronic  Narcotic agreement on file: YES  Clinic visit recommended: Q 6  months      Problem list name updated by automated process. Provider to review        Allergic rhinitis 08/08/2002     Priority: Medium     Problem list name updated by automated process. Provider to review      Headache      Priority: Medium     likely migraine etiology  Problem list name updated by automated process. Provider to review      Premenstrual tension syndrome      Priority: Medium     bloating, irritability, breast tenderness  Problem list name updated by automated process. Provider to review              Medications (include herbals and vitamins):   Any Plavix use in the last 7 days? No     No current facility-administered medications for this encounter.     Current Outpatient Medications   Medication Sig    acetaminophen (TYLENOL) 325 MG tablet Take 2 tablets (650 mg) by mouth every 4 hours as needed for other    Ascorbic Acid (VITAMIN C PO) Take 1 tablet by mouth daily    bisacodyl (DULCOLAX) 5 MG EC tablet 2 days prior to procedure, take 2 tablets at 4 pm. 1 day prior to procedure, take 2 tablets at 4 pm. For additional instructions refer to your colonoscopy prep instructions.    bisacodyl (DULCOLAX) 5 MG EC tablet 2 days prior to procedure, take 2 tablets at 4 pm. 1 day prior to procedure,  take 2 tablets at 4 pm. For additional instructions refer to your colonoscopy prep instructions.    Calcium Carbonate-Vitamin D (CALCIUM + D PO) Take 1 tablet by mouth daily    famotidine (PEPCID) 20 MG tablet TAKE ONE TABLET BY MOUTH TWICE A DAY AS NEEDED FOR HEARTBURN    HYDROcodone-acetaminophen (NORCO) 5-325 MG tablet TAKE ONE TABLET BY MOUTH EVERY 8 HOURS AS NEEDED FOR MODERATE TO SEVERE PAIN *CAUTION: OPIOID. RISK OF OVERDOSE AND ADDICTION.    multivitamin, therapeutic (THERA-VIT) TABS tablet Take 1 tablet by mouth daily    ondansetron (ZOFRAN ODT) 4 MG ODT tab Take 1-2 tablets (4-8 mg) by mouth every 8 hours as needed for nausea    polyethylene glycol (GOLYTELY) 236 g suspension 2 days prior at 5pm, mix and drink half of a jug of Golytely. Drink an 8 oz. glass of Golytely every 15 minutes until half of the jug is gone. Place remainder of Golytely in the refrigerator. 1 day prior at 5 pm, drink the 2nd half of a jug of Golytely bowel prep. 6 hours before your check-in time, drink an 8 oz. glass of Golytely every 15 minutes until half of the 2nd jug of Golytely is gone. Discard remainder of second jug. (Patient not taking: Reported on 12/20/2023)    polyethylene glycol (GOLYTELY) 236 g suspension 2 days prior at 5pm, mix and drink half of a jug of Golytely. Drink an 8 oz. glass of Golytely every 15 minutes until half of the jug is gone. Place remainder of Golytely in the refrigerator. 1 day prior at 5 pm, drink the 2nd half of a jug of Golytely bowel prep. 6 hours before your check-in time, drink an 8 oz. glass of Golytely every 15 minutes until half of the 2nd jug of Golytely is gone. Discard remainder of second jug. (Patient not taking: Reported on 12/20/2023)    SUMAtriptan (IMITREX) 100 MG tablet TAKE 1 TABLET BY MOUTH AT ONSET OF MIGRAINE    triamcinolone (KENALOG) 0.1 % paste     VITAMIN D PO Take 1 tablet by mouth daily             Allergies:      Allergies   Allergen Reactions    Amoxicillin Difficulty  breathing    Anesthetic Ether      pseudocholinesterase deficiency    Doxycycline Headache     Allergy to Latex? No  Allergy to tape?   No  Intolerances:             Physical Exam:   All vitals have been reviewed  No data found.  No intake/output data recorded.  Lungs:   No increased work of breathing, good air exchange, clear to auscultation bilaterally, no crackles or wheezing     Cardiovascular:   Normal apical impulse, regular rate and rhythm, normal S1 and S2, no S3 or S4, and no murmur noted             Lab / Radiology Results:            Anesthetic risk and/or ASA classification:       Juan Brownlee MD

## 2024-01-31 ENCOUNTER — HOSPITAL ENCOUNTER (OUTPATIENT)
Facility: CLINIC | Age: 59
Discharge: HOME | End: 2024-01-31
Attending: INTERNAL MEDICINE | Admitting: INTERNAL MEDICINE
Payer: COMMERCIAL

## 2024-01-31 ENCOUNTER — ANESTHESIA EVENT (OUTPATIENT)
Dept: GASTROENTEROLOGY | Facility: CLINIC | Age: 59
End: 2024-01-31
Payer: COMMERCIAL

## 2024-01-31 ENCOUNTER — ANESTHESIA (OUTPATIENT)
Dept: GASTROENTEROLOGY | Facility: CLINIC | Age: 59
End: 2024-01-31
Payer: COMMERCIAL

## 2024-01-31 VITALS
WEIGHT: 157 LBS | DIASTOLIC BLOOD PRESSURE: 100 MMHG | HEART RATE: 77 BPM | RESPIRATION RATE: 16 BRPM | TEMPERATURE: 97.7 F | BODY MASS INDEX: 27.81 KG/M2 | OXYGEN SATURATION: 99 % | SYSTOLIC BLOOD PRESSURE: 183 MMHG

## 2024-01-31 LAB — COLONOSCOPY: NORMAL

## 2024-01-31 PROCEDURE — 88305 TISSUE EXAM BY PATHOLOGIST: CPT | Mod: 26

## 2024-01-31 PROCEDURE — 45385 COLONOSCOPY W/LESION REMOVAL: CPT | Mod: PT | Performed by: INTERNAL MEDICINE

## 2024-01-31 PROCEDURE — 258N000003 HC RX IP 258 OP 636: Performed by: NURSE ANESTHETIST, CERTIFIED REGISTERED

## 2024-01-31 PROCEDURE — 88305 TISSUE EXAM BY PATHOLOGIST: CPT | Mod: TC | Performed by: INTERNAL MEDICINE

## 2024-01-31 PROCEDURE — 370N000017 HC ANESTHESIA TECHNICAL FEE, PER MIN: Performed by: INTERNAL MEDICINE

## 2024-01-31 PROCEDURE — 45380 COLONOSCOPY AND BIOPSY: CPT | Performed by: INTERNAL MEDICINE

## 2024-01-31 PROCEDURE — 250N000013 HC RX MED GY IP 250 OP 250 PS 637: Performed by: NURSE ANESTHETIST, CERTIFIED REGISTERED

## 2024-01-31 PROCEDURE — 250N000011 HC RX IP 250 OP 636: Performed by: NURSE ANESTHETIST, CERTIFIED REGISTERED

## 2024-01-31 PROCEDURE — 250N000009 HC RX 250: Performed by: NURSE ANESTHETIST, CERTIFIED REGISTERED

## 2024-01-31 RX ORDER — SODIUM CHLORIDE, SODIUM LACTATE, POTASSIUM CHLORIDE, CALCIUM CHLORIDE 600; 310; 30; 20 MG/100ML; MG/100ML; MG/100ML; MG/100ML
INJECTION, SOLUTION INTRAVENOUS CONTINUOUS
Status: DISCONTINUED | OUTPATIENT
Start: 2024-01-31 | End: 2024-01-31 | Stop reason: HOSPADM

## 2024-01-31 RX ORDER — IBUPROFEN 600 MG/1
600 TABLET, FILM COATED ORAL ONCE
Status: COMPLETED | OUTPATIENT
Start: 2024-01-31 | End: 2024-01-31

## 2024-01-31 RX ORDER — PROPOFOL 10 MG/ML
INJECTION, EMULSION INTRAVENOUS PRN
Status: DISCONTINUED | OUTPATIENT
Start: 2024-01-31 | End: 2024-01-31

## 2024-01-31 RX ORDER — PROPOFOL 10 MG/ML
INJECTION, EMULSION INTRAVENOUS CONTINUOUS PRN
Status: DISCONTINUED | OUTPATIENT
Start: 2024-01-31 | End: 2024-01-31

## 2024-01-31 RX ORDER — LIDOCAINE HYDROCHLORIDE 10 MG/ML
INJECTION, SOLUTION INFILTRATION; PERINEURAL PRN
Status: DISCONTINUED | OUTPATIENT
Start: 2024-01-31 | End: 2024-01-31

## 2024-01-31 RX ORDER — LIDOCAINE 40 MG/G
CREAM TOPICAL
Status: DISCONTINUED | OUTPATIENT
Start: 2024-01-31 | End: 2024-01-31 | Stop reason: HOSPADM

## 2024-01-31 RX ORDER — ACETAMINOPHEN 325 MG/1
975 TABLET ORAL ONCE
Status: COMPLETED | OUTPATIENT
Start: 2024-01-31 | End: 2024-01-31

## 2024-01-31 RX ORDER — ONDANSETRON 2 MG/ML
4 INJECTION INTRAMUSCULAR; INTRAVENOUS EVERY 30 MIN PRN
Status: DISCONTINUED | OUTPATIENT
Start: 2024-01-31 | End: 2024-01-31 | Stop reason: HOSPADM

## 2024-01-31 RX ORDER — ONDANSETRON 4 MG/1
4 TABLET, ORALLY DISINTEGRATING ORAL EVERY 30 MIN PRN
Status: DISCONTINUED | OUTPATIENT
Start: 2024-01-31 | End: 2024-01-31 | Stop reason: HOSPADM

## 2024-01-31 RX ADMIN — IBUPROFEN 600 MG: 600 TABLET ORAL at 08:58

## 2024-01-31 RX ADMIN — PROPOFOL 50 MG: 10 INJECTION, EMULSION INTRAVENOUS at 08:20

## 2024-01-31 RX ADMIN — SODIUM CHLORIDE, POTASSIUM CHLORIDE, SODIUM LACTATE AND CALCIUM CHLORIDE: 600; 310; 30; 20 INJECTION, SOLUTION INTRAVENOUS at 08:16

## 2024-01-31 RX ADMIN — PROPOFOL 140 MCG/KG/MIN: 10 INJECTION, EMULSION INTRAVENOUS at 08:21

## 2024-01-31 RX ADMIN — LIDOCAINE HYDROCHLORIDE 50 MG: 10 INJECTION, SOLUTION INFILTRATION; PERINEURAL at 08:20

## 2024-01-31 RX ADMIN — ACETAMINOPHEN 975 MG: 325 TABLET, FILM COATED ORAL at 08:58

## 2024-01-31 RX ADMIN — PROPOFOL 50 MG: 10 INJECTION, EMULSION INTRAVENOUS at 08:21

## 2024-01-31 ASSESSMENT — ACTIVITIES OF DAILY LIVING (ADL): ADLS_ACUITY_SCORE: 35

## 2024-01-31 ASSESSMENT — LIFESTYLE VARIABLES: TOBACCO_USE: 1

## 2024-01-31 NOTE — ANESTHESIA PREPROCEDURE EVALUATION
Anesthesia Pre-Procedure Evaluation    Patient: Ophelia Wolf   MRN: [id number] : 1965        Procedure : Procedure(s):  Colonoscopy          Past Medical History:   Diagnosis Date    ALLERGIC RHINITIS NOS 2002    Arthritis of right acromioclavicular joint 2021    Cervical high risk HPV (human papillomavirus) test positive 10/20/2020    CLASS MIGRAIN W/O MENTN INTRACTABLE 12/15/2004    Dysmenorrhea 2007    Headache     Headache     Headache     History of rabies vaccination     Lesion of plantar nerve     Buckley's neuroma/metatarsalgia    Malignant hyperthermia     NONSPECIFIC MEDICAL HISTORY     pseudocholinesterase deficiency    Premenstrual tension syndrome     Tension headache     Tobacco use disorder     UTERINE LEIOMYOMA NOS 2007      Past Surgical History:   Procedure Laterality Date    ARTHROTOMY SHOULDER, ROTATOR CUFF REPAIR, COMBINED Right 2021    Procedure: ARTHROTOMY, SHOULDER (anatoly marmolejo), WITH ROTATOR CUFF REPAIR open;  Surgeon: César Talavera MD;  Location: [facility] OR    BIOPSY  3/7/22    CLOSE SECONDARY WOUND LOWER EXTREMITY Right 2022    Procedure: Delayed primary closure Right thigh wound;  Surgeon: Richard Katz MD;  Location: [facility] OR    COLONOSCOPY  2010    EXCISE MASS GROIN Right 2022    Procedure: Excision of Large Schwannoma of Right Retroperitoneum and Groin;  Surgeon: Richard Katz MD;  Location: [facility]    IRRIGATION AND DEBRIDEMENT GROIN Right 2022    Procedure: Irrigation and debridement Right thigh and abdomen;  Surgeon: Richard Katz MD;  Location: [facility]    REPAIR SCIATIC NERVE Right 2022    Procedure: Decompression Neuroplasty of Right Femoral Nerve;  Surgeon: Richard Katz MD;  [facility]    VASCULAR REPAIR LOWER EXTREMITY N/A 2022    Procedure: Vascular Dissection of Iliac and Common Femoral Artery and Vein;  Surgeon: Richard Katz MD;  Location: [facility]    ZZC LIGATE FALLOPIAN  TUBE,POSTPARTUM      Tubal Ligation    [facility] NONSPECIFIC PROCEDURE      jaw surgery    [facility] SUPRACERV ABD HYSTERECTOMY  2007      Allergies   Allergen Reactions    Amoxicillin Difficulty breathing    Anesthetic Ether      pseudocholinesterase deficiency    Doxycycline Headache      Social History     Tobacco Use    Smoking status: Former     Packs/day: 0.50     Years: 27.00     Additional pack years: 0.00     Total pack years: 13.50     Types: Cigarettes     Quit date: 2007     Years since quittin.1     Passive exposure: Never    Smokeless tobacco: Never    Tobacco comments:     3-4 cig/day   Substance Use Topics    Alcohol use: Yes     Alcohol/week: 3.3 standard drinks of alcohol     Comment: weekends      Wt Readings from Last 1 Encounters:   23 71.2 kg (157 lb)        Anesthesia Evaluation   Pt has had prior anesthetic. Type: General and Regional.    History of anesthetic complications (denies malignant hyperthermia)  - .  - pseudocholinesterase deficiency.    ROS/MED HX  ENT/Pulmonary:     (+)           allergic rhinitis,     tobacco use (quit ), Past use,                       Neurologic: Comment: - tumor- related femoral nerve dysesthesias     (+)      migraines (last with prior surgery),                          Cardiovascular: Comment: 138/88 on preop. Higher today - neg cardiovascular ROS   (+)  - -   -  - -                                 Previous cardiac testing (Coronary CTA (2015), negative forstenosis or plazue)     METS/Exercise Tolerance: >4 METS    Hematologic:  - neg hematologic  ROS     Musculoskeletal: Comment: - S/P R shoulder rotator cuff repair      GI/Hepatic:  - neg GI/hepatic ROS     Renal/Genitourinary:  - neg Renal ROS     Endo:  - neg endo ROS     Psychiatric/Substance Use:  - neg psychiatric ROS     Infectious Disease: Comment: Likely infected surgical wound      Malignancy: Comment: - Schwannoma of R retroperitoneum and groin      Other:  - neg  "other ROS          Physical Exam    Airway  airway exam normal      Mallampati: II   TM distance: > 3 FB   Neck ROM: full   Mouth opening: > 3 cm    Respiratory Devices and Support         Dental  no notable dental history     (+) Completely normal teeth      Cardiovascular   cardiovascular exam normal       Rhythm and rate: regular and normal     Pulmonary   pulmonary exam normal        breath sounds clear to auscultation           OUTSIDE LABS:  CBC:   Lab Results   Component Value Date    WBC 5.8 06/02/2022    WBC 4.1 05/24/2022    HGB 13.3 06/02/2022    HGB 11.3 (L) 05/24/2022    HCT 41.0 06/02/2022    HCT 35.0 05/24/2022     06/02/2022     05/24/2022     BMP:   Lab Results   Component Value Date     06/02/2022     05/24/2022    POTASSIUM 3.7 06/02/2022    POTASSIUM 4.0 05/24/2022    CHLORIDE 104 06/02/2022    CHLORIDE 105 05/24/2022    CO2 31 06/02/2022    CO2 34 (H) 05/24/2022    BUN 9 06/02/2022    BUN 11 05/24/2022    CR 0.58 06/02/2022    CR 0.61 05/24/2022     (H) 06/17/2022     (H) 06/02/2022     COAGS: No results found for: \"PTT\", \"INR\", \"FIBR\"  POC:   Lab Results   Component Value Date    HCG Negative 06/28/2007    HCGS Negative 05/22/2021     HEPATIC:   Lab Results   Component Value Date    ALBUMIN 3.6 06/02/2022    PROTTOTAL 7.2 06/02/2022    ALT 42 06/02/2022    AST 19 06/02/2022    ALKPHOS 75 06/02/2022    BILITOTAL 0.3 06/02/2022     OTHER:   Lab Results   Component Value Date    LACT 1.4 04/20/2022    A1C 4.8 01/27/2003    LYNNE 9.2 06/02/2022    MAG 2.0 05/22/2022    TSH 1.03 02/04/2015    T4 0.9 01/27/2003    CRP <2.9 06/02/2022    SED 11 12/06/2011       Anesthesia Plan    ASA Status:  2    NPO Status:  NPO Appropriate    Anesthesia Type: MAC.     - Reason for MAC: Deep or markedly invasive procedure (G8)   Induction: Intravenous.   Maintenance: TIVA.        Consents    Anesthesia Plan(s) and associated risks, benefits, and realistic alternatives discussed. " Questions answered and patient/representative(s) expressed understanding.     - Discussed:     - Discussed with:  Patient      - Extended Intubation/Ventilatory Support Discussed: No.      - Patient is DNR/DNI Status: No     Use of blood products discussed: No .     Postoperative Care    Pain management: Multi-modal analgesia.   PONV prophylaxis: Background Propofol Infusion     Comments:    Other Comments: The risks and benefits of anesthesia, and the alternatives where applicable, have been discussed with the patient, and they wish to proceed.             ABHI Covarrubias CRNA    I have reviewed the pertinent notes and labs in the chart from the past 30 days and (re)examined the patient.  Any updates or changes from those notes are reflected in this note.

## 2024-01-31 NOTE — ANESTHESIA POSTPROCEDURE EVALUATION
Patient: Ophelia Wolf    Procedure: Procedure(s):  COLONOSCOPY, WITH POLYPECTOMY       Anesthesia Type:  MAC    Note:  Disposition: Outpatient   Postop Pain Control: Uneventful            Sign Out: Well controlled pain   PONV: No   Neuro/Psych: Uneventful            Sign Out: Acceptable/Baseline neuro status   Airway/Respiratory: Uneventful            Sign Out: Acceptable/Baseline resp. status   CV/Hemodynamics: Uneventful            Sign Out: Acceptable CV status   Other NRE: NONE   DID A NON-ROUTINE EVENT OCCUR? No    Event details/Postop Comments:  Pt was happy with anesthesia care.  No complications.  I will follow up with the pt if needed.           Last vitals:  Vitals Value Taken Time   /103 01/31/24 0850   Temp     Pulse 83 01/31/24 0850   Resp     SpO2 99 % 01/31/24 0851   Vitals shown include unfiled device data.    Electronically Signed By: ABHI Covarrubias CRNA  January 31, 2024  8:51 AM

## 2024-01-31 NOTE — LETTER
"February 5, 2024      Ophelia SAFIA Maryann  [address]  [place] [facility] [place] [address]        Dear ,    We are writing to inform you of your test results.    Non-concerning polyp removed.  A repeat exam in 10 yrs is suggested.     Resulted Orders   Surgical Pathology Exam   Result Value Ref Range    Case Report       Surgical Pathology Report                         Case: [id number]                                  Authorizing Provider:  Juan Brownlee MD        Collected:           01/31/2024 08:36 AM          Ordering Location:     [facility]          Received:            01/31/2024 08:49 AM                                 [facility]                                                          Pathologist:           Rina Michael MD                                                           Specimen:    Rectum, Rectal Polyp                                                                       Final Diagnosis       Rectum, polyp, biopsy/polypectomy:   -Consistent with hyperplastic polyp (partly denuded mucosal surface precludes accurate assessment).  -Negative for diagnostic dysplasia or malignancy.      Clinical Information       Screening colonoscopy.  One 10 mm polyp in the rectum, removed piecemeal using a cold snare. Resected and retrieved.       Gross Description       A(1). Rectum, Rectal Polyp:  The specimen is received in formalin, labeled with the patient's name, medical record number and other identifying information designated \"rectal polyp\". It consists of tan-pink soft tissue fragments, 0.4-0.7 cm.  The largest fragment is inked black and sectioned.  Entirely submitted in 2 cassettes.([facility])      Microscopic Description       Microscopic examination is performed.  Deeper levels are obtained on block # A1 for further evaluation.        Performing Labs       The technical component of this testing was completed at [facility] " [facility]      Case Images         If you have any questions or concerns, please call the clinic at the number listed above.       Sincerely,      Juan Brownlee MD

## 2024-01-31 NOTE — ANESTHESIA CARE TRANSFER NOTE
Patient: Ophelia Wolf    Procedure: Procedure(s):  COLONOSCOPY, WITH POLYPECTOMY       Diagnosis: Screen for colon cancer [Z12.11]  Diagnosis Additional Information: No value filed.    Anesthesia Type:   MAC     Note:    Oropharynx: oropharynx clear of all foreign objects and spontaneously breathing  Level of Consciousness: drowsy  Oxygen Supplementation: room air    Independent Airway: airway patency satisfactory and stable  Dentition: dentition unchanged  Vital Signs Stable: post-procedure vital signs reviewed and stable  Report to RN Given: handoff report given  Patient transferred to: Phase II    Handoff Report: Identifed the Patient, Identified the Reponsible Provider, Reviewed the pertinent medical history, Discussed the surgical course, Reviewed Intra-OP anesthesia mangement and issues during anesthesia, Set expectations for post-procedure period and Allowed opportunity for questions and acknowledgement of understanding      Vitals:  Vitals Value Taken Time   /101 01/31/24 0846   Temp     Pulse 80 01/31/24 0846   Resp     SpO2 98 % 01/31/24 0850   Vitals shown include unfiled device data.    Electronically Signed By: ABHI Covarrubias CRNA  January 31, 2024  8:51 AM

## 2024-01-31 NOTE — DISCHARGE INSTRUCTIONS
[facility]    Home Care Following Endoscopy          Activity:  You have just undergone an endoscopic procedure usually performed with conscious sedation.  Do not work or operate machinery (including a car) for at least 12 hours.    I encourage you to walk and attempt to pass this air as soon as possible.    Diet:  Return to the diet you were on before your procedure but eat lightly for the first 12-24 hours.  Drink plenty of water.  Resume any regular medications unless otherwise advised by your physician.  Please begin any new medication prescribed as a result of your procedure as directed by your physician.   If you had any biopsy or polyp removed please refrain from aspirin or aspirin products for 2 days.  If on Coumadin please restart as instructed by your physician.   Pain:  You may take Tylenol as needed for pain.  Expected Recovery:  You can expect some mild abdominal fullness and/or discomfort due to the air used to inflate your intestinal tract.     Call Your Physician if You Have:    After Colonoscopy:  Worsening persisting abdominal pain which is worse with activity.  Fevers (>101 degrees F), chills or shakes.  Passage of continued blood with bowel movements.     Any questions or concerns about your recovery, please call [phone] or after hours [phone] ([phone]) Nurse Advice Line.    Follow-up Care:  You did have polyps/biopsy tissue sample(s) removed.  The polyps/biopsy tissue sample(s) will be sent to pathology.    You should receive letter in your My Chart from Dr. Brownlee with your results within 1-2 weeks. If you do not participate in My Chart a physical letter will come in the mail in 2-3 weeks.  Please call if you have not received a notification of your results.  If asked to return to clinic please make an appointment 1 week after your procedure.  Call [phone].

## 2024-02-01 ENCOUNTER — MYC REFILL (OUTPATIENT)
Dept: FAMILY MEDICINE | Facility: CLINIC | Age: 59
End: 2024-02-01
Payer: COMMERCIAL

## 2024-02-01 DIAGNOSIS — G43.109 MIGRAINE WITH AURA AND WITHOUT STATUS MIGRAINOSUS, NOT INTRACTABLE: ICD-10-CM

## 2024-02-02 RX ORDER — HYDROCODONE BITARTRATE AND ACETAMINOPHEN 5; 325 MG/1; MG/1
TABLET ORAL
Qty: 6 TABLET | Refills: 0 | Status: SHIPPED | OUTPATIENT
Start: 2024-02-02 | End: 2024-05-01

## 2024-02-27 ENCOUNTER — OFFICE VISIT (OUTPATIENT)
Dept: FAMILY MEDICINE | Facility: CLINIC | Age: 59
End: 2024-02-27
Payer: COMMERCIAL

## 2024-02-27 ENCOUNTER — MYC MEDICAL ADVICE (OUTPATIENT)
Dept: FAMILY MEDICINE | Facility: CLINIC | Age: 59
End: 2024-02-27

## 2024-02-27 ENCOUNTER — ANCILLARY PROCEDURE (OUTPATIENT)
Dept: GENERAL RADIOLOGY | Facility: CLINIC | Age: 59
End: 2024-02-27
Attending: PHYSICIAN ASSISTANT
Payer: COMMERCIAL

## 2024-02-27 VITALS
BODY MASS INDEX: 27.07 KG/M2 | SYSTOLIC BLOOD PRESSURE: 140 MMHG | WEIGHT: 152.8 LBS | HEIGHT: 63 IN | HEART RATE: 101 BPM | DIASTOLIC BLOOD PRESSURE: 86 MMHG | RESPIRATION RATE: 16 BRPM | OXYGEN SATURATION: 99 % | TEMPERATURE: 97.3 F

## 2024-02-27 DIAGNOSIS — M25.551 HIP PAIN, RIGHT: ICD-10-CM

## 2024-02-27 DIAGNOSIS — J01.90 ACUTE NON-RECURRENT SINUSITIS, UNSPECIFIED LOCATION: Primary | ICD-10-CM

## 2024-02-27 PROCEDURE — 99214 OFFICE O/P EST MOD 30 MIN: CPT | Performed by: PHYSICIAN ASSISTANT

## 2024-02-27 PROCEDURE — 72170 X-RAY EXAM OF PELVIS: CPT | Mod: TC | Performed by: RADIOLOGY

## 2024-02-27 RX ORDER — AZITHROMYCIN 250 MG/1
TABLET, FILM COATED ORAL
Qty: 6 TABLET | Refills: 0 | Status: SHIPPED | OUTPATIENT
Start: 2024-02-27 | End: 2024-03-03

## 2024-02-27 NOTE — PROGRESS NOTES
Assessment & Plan     Acute non-recurrent sinusitis, unspecified location  She will start with Flonase and over-the-counter remedies and Lois pot rinses.  If symptoms continue and she has had symptoms for 7 to 10 days she may start the antibiotic at that time.  Antibiotic was given to her today because she is going out of town later this week  - azithromycin (ZITHROMAX) 250 MG tablet; Take 2 tablets (500 mg) by mouth daily for 1 day, THEN 1 tablet (250 mg) daily for 4 days.    Hip pain, right  There is no pain per se more of a sensation that she thinks may be nerve related to her surgery.  We will wait for the official report from radiology, as long as they do not see any abnormality she will be reassured  - XR Pelvis 1/2 Views; Future      This chart documentation was completed in part with Dragon voice recognition software.  Documentation is reviewed after completion, however, some words and grammatical errors may remain.  Susannah Ray PA-C        Gilberto Jacinto is a 58 year old, presenting for the following health issues:  Musculoskeletal Problem        2/27/2024     7:20 AM   Additional Questions   Roomed by NICOLASA   Accompanied by self     History of Present Illness       Reason for visit:  Question for my hip, from the tumor surgery almost 2 years ago    She eats 0-1 servings of fruits and vegetables daily.She consumes 0 sweetened beverage(s) daily.She exercises with enough effort to increase her heart rate 9 or less minutes per day.  She exercises with enough effort to increase her heart rate 3 or less days per week.   She is taking medications regularly.     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  Ever since she has a different sensation when pressing on the right anterior iliac crest , it feels different to her.  She had the surgeon check this area a year ago but he told her he was not sure what he was checking for and she did not raise further concern.  Pt states that it  "may just be nerves that were irritated or damaged in the procedure that makes her feel like there is something abnormal here nonetheless she is concerned and would like to have an x-ray done today.    Acute Illness  Acute illness concerns: Sinus Problem  Onset/Duration: 4 days, she has a history of chronic sinus problems.  She will be flying to [place] next week and is concerned about having an infection while gone.  She has not tested for COVID but does have a COVID test at home  Symptoms:  Fever: No  Chills/Sweats: No  Headache (location?): YES  Sinus Pressure: YES, bilateral maxillary  Conjunctivitis:  No  Ear Pain: YES-   Rhinorrhea: YES  Congestion: YES  Sore Throat: YES- little  Cough: YES - little  Wheeze: No  Decreased Appetite: No  Nausea: No  Vomiting: No  Diarrhea: No  Dysuria/Freq.: No  Dysuria or Hematuria: No  Fatigue/Achiness: YES  Sick/Strep Exposure: No  Therapies tried and outcome: None            Review of Systems  As documented above       Objective    BP (!) 140/86   Pulse 101   Temp 97.3  F (36.3  C) (Temporal)   Resp 16   Ht 1.6 m (5' 3\")   Wt 69.3 kg (152 lb 12.8 oz)   LMP 06/16/2007 (Approximate)   SpO2 99%   BMI 27.07 kg/m    Body mass index is 27.07 kg/m .  Physical Exam   GENERAL: alert and no distress  HENT: ear canals and TM's normal, nose and mouth without ulcers or lesions  HENT: nasal mucosa edematous  and sinuses: maxillary tenderness on bilaterally  NECK: no adenopathy, no asymmetry, masses, or scars  RESP: lungs clear to auscultation - no rales, rhonchi or wheezes  CV: regular rate and rhythm, normal S1 S2, no S3 or S4, no murmur, click or rub, no peripheral edema  ABDOMEN: soft, nontender, no hepatosplenomegaly, no masses and bowel sounds normal  MS: no gross musculoskeletal defects noted, no edema  MS: Palpation of the bilateral iliac crests do not reveal any abnormalities or asymmetry, she does have some alteration of sensation when palpating over the anterior iliac " crest    Xray - Reviewed and interpreted by me.  Bilateral pelvis I do not see any asymmetry between her iliac crests or any concerning findings, perhaps a little SI joint degenerative changes noted otherwise we will wait for the reading by radiology        Signed Electronically by: Susannah Ray PA-C

## 2024-03-10 NOTE — TELEPHONE ENCOUNTER
Patient reviewed message on 10/16/2020 @ 2:31pm.  Sary Mejia CMA ([facility])    
Responded via [employer] message.    Jean Pierre Misrha RN, BSN    
x1

## 2024-04-22 DIAGNOSIS — G43.109 MIGRAINE WITH AURA AND WITHOUT STATUS MIGRAINOSUS, NOT INTRACTABLE: ICD-10-CM

## 2024-04-22 RX ORDER — SUMATRIPTAN 100 MG/1
TABLET, FILM COATED ORAL
Qty: 18 TABLET | Refills: 3 | Status: SHIPPED | OUTPATIENT
Start: 2024-04-22 | End: 2024-07-11

## 2024-05-01 ENCOUNTER — MYC REFILL (OUTPATIENT)
Dept: FAMILY MEDICINE | Facility: CLINIC | Age: 59
End: 2024-05-01
Payer: COMMERCIAL

## 2024-05-01 DIAGNOSIS — G43.109 MIGRAINE WITH AURA AND WITHOUT STATUS MIGRAINOSUS, NOT INTRACTABLE: ICD-10-CM

## 2024-05-02 RX ORDER — HYDROCODONE BITARTRATE AND ACETAMINOPHEN 5; 325 MG/1; MG/1
TABLET ORAL
Qty: 6 TABLET | Refills: 0 | Status: SHIPPED | OUTPATIENT
Start: 2024-05-02 | End: 2024-08-05

## 2024-07-11 DIAGNOSIS — G43.109 MIGRAINE WITH AURA AND WITHOUT STATUS MIGRAINOSUS, NOT INTRACTABLE: ICD-10-CM

## 2024-07-11 RX ORDER — SUMATRIPTAN 100 MG/1
TABLET, FILM COATED ORAL
Qty: 18 TABLET | Refills: 5 | Status: SHIPPED | OUTPATIENT
Start: 2024-07-11

## 2024-08-05 ENCOUNTER — MYC REFILL (OUTPATIENT)
Dept: FAMILY MEDICINE | Facility: CLINIC | Age: 59
End: 2024-08-05
Payer: COMMERCIAL

## 2024-08-05 DIAGNOSIS — G43.109 MIGRAINE WITH AURA AND WITHOUT STATUS MIGRAINOSUS, NOT INTRACTABLE: ICD-10-CM

## 2024-08-05 RX ORDER — HYDROCODONE BITARTRATE AND ACETAMINOPHEN 5; 325 MG/1; MG/1
1 TABLET ORAL EVERY 6 HOURS PRN
Qty: 6 TABLET | Refills: 0 | Status: SHIPPED | OUTPATIENT
Start: 2024-08-05

## 2024-10-28 ASSESSMENT — ANXIETY QUESTIONNAIRES
3. WORRYING TOO MUCH ABOUT DIFFERENT THINGS: NOT AT ALL
7. FEELING AFRAID AS IF SOMETHING AWFUL MIGHT HAPPEN: NOT AT ALL
6. BECOMING EASILY ANNOYED OR IRRITABLE: NOT AT ALL
2. NOT BEING ABLE TO STOP OR CONTROL WORRYING: NOT AT ALL
8. IF YOU CHECKED OFF ANY PROBLEMS, HOW DIFFICULT HAVE THESE MADE IT FOR YOU TO DO YOUR WORK, TAKE CARE OF THINGS AT HOME, OR GET ALONG WITH OTHER PEOPLE?: NOT DIFFICULT AT ALL
5. BEING SO RESTLESS THAT IT IS HARD TO SIT STILL: NOT AT ALL
7. FEELING AFRAID AS IF SOMETHING AWFUL MIGHT HAPPEN: NOT AT ALL
1. FEELING NERVOUS, ANXIOUS, OR ON EDGE: NOT AT ALL
GAD7 TOTAL SCORE: 0
4. TROUBLE RELAXING: NOT AT ALL
IF YOU CHECKED OFF ANY PROBLEMS ON THIS QUESTIONNAIRE, HOW DIFFICULT HAVE THESE PROBLEMS MADE IT FOR YOU TO DO YOUR WORK, TAKE CARE OF THINGS AT HOME, OR GET ALONG WITH OTHER PEOPLE: NOT DIFFICULT AT ALL
GAD7 TOTAL SCORE: 0
GAD7 TOTAL SCORE: 0

## 2024-10-28 ASSESSMENT — PATIENT HEALTH QUESTIONNAIRE - PHQ9
10. IF YOU CHECKED OFF ANY PROBLEMS, HOW DIFFICULT HAVE THESE PROBLEMS MADE IT FOR YOU TO DO YOUR WORK, TAKE CARE OF THINGS AT HOME, OR GET ALONG WITH OTHER PEOPLE: NOT DIFFICULT AT ALL
SUM OF ALL RESPONSES TO PHQ QUESTIONS 1-9: 0
SUM OF ALL RESPONSES TO PHQ QUESTIONS 1-9: 0

## 2024-10-29 ENCOUNTER — OFFICE VISIT (OUTPATIENT)
Dept: FAMILY MEDICINE | Facility: CLINIC | Age: 59
End: 2024-10-29
Payer: COMMERCIAL

## 2024-10-29 VITALS
OXYGEN SATURATION: 99 % | HEART RATE: 76 BPM | DIASTOLIC BLOOD PRESSURE: 84 MMHG | TEMPERATURE: 97.2 F | SYSTOLIC BLOOD PRESSURE: 130 MMHG | BODY MASS INDEX: 26.84 KG/M2 | RESPIRATION RATE: 16 BRPM | WEIGHT: 151.5 LBS | HEIGHT: 63 IN

## 2024-10-29 DIAGNOSIS — Z96.0 PRESENCE OF PESSARY: ICD-10-CM

## 2024-10-29 DIAGNOSIS — N89.8 VAGINAL DISCHARGE: Primary | ICD-10-CM

## 2024-10-29 DIAGNOSIS — B96.89 BV (BACTERIAL VAGINOSIS): ICD-10-CM

## 2024-10-29 DIAGNOSIS — R35.0 URINARY FREQUENCY: ICD-10-CM

## 2024-10-29 DIAGNOSIS — N76.0 BV (BACTERIAL VAGINOSIS): ICD-10-CM

## 2024-10-29 LAB
ALBUMIN UR-MCNC: NEGATIVE MG/DL
APPEARANCE UR: CLEAR
BILIRUB UR QL STRIP: NEGATIVE
CLUE CELLS: PRESENT
COLOR UR AUTO: YELLOW
GLUCOSE UR STRIP-MCNC: NEGATIVE MG/DL
HGB UR QL STRIP: NEGATIVE
KETONES UR STRIP-MCNC: NEGATIVE MG/DL
LEUKOCYTE ESTERASE UR QL STRIP: NEGATIVE
NITRATE UR QL: NEGATIVE
PH UR STRIP: 7 [PH] (ref 5–7)
SP GR UR STRIP: 1.01 (ref 1–1.03)
TRICHOMONAS, WET PREP: ABNORMAL
UROBILINOGEN UR STRIP-ACNC: 0.2 E.U./DL
WBC'S/HIGH POWER FIELD, WET PREP: ABNORMAL
YEAST, WET PREP: ABNORMAL

## 2024-10-29 PROCEDURE — 99459 PELVIC EXAMINATION: CPT | Performed by: PHYSICIAN ASSISTANT

## 2024-10-29 PROCEDURE — 87210 SMEAR WET MOUNT SALINE/INK: CPT | Performed by: PHYSICIAN ASSISTANT

## 2024-10-29 PROCEDURE — 99213 OFFICE O/P EST LOW 20 MIN: CPT | Performed by: PHYSICIAN ASSISTANT

## 2024-10-29 PROCEDURE — 81003 URINALYSIS AUTO W/O SCOPE: CPT | Performed by: PHYSICIAN ASSISTANT

## 2024-10-29 RX ORDER — METRONIDAZOLE 500 MG/1
500 TABLET ORAL 2 TIMES DAILY
Qty: 14 TABLET | Refills: 0 | Status: SHIPPED | OUTPATIENT
Start: 2024-10-29 | End: 2024-11-05

## 2024-10-29 ASSESSMENT — PAIN SCALES - GENERAL: PAINLEVEL_OUTOF10: NO PAIN (0)

## 2024-10-29 NOTE — PROGRESS NOTES
"  Assessment & Plan     Vaginal discharge  Now resolved per patient  - Wet prep - Clinic Collect    Urinary frequency  Negative UA this may be attributed to drinking plenty of fluids and the pressure of her vaginal prolapse  - UA Macroscopic with reflex to Microscopic and Culture - Lab Collect; Future  - UA Macroscopic with reflex to Microscopic and Culture - Lab Collect    Presence of pessary  She will ensure this is very clean and reinsert after treatment has been completed    BV (bacterial vaginosis)  Complete treatment as ordered, she will try to make a point of removing her pessary and washing it more routinely she will check with gynecology to see what they recommend for cleaning intervals  - metroNIDAZOLE (FLAGYL) 500 MG tablet; Take 1 tablet (500 mg) by mouth 2 times daily for 7 days.          BMI  Estimated body mass index is 26.68 kg/m  as calculated from the following:    Height as of this encounter: 1.605 m (5' 3.19\").    Weight as of this encounter: 68.7 kg (151 lb 8 oz).   Weight management plan: Discussed healthy diet and exercise guidelines    This chart documentation was completed in part with Dragon voice recognition software.  Documentation is reviewed after completion, however, some words and grammatical errors may remain.  Susannah Ray PA-C      Gilberto Jacinto is a 59 year old, presenting for the following health issues:  Vaginal Bleeding        10/29/2024     7:04 AM   Additional Questions   Roomed by Alix MENJIVAR   Accompanied by None         10/29/2024     7:04 AM   Patient Reported Additional Medications   Patient reports taking the following new medications NA     History of Present Illness       Reason for visit:  Check on pessary situation    She eats 0-1 servings of fruits and vegetables daily.She consumes 1 sweetened beverage(s) daily.She exercises with enough effort to increase her heart rate 10 to 19 minutes per day.  She exercises with enough effort to increase her heart rate 3 or " "less days per week.   She is taking medications regularly.           Menstrual Concern-      Onset/Duration: Patient is status post supracervical hysterectomy in 2007.  She has been continuing with her Pap smears.  Next Pap is due December 2026.  She has vaginal prolapse and is treated with a pessary.  She removes the pessary on occasion to clean it when she does so, she leaves it out for a couple of days.  She started to develop the brown discharge about a month ago.  She removed the pessary cleaning it and has not replaced it.  Thankfully all of her spotting and discharge have resolved but she was apprehensive to wait until December to see gynecology.  She does have some urinary frequency but does states she drinks a lot of water.  This is not necessarily unusual.  Description:   Duration of bleeding episodes: 1 MONTH    Frequency of periods: (1st day of one to 1st day of next):  every  day  Describe bleeding/flow: She noticed small specks of blood within the vaginal discharge this is now resolved.  Initially she did have a brownish discharge that was malodorous but that too has stopped.    Clots: No  Number of pads/day: 1        Cramping: NONE   Accompanying Signs & Symptoms:  Lightheadedness: No  Temperature intolerance: No  Nosebleeds/Easy bruising: No  Vaginal Discharge: No  Acne: No  Change in body hair: No  History:  Patient's last menstrual period was 06/16/2007 (approximate).  Previous normal periods: no   Contraceptive use: NO  Sexually active: No  Any bleeding after intercourse: No  Abnormal PAP Smears: YES- ONCE  Precipitating or alleviating factors: None  Therapies tried and outcome: None          Review of Systems  Constitutional, HEENT, cardiovascular, pulmonary, gi and gu systems are negative, except as otherwise noted.      Objective    BP (!) 132/90   Pulse 76   Temp 97.2  F (36.2  C) (Temporal)   Resp 16   Ht 1.605 m (5' 3.19\")   Wt 68.7 kg (151 lb 8 oz)   LMP 06/16/2007 (Approximate)   " SpO2 99%   BMI 26.68 kg/m    Body mass index is 26.68 kg/m .  Physical Exam   GENERAL: alert and no distress  NECK: no adenopathy, no asymmetry, masses, or scars  RESP: lungs clear to auscultation - no rales, rhonchi or wheezes  CV: regular rate and rhythm, normal S1 S2, no S3 or S4, no murmur, click or rub, no peripheral edema   ABDOMEN: soft, nontender, no hepatosplenomegaly, no masses and bowel sounds normal   (female): normal female external genitalia, normal urethral meatus , vaginal mucosal atrophy, and vaginal prolapse noted.  She has no areas of bleeding irritation or visible lesions vaginally or on her cervix.    Results for orders placed or performed in visit on 10/29/24   UA Macroscopic with reflex to Microscopic and Culture - Lab Collect     Status: Normal    Specimen: Urine, Clean Catch   Result Value Ref Range    Color Urine Yellow Colorless, Straw, Light Yellow, Yellow    Appearance Urine Clear Clear    Glucose Urine Negative Negative mg/dL    Bilirubin Urine Negative Negative    Ketones Urine Negative Negative mg/dL    Specific Gravity Urine 1.010 1.003 - 1.035    Blood Urine Negative Negative    pH Urine 7.0 5.0 - 7.0    Protein Albumin Urine Negative Negative mg/dL    Urobilinogen Urine 0.2 0.2, 1.0 E.U./dL    Nitrite Urine Negative Negative    Leukocyte Esterase Urine Negative Negative    Narrative    Microscopic not indicated   Wet prep - Clinic Collect     Status: Abnormal    Specimen: Vagina; Swab   Result Value Ref Range    Trichomonas Absent Absent    Yeast Absent Absent    Clue Cells Present (A) Absent    WBCs/high power field 2+ (A) None           Signed Electronically by: Susannah Ray PA-C

## 2024-11-05 DIAGNOSIS — G43.109 MIGRAINE WITH AURA AND WITHOUT STATUS MIGRAINOSUS, NOT INTRACTABLE: ICD-10-CM

## 2024-11-06 RX ORDER — HYDROCODONE BITARTRATE AND ACETAMINOPHEN 5; 325 MG/1; MG/1
1 TABLET ORAL EVERY 6 HOURS PRN
Qty: 6 TABLET | Refills: 0 | Status: SHIPPED | OUTPATIENT
Start: 2024-11-06

## 2024-11-11 ENCOUNTER — MYC MEDICAL ADVICE (OUTPATIENT)
Dept: FAMILY MEDICINE | Facility: CLINIC | Age: 59
End: 2024-11-11
Payer: COMMERCIAL

## 2024-11-11 NOTE — TELEPHONE ENCOUNTER
Patient Quality Outreach    Patient is due for the following:   Breast Cancer Screening - Mammogram      Topic Date Due    Hepatitis B Vaccine (1 of 3 - 19+ 3-dose series) Never done    Zoster (Shingles) Vaccine (1 of 2) Never done    Flu Vaccine (1) 09/01/2024    COVID-19 Vaccine (1 - 2024-25 season) Never done     Chronic Opioid Use -  Treatment Agreement (CSA)    Action(s) Taken:   No follow up needed at this time.    Type of outreach:    Sent [employer] message.  Send letter in 1 week if not read/completd.    Questions for provider review:    You are listed as patient's pain provider and patient has had 3 or more opioids in the last 12 months prescribed. Please review and add chronic diagnosis to problem list-either F11.2 or F11.9 so that the correct HEALTH MAINTENANCE items trigger for follow up if appropriate. Patient will need urine drug screen and CSA at next in clinic visit.                Albertina Barnes, [facility]  Chart routed to Care Team and provider.

## 2024-11-20 ENCOUNTER — PATIENT OUTREACH (OUTPATIENT)
Dept: CARE COORDINATION | Facility: CLINIC | Age: 59
End: 2024-11-20
Payer: COMMERCIAL

## 2024-12-04 ENCOUNTER — PATIENT OUTREACH (OUTPATIENT)
Dept: CARE COORDINATION | Facility: CLINIC | Age: 59
End: 2024-12-04
Payer: COMMERCIAL

## 2024-12-10 DIAGNOSIS — G43.109 MIGRAINE WITH AURA AND WITHOUT STATUS MIGRAINOSUS, NOT INTRACTABLE: ICD-10-CM

## 2024-12-10 RX ORDER — ONDANSETRON 4 MG/1
TABLET, ORALLY DISINTEGRATING ORAL
Qty: 20 TABLET | Refills: 1 | Status: SHIPPED | OUTPATIENT
Start: 2024-12-10

## 2024-12-28 ENCOUNTER — TELEPHONE (OUTPATIENT)
Dept: FAMILY MEDICINE | Facility: CLINIC | Age: 59
End: 2024-12-28
Payer: COMMERCIAL

## 2024-12-28 DIAGNOSIS — G43.109 MIGRAINE WITH AURA AND WITHOUT STATUS MIGRAINOSUS, NOT INTRACTABLE: ICD-10-CM

## 2024-12-30 ENCOUNTER — OFFICE VISIT (OUTPATIENT)
Dept: OBGYN | Facility: OTHER | Age: 59
End: 2024-12-30
Payer: COMMERCIAL

## 2024-12-30 VITALS
BODY MASS INDEX: 27.7 KG/M2 | DIASTOLIC BLOOD PRESSURE: 98 MMHG | OXYGEN SATURATION: 100 % | WEIGHT: 157.3 LBS | HEART RATE: 82 BPM | SYSTOLIC BLOOD PRESSURE: 147 MMHG

## 2024-12-30 DIAGNOSIS — N89.8 VAGINAL DISCHARGE: ICD-10-CM

## 2024-12-30 DIAGNOSIS — N95.2 VAGINAL ATROPHY: Primary | ICD-10-CM

## 2024-12-30 DIAGNOSIS — Z96.0 PRESENCE OF PESSARY: ICD-10-CM

## 2024-12-30 DIAGNOSIS — N93.9 VAGINAL SPOTTING: ICD-10-CM

## 2024-12-30 DIAGNOSIS — N81.4 UTERINE PROLAPSE: ICD-10-CM

## 2024-12-30 LAB
CLUE CELLS: ABNORMAL
TRICHOMONAS, WET PREP: ABNORMAL
WBC'S/HIGH POWER FIELD, WET PREP: ABNORMAL
YEAST, WET PREP: ABNORMAL

## 2024-12-30 PROCEDURE — 99214 OFFICE O/P EST MOD 30 MIN: CPT | Performed by: OBSTETRICS & GYNECOLOGY

## 2024-12-30 PROCEDURE — 99459 PELVIC EXAMINATION: CPT | Performed by: OBSTETRICS & GYNECOLOGY

## 2024-12-30 PROCEDURE — G2211 COMPLEX E/M VISIT ADD ON: HCPCS | Performed by: OBSTETRICS & GYNECOLOGY

## 2024-12-30 PROCEDURE — 87210 SMEAR WET MOUNT SALINE/INK: CPT | Performed by: OBSTETRICS & GYNECOLOGY

## 2024-12-30 RX ORDER — ESTRADIOL 0.1 MG/G
CREAM VAGINAL
Qty: 42.5 G | Refills: 0 | Status: SHIPPED | OUTPATIENT
Start: 2024-12-30

## 2024-12-30 RX ORDER — SUMATRIPTAN SUCCINATE 100 MG/1
TABLET ORAL
Qty: 18 TABLET | Refills: 0 | Status: SHIPPED | OUTPATIENT
Start: 2024-12-30

## 2024-12-30 NOTE — PROGRESS NOTES
Subjective  59 year old non-pregnant female presents today complaining of vaginal spotting.  Patient has been using a pessary for many years.  She did take time off from at a few years ago due to having a large 12 cm schwannoma removed from her thigh, but has been using it for the last year.  Patient states over the last few months she has noticed dark discharge with it in place.  She was seen by her primary care provider a few weeks ago and diagnosed with a yeast infection.  Patient is good about taking out her pessary and cleaning it approximately every 4 to 5 weeks.  She has noticed over the last few months she is wearing a panty liner due to an increase in discharge.  There is no odor or itching.  The discharge seems like a dark old blood.  No problems urinating except for when patient does not have the pessary in place she feels like she has to push in the cervix in order to void.  Patient does take a few days off of the pessary once she takes it out and cleans it.  No problems with bowel movements.  No blood with bowel movements.  She has not been sexually active in over a year.  Patient is using a size 4 pessary because the size 3 had fallen out in the past.      ROS: 10 point ROS neg other than the symptoms noted above in the HPI.  Past Medical History:   Diagnosis Date    ALLERGIC RHINITIS NOS 08/08/2002    Arthritis of right acromioclavicular joint 02/11/2021    Cervical high risk HPV (human papillomavirus) test positive 10/20/2020    CLASS MIGRAIN W/O MENTN INTRACTABLE 12/15/2004    Dysmenorrhea 03/13/2007    Headache     Headache     Headache     History of rabies vaccination     Lesion of plantar nerve     Buckley's neuroma/metatarsalgia    NONSPECIFIC MEDICAL HISTORY     pseudocholinesterase deficiency    Premenstrual tension syndrome     pseudocholinesterase deficiency     Tension headache     Tobacco use disorder     UTERINE LEIOMYOMA NOS 04/26/2007     Past Surgical History:   Procedure Laterality  Date    ARTHROTOMY SHOULDER, ROTATOR CUFF REPAIR, COMBINED Right 03/12/2021    Procedure: ARTHROTOMY, SHOULDER (anatoly marmolejo), WITH ROTATOR CUFF REPAIR open;  Surgeon: César Talavera MD;  Location: [facility] OR    BIOPSY  3/7/22    CLOSE SECONDARY WOUND LOWER EXTREMITY Right 06/17/2022    Procedure: Delayed primary closure Right thigh wound;  Surgeon: Richard Katz MD;  Location: [facility] OR    COLONOSCOPY  04/14/2010    COLONOSCOPY N/A 1/31/2024    Procedure: COLONOSCOPY, WITH POLYPECTOMY;  Surgeon: Juan Brownlee MD;  Location: [facility] GI    EXCISE MASS GROIN Right 04/19/2022    Procedure: Excision of Large Schwannoma of Right Retroperitoneum and Groin;  Surgeon: Richard Katz MD;  Location: [facility] OR    IRRIGATION AND DEBRIDEMENT GROIN Right 05/20/2022    Procedure: Irrigation and debridement Right thigh and abdomen;  Surgeon: Richard Katz MD;  Location[facility]    REPAIR SCIATIC NERVE Right 04/19/2022    Procedure: Decompression Neuroplasty of Right Femoral Nerve;  Surgeon: Richard Katz MD;  Location: [facility]    VASCULAR REPAIR LOWER EXTREMITY N/A 04/19/2022    Procedure: Vascular Dissection of Iliac and Common Femoral Artery and Vein;  Surgeon: Richard Katz MD;  Location: [facility]    ZZC LIGATE FALLOPIAN TUBE,POSTPARTUM  1990    Tubal Ligation    ZZC NONSPECIFIC PROCEDURE  1978    jaw surgery    ZZC SUPRACERV ABD HYSTERECTOMY  06/28/2007     Family History   Problem Relation Age of Onset    Neurologic Disorder Mother         Meniere's disease    Anesthesia Reaction Mother         And myself    Osteoporosis Mother     Thyroid Disease Mother         hypo    Heart Disease Maternal Grandmother         stroke    Arthritis Maternal Grandmother         osteoporosis    Diabetes Maternal Grandmother         Type II diabetes    Coronary Artery Disease Maternal Grandmother 40    Cerebrovascular Disease Maternal Grandmother 40    Osteoporosis Maternal Grandmother     Heart Disease Maternal Grandfather         heart problems,  s/p CAB    Cancer Maternal Grandfather         prostate     Diabetes Maternal Grandfather         Type II diabetes    Prostate Cancer Maternal Grandfather     Hypertension Father     Hyperlipidemia Father     Heart Disease Paternal Grandmother     Anesthesia Reaction Brother     Anesthesia Reaction Brother      Social History     Tobacco Use    Smoking status: Former     Current packs/day: 0.00     Average packs/day: 0.5 packs/day for 27.0 years (13.5 ttl pk-yrs)     Types: Cigarettes     Start date: 1980     Quit date: 2007     Years since quittin.0     Passive exposure: Never    Smokeless tobacco: Never    Tobacco comments:     3-4 cig/day   Substance Use Topics    Alcohol use: Yes     Alcohol/week: 3.3 standard drinks of alcohol     Comment: weekends         Objective  Vitals: BP (!) 147/98 (BP Location: Left arm, Patient Position: Sitting, Cuff Size: Adult Regular)   Pulse 82   Wt 71.4 kg (157 lb 4.8 oz)   LMP 2007 (Approximate)   SpO2 100%   BMI 27.70 kg/m    BMI= Body mass index is 27.7 kg/m .    General appearance=well developed, well-nourished female  Gait=normal  Psych=mood is stable, alert and oriented x3   PELVIC:    External genitalia: normal without lesions or masses  Urethral meatus: no lesions or prolapse noted, normal size  Urethra: no masses, non tender  Bladder: non tender, no fullness  Vagina: vulvovaginal pallor/erythema, decreased vulvovaginal secretions/lubrication, decreased elasticity, no discharge, erythema/ulcerations on both sides of cervix in fornices, no active bleeding, wet prep obtained  Cervix: normal without lesion, no cervical motion tenderness, healthy, multiparous, erythema around os, no active bleeding  Uterus: small, mobile, nontender, grade 3 prolapse  Adnexa: non tender, without masses  Rectal: deferred  Ext=no clubbing or cyanosis, no swelling      Procedure:  The pessary was placed.  Patient feels comfortable with the placement of it.         Assessment  1.)  Uterine prolapse  2.)  Erythema along vaginal side walls  3.)  Recent yeast infection      Plan  1.)  Place pessary  2.)  Vaginal estrogen ordered  3.)  Pelvic ultrasound ordered  4.)  Follow-up in 3 months      Acute, uncomplicated illness or injury and prescription ordered.  Nursing notes read and reviewed    Lazara Mora DO

## 2024-12-30 NOTE — PATIENT INSTRUCTIONS
If you have labs or imaging done, the results will automatically release in [employer] without an interpretation.  Your health care professional will review those results and send an interpretation with recommendations as soon as possible, but this may be 1-3 business days.    If you have any questions regarding your visit, please contact your care team.     [employer]: [phone]  [facility] CLINIC HOURS TELEPHONE NUMBER     Lazara DO Ashlee Mora - Certified Medical Assistant    Keyona Zamora-  Francesca-     Monday- [place]  8:00 a.m - 5:00 p.m    Tuesday- [place]  7:00 a.m - 5:00 p.m    Friday- [place]  9:00 a.m - 5:00 p.m.    Typical Surgery Day: Thursday [facility]  [address]  [place] [address]  Appointment [phone]  Fax: [phone]   Imaging Scheduling- [phone]    [facility]  [address] [place] [address] [place] [address]  [phone]    [address] [address]  Phone: [phone]  Fax: [phone]   Imaging Scheduling- [phone]       **Surgeries** Our Surgery Schedulers will contact you to schedule. If you do not receive a call within 3 business days, please call [phone]    Urgent Care locations:  [facility] Monday-Friday  10 am - 8 pm  Saturday and    9 am - 5 pm  Monday-Friday   10 am - 8 pm  Saturday and    9 am - 5 pm   [phone]     If you need a medication refill, please contact your pharmacy. Please allow 3 business days for your refill to be completed.  As always, Thank you for trusting us with your healthcare needs!    see additional instructions from your care team below

## 2025-01-22 ENCOUNTER — ANCILLARY PROCEDURE (OUTPATIENT)
Dept: ULTRASOUND IMAGING | Facility: OTHER | Age: 60
End: 2025-01-22
Attending: OBSTETRICS & GYNECOLOGY
Payer: COMMERCIAL

## 2025-01-22 DIAGNOSIS — N93.9 VAGINAL SPOTTING: ICD-10-CM

## 2025-01-22 PROCEDURE — 76856 US EXAM PELVIC COMPLETE: CPT | Mod: TC | Performed by: RADIOLOGY

## 2025-01-22 PROCEDURE — 76830 TRANSVAGINAL US NON-OB: CPT | Mod: TC | Performed by: RADIOLOGY

## 2025-01-25 ENCOUNTER — HEALTH MAINTENANCE LETTER (OUTPATIENT)
Age: 60
End: 2025-01-25

## 2025-01-26 NOTE — TELEPHONE ENCOUNTER
Placed in provider basket    Subjective   Patient ID: Bogdan is a 9 year old male.    Chief Complaint   Patient presents with    Fever    Congestion       HPI:  Bogdan is a 9yr boy who presents with concerns of congestion, fever and fatigue for 1 day.  (+) fever with Tmax 103.  No vomiting or diarrhea.  Normal PO intake.  Mother reports that he had COVID infection on .  Since then, Bogdan has been feeling well until yesterday.  (+) sick contacts at school.     Past Medical History:   Diagnosis Date    Bacterial pneumonia 2023 Urgent care outpatient tx amox    Chronic idiopathic constipation 2021    Influenza B 2020    Mild anemia 2017    Term  delivered vaginally, current hospitalization (CMD) 2016    Viral croup 10/13/2019       MEDICATIONS:  Current Outpatient Medications   Medication Sig    albuterol 108 (90 Base) MCG/ACT inhaler TAKE 1 TO 2 APPLICATION BY MOUTH EVERY 4 TO 6 HOURS AS NEEDED FOR - WHEEZING FOR 30 DAYS    Spacer/Aero-Holding Chambers (Compact Space Chamber/Lg Mask) Device [None received]    acetaminophen (TYLENOL) 160 MG/5ML solution Take 11.1 mLs by mouth every 6 hours as needed for Fever.     No current facility-administered medications for this visit.       ALLERGIES:  ALLERGIES:  No Known Allergies    PAST SURGICAL HISTORY:  No past surgical history on file.    FAMILY HISTORY:  Family History   Problem Relation Age of Onset    Patient is unaware of any medical problems Mother     Patient is unaware of any medical problems Father     Patient is unaware of any medical problems Maternal Grandmother     Patient is unaware of any medical problems Maternal Grandfather     Patient is unaware of any medical problems Paternal Grandmother     Patient is unaware of any medical problems Paternal Grandfather     Diabetes Neg Hx        SOCIAL HISTORY:  Social History     Tobacco Use    Smoking status: Never    Smokeless tobacco: Never         Review of Systems   All other  systems reviewed and are negative.      Visit Vitals  Temp 99.7 °F (37.6 °C)   Resp 20   Wt 29.7 kg (65 lb 7.6 oz)       Physical Exam  HENT:      Head: Normocephalic.      Right Ear: Tympanic membrane, ear canal and external ear normal.      Left Ear: Tympanic membrane, ear canal and external ear normal.      Nose: Congestion present.      Mouth/Throat:      Mouth: Mucous membranes are moist.      Pharynx: No oropharyngeal exudate or posterior oropharyngeal erythema.      Neck: Neck supple.   Eyes:      Extraocular Movements: Extraocular movements intact.      Pupils: Pupils are equal, round, and reactive to light.   Cardiovascular:      Rate and Rhythm: Normal rate and regular rhythm.      Pulses: Normal pulses.      Heart sounds: Normal heart sounds.   Pulmonary:      Effort: Pulmonary effort is normal.      Breath sounds: Normal breath sounds.   Abdominal:      General: Abdomen is flat. Bowel sounds are normal.      Palpations: Abdomen is soft.   Skin:     General: Skin is warm.      Capillary Refill: Capillary refill takes less than 2 seconds.   Neurological:      Mental Status: He is alert.         ASSESSMENT/ PLAN: 9yr boy with fever     1. Fever   Patient appears clinically stable and non-toxic appearing.  COVID/RSV/Flu PCR sent. Advised to continue supportive care.  Tylenol or Ibuprofen as needed for fever.  Anticipatory guidance given regarding further signs and symptoms to monitor for and indications when to return to clinic.  Mother displayed good understanding of plan of care.      FOLLOW-UP:  No follow-ups on file.     Plan of care and anticipatory guidance discussed with patient/parents at bedside.  Parents displayed good understanding of plan of care.    Nichole Fajardo MD

## 2025-02-05 DIAGNOSIS — R12 HEARTBURN SYMPTOM: ICD-10-CM

## 2025-02-05 RX ORDER — FAMOTIDINE 20 MG/1
TABLET, FILM COATED ORAL
Qty: 60 TABLET | Refills: 0 | Status: SHIPPED | OUTPATIENT
Start: 2025-02-05

## 2025-02-06 ENCOUNTER — MYC REFILL (OUTPATIENT)
Dept: FAMILY MEDICINE | Facility: CLINIC | Age: 60
End: 2025-02-06
Payer: COMMERCIAL

## 2025-02-06 DIAGNOSIS — G43.109 MIGRAINE WITH AURA AND WITHOUT STATUS MIGRAINOSUS, NOT INTRACTABLE: ICD-10-CM

## 2025-02-06 RX ORDER — HYDROCODONE BITARTRATE AND ACETAMINOPHEN 5; 325 MG/1; MG/1
1 TABLET ORAL EVERY 6 HOURS PRN
Qty: 6 TABLET | Refills: 0 | Status: SHIPPED | OUTPATIENT
Start: 2025-02-06

## 2025-02-13 ENCOUNTER — MYC MEDICAL ADVICE (OUTPATIENT)
Dept: FAMILY MEDICINE | Facility: CLINIC | Age: 60
End: 2025-02-13
Payer: COMMERCIAL

## 2025-02-13 NOTE — TELEPHONE ENCOUNTER
Patient Quality Outreach    Patient is due for the following:   Breast Cancer Screening - Mammogram  Physical Preventive Adult Physical      Topic Date Due    Hepatitis B Vaccine (1 of 3 - 19+ 3-dose series) Never done    Pneumococcal Vaccine (1 of 1 - PCV) Never done    Zoster (Shingles) Vaccine (1 of 2) Never done    Flu Vaccine (1) 09/01/2024    COVID-19 Vaccine (1 - 2024-25 season) Never done     Chronic Opioid Use -  Treatment Agreement (CSA) and Urine Drug Screen    Action(s) Taken:   Schedule a Adult Preventative    Type of outreach:    Sent [employer] message.  Call in 1 week if not read/completed; send letter in 2 weeks if not returned/read.     Questions for provider review:    Update CSA and UDS at next visit.           Albertina Barnes CMA  Chart routed to Care Team.

## 2025-02-13 NOTE — LETTER
[facility]  [id number], [address]  [place] [address]  Phone: [phone]  Fax: [phone]  February 20, 2025      Ophelia Wolf  [address]   [place] [address]      Dear Ophelia,    We care about your health and have reviewed your health plan including your medical conditions, medications, and lab results.  Based on this review, it is recommended that you follow up regarding the following health topic(s):  -Breast Cancer Screening  -Wellness (Physical) Visit   -Immunizations:       Health Maintenance Due   Topic Date Due    HEPATITIS B IMMUNIZATION (1 of 3 - 19+ 3-dose series) Never done    Pneumococcal Vaccine: 50+ Years (1 of 1 - PCV) Never done    ZOSTER IMMUNIZATION (1 of 2) Never done    INFLUENZA VACCINE (1) 09/01/2024    COVID-19 Vaccine (1 - 2024-25 season) Never done      We recommend you take the following action(s):  -schedule a WELLNESS (Physical) APPOINTMENT.  We will perform the following labs: none at this time.  -schedule a MAMMOGRAM which is due. Please disregard this reminder if you have had this exam elsewhere within the last 1-2 years please let us know so we can update your records.     Please call us at the [facility] [phone] (or use [employer]) to address the above recommendations.  [facility] scheduling number is [phone] or schedule in [employer] (self referral).    If you are no longer a patient with us please give us the name of the clinic and/or provider you have transferred your care to so we may update our records and we will no longer reach out to you.     Thank you for trusting [facility] and we appreciate the opportunity to serve you.  We look forward to supporting your healthcare needs in the future.     Healthy Regards,     Your Health Care Team at [facility]

## 2025-02-20 NOTE — TELEPHONE ENCOUNTER
Patient Quality Outreach Summary      Summary:    Patient is due/failing the following:   Breast Cancer Screening - Mammogram, Adult/Adolescent physical, date due: now , and   Health Maintenance Due   Topic    Hepatitis B Vaccine (1 of 3 - 19+ 3-dose series)    Flu Vaccine (1)       Type of outreach:    Sent letter.  Patient read mychart but did not schedule.    Questions for provider review:    Update CSA and UDS at next office visit                                                                                                                    Albertina Barnes CMA (AAMA)       Chart routed to none.

## 2025-05-03 ENCOUNTER — HEALTH MAINTENANCE LETTER (OUTPATIENT)
Age: 60
End: 2025-05-03

## 2025-05-19 DIAGNOSIS — G43.109 MIGRAINE WITH AURA AND WITHOUT STATUS MIGRAINOSUS, NOT INTRACTABLE: ICD-10-CM

## 2025-05-19 RX ORDER — HYDROCODONE BITARTRATE AND ACETAMINOPHEN 5; 325 MG/1; MG/1
1 TABLET ORAL EVERY 6 HOURS PRN
Qty: 6 TABLET | Refills: 0 | Status: SHIPPED | OUTPATIENT
Start: 2025-05-19

## 2025-06-02 ENCOUNTER — TELEPHONE (OUTPATIENT)
Dept: FAMILY MEDICINE | Facility: CLINIC | Age: 60
End: 2025-06-02
Payer: COMMERCIAL

## 2025-06-02 DIAGNOSIS — G43.109 MIGRAINE WITH AURA AND WITHOUT STATUS MIGRAINOSUS, NOT INTRACTABLE: ICD-10-CM

## 2025-06-03 ENCOUNTER — MYC MEDICAL ADVICE (OUTPATIENT)
Dept: FAMILY MEDICINE | Facility: CLINIC | Age: 60
End: 2025-06-03
Payer: COMMERCIAL

## 2025-06-03 RX ORDER — SUMATRIPTAN SUCCINATE 100 MG/1
TABLET ORAL
Qty: 18 TABLET | Refills: 0 | Status: SHIPPED | OUTPATIENT
Start: 2025-06-03

## 2025-06-03 NOTE — TELEPHONE ENCOUNTER
Patient Quality Outreach    Patient is due for the following:   Breast Cancer Screening - Mammogram  Physical Preventive Adult Physical      Topic Date Due    Hepatitis B Vaccine (1 of 3 - 19+ 3-dose series) Never done    Pneumococcal Vaccine (1 of 1 - PCV) Never done    Zoster (Shingles) Vaccine (1 of 2) Never done    COVID-19 Vaccine (1 - 2024-25 season) Never done     Chronic Opioid Use -  Treatment Agreement (CSA) and Urine Drug Screen    Action(s) Taken:   Schedule a Adult Preventative    Type of outreach:    Sent [employer] message.    Questions for provider review:    Update CSA & U DS at next office visit and update problem list with chronic diagnosis          Albertina Barnes, DANNY  Chart routed to Care Team.

## 2025-06-03 NOTE — TELEPHONE ENCOUNTER
Staff sent [facility] message to notify patient of required appointment for further medication refills.

## 2025-06-05 NOTE — TELEPHONE ENCOUNTER
Appointment scheduled.    Next 5 appointments (look out 90 days)      Jun 30, 2025 1:00 PM  (Arrive by 12:50 PM)  Provider Visit with Susannah Ray PA-C  [facility] ([facility]) [address], [address]  [facility] [id number] [phone]

## 2025-06-18 ENCOUNTER — PATIENT OUTREACH (OUTPATIENT)
Dept: CARE COORDINATION | Facility: CLINIC | Age: 60
End: 2025-06-18
Payer: COMMERCIAL

## 2025-07-05 DIAGNOSIS — R12 HEARTBURN SYMPTOM: ICD-10-CM

## 2025-07-07 RX ORDER — FAMOTIDINE 20 MG/1
TABLET, FILM COATED ORAL
Qty: 60 TABLET | Refills: 0 | OUTPATIENT
Start: 2025-07-07

## 2025-07-28 ENCOUNTER — TELEPHONE (OUTPATIENT)
Dept: FAMILY MEDICINE | Facility: CLINIC | Age: 60
End: 2025-07-28

## 2025-07-28 ENCOUNTER — VIRTUAL VISIT (OUTPATIENT)
Dept: FAMILY MEDICINE | Facility: CLINIC | Age: 60
End: 2025-07-28
Payer: COMMERCIAL

## 2025-07-28 DIAGNOSIS — G43.109 MIGRAINE WITH AURA AND WITHOUT STATUS MIGRAINOSUS, NOT INTRACTABLE: ICD-10-CM

## 2025-07-28 DIAGNOSIS — R12 HEARTBURN SYMPTOM: ICD-10-CM

## 2025-07-28 PROCEDURE — 98005 SYNCH AUDIO-VIDEO EST LOW 20: CPT | Performed by: PHYSICIAN ASSISTANT

## 2025-07-28 RX ORDER — FAMOTIDINE 20 MG/1
20 TABLET, FILM COATED ORAL 2 TIMES DAILY PRN
Qty: 60 TABLET | Refills: 1 | Status: SHIPPED | OUTPATIENT
Start: 2025-07-28

## 2025-07-28 RX ORDER — SUMATRIPTAN SUCCINATE 100 MG/1
TABLET ORAL
Qty: 12 TABLET | Refills: 5 | Status: SHIPPED | OUTPATIENT
Start: 2025-07-28

## 2025-07-28 RX ORDER — SUMATRIPTAN SUCCINATE 100 MG/1
TABLET ORAL
Qty: 12 TABLET | Refills: 5 | Status: SHIPPED | OUTPATIENT
Start: 2025-07-28 | End: 2025-07-28

## 2025-07-28 RX ORDER — ONDANSETRON 4 MG/1
4 TABLET, ORALLY DISINTEGRATING ORAL EVERY 8 HOURS PRN
Qty: 20 TABLET | Refills: 1 | Status: SHIPPED | OUTPATIENT
Start: 2025-07-28

## 2025-07-28 NOTE — LETTER

## 2025-07-28 NOTE — PROGRESS NOTES
"Ophelia is a 60 year old who is being evaluated via a billable video visit.    How would you like to obtain your AVS? MyChart  If the video visit is dropped, the invitation should be resent by: Text to cell phone: [phone]  Will anyone else be joining your video visit? No      Assessment & Plan     Migraine with aura and without status migrainosus, not intractable  Stable, continue current meds she will limit use of sumatriptan to only when necessary she uses Norco 6 for every 90 days.  Controlled substance agreement mailed to patient she will sign and return she is also due for urine drug screen which she will make a lab appointment to complete at some point  - SUMAtriptan (IMITREX) 100 MG tablet; TAKE ONE TABLET BY MOUTH AT ONSET OF MIGRAINE  - Urine Drug Screen Clinic; Future  - ondansetron (ZOFRAN ODT) 4 MG ODT tab; Take 1 tablet (4 mg) by mouth every 8 hours as needed for nausea.    Heartburn symptom  Continue to use as needed dosage discussed the safety of this med and the fact that she can use it more routinely necessary  - famotidine (PEPCID) 20 MG tablet; Take 1 tablet (20 mg) by mouth 2 times daily as needed (heartburn).    BMI  Estimated body mass index is 27.7 kg/m  as calculated from the following:    Height as of 10/29/24: 1.605 m (5' 3.19\").    Weight as of 12/30/24: 71.4 kg (157 lb 4.8 oz).   Not addressed at virtual visit    This chart documentation was completed in part with Dragon voice recognition software.  Documentation is reviewed after completion, however, some words and grammatical errors may remain.  Susannah Ray PA-C      Subjective   Ophelia is a 60 year old, presenting for the following health issues:  No chief complaint on file.        10/29/2024     7:04 AM   Additional Questions   Roomed by Alix Lara by None     HPI      Migraine   Since your last clinic visit, how have your headaches changed?  No change  How often are you getting headaches or migraines?   Has not had a " full blown migraine in a long time, she can avoid them at times - imitrex resolves this  Are you able to do normal daily activities when you have a migraine? Yes as long as she takes sumatriptan  Are you taking rescue/relief medications? (Select all that apply) sumatriptan (Imitrex)  How helpful is your rescue/relief medication?  I get total relief  Are you taking any medications to prevent migraines? (Select all that apply)  No  In the past 4 weeks, how often have you gone to urgent care or the emergency room because of your headaches?  0        Review of Systems  Constitutional, HEENT, cardiovascular, pulmonary, gi and gu systems are negative, except as otherwise noted.      Objective           Vitals:  No vitals were obtained today due to virtual visit.    Physical Exam   GENERAL: alert and no distress  EYES: Eyes grossly normal to inspection.  No discharge or erythema, or obvious scleral/conjunctival abnormalities.  RESP: No audible wheeze, cough, or visible cyanosis.    SKIN: Visible skin clear. No significant rash, abnormal pigmentation or lesions.  NEURO: Cranial nerves grossly intact.  Mentation and speech appropriate for age.  PSYCH: Appropriate affect, tone, and pace of words          Video-Visit Details    Type of service:  Video Visit   Originating Location (pt. Location): Home    Distant Location (provider location):  On-site  Platform used for Video Visit: Sage  Signed Electronically by: Susannah Ray PA-C

## 2025-07-28 NOTE — LETTER
July 28, 2025      Ophelia Wolf  [address] [facility] [address]        Dear Ophelia,     Please sign the enclosed form and return it in the self-addressed stamped envelope provided. A copy is also included for you to keep. Please let us know if you have any questions.      Sincerely,        Susannah Ray PA-C    Electronically signed

## 2025-07-28 NOTE — TELEPHONE ENCOUNTER
Disp Refills Start End JESI   SUMAtriptan (IMITREX) 100 MG tablet 12 tablet 5 7/28/2025 -- No   Sig: TAKE ONE TABLET BY MOUTH AT ONSET OF MIGRAINE   Sent to pharmacy as: SUMAtriptan Succinate 100 MG Oral Tablet (IMITREX)   Class: E-Prescribe   Order: [id number]   E-Prescribing Status: Receipt confirmed by pharmacy (7/28/2025  7:45 AM CDT)         Pharmacy Message: We need frequency and max number per day. Thanks.

## 2025-08-25 ENCOUNTER — MYC REFILL (OUTPATIENT)
Dept: FAMILY MEDICINE | Facility: CLINIC | Age: 60
End: 2025-08-25
Payer: COMMERCIAL

## 2025-08-25 DIAGNOSIS — G43.109 MIGRAINE WITH AURA AND WITHOUT STATUS MIGRAINOSUS, NOT INTRACTABLE: ICD-10-CM

## 2025-08-25 RX ORDER — HYDROCODONE BITARTRATE AND ACETAMINOPHEN 5; 325 MG/1; MG/1
1 TABLET ORAL EVERY 6 HOURS PRN
Qty: 6 TABLET | Refills: 0 | Status: SHIPPED | OUTPATIENT
Start: 2025-08-25

## (undated) DEVICE — GLOVE PROTEXIS W/NEU-THERA 7.0  2D73TE70

## (undated) DEVICE — DRSG WOUND VAC GRANUFOAM MED SILVER M8275096/5

## (undated) DEVICE — GLOVE PROTEXIS BLUE W/NEU-THERA 8.5  2D73EB85

## (undated) DEVICE — DRSG ADAPTIC 3X8" 6113

## (undated) DEVICE — DRSG GAUZE 2X2" 8042

## (undated) DEVICE — BLADE KNIFE SURG 10 371110

## (undated) DEVICE — SU PROLENE 3-0 PS-2 18" 8687H

## (undated) DEVICE — SOL WATER IRRIG 1000ML BOTTLE 2F7114

## (undated) DEVICE — GLOVE PROTEXIS POWDER FREE 8.5 ORTHOPEDIC 2D73ET85

## (undated) DEVICE — STRAP KNEE/BODY 31143004

## (undated) DEVICE — TUBING SUCTION 6"X3/16" N56A

## (undated) DEVICE — PACK LOWER EXTREMITY RIVERSIDE SOP32LEFSX

## (undated) DEVICE — PREP DURAPREP REMOVER 4OZ 8611

## (undated) DEVICE — PREP SKIN SCRUB TRAY 4461A

## (undated) DEVICE — KIT ENDO TURNOVER/PROCEDURE CARRY-ON 101822

## (undated) DEVICE — Device

## (undated) DEVICE — PREP DURAPREP 26ML APL 8630

## (undated) DEVICE — DRAPE STOCKINETTE 8" 8586

## (undated) DEVICE — DRSG TELFA 3X8" 1238

## (undated) DEVICE — ESU GROUND PAD UNIVERSAL W/O CORD

## (undated) DEVICE — SYR 10ML LL W/O NDL 302995

## (undated) DEVICE — SPONGE SURGIFOAM 100 1974

## (undated) DEVICE — SUCTION MANIFOLD NEPTUNE 2 SYS 4 PORT 0702-020-000

## (undated) DEVICE — SPONGE LAP 18X18" X8435

## (undated) DEVICE — PACK EXTREMITY SOP15EXFSD

## (undated) DEVICE — GLOVE BIOGEL 7.5 LATEX

## (undated) DEVICE — LINEN GOWN OVERSIZE 5408

## (undated) DEVICE — SU ETHIBOND 1 CT-1 30" X425H

## (undated) DEVICE — DRAPE IOBAN INCISE 23X17" 6650EZ

## (undated) DEVICE — GOWN XLG DISP 9545

## (undated) DEVICE — DRAPE CONVERTORS U-DRAPE 60X72" 8476

## (undated) DEVICE — SU MONOCRYL 3-0 PS-1 27" Y936H

## (undated) DEVICE — ESU PENCIL W/HOLSTER

## (undated) DEVICE — PREP CHLORAPREP 26ML TINTED ORANGE  260815

## (undated) DEVICE — SU PDS II 3-0 PS-1 18" Z683G

## (undated) DEVICE — GOWN IMPERVIOUS SPECIALTY XLG/XLONG 32474

## (undated) DEVICE — DRSG STERI STRIP 1/2X4" R1547

## (undated) DEVICE — GLOVE PROTEXIS BLUE W/NEU-THERA 7.5  2D73EB75

## (undated) DEVICE — PAD CHUX UNDERPAD 30X36" P3036C

## (undated) DEVICE — GLOVE BIOGEL PI SZ 7.5 40875

## (undated) DEVICE — SNARE CAPIVATOR ROUND COLD SNR BX10 M00561101

## (undated) DEVICE — CAST PADDING 4" STERILE 9044S

## (undated) DEVICE — SUCTION FRAZIER 12FR W/HANDLE K73

## (undated) DEVICE — WIPES FOLEY CARE SURESTEP PROVON DFC100

## (undated) DEVICE — DRAPE STOCKINETTE IMPERVIOUS 12" 1587

## (undated) DEVICE — SU SILK 2-0 SH 30" K833H

## (undated) DEVICE — DRAPE MINOR PROCEDURE LAP 29496

## (undated) DEVICE — LINEN BACK PACK 5440

## (undated) DEVICE — DRSG ABDOMINAL 07 1/2X8" 7197D

## (undated) DEVICE — PREP POVIDONE-IODINE 7.5% SCRUB 4OZ BOTTLE MDS093945

## (undated) DEVICE — LIGHT HANDLE X2

## (undated) DEVICE — LINEN ORTHO PACK 5446

## (undated) DEVICE — DRAPE STERI TOWEL SM 1000

## (undated) DEVICE — SYR 10ML FINGER CONTROL W/O NDL 309695

## (undated) DEVICE — BNDG COBAN 4"X5YDS STERILE

## (undated) DEVICE — ENDO TRAP POLYP E-TRAP 00711099

## (undated) DEVICE — CLIP HORIZON MED BLUE 002200

## (undated) DEVICE — SU VICRYL 0 CT-1 27" J340H

## (undated) DEVICE — DRAIN JACKSON PRATT RESERVOIR 400ML SU130-1000

## (undated) DEVICE — SU ETHIBOND 1 CTX CR 8/18" CX30D

## (undated) DEVICE — DRAPE LAP W/ARMBOARD 29410

## (undated) DEVICE — COVER ULTRASOUND PROBE W/GEL FLEXI-FEEL 6"X58" LF  25-FF658

## (undated) DEVICE — ADH SKIN CLOSURE PREMIERPRO EXOFIN 1.0ML 3470

## (undated) DEVICE — LINEN GOWN X4 5410

## (undated) DEVICE — DRSG KERLIX 4 1/2"X4YDS ROLL 6730

## (undated) DEVICE — LINEN TOWEL PACK X5 5464

## (undated) DEVICE — SU VICRYL 2-0 SH 27" UND J417H

## (undated) DEVICE — GLOVE PROTEXIS W/NEU-THERA 7.5  2D73TE75

## (undated) DEVICE — SU VICRYL 1 CT-1 27" J341H

## (undated) DEVICE — CATH TRAY FOLEY SURESTEP 16FR WDRAIN BAG STLK LATEX A300316A

## (undated) DEVICE — SPECIMEN CONTAINER 5OZ STERILE 2600SA

## (undated) DEVICE — DRSG TEGADERM 4X4 3/4" 1626W

## (undated) DEVICE — SU MONOCRYL 3-0 PS-2 18" UND Y497G

## (undated) DEVICE — DRAPE CONVERTORS U-DRAPE 60X60" 8474

## (undated) DEVICE — GLOVE PROTEXIS BLUE W/NEU-THERA 8.0  2D73EB80

## (undated) DEVICE — SOL NACL 0.9% IRRIG 1000ML BOTTLE 2F7124

## (undated) DEVICE — ESU ELEC BLADE 6" COATED/INSULATED E1455-6

## (undated) DEVICE — KIT CULTURE TRANSPORT SYS A.C.T. II DUAL ANEROBE R124022

## (undated) DEVICE — SUCTION TIP YANKAUER STR K87

## (undated) DEVICE — SPONGE RAY-TEC 4X8" 7318

## (undated) DEVICE — DRSG TEGADERM ALGINATE AG 4X5" 90303

## (undated) DEVICE — ESU ELEC BLADE 2.75" COATED/INSULATED E1455

## (undated) DEVICE — SU ETHILON 3-0 PS-1 18" 1663H

## (undated) DEVICE — SU PDS II 0 CT-1 27" Z340H

## (undated) DEVICE — SU VICRYL 2-0 CT-1 27" UND J259H

## (undated) DEVICE — CLIP HORIZON SM RED WIDE SLOT 001201

## (undated) DEVICE — SU SILK 2-0 TIE 12X30" A305H

## (undated) DEVICE — STRAP STIRRUP W/SLIP 30187-030

## (undated) DEVICE — SU NDL MAYO TROCAR MED 217003

## (undated) DEVICE — ESU CORD BIPOLAR GREEN 10-4000

## (undated) DEVICE — DRAPE MAYO STAND 23X54 8337

## (undated) DEVICE — SU VICRYL 0 CTX 36" J370H

## (undated) DEVICE — SPECIMEN CONTAINER W/LID UNSTERILE 4OZ

## (undated) DEVICE — SOL WATER IRRIG 1000ML BOTTLE 07139-09

## (undated) DEVICE — PACK UNIVERSAL SPLIT 29131

## (undated) DEVICE — SU PROLENE 0 CT-1 30" 8424H

## (undated) DEVICE — SU SILK 2-0 PS 18" 1588H

## (undated) DEVICE — CANISTER WOUND VAC W/GEL 500ML M8275063/5

## (undated) DEVICE — DRSG PREVENA NEGATIVE PRESSURE WND 13CMX2  LF PRE1121US

## (undated) DEVICE — SU SILK 3-0 TIE 12X30" A304H

## (undated) DEVICE — FASTENER CATH STAYFIX 685ME

## (undated) DEVICE — DRAIN JACKSON PRATT ROUND W/TROCAR 15FR LF JP-HUR151

## (undated) DEVICE — IMM KIT SHOULDER STABILIZATION 7210573

## (undated) RX ORDER — DEXAMETHASONE SODIUM PHOSPHATE 10 MG/ML
INJECTION, SOLUTION INTRAMUSCULAR; INTRAVENOUS
Status: DISPENSED
Start: 2021-03-12

## (undated) RX ORDER — FENTANYL CITRATE-0.9 % NACL/PF 10 MCG/ML
PLASTIC BAG, INJECTION (ML) INTRAVENOUS
Status: DISPENSED
Start: 2022-05-20

## (undated) RX ORDER — PROPOFOL 10 MG/ML
INJECTION, EMULSION INTRAVENOUS
Status: DISPENSED
Start: 2021-03-12

## (undated) RX ORDER — IBUPROFEN 600 MG/1
TABLET, FILM COATED ORAL
Status: DISPENSED
Start: 2024-01-31

## (undated) RX ORDER — KETOROLAC TROMETHAMINE 30 MG/ML
INJECTION, SOLUTION INTRAMUSCULAR; INTRAVENOUS
Status: DISPENSED
Start: 2022-04-19

## (undated) RX ORDER — FENTANYL CITRATE 50 UG/ML
INJECTION, SOLUTION INTRAMUSCULAR; INTRAVENOUS
Status: DISPENSED
Start: 2022-06-17

## (undated) RX ORDER — BUPIVACAINE HYDROCHLORIDE AND EPINEPHRINE 2.5; 5 MG/ML; UG/ML
INJECTION, SOLUTION EPIDURAL; INFILTRATION; INTRACAUDAL; PERINEURAL
Status: DISPENSED
Start: 2022-06-17

## (undated) RX ORDER — LIDOCAINE HYDROCHLORIDE 20 MG/ML
INJECTION, SOLUTION EPIDURAL; INFILTRATION; INTRACAUDAL; PERINEURAL
Status: DISPENSED
Start: 2022-06-17

## (undated) RX ORDER — CLINDAMYCIN PHOSPHATE 900 MG/50ML
INJECTION, SOLUTION INTRAVENOUS
Status: DISPENSED
Start: 2022-06-17

## (undated) RX ORDER — BUPIVACAINE HYDROCHLORIDE AND EPINEPHRINE 2.5; 5 MG/ML; UG/ML
INJECTION, SOLUTION EPIDURAL; INFILTRATION; INTRACAUDAL; PERINEURAL
Status: DISPENSED
Start: 2021-03-12

## (undated) RX ORDER — ONDANSETRON 2 MG/ML
INJECTION INTRAMUSCULAR; INTRAVENOUS
Status: DISPENSED
Start: 2021-03-12

## (undated) RX ORDER — ACETAMINOPHEN 325 MG/1
TABLET ORAL
Status: DISPENSED
Start: 2022-05-20

## (undated) RX ORDER — OXYCODONE HYDROCHLORIDE 5 MG/1
TABLET ORAL
Status: DISPENSED
Start: 2022-06-17

## (undated) RX ORDER — ACETAMINOPHEN 325 MG/1
TABLET ORAL
Status: DISPENSED
Start: 2022-04-19

## (undated) RX ORDER — FENTANYL CITRATE-0.9 % NACL/PF 10 MCG/ML
PLASTIC BAG, INJECTION (ML) INTRAVENOUS
Status: DISPENSED
Start: 2021-03-12

## (undated) RX ORDER — LIDOCAINE HYDROCHLORIDE 10 MG/ML
INJECTION, SOLUTION INFILTRATION; PERINEURAL
Status: DISPENSED
Start: 2022-03-07

## (undated) RX ORDER — FENTANYL CITRATE 50 UG/ML
INJECTION, SOLUTION INTRAMUSCULAR; INTRAVENOUS
Status: DISPENSED
Start: 2022-05-20

## (undated) RX ORDER — LIDOCAINE HYDROCHLORIDE 20 MG/ML
INJECTION, SOLUTION EPIDURAL; INFILTRATION; INTRACAUDAL; PERINEURAL
Status: DISPENSED
Start: 2021-03-12

## (undated) RX ORDER — FENTANYL CITRATE 50 UG/ML
INJECTION, SOLUTION INTRAMUSCULAR; INTRAVENOUS
Status: DISPENSED
Start: 2021-03-12

## (undated) RX ORDER — FENTANYL CITRATE 50 UG/ML
INJECTION, SOLUTION INTRAMUSCULAR; INTRAVENOUS
Status: DISPENSED
Start: 2022-04-19

## (undated) RX ORDER — HYDROMORPHONE HYDROCHLORIDE 1 MG/ML
INJECTION, SOLUTION INTRAMUSCULAR; INTRAVENOUS; SUBCUTANEOUS
Status: DISPENSED
Start: 2022-04-19

## (undated) RX ORDER — CLINDAMYCIN PHOSPHATE 900 MG/50ML
INJECTION, SOLUTION INTRAVENOUS
Status: DISPENSED
Start: 2022-04-19

## (undated) RX ORDER — LIDOCAINE HYDROCHLORIDE 10 MG/ML
INJECTION, SOLUTION INFILTRATION; PERINEURAL
Status: DISPENSED
Start: 2022-06-13

## (undated) RX ORDER — ACETAMINOPHEN 325 MG/1
TABLET ORAL
Status: DISPENSED
Start: 2024-01-31

## (undated) RX ORDER — FENTANYL CITRATE-0.9 % NACL/PF 10 MCG/ML
PLASTIC BAG, INJECTION (ML) INTRAVENOUS
Status: DISPENSED
Start: 2022-06-17

## (undated) RX ORDER — DEXAMETHASONE SODIUM PHOSPHATE 4 MG/ML
INJECTION, SOLUTION INTRA-ARTICULAR; INTRALESIONAL; INTRAMUSCULAR; INTRAVENOUS; SOFT TISSUE
Status: DISPENSED
Start: 2022-06-17

## (undated) RX ORDER — ONDANSETRON 2 MG/ML
INJECTION INTRAMUSCULAR; INTRAVENOUS
Status: DISPENSED
Start: 2022-06-17

## (undated) RX ORDER — CEFAZOLIN SODIUM 1 G/3ML
INJECTION, POWDER, FOR SOLUTION INTRAMUSCULAR; INTRAVENOUS
Status: DISPENSED
Start: 2022-04-19

## (undated) RX ORDER — ACETAMINOPHEN 325 MG/1
TABLET ORAL
Status: DISPENSED
Start: 2022-06-17

## (undated) RX ORDER — CLINDAMYCIN PHOSPHATE 900 MG/50ML
INJECTION, SOLUTION INTRAVENOUS
Status: DISPENSED
Start: 2022-05-20

## (undated) RX ORDER — BUPIVACAINE HYDROCHLORIDE 5 MG/ML
INJECTION, SOLUTION EPIDURAL; INTRACAUDAL
Status: DISPENSED
Start: 2021-03-12

## (undated) RX ORDER — EPHEDRINE SULFATE 50 MG/ML
INJECTION, SOLUTION INTRAMUSCULAR; INTRAVENOUS; SUBCUTANEOUS
Status: DISPENSED
Start: 2022-05-20

## (undated) RX ORDER — ONDANSETRON 4 MG/1
TABLET, ORALLY DISINTEGRATING ORAL
Status: DISPENSED
Start: 2022-04-19

## (undated) RX ORDER — SODIUM CHLORIDE, SODIUM LACTATE, POTASSIUM CHLORIDE, CALCIUM CHLORIDE 600; 310; 30; 20 MG/100ML; MG/100ML; MG/100ML; MG/100ML
INJECTION, SOLUTION INTRAVENOUS
Status: DISPENSED
Start: 2022-05-20

## (undated) RX ORDER — GABAPENTIN 300 MG/1
CAPSULE ORAL
Status: DISPENSED
Start: 2022-04-19

## (undated) RX ORDER — PROPOFOL 10 MG/ML
INJECTION, EMULSION INTRAVENOUS
Status: DISPENSED
Start: 2022-06-17

## (undated) RX ORDER — GABAPENTIN 300 MG/1
CAPSULE ORAL
Status: DISPENSED
Start: 2022-05-20

## (undated) RX ORDER — OXYCODONE HYDROCHLORIDE 5 MG/1
TABLET ORAL
Status: DISPENSED
Start: 2022-04-19